# Patient Record
Sex: MALE | Race: WHITE | Employment: OTHER | URBAN - METROPOLITAN AREA
[De-identification: names, ages, dates, MRNs, and addresses within clinical notes are randomized per-mention and may not be internally consistent; named-entity substitution may affect disease eponyms.]

---

## 2016-11-08 LAB
CREATININE, EXTERNAL: 1.23
HBA1C MFR BLD HPLC: 6.8 %
LDL-C, EXTERNAL: 58
MICROALBUMIN UR TEST STR-MCNC: <3 MG/DL

## 2017-03-16 ENCOUNTER — OFFICE VISIT (OUTPATIENT)
Dept: INTERNAL MEDICINE CLINIC | Age: 67
End: 2017-03-16

## 2017-03-16 VITALS
SYSTOLIC BLOOD PRESSURE: 110 MMHG | RESPIRATION RATE: 16 BRPM | BODY MASS INDEX: 27.2 KG/M2 | DIASTOLIC BLOOD PRESSURE: 62 MMHG | HEART RATE: 75 BPM | OXYGEN SATURATION: 98 % | WEIGHT: 190 LBS | HEIGHT: 70 IN | TEMPERATURE: 97 F

## 2017-03-16 DIAGNOSIS — N52.9 ERECTILE DYSFUNCTION, UNSPECIFIED ERECTILE DYSFUNCTION TYPE: ICD-10-CM

## 2017-03-16 DIAGNOSIS — R10.31 RIGHT GROIN PAIN: ICD-10-CM

## 2017-03-16 DIAGNOSIS — E78.5 HYPERLIPIDEMIA WITH TARGET LDL LESS THAN 100: ICD-10-CM

## 2017-03-16 DIAGNOSIS — K21.9 GERD WITHOUT ESOPHAGITIS: ICD-10-CM

## 2017-03-16 DIAGNOSIS — I10 BENIGN ESSENTIAL HYPERTENSION: Primary | ICD-10-CM

## 2017-03-16 DIAGNOSIS — G47.33 SEVERE OBSTRUCTIVE SLEEP APNEA: ICD-10-CM

## 2017-03-16 RX ORDER — LOSARTAN POTASSIUM 25 MG/1
25 TABLET ORAL DAILY
Qty: 30 TAB | Refills: 2 | Status: SHIPPED | OUTPATIENT
Start: 2017-03-16 | End: 2018-01-19 | Stop reason: ALTCHOICE

## 2017-03-16 NOTE — PATIENT INSTRUCTIONS
Sleep Apnea: Care Instructions  Your Care Instructions  Sleep apnea means that you frequently stop breathing for 10 seconds or longer during sleep. It can be mild to severe, based on the number of times an hour that you stop breathing or have slowed breathing. Blocked or narrowed airways in your nose, mouth, or throat can cause sleep apnea. Your airway can become blocked when your throat muscles and tongue relax during sleep. You can treat sleep apnea at home by making lifestyle changes. You also can use a CPAP breathing machine that keeps tissues in the throat from blocking your airway. Or your doctor may suggest that you use a breathing device while you sleep. It helps keep your airway open. This could be a device that you put in your mouth. Other examples include strips or disks that you use on your nose. In some cases, surgery may be needed to remove enlarged tissues in the throat. Follow-up care is a key part of your treatment and safety. Be sure to make and go to all appointments, and call your doctor if you are having problems. It's also a good idea to know your test results and keep a list of the medicines you take. How can you care for yourself at home? · Lose weight, if needed. It may reduce the number of times you stop breathing or have slowed breathing. · Sleep on your side. It may stop mild apnea. If you tend to roll onto your back, sew a pocket in the back of your pajama top. Put a tennis ball into the pocket, and stitch the pocket shut. This will help keep you from sleeping on your back. · Avoid alcohol and medicines such as sleeping pills and sedatives before bed. · Do not smoke. Smoking can make sleep apnea worse. If you need help quitting, talk to your doctor about stop-smoking programs and medicines. These can increase your chances of quitting for good. · Prop up the head of your bed 4 to 6 inches by putting bricks under the legs of the bed.   · Treat breathing problems, such as a stuffy nose, caused by a cold or allergies. · Try a continuous positive airway pressure (CPAP) breathing machine if your doctor recommends it. The machine keeps your airway open when you sleep. · If CPAP does not work for you, ask your doctor if you can try other breathing machines. A bilevel positive airway pressure machine uses one type of air pressure for breathing in and another type for breathing out. Another device raises or lowers air pressure as needed while you breathe. · Talk to your doctor if:  ¨ Your nose feels dry or bleeds when you use one of these machines. You may need to increase moisture in the air. A humidifier may help. ¨ Your nose is runny or stuffy from using a breathing machine. Decongestants or a corticosteroid nasal spray may help. ¨ You are sleepy during the day and it gets in the way of the normal things you do. Do not drive when you are drowsy. When should you call for help? Watch closely for changes in your health, and be sure to contact your doctor if:  · You still have sleep apnea even though you have made lifestyle changes. · You are thinking of trying a device such as CPAP. · You are having problems using a CPAP or similar machine. Where can you learn more? Go to http://garo-kelle.info/. Enter O947 in the search box to learn more about \"Sleep Apnea: Care Instructions. \"  Current as of: May 23, 2016  Content Version: 11.1  © 5930-6786 [employer]. Care instructions adapted under license by [employer] (which disclaims liability or warranty for this information). If you have questions about a medical condition or this instruction, always ask your healthcare professional. [place] [address] any warranty or liability for your use of this information.

## 2017-03-16 NOTE — MR AVS SNAPSHOT
Visit Information Date & Time Provider Department Dept. Phone Encounter #  
 3/16/2017 12:40 PM Oumar Dejesus MD [place] [id number] [facility] [phone] Follow-up Instructions Return in about 6 months (around 9/16/2017) for establish care with new PCP for full physical.  
  
Upcoming Health Maintenance Date Due Hepatitis C Screening 1950 FOOT EXAM Q1 1/19/2016 MEDICARE YEARLY EXAM 2/5/2017 HEMOGLOBIN A1C Q6M 5/8/2017 MICROALBUMIN Q1 11/8/2017 DTaP/Tdap/Td series (2 - Td) 12/1/2017 EYE EXAM RETINAL OR DILATED Q1 1/19/2018 LIPID PANEL Q1 2/21/2018 GLAUCOMA SCREENING Q2Y 1/19/2019 COLONOSCOPY 6/9/2021 Allergies as of 3/16/2017  Review Complete On: 3/16/2017 By: Oumar Dejesus MD  
 No Active Allergies Current Immunizations  Reviewed on 7/24/2015 Name Date Influenza Vaccine 10/1/2016, 10/3/2015, 10/1/2014 Pneumococcal Conjugate (PCV-13) 7/24/2015  2:52 PM  
 Pneumococcal Polysaccharide (PPSV-23) 8/15/2014 Pneumococcal Vaccine (Unspecified Type) 12/1/2007, 12/1/2007 TDAP Vaccine 12/1/2007 Zoster Vaccine, Live 4/1/2011 Not reviewed this visit You Were Diagnosed With   
  
 Codes Comments Benign essential hypertension    -  Primary ICD-10-CM: I10 
ICD-9-CM: 401.1 Hyperlipidemia with target LDL less than 100     ICD-10-CM: E78.5 ICD-9-CM: 272.4 GERD without esophagitis     ICD-10-CM: K21.9 ICD-9-CM: 530.81 Severe obstructive sleep apnea     ICD-10-CM: G47.33 
ICD-9-CM: 327.23 Vitals BP Pulse Temp Resp Height(growth percentile) Weight(growth percentile) 110/62 (BP 1 Location: Left arm, BP Patient Position: Sitting) 75 97 °F (36.1 °C) (Oral) 16 5' 9.5\" (1.765 m) 190 lb (86.2 kg) SpO2 BMI Smoking Status 98% 27.66 kg/m2 Former Smoker BMI and BSA Data Body Mass Index Body Surface Area  
 27.66 kg/m 2 2.06 m 2 Preferred Pharmacy Pharmacy Name Phone [facility] [address], [facility] [address] [place] [address] [phone] Your Updated Medication List  
  
   
This list is accurate as of: 3/16/17  1:31 PM.  Always use your most recent med list.  
  
  
  
  
 aspirin 81 mg tablet Take 81 mg by mouth daily. atenolol 50 mg tablet Commonly known as:  TENORMIN Take 1 Tab by mouth daily. AXIRON 30 mg/actuation (1.5 mL) Slpm  
Generic drug:  testosterone 1 Pump(s) by TransDERmal route daily. CALCIUM 600 + D 600-125 mg-unit Tab Generic drug:  calcium-cholecalciferol (d3) Take 2 Tabs by mouth daily. canagliflozin 100 mg tablet Commonly known as:  Wyvonne Beech Take 300 mg by mouth Daily (before breakfast). CIALIS 5 mg tablet Generic drug:  tadalafil Take 5 mg by mouth daily. clobetasol 0.05 % external solution Commonly known as:  Ulysses Castaneda Apply  to affected area two (2) times a day. dexlansoprazole 30 mg capsule Commonly known as:  DEXILANT Take 1 capsule by mouth daily. * gabapentin 100 mg capsule Commonly known as:  NEURONTIN Take 300 mg by mouth three (3) times daily. * gabapentin 300 mg capsule Commonly known as:  NEURONTIN Take 1 Cap by mouth three (3) times daily. losartan 25 mg tablet Commonly known as:  COZAAR Take 1 Tab by mouth daily. metFORMIN ER 1,000 mg Tr24 Take 1,000 mg by mouth two (2) times a day. NOVOFINE 32 32 gauge x 1/4\" Ndle Generic drug:  Insulin Needles (Disposable) omeprazole 20 mg capsule Commonly known as:  PRILOSEC Take 20 mg by mouth daily as needed. ONETOUCH ULTRA TEST strip Generic drug:  glucose blood VI test strips TEST THREE TIMES DAILY  
  
 simvastatin 20 mg tablet Commonly known as:  ZOCOR Take 1 tablet by mouth daily. tamsulosin 0.4 mg capsule Commonly known as:  FLOMAX Take 0.4 mg by mouth daily. VICTOZA 3-DAGMAR 0.6 mg/0.1 mL (18 mg/3 mL) sub-q pen Generic drug:  Liraglutide 1.8 mg by SubCUTAneous route daily. VITAMIN B-12 1,000 mcg tablet Generic drug:  cyanocobalamin Take 1,000 mcg by mouth daily. VITAMIN D2 50,000 unit capsule Generic drug:  ergocalciferol Take 1 Cap by mouth every seven (7) days. * Notice: This list has 2 medication(s) that are the same as other medications prescribed for you. Read the directions carefully, and ask your doctor or other care provider to review them with you. Prescriptions Printed Refills  
 losartan (COZAAR) 25 mg tablet 2 Sig: Take 1 Tab by mouth daily. Class: Print Route: Oral  
  
Follow-up Instructions Return in about 6 months (around 9/16/2017) for establish care with new PCP for full physical.  
  
  
Patient Instructions Sleep Apnea: Care Instructions Your Care Instructions Sleep apnea means that you frequently stop breathing for 10 seconds or longer during sleep. It can be mild to severe, based on the number of times an hour that you stop breathing or have slowed breathing. Blocked or narrowed airways in your nose, mouth, or throat can cause sleep apnea. Your airway can become blocked when your throat muscles and tongue relax during sleep. You can treat sleep apnea at home by making lifestyle changes. You also can use a CPAP breathing machine that keeps tissues in the throat from blocking your airway. Or your doctor may suggest that you use a breathing device while you sleep. It helps keep your airway open. This could be a device that you put in your mouth. Other examples include strips or disks that you use on your nose. In some cases, surgery may be needed to remove enlarged tissues in the throat. Follow-up care is a key part of your treatment and safety.  Be sure to make and go to all appointments, and call your doctor if you are having problems. It's also a good idea to know your test results and keep a list of the medicines you take. How can you care for yourself at home? · Lose weight, if needed. It may reduce the number of times you stop breathing or have slowed breathing. · Sleep on your side. It may stop mild apnea. If you tend to roll onto your back, sew a pocket in the back of your pajama top. Put a tennis ball into the pocket, and stitch the pocket shut. This will help keep you from sleeping on your back. · Avoid alcohol and medicines such as sleeping pills and sedatives before bed. · Do not smoke. Smoking can make sleep apnea worse. If you need help quitting, talk to your doctor about stop-smoking programs and medicines. These can increase your chances of quitting for good. · Prop up the head of your bed 4 to 6 inches by putting bricks under the legs of the bed. · Treat breathing problems, such as a stuffy nose, caused by a cold or allergies. · Try a continuous positive airway pressure (CPAP) breathing machine if your doctor recommends it. The machine keeps your airway open when you sleep. · If CPAP does not work for you, ask your doctor if you can try other breathing machines. A bilevel positive airway pressure machine uses one type of air pressure for breathing in and another type for breathing out. Another device raises or lowers air pressure as needed while you breathe. · Talk to your doctor if: 
¨ Your nose feels dry or bleeds when you use one of these machines. You may need to increase moisture in the air. A humidifier may help. ¨ Your nose is runny or stuffy from using a breathing machine. Decongestants or a corticosteroid nasal spray may help. ¨ You are sleepy during the day and it gets in the way of the normal things you do. Do not drive when you are drowsy. When should you call for help? Watch closely for changes in your health, and be sure to contact your doctor if: · You still have sleep apnea even though you have made lifestyle changes. · You are thinking of trying a device such as CPAP. · You are having problems using a CPAP or similar machine. Where can you learn more? Go to http://garo-kelle.info/. Enter P637 in the search box to learn more about \"Sleep Apnea: Care Instructions. \" Current as of: May 23, 2016 Content Version: 11.1 © 0685-2744 [employer]. Care instructions adapted under license by [employer] (which disclaims liability or warranty for this information). If you have questions about a medical condition or this instruction, always ask your healthcare professional. [address] any warranty or liability for your use of this information. Introducing [facility]! Dear Reji Goldberg: Thank you for requesting a Zafgen account. Our records indicate that you already have an active Zafgen account. You can access your account anytime at [email or web address] Did you know that you can access your hospital and ER discharge instructions at any time in Zafgen? You can also review all of your test results from your hospital stay or ER visit. Additional Information If you have questions, please visit the Frequently Asked Questions section of the Zafgen website at [email or web address] Remember, Zafgen is NOT to be used for urgent needs. For medical emergencies, dial 911. Now available from your iPhone and Android! Please provide this summary of care documentation to your next provider. Your primary care clinician is listed as Lizy Spencer. If you have any questions after today's visit, please call [phone].

## 2017-03-16 NOTE — PROGRESS NOTES
HPI:  Josafat Gonzalez is a 74y.o. year old male  who returns to clinic today for routine follow up appointment to discuss the issues below:    Here for f/u htn and hld. Adherent to current regimen. Also has diabetes since 2006 -followed by Dr. Suki Castañeda. Labs reviewed from 2/20/17   Peripheral neuropathy diagnosed at same time as diabetes on gabapentin. No retinopathy or microalbuminuria. LDL at goal (< 100), A1c 6.8 %, Cr 0.9  He walks a couple miles daily for exercise and feels well doing so. Dr. Juan Verde monitors his prostate - has BPH, hypogonadism and erectile dysfunction. Colonoscopy UTD, 2011 w/ repeat in 10 yrs. GERD - on Dexilant for years. EGD w Dr. Laquita Mejia several years ago. Sx controlled. H/o colitis no episode in a long while. Sleep apnea, severe- did take home study through Dr. Suki Castañeda in [facility] to sleep physician but was never contacted to get the machine. At end of visit he mentions a pain in his right groin, no injury, occurs when lifting leg up. Prior to Admission medications    Medication Sig Start Date End Date Taking? Authorizing Provider   VITAMIN D2 50,000 unit capsule Take 1 Cap by mouth every seven (7) days. 4/28/16  Yes Historical Provider   gabapentin (NEURONTIN) 300 mg capsule Take 1 Cap by mouth three (3) times daily. 4/26/16  Yes Historical Provider   metFORMIN ER 1,000 mg tr24 Take 1,000 mg by mouth two (2) times a day. 1/18/16  Yes Historical Provider   NOVOFINE 32 32 gauge x 1/4\" ndle  1/25/16  Yes Historical Provider   canagliflozin (INVOKANA) 100 mg tablet Take 300 mg by mouth Daily (before breakfast). Yes Historical Provider   cyanocobalamin (VITAMIN B-12) 1,000 mcg tablet Take 1,000 mcg by mouth daily. Yes Historical Provider   tamsulosin (FLOMAX) 0.4 mg capsule Take 0.4 mg by mouth daily. 11/8/15  Yes Historical Provider   clobetasol (TEMOVATE) 0.05 % external solution Apply  to affected area two (2) times a day. 6/19/15  Yes Historical Provider   ONETOUCH ULTRA TEST strip TEST THREE TIMES DAILY 6/19/15  Yes Suhail Cruz MD   omeprazole (PRILOSEC) 20 mg capsule Take 20 mg by mouth daily as needed. Yes Historical Provider   tadalafil (CIALIS) 5 mg tablet Take 5 mg by mouth daily. Yes Historical Provider   dexlansoprazole (DEXILANT) 30 mg capsule Take 1 capsule by mouth daily. 10/8/14  Yes Buddy Nayak NP   simvastatin (ZOCOR) 20 mg tablet Take 1 tablet by mouth daily. 10/8/14  Yes Buddy Nayak NP   testosterone (AXIRON) 30 mg/1.5 mL /actuation slpm 1 Pump(s) by TransDERmal route daily. Yes Historical Provider   calcium-cholecalciferol, d3, (CALCIUM 600 + D) 600-125 mg-unit tab Take 2 Tabs by mouth daily. Yes Historical Provider   gabapentin (NEURONTIN) 100 mg capsule Take 300 mg by mouth three (3) times daily. Yes Historical Provider   Liraglutide (VICTOZA 3-DAGMAR) 0.6 mg/0.1 mL (18 mg/3 mL) sub-q pen 1.8 mg by SubCUTAneous route daily. Yes Historical Provider   atenolol (TENORMIN) 50 mg tablet Take 1 Tab by mouth daily. 10/8/13  Yes Rachel Brown MD   aspirin 81 mg tablet Take 81 mg by mouth daily. 9/15/11  Yes Historical Provider          No Active Allergies        Review of Systems   Constitutional: Negative for chills, fever and malaise/fatigue. HENT: Negative for congestion. Respiratory: Negative for cough, shortness of breath and wheezing. Cardiovascular: Negative for chest pain, palpitations and leg swelling. Gastrointestinal: Negative for abdominal pain, blood in stool and heartburn. Genitourinary: Positive for frequency. Musculoskeletal: Negative for falls, joint pain and myalgias. Neurological: Negative for dizziness and headaches. Physical Exam   Constitutional: He appears well-nourished. Neck: Carotid bruit is not present. Cardiovascular: Normal rate, regular rhythm and normal heart sounds. No murmur heard.   Pulses:       Carotid pulses are 2+ on the right side, and 2+ on the left side. Pulmonary/Chest: Effort normal and breath sounds normal.   Abdominal: Soft. Bowel sounds are normal. There is no hepatosplenomegaly. There is no tenderness. Musculoskeletal: He exhibits no edema. Right hip: He exhibits tenderness (with internal rotation of hip). Lymphadenopathy:        Right: No inguinal adenopathy present. Visit Vitals    /62 (BP 1 Location: Left arm, BP Patient Position: Sitting)    Pulse 75    Temp 97 °F (36.1 °C) (Oral)    Resp 16    Ht 5' 9.5\" (1.765 m)    Wt 190 lb (86.2 kg)    SpO2 98%    BMI 27.66 kg/m2         Assessment & Plan:  Jammie Hayward was seen today for hypertension. Diagnoses and all orders for this visit:    Benign essential hypertension  bp low normal today. Has been on atenolol for years - not favorable for diabetics or males with ED (and the ED is one of his major complaints). I recommend a change to losartan. His wife had problems with either an Ace or an Arb and he prefers to avoid the one she was on, he will call if it was losartan. -     losartan (COZAAR) 25 mg tablet; Take 1 Tab by mouth daily. Hyperlipidemia with target LDL less than 100  I evaluated and recommended to continue current doses of medications.      GERD without esophagitis  I evaluated and recommended to continue current doses of medications.      Severe obstructive sleep apnea  Counseled today at length about the implications of untreated sleep apnea. Strongly encouraged him to call his sleep physician to enquire about mask and give a trial.     Erectile dysfunction, unspecified erectile dysfunction type  Continue medication management per Dr. Michel Neely    Right groin pain  Suspect hip arthritis. Plain film today.     -     XR HIP RT W OR WO PELV 2-3 VWS; Future      Follow-up Disposition:  Return in about 6 months (around 9/16/2017) for establish care with new PCP for full physical.   Advised him to call back or return to office if symptoms worsen/change/persist.  Discussed expected course/resolution/complications of diagnosis in detail with patient. Medication risks/benefits/costs/interactions/alternatives discussed with patient. He was given an after visit summary which includes diagnoses, current medications, & vitals. He expressed understanding with the diagnosis and plan.

## 2017-03-16 NOTE — PROGRESS NOTES
Chief Complaint   Patient presents with    Hypertension     f/u     Pt here for f/u hypertension. Pt states he received message concerning colon screening.

## 2017-09-07 ENCOUNTER — APPOINTMENT (OUTPATIENT)
Dept: PHYSICAL THERAPY | Age: 67
End: 2017-09-07

## 2017-12-19 ENCOUNTER — OFFICE VISIT (OUTPATIENT)
Dept: DERMATOLOGY | Facility: AMBULATORY SURGERY CENTER | Age: 67
End: 2017-12-19

## 2017-12-19 VITALS
HEIGHT: 70 IN | DIASTOLIC BLOOD PRESSURE: 84 MMHG | WEIGHT: 195 LBS | HEART RATE: 78 BPM | BODY MASS INDEX: 27.92 KG/M2 | TEMPERATURE: 97.8 F | RESPIRATION RATE: 18 BRPM | SYSTOLIC BLOOD PRESSURE: 130 MMHG | OXYGEN SATURATION: 96 %

## 2017-12-19 DIAGNOSIS — C44.319 BASAL CELL CARCINOMA OF LEFT FOREHEAD: Primary | ICD-10-CM

## 2017-12-19 NOTE — PATIENT INSTRUCTIONS
WOUND CARE INSTRUCTIONS    1. Keep the dressing clean and dry and do not remove for 48 hours. 2. Then change the dressing once a day as follows:  a. Wash hands before and after each dressing change. b. Remove dressing and wash site gently with mild soap and water, rinse, and pat dry.  c. Apply an ointment (Bacitracin, Polysporin, Neosporin, Petroleum jelly or Aquaphor). d. Apply a non-stick (Telfa) dressing or Band-Aid to cover the wound. Remove pressure bandage on Thursday, then wash gently and apply a thin layer Vaseline and a band-aid to site daily for 1 week. 3. Watch for:  BLEEDING: A small amount of drainage may occur. If bleeding occurs, elevate and rest the surgery site. Apply gauze and steady pressure for 15 minutes. If bleeding continues, call this office. INFECTION: Signs of infection include increased redness, pain, warmth, drainage of pus, and fever. If this occurs, call this office. 4. Special Instructions (follow any that are checked):  · [x] You have stitches that DO NOT need to be removed. · [x] Avoid bending at the waist and heavy lifting for two days. · [x] Sleep with your head elevated for the next two nights. · [x] Rest the surgery site and keep it elevated as much as possible for two days. · [x] You may apply an ice-pack for 10-15 minutes every waking hour for the rest of the day. · [] Eat a soft diet and avoid hot food and hot drinks for the rest of the day. · [] Other instructions: Follow up as directed. Take Tylenol or Ibuprofen for pain as needed. Once the site is healed with no remaining bandages or open areas, protect your surgical site and scar from the sun, as this area will be more sensitive. Use a broad spectrum sunscreen SPF 30 or higher daily, and a chemical free product (one containing zinc oxide or titanium dioxide) is a good choice if the area is sensitive.     You may begin to gently massage the surgical site in 2-3 weeks, rubbing in a circular motion along the scar. This can help reduce swelling and thickness of a scar. A scar cream may be used beginnning 1 month after the surgery. If you have any questions or concerns, please call our office Monday through Friday at [phone].

## 2017-12-19 NOTE — MR AVS SNAPSHOT
Visit Information Date & Time Provider Department Dept. Phone Encounter #  
 12/19/2017  1:00 PM Tim Henry MD [facility] [id number] Your Appointments 1/19/2018 10:40 AM  
New Patient with Cynthia Lucio MD  
Critical access hospital Internal Medicine [facility]) Appt Note: Np est pcp CP: PD: 9/7/17 Shi Wetzel from [address] [place], [place] [address]e [address] [address] [address] [address] Upcoming Health Maintenance Date Due Hepatitis C Screening 1950 FOOT EXAM Q1 1/19/2016 MEDICARE YEARLY EXAM 2/5/2017 Influenza Age 5 to Adult 8/1/2017 MICROALBUMIN Q1 11/8/2017 DTaP/Tdap/Td series (2 - Td) 12/1/2017 EYE EXAM RETINAL OR DILATED Q1 1/19/2018 HEMOGLOBIN A1C Q6M 5/4/2018 LIPID PANEL Q1 11/4/2018 GLAUCOMA SCREENING Q2Y 1/19/2019 COLONOSCOPY 6/9/2021 Allergies as of 12/19/2017  Review Complete On: 12/19/2017 By: Rivas Murdock RN No Known Allergies Current Immunizations  Reviewed on 7/24/2015 Name Date Influenza Vaccine 10/1/2016, 10/3/2015, 10/1/2014 Pneumococcal Conjugate (PCV-13) 7/24/2015  2:52 PM  
 Pneumococcal Polysaccharide (PPSV-23) 8/15/2014 Pneumococcal Vaccine (Unspecified Type) 12/1/2007 TDAP Vaccine 12/1/2007 ZZZ-RETIRED (DO NOT USE) Pneumococcal Vaccine (Unspecified Type) 12/1/2007 Zoster Vaccine, Live 4/1/2011 Not reviewed this visit Vitals BP Pulse Temp Resp Height(growth percentile) Weight(growth percentile) 130/84 (BP 1 Location: Left arm, BP Patient Position: Sitting) 78 97.8 °F (36.6 °C) (Oral) 18 5' 9.5\" (1.765 m) 195 lb (88.5 kg) SpO2 BMI Smoking Status 96% 28.38 kg/m2 Former Smoker BMI and BSA Data Body Mass Index Body Surface Area  
 28.38 kg/m 2 2.08 m 2 Preferred Pharmacy Pharmacy Name Phone [address] [address] [facility] [place] [address] [address] [phone] Your Updated Medication List  
  
   
This list is accurate as of: 12/19/17  1:45 PM.  Always use your most recent med list.  
  
  
  
  
 aspirin 81 mg tablet Take 81 mg by mouth daily. atenolol 50 mg tablet Commonly known as:  TENORMIN Take 1 Tab by mouth daily. AXIRON 30 mg/actuation (1.5 mL) Slpm  
Generic drug:  testosterone 1 Pump(s) by TransDERmal route daily. CALCIUM 600 + D 600-125 mg-unit Tab Generic drug:  calcium-cholecalciferol (d3) Take 2 Tabs by mouth daily. canagliflozin 100 mg tablet Commonly known as:  Lannette Deutscher Take 300 mg by mouth Daily (before breakfast). CIALIS 5 mg tablet Generic drug:  tadalafil Take 5 mg by mouth daily. clobetasol 0.05 % external solution Commonly known as:  Marcha Sous Apply  to affected area two (2) times a day. dexlansoprazole 30 mg capsule Commonly known as:  DEXILANT Take 1 capsule by mouth daily. * gabapentin 100 mg capsule Commonly known as:  NEURONTIN Take 300 mg by mouth three (3) times daily. * gabapentin 300 mg capsule Commonly known as:  NEURONTIN Take 1 Cap by mouth three (3) times daily. losartan 25 mg tablet Commonly known as:  COZAAR Take 1 Tab by mouth daily. metFORMIN ER 1,000 mg Tr24 Take 1,000 mg by mouth two (2) times a day. NOVOFINE 32 32 gauge x 1/4\" Ndle Generic drug:  Insulin Needles (Disposable) omeprazole 20 mg capsule Commonly known as:  PRILOSEC Take 20 mg by mouth daily as needed. ONETOUCH ULTRA TEST strip Generic drug:  glucose blood VI test strips TEST THREE TIMES DAILY  
  
 simvastatin 20 mg tablet Commonly known as:  ZOCOR Take 1 tablet by mouth daily. tamsulosin 0.4 mg capsule Commonly known as:  FLOMAX Take 0.4 mg by mouth daily. VICTOZA 3-DAGMAR 0.6 mg/0.1 mL (18 mg/3 mL) Pnij Generic drug:  Liraglutide 1.8 mg by SubCUTAneous route daily. VITAMIN B-12 1,000 mcg tablet Generic drug:  cyanocobalamin Take 1,000 mcg by mouth daily. VITAMIN D2 50,000 unit capsule Generic drug:  ergocalciferol Take 1 Cap by mouth every seven (7) days. * Notice: This list has 2 medication(s) that are the same as other medications prescribed for you. Read the directions carefully, and ask your doctor or other care provider to review them with you. Patient Instructions WOUND CARE INSTRUCTIONS 1. Keep the dressing clean and dry and do not remove for 48 hours. 2. Then change the dressing once a day as follows: 
a. Wash hands before and after each dressing change. b. Remove dressing and wash site gently with mild soap and water, rinse, and pat dry. 
c. Apply an ointment (Bacitracin, Polysporin, Neosporin, Petroleum jelly or Aquaphor). d. Apply a non-stick (Telfa) dressing or Band-Aid to cover the wound. Remove pressure bandage on Thursday, then wash gently and apply a thin layer Vaseline and a band-aid to site daily for 1 week. 3. Watch for: BLEEDING: A small amount of drainage may occur. If bleeding occurs, elevate and rest the surgery site. Apply gauze and steady pressure for 15 minutes. If bleeding continues, call this office. INFECTION: Signs of infection include increased redness, pain, warmth, drainage of pus, and fever. If this occurs, call this office. 4. Special Instructions (follow any that are checked): ·  You have stitches that DO NOT need to be removed. ·  Avoid bending at the waist and heavy lifting for two days. ·  Sleep with your head elevated for the next two nights. ·  Rest the surgery site and keep it elevated as much as possible for two days. ·  You may apply an ice-pack for 10-15 minutes every waking hour for the rest of the day. ·  Eat a soft diet and avoid hot food and hot drinks for the rest of the day. ·  Other instructions: Follow up as directed. Take Tylenol or Ibuprofen for pain as needed. Once the site is healed with no remaining bandages or open areas, protect your surgical site and scar from the sun, as this area will be more sensitive. Use a broad spectrum sunscreen SPF 30 or higher daily, and a chemical free product (one containing zinc oxide or titanium dioxide) is a good choice if the area is sensitive. You may begin to gently massage the surgical site in 2-3 weeks, rubbing in a circular motion along the scar. This can help reduce swelling and thickness of a scar. A scar cream may be used beginnning 1 month after the surgery. If you have any questions or concerns, please call our office Monday through Friday at [phone]. Introducing [facility] & [facility] SERVICES! Dear Asa Spears: Thank you for requesting a [employer] account. Our records indicate that you already have an active [employer] account. You can access your account anytime at [email or web address] Did you know that you can access your hospital and ER discharge instructions at any time in [employer]? You can also review all of your test results from your hospital stay or ER visit. Additional Information If you have questions, please visit the Frequently Asked Questions section of the [employer] website at [email or web address] Remember, [employer] is NOT to be used for urgent needs. For medical emergencies, dial 911. Now available from your iPhone and Android! Please provide this summary of care documentation to your next provider. Your primary care clinician is listed as [address]. If you have any questions after today's visit, please call [phone].

## 2017-12-19 NOTE — PROGRESS NOTES
Pre-op: Patient presents today for the evaluation of BCC to the left forehead. Procedure explained with full understanding. Vitals:    12/19/17 1327   BP: 130/84   Pulse: 78   Resp: 18   Temp: 97.8 °F (36.6 °C)   TempSrc: Oral   SpO2: 96%   Weight: 88.5 kg (195 lb)   Height: 5' 9.5\" (1.765 m)     preoperatively, will continue to monitor. Post-op: Written and verbal post-op wound care instructions given to patient with full understanding of care. Surgical wound bandaged with Vaseline, Telfa, 2x2 gauze, and coverall tape. All questions and concerns addressed. Vitals stable postoperatively.

## 2017-12-19 NOTE — PROGRESS NOTES
Chief complaint: Basal cell carcinoma on the left forehead    History of present illness: Mr. Zee Velasco is a 20-year-old man referred by Dr. Marilynn Rodgers. He has a new biopsy proven basal cell carcinoma on his left forehead. This was an asymptomatic lesion detected by Dr. Marilynn Rodgers during his recent skin examination. He has a prior history of squamous cell carcinoma, I treated 1 on his right temple 2 years ago. That site has healed well with a scar. He is feeling well and in his usual state of health today. He has no pain, no current illnesses, no other skin concerns. His allergies medications medical and social history are reviewed by me today. Exam: He is awake alert well appearing man in no distress. There is no preauricular, submandibular, or cervical lymphadenopathy. I examined his face. He has a well-healed scar at the right temple without evidence of recurrent skin cancer. His left forehead has a subtle scar which corresponds to the recent biopsy, location is confirmed using a photograph that he took after the biopsy. Assessment/plan:  1. History of skin cancers. He will see Dr. Marilynn Rodgers routinely for surveillance. 2. Basal cell carcinoma, left fore head. We discussed the diagnosis. Mohs surgery is indicated by site and poor definition. The procedure was discussed, verbal and written consent were obtained. I performed the procedure. 1 stage was required to reach a tumor free plane. The surgical defect was managed with intermediate layered closure. There were no complications. He will followup as needed as the site heals.     [facility]  OFFICE PROCEDURE PROGRESS NOTE        Chart reviewed for the following:   Deanna Karimi MD, have reviewed the History, Physical and updated the Allergic reactions for Moncho Lopez Hafsa performed immediately prior to start of procedure:   Deanna Karimi MD, have performed the following reviews on Mauricio Angela prior to the start of the procedure:            * Patient was identified by name and date of birth   * Agreement on procedure being performed was verified  * Risks and Benefits explained to the patient  * Procedure site verified and marked as necessary  * Patient was positioned for comfort  * Consent was signed and verified     Time:       Date of procedure: 12/19/2017    Procedure performed by:  Olga Ayala MD    Provider assisted by:  LPN    Patient assisted by: self    How tolerated by patient: tolerated the procedure well with no complications    Post Procedural Pain Scale: 0 - No Hurt    Comments: none

## 2018-01-19 ENCOUNTER — OFFICE VISIT (OUTPATIENT)
Dept: INTERNAL MEDICINE CLINIC | Age: 68
End: 2018-01-19

## 2018-01-19 VITALS
RESPIRATION RATE: 19 BRPM | OXYGEN SATURATION: 97 % | HEART RATE: 76 BPM | TEMPERATURE: 97.7 F | DIASTOLIC BLOOD PRESSURE: 72 MMHG | SYSTOLIC BLOOD PRESSURE: 120 MMHG | BODY MASS INDEX: 29.38 KG/M2 | HEIGHT: 70 IN | WEIGHT: 205.2 LBS

## 2018-01-19 DIAGNOSIS — K21.9 GERD WITHOUT ESOPHAGITIS: ICD-10-CM

## 2018-01-19 DIAGNOSIS — E78.5 HYPERLIPIDEMIA WITH TARGET LDL LESS THAN 100: ICD-10-CM

## 2018-01-19 DIAGNOSIS — E11.42 CONTROLLED TYPE 2 DIABETES MELLITUS WITH DIABETIC POLYNEUROPATHY, WITHOUT LONG-TERM CURRENT USE OF INSULIN (HCC): Primary | ICD-10-CM

## 2018-01-19 DIAGNOSIS — I10 BENIGN ESSENTIAL HYPERTENSION: ICD-10-CM

## 2018-01-19 DIAGNOSIS — S46.012A ROTATOR CUFF STRAIN, LEFT, INITIAL ENCOUNTER: ICD-10-CM

## 2018-01-19 RX ORDER — DAPAGLIFLOZIN 10 MG/1
10 TABLET, FILM COATED ORAL DAILY
COMMUNITY
Start: 2017-11-20 | End: 2019-04-03

## 2018-01-19 RX ORDER — METOPROLOL SUCCINATE 25 MG/1
25 TABLET, EXTENDED RELEASE ORAL DAILY
COMMUNITY
Start: 2017-11-11

## 2018-01-19 RX ORDER — DULAGLUTIDE 1.5 MG/.5ML
1.5 INJECTION, SOLUTION SUBCUTANEOUS
COMMUNITY
Start: 2018-01-18 | End: 2019-04-03

## 2018-01-19 NOTE — PROGRESS NOTES
Chief Complaint   Patient presents with   [address] [place] [address]     Left arm stiffness- few months      1. Have you been to the ER, urgent care clinic since your last visit? Hospitalized since your last visit? No    2. Have you seen or consulted any other health care providers outside of the [address] [place] [address] since your last visit? Include any pap smears or colon screening.  No

## 2018-01-19 NOTE — PROGRESS NOTES
HISTORY OF PRESENT ILLNESS  Dasia Resendiz is a 69 y.o. male. HPI  New patient to me. Previously saw Dr. Alfredo Saha and Dr. Melissa Finley in the past.     Hx of DM. Followed by Dr. Selena Kennedy. Officially diagnosed in [address] [place] [address]. Last A1C in November was 6.7. Slowly creeping upwards. Making some medication changes recently secondary to formulary. Neuropathy bilat feet controlled with gabapentin. Trying to follow DM diet. Does not exercise regularly, had been walking in the past but fell out of habit. Sees Dr. Melody Brantley for BPH. Symptoms farily well controlled with current medications. Has been Dr. Philipp Solano for right hip pain. Told may need hip replacement in the future. Also with gerd. Hx of collagenous collitis - saw Dr. Kian Casanova in past.  NO recent flares. Left shoulder pain. Stiff, worse lifting overhead. Can radiate to back. Similar issues a couple of years ago, PT helped. Did not require steroid injection. Pain does not radiate down arm. Denies injury to area. Chronic pain LLQ which radiates to penis. Intermittent since 1996 but unchanged since that time. Dr. Melody Brantley sent him to PT, had dry needling. Sitting the wrong way tends to trigger the pain. Occurs every day but \"has learned to ignore it\". Got somewhat better post hernia surgery in 1998. Had repeat hernia surgery without resolution of pain. Looked at back, had injections through Dr. Gosia Barron without improvement. Current Outpatient Prescriptions:     TRULICITY 1.5 QG/8.3 mL sub-q pen, , Disp: , Rfl:     VITAMIN D2 50,000 unit capsule, Take 1 Cap by mouth every seven (7) days. , Disp: , Rfl:     gabapentin (NEURONTIN) 300 mg capsule, Take 1 Cap by mouth three (3) times daily. , Disp: , Rfl:     metFORMIN ER 1,000 mg tr24, Take 1,000 mg by mouth two (2) times a day., Disp: , Rfl:     NOVOFINE 32 32 gauge x 1/4\" ndle, , Disp: , Rfl:     canagliflozin (INVOKANA) 100 mg tablet, Take 300 mg by mouth Daily (before breakfast). , Disp: , Rfl:     cyanocobalamin (VITAMIN B-12) 1,000 mcg tablet, Take 1,000 mcg by mouth daily. , Disp: , Rfl:     tamsulosin (FLOMAX) 0.4 mg capsule, Take 0.4 mg by mouth daily. , Disp: , Rfl: 4    ONETOUCH ULTRA TEST strip, TEST THREE TIMES DAILY, Disp: 300 Each, Rfl: 1    omeprazole (PRILOSEC) 20 mg capsule, Take 20 mg by mouth daily as needed. , Disp: , Rfl:     tadalafil (CIALIS) 5 mg tablet, Take 5 mg by mouth daily. , Disp: , Rfl:     dexlansoprazole (DEXILANT) 30 mg capsule, Take 1 capsule by mouth daily. , Disp: 90 capsule, Rfl: 1    simvastatin (ZOCOR) 20 mg tablet, Take 1 tablet by mouth daily. , Disp: 90 tablet, Rfl: 1    testosterone (AXIRON) 30 mg/1.5 mL /actuation slpm, 1 Pump(s) by TransDERmal route daily. , Disp: , Rfl:     calcium-cholecalciferol, d3, (CALCIUM 600 + D) 600-125 mg-unit tab, Take 2 Tabs by mouth daily. , Disp: , Rfl:     atenolol (TENORMIN) 50 mg tablet, Take 1 Tab by mouth daily. , Disp: 90 Tab, Rfl: 3    aspirin 81 mg tablet, Take 81 mg by mouth daily. , Disp: , Rfl:     FARXIGA 10 mg tab tablet, , Disp: , Rfl:     metoprolol succinate (TOPROL-XL) 25 mg XL tablet, , Disp: , Rfl:     clobetasol (TEMOVATE) 0.05 % external solution, Apply  to affected area two (2) times a day., Disp: , Rfl:     Visit Vitals    /72    Pulse 76    Temp 97.7 °F (36.5 °C) (Oral)    Resp 19    Ht 5' 9.5\" (1.765 m)    Wt 205 lb 3.2 oz (93.1 kg)    SpO2 97%    BMI 29.87 kg/m2       ROS  See above  Physical Exam   Constitutional: He appears well-developed and well-nourished. HENT:   Head: Normocephalic and atraumatic. Neck: Neck supple. No thyromegaly present. Cardiovascular: Normal rate, regular rhythm and normal heart sounds. Exam reveals no gallop and no friction rub. No murmur heard. Pulmonary/Chest: Effort normal and breath sounds normal.   Abdominal: Soft. Bowel sounds are normal. He exhibits no distension and no mass.  There is tenderness (mild llq but no rebound or guarding. ). Musculoskeletal: He exhibits no edema. Lymphadenopathy:     He has no cervical adenopathy. Vitals reviewed. ASSESSMENT and PLAN  DM type II with neuropathy- med changes as above, f/u with Dr. Chad Harkins  Hyperlipidemia - controlled in past, continue same  htn - controlled, cont same  BPH - controlled, f/u with urology  Chronic LLQ pain - unknown etiology but full w/u in past without cause and has not changed. Will continue to follow. GERD 0 controlled, cont same  Right hip OA - f/u with Dr. Pastor Vyas, may need THR in future. Left shoulder strain - recurrent issue. Will refer back to PT.     Orders Placed This Encounter    REFERRAL TO PHYSICAL THERAPY    TRULICITY 1.5 ND/9.0 mL sub-q pen    FARXIGA 10 mg tab tablet    metoprolol succinate (TOPROL-XL) 25 mg XL tablet     Follow-up Disposition: Not on File

## 2018-01-19 NOTE — MR AVS SNAPSHOT
[address] [place] [address] [address] [place] [address] 
[phone] Patient: Ofe Perry MRN: [id number] Visit Information Date & Time Provider Department Dept. Phone Encounter #  
 1/19/2018 10:40 AM Ara Loera MD [facility] [phone] [id number] Upcoming Health Maintenance Date Due Hepatitis C Screening 1950 FOOT EXAM Q1 1/19/2016 MEDICARE YEARLY EXAM 2/5/2017 MICROALBUMIN Q1 11/8/2017 DTaP/Tdap/Td series (2 - Td) 12/1/2017 EYE EXAM RETINAL OR DILATED Q1 1/19/2018 HEMOGLOBIN A1C Q6M 5/4/2018 LIPID PANEL Q1 11/4/2018 GLAUCOMA SCREENING Q2Y 1/19/2019 COLONOSCOPY 6/9/2021 Allergies as of 1/19/2018  Review Complete On: 1/19/2018 By: Ara Loera MD  
  
 Severity Noted Reaction Type Reactions Pcn [Penicillins]  09/15/2011    Other (comments) Infancy was told he had a rash Current Immunizations  Reviewed on 7/24/2015 Name Date Influenza Vaccine 10/17/2017, 10/1/2016, 10/3/2015, 10/1/2014 Pneumococcal Conjugate (PCV-13) 7/24/2015  2:52 PM  
 Pneumococcal Polysaccharide (PPSV-23) 8/15/2014 Pneumococcal Vaccine (Unspecified Type) 12/1/2007 TDAP Vaccine 12/1/2007 ZZZ-RETIRED (DO NOT USE) Pneumococcal Vaccine (Unspecified Type) 12/1/2007 Zoster Vaccine, Live 4/1/2011 Not reviewed this visit You Were Diagnosed With   
  
 Codes Comments Rotator cuff strain, left, initial encounter    -  Primary ICD-10-CM: I25.997O ICD-9-CM: 840.4 Controlled type 2 diabetes mellitus with diabetic polyneuropathy, without long-term current use of insulin (HCC)     ICD-10-CM: E11.42 
ICD-9-CM: 250.60, 357.2 Severe obstructive sleep apnea     ICD-10-CM: G47.33 
ICD-9-CM: 327.23 Hyperlipidemia with target LDL less than 100     ICD-10-CM: E78.5 ICD-9-CM: 272.4  Benign essential hypertension     ICD-10-CM: I10 
 ICD-9-CM: 401.1 Vitals BP Pulse Temp Resp Height(growth percentile) Weight(growth percentile) 120/72 76 97.7 °F (36.5 °C) (Oral) 19 5' 9.5\" (1.765 m) 205 lb 3.2 oz (93.1 kg) SpO2 BMI Smoking Status 97% 29.87 kg/m2 Former Smoker Vitals History BMI and BSA Data Body Mass Index Body Surface Area  
 29.87 kg/m 2 2.14 m 2 Preferred Pharmacy Pharmacy Name Phone [address], [facility] [address] [phone] Your Updated Medication List  
  
   
This list is accurate as of: 1/19/18 11:54 AM.  Always use your most recent med list.  
  
  
  
  
 aspirin 81 mg tablet Take 81 mg by mouth daily. atenolol 50 mg tablet Commonly known as:  TENORMIN Take 1 Tab by mouth daily. AXIRON 30 mg/actuation (1.5 mL) Slpm  
Generic drug:  testosterone 1 Pump(s) by TransDERmal route daily. CALCIUM 600 + D 600-125 mg-unit Tab Generic drug:  calcium-cholecalciferol (d3) Take 2 Tabs by mouth daily. canagliflozin 100 mg tablet Commonly known as:  Azul Levers Take 300 mg by mouth Daily (before breakfast). CIALIS 5 mg tablet Generic drug:  tadalafil Take 5 mg by mouth daily. clobetasol 0.05 % external solution Commonly known as:  Jose Hipps Apply  to affected area two (2) times a day. dexlansoprazole 30 mg capsule Commonly known as:  DEXILANT Take 1 capsule by mouth daily. FARXIGA 10 mg Tab tablet Generic drug:  dapagliflozin  
  
 gabapentin 300 mg capsule Commonly known as:  NEURONTIN Take 1 Cap by mouth three (3) times daily. metFORMIN ER 1,000 mg Tr24 Take 1,000 mg by mouth two (2) times a day. metoprolol succinate 25 mg XL tablet Commonly known as:  TOPROL-XL  
  
 NOVOFINE 32 32 gauge x 1/4\" Ndle Generic drug:  Insulin Needles (Disposable) omeprazole 20 mg capsule Commonly known as:  PRILOSEC  
 Take 20 mg by mouth daily as needed. ONETOUCH ULTRA TEST strip Generic drug:  glucose blood VI test strips TEST THREE TIMES DAILY  
  
 simvastatin 20 mg tablet Commonly known as:  ZOCOR Take 1 tablet by mouth daily. tamsulosin 0.4 mg capsule Commonly known as:  FLOMAX Take 0.4 mg by mouth daily. TRULICITY 1.5 JU/9.1 mL sub-q pen Generic drug:  dulaglutide VITAMIN B-12 1,000 mcg tablet Generic drug:  cyanocobalamin Take 1,000 mcg by mouth daily. VITAMIN D2 50,000 unit capsule Generic drug:  ergocalciferol Take 1 Cap by mouth every seven (7) days. We Performed the Following REFERRAL TO PHYSICAL THERAPY [XLB94 Custom] Comments:  
 Left rotator cuff strain with anterior pain and decreased mobility Referral Information Referral ID Referred By Referred To  
  
 [id number] SHERITA Garcia [address] [place] [address] Medicine and Physical Therapy [address] Phone: [phone] Fax: [phone] Visits Status Start Date End Date 1 New Request 1/19/18 1/19/19 If your referral has a status of pending review or denied, additional information will be sent to support the outcome of this decision. Introducing [facility] & HEALTH SERVICES! Dear Angel Yen: Thank you for requesting a [employer] account. Our records indicate that you already have an active [employer] account. You can access your account anytime at [email or web address] Did you know that you can access your hospital and ER discharge instructions at any time in [employer]? You can also review all of your test results from your hospital stay or ER visit. Additional Information If you have questions, please visit the Frequently Asked Questions section of the [employer] website at [email or web address] Remember, [employer] is NOT to be used for urgent needs.  For medical emergencies, dial 911. Now available from your iPhone and Android! Please provide this summary of care documentation to your next provider. Your primary care clinician is listed as SHERITA PAVON. If you have any questions after today's visit, please call [phone].

## 2018-04-09 ENCOUNTER — TELEPHONE (OUTPATIENT)
Dept: INTERNAL MEDICINE CLINIC | Age: 68
End: 2018-04-09

## 2018-06-13 ENCOUNTER — OFFICE VISIT (OUTPATIENT)
Dept: INTERNAL MEDICINE CLINIC | Age: 68
End: 2018-06-13

## 2018-06-13 VITALS
OXYGEN SATURATION: 98 % | SYSTOLIC BLOOD PRESSURE: 133 MMHG | BODY MASS INDEX: 29.29 KG/M2 | HEART RATE: 75 BPM | DIASTOLIC BLOOD PRESSURE: 88 MMHG | RESPIRATION RATE: 20 BRPM | TEMPERATURE: 98 F | HEIGHT: 70 IN | WEIGHT: 204.6 LBS

## 2018-06-13 DIAGNOSIS — R17 YELLOW SKIN: Primary | ICD-10-CM

## 2018-06-13 DIAGNOSIS — S46.019A STRAIN OF ROTATOR CUFF CAPSULE, UNSPECIFIED LATERALITY, INITIAL ENCOUNTER: ICD-10-CM

## 2018-06-13 NOTE — PROGRESS NOTES
1. Have you been to the ER, urgent care clinic since your last visit? Hospitalized since your last [facility] for congestion and possible pneumonia. 2. Have you seen or consulted any other health care providers outside of the [address] [place] [address] since your last visit? Include any pap smears or colon screening. Endocrinologist Dr. Hussein Ovalles 5/2018 for follow up. Chief Complaint   Patient presents with    Hand Problem     hands turning yellow- noticed a couple of weeks ago; states ankles were also yellowing.      Shoulder Pain     bilateral shoulder pain for the last couple of months     Not fasting

## 2018-06-13 NOTE — MR AVS SNAPSHOT
[address] [place] [address] [address] [place] [address] 
[phone] Patient: Ignacio Fisher MRN: [id number] EBM:02/85/1114 Visit Information Date & Time Provider Department Dept. Phone Encounter #  
 6/13/2018  9:00 AM Jazmin Joshi MD [facility] [phone] [id number] Upcoming Health Maintenance Date Due Hepatitis C Screening 1950 FOOT EXAM Q1 1/19/2016 Influenza Age 5 to Adult 8/1/2018 HEMOGLOBIN A1C Q6M 8/9/2018 EYE EXAM RETINAL OR DILATED Q1 1/25/2019 MICROALBUMIN Q1 2/9/2019 LIPID PANEL Q1 2/9/2019 DTaP/Tdap/Td series (3 - Td) 1/1/2020 GLAUCOMA SCREENING Q2Y 1/25/2020 COLONOSCOPY 6/9/2021 Allergies as of 6/13/2018  Review Complete On: 6/13/2018 By: Jazmin Joshi MD  
  
 Severity Noted Reaction Type Reactions Pcn [Penicillins]  09/15/2011    Other (comments) Infancy was told he had a rash Current Immunizations  Reviewed on 7/24/2015 Name Date Influenza Vaccine 10/17/2017, 10/1/2016, 10/3/2015, 10/1/2014 Pneumococcal Conjugate (PCV-13) 7/24/2015  2:52 PM  
 Pneumococcal Polysaccharide (PPSV-23) 8/15/2014 Pneumococcal Vaccine (Unspecified Type) 12/1/2007 TDAP Vaccine 12/1/2007 Tdap 1/1/2010 ZZZ-RETIRED (DO NOT USE) Pneumococcal Vaccine (Unspecified Type) 12/1/2007 Zoster Vaccine, Live 4/1/2011 Not reviewed this visit You Were Diagnosed With   
  
 Codes Comments Yellow skin    -  Primary ICD-10-CM: R17 
ICD-9-CM: 782.4 Strain of rotator cuff capsule, unspecified laterality, initial encounter     ICD-10-CM: S46.019A 
ICD-9-CM: 840.4 Vitals BP Pulse Temp Resp Height(growth percentile) Weight(growth percentile) 133/88 (BP 1 Location: Left arm, BP Patient Position: Sitting) 75 98 °F (36.7 °C) (Oral) 20 5' 9.5\" (1.765 m) 204 lb 9.6 oz (92.8 kg) SpO2 BMI Smoking Status 98% 29.78 kg/m2 Former Smoker Vitals History BMI and BSA Data Body Mass Index Body Surface Area  
 29.78 kg/m 2 2.13 m 2 Preferred Pharmacy Pharmacy Name Phone [facility] [address], [facility] [address] [phone] Your Updated Medication List  
  
   
This list is accurate as of 6/13/18  9:33 AM.  Always use your most recent med list.  
  
  
  
  
 aspirin 81 mg tablet Take 81 mg by mouth daily. atenolol 50 mg tablet Commonly known as:  TENORMIN Take 1 Tab by mouth daily. AXIRON 30 mg/actuation (1.5 mL) Slpm  
Generic drug:  testosterone 1 Pump(s) by TransDERmal route daily. CALCIUM 600 + D 600-125 mg-unit Tab Generic drug:  calcium-cholecalciferol (d3) Take 2 Tabs by mouth daily. canagliflozin 100 mg tablet Commonly known as:  Britton Cordia Take 300 mg by mouth Daily (before breakfast). CIALIS 5 mg tablet Generic drug:  tadalafil Take 5 mg by mouth daily. clobetasol 0.05 % external solution Commonly known as:  Warnell January Apply  to affected area two (2) times a day. dexlansoprazole 30 mg capsule Commonly known as:  DEXILANT Take 1 capsule by mouth daily. FARXIGA 10 mg Tab tablet Generic drug:  dapagliflozin  
  
 gabapentin 300 mg capsule Commonly known as:  NEURONTIN Take 1 Cap by mouth three (3) times daily. metFORMIN ER 1,000 mg Tr24 Take 1,000 mg by mouth two (2) times a day. metoprolol succinate 25 mg XL tablet Commonly known as:  TOPROL-XL  
  
 NOVOFINE 32 32 gauge x 1/4\" Ndle Generic drug:  Insulin Needles (Disposable) omeprazole 20 mg capsule Commonly known as:  PRILOSEC Take 20 mg by mouth daily as needed. ONETOUCH ULTRA TEST strip Generic drug:  glucose blood VI test strips TEST THREE TIMES DAILY  
  
 simvastatin 20 mg tablet Commonly known as:  ZOCOR Take 1 tablet by mouth daily. tamsulosin 0.4 mg capsule Commonly known as:  FLOMAX Take 0.4 mg by mouth daily. TRULICITY 1.5 QE/3.7 mL sub-q pen Generic drug:  dulaglutide VITAMIN B-12 1,000 mcg tablet Generic drug:  cyanocobalamin Take 1,000 mcg by mouth daily. VITAMIN D2 50,000 unit capsule Generic drug:  ergocalciferol Take 1 Cap by mouth every seven (7) days. We Performed the Following HEPATIC FUNCTION PANEL [44191 CPT(R)] REFERRAL TO PHYSICAL THERAPY [PEM03 Custom] Referral Information Referral ID Referred By Referred To  
  
 [id number] SHERITA Garcia [address] [place] [address] Medicine and Physical Therapy [address] Phone: [phone] Fax: [phone] Visits Status Start Date End Date 1 New Request 6/13/18 6/13/19 If your referral has a status of pending review or denied, additional information will be sent to support the outcome of this decision. Introducing [address]! Dear Sugey Willoughby: Thank you for requesting a [employer] account. Our records indicate that you already have an active [employer] account. You can access your account anytime at [email or web address] Did you know that you can access your hospital and ER discharge instructions at any time in [employer]? You can also review all of your test results from your hospital stay or ER visit. Additional Information If you have questions, please visit the Frequently Asked Questions section of the [employer] website at [email or web address] Remember, [employer] is NOT to be used for urgent needs. For medical emergencies, dial 911. Now available from your iPhone and Android! Please provide this summary of care documentation to your next provider. Your primary care clinician is listed as SHERITA PAVON.  If you have any questions after today's visit, please call [phone].

## 2018-06-13 NOTE — PROGRESS NOTES
HISTORY OF PRESENT ILLNESS  Ruslan Bianchi is a 69 y.o. male. HPI  Was on cruise a couple of weeks ago and hands looked yellow - dorsal distal fingers. Also noticed yellow tint to ankles as well. Rest of skin was not yellow. No icterus of eyes. Yellow tint has improved. Increased stiffness in bilat shoulders with pain anterior and posterior shoulder. Previous given rx for PT but didn't quite get there - multiple cancellations. Pain has slowly increased since that time. Now with limitations in overhead reach. Current Outpatient Prescriptions:     TRULICITY 1.5 SN/0.8 mL sub-q pen, , Disp: , Rfl:     FARXIGA 10 mg tab tablet, , Disp: , Rfl:     metoprolol succinate (TOPROL-XL) 25 mg XL tablet, , Disp: , Rfl:     VITAMIN D2 50,000 unit capsule, Take 1 Cap by mouth every seven (7) days. , Disp: , Rfl:     gabapentin (NEURONTIN) 300 mg capsule, Take 1 Cap by mouth three (3) times daily. , Disp: , Rfl:     metFORMIN ER 1,000 mg tr24, Take 1,000 mg by mouth two (2) times a day., Disp: , Rfl:     NOVOFINE 32 32 gauge x 1/4\" ndle, , Disp: , Rfl:     cyanocobalamin (VITAMIN B-12) 1,000 mcg tablet, Take 1,000 mcg by mouth daily. , Disp: , Rfl:     tamsulosin (FLOMAX) 0.4 mg capsule, Take 0.4 mg by mouth daily. , Disp: , Rfl: 4    clobetasol (TEMOVATE) 0.05 % external solution, Apply  to affected area two (2) times a day., Disp: , Rfl:     ONETOUCH ULTRA TEST strip, TEST THREE TIMES DAILY, Disp: 300 Each, Rfl: 1    omeprazole (PRILOSEC) 20 mg capsule, Take 20 mg by mouth daily as needed. , Disp: , Rfl:     tadalafil (CIALIS) 5 mg tablet, Take 5 mg by mouth daily. , Disp: , Rfl:     dexlansoprazole (DEXILANT) 30 mg capsule, Take 1 capsule by mouth daily. , Disp: 90 capsule, Rfl: 1    simvastatin (ZOCOR) 20 mg tablet, Take 1 tablet by mouth daily. , Disp: 90 tablet, Rfl: 1    testosterone (AXIRON) 30 mg/1.5 mL /actuation slpm, 1 Pump(s) by TransDERmal route daily. , Disp: , Rfl:    calcium-cholecalciferol, d3, (CALCIUM 600 + D) 600-125 mg-unit tab, Take 2 Tabs by mouth daily. , Disp: , Rfl:     aspirin 81 mg tablet, Take 81 mg by mouth daily. , Disp: , Rfl:     canagliflozin (INVOKANA) 100 mg tablet, Take 300 mg by mouth Daily (before breakfast). , Disp: , Rfl:     atenolol (TENORMIN) 50 mg tablet, Take 1 Tab by mouth daily. (Patient not taking: Reported on 6/13/2018), Disp: 90 Tab, Rfl: 3    Visit Vitals    /88 (BP 1 Location: Left arm, BP Patient Position: Sitting)    Pulse 75    Temp 98 °F (36.7 °C) (Oral)    Resp 20    Ht 5' 9.5\" (1.765 m)    Wt 204 lb 9.6 oz (92.8 kg)    SpO2 98%    BMI 29.78 kg/m2       ROS  See above  Physical Exam   Constitutional: He appears well-developed and well-nourished. HENT:   Head: Normocephalic and atraumatic. Eyes: No scleral icterus. Neck: Neck supple. Lymphadenopathy:     He has no cervical adenopathy. Skin:   Skin is normal in coloration. Vitals reviewed. ASSESSMENT and PLAN  Yellowing of skin - ? Natural pigment, lighting on plane.   Will check LFT's to confirm no jaundice  bilat rot cuff strain - referred back to PT  Orders Placed This Encounter    HEPATIC FUNCTION PANEL    REFERRAL TO PHYSICAL THERAPY     Follow-up Disposition: Not on File fever (to 102) on and off with cough x 5 days. Not eating well. Vomiting with cough (x 1 today). Pt is awake, alert, active, no distress. Pink mucous membranes; slightly dry.

## 2018-06-14 LAB
ALBUMIN SERPL-MCNC: 4.9 G/DL (ref 3.6–4.8)
ALP SERPL-CCNC: 72 IU/L (ref 39–117)
ALT SERPL-CCNC: 21 IU/L (ref 0–44)
AST SERPL-CCNC: 21 IU/L (ref 0–40)
BILIRUB DIRECT SERPL-MCNC: 0.09 MG/DL (ref 0–0.4)
BILIRUB SERPL-MCNC: 0.3 MG/DL (ref 0–1.2)
PROT SERPL-MCNC: 7.7 G/DL (ref 6–8.5)

## 2018-06-26 ENCOUNTER — HOSPITAL ENCOUNTER (OUTPATIENT)
Dept: PHYSICAL THERAPY | Age: 68
Discharge: HOME | End: 2018-06-26
Payer: COMMERCIAL

## 2018-06-26 PROCEDURE — 97110 THERAPEUTIC EXERCISES: CPT | Performed by: PHYSICAL THERAPIST

## 2018-06-26 PROCEDURE — 97161 PT EVAL LOW COMPLEX 20 MIN: CPT | Performed by: PHYSICAL THERAPIST

## 2018-06-26 NOTE — PROGRESS NOTES
[facility]  [address]  [place], [address]  Phone: [phone]  Fax: [phone]    Plan of Care/Statement of Necessity for Physical Therapy Services  2-15    Patient name: Todd Capps  : 1950  Provider#: [id number]  Referral source: Fraser Gosselin, MD      Medical/Treatment Diagnosis: Lower back pain [M54.5]     Prior Hospitalization: see medical history     Comorbidities: see eval.  Prior Level of Function: see eval.   Medications: Verified on Patient Summary List  Start of Care: see eval.      Onset Date: see eval.     The Plan of Care and following information is based on the information from the initial evaluation. Assessment/ key information: Pt is a 70 yo male with acute onset of left sided back pain 2018 with no specific injury. Pt has severe pain with sitting > standing and with lumbar flexion. Pt also with TTP and decreased tolerance to evaluation (needed to lie down on table versus sitting during subjective portion of evaluation). Pt noted pain was 10/10 beginning of evaluation today but does decrease with supine lying and decreased to 3/10 end of session.        Evaluation Complexity History MEDIUM  Complexity : 1-2 comorbidities / personal factors will impact the outcome/ POC ; Examination LOW Complexity : 1-2 Standardized tests and measures addressing body structure, function, activity limitation and / or participation in recreation  ;Presentation LOW Complexity : Stable, uncomplicated  ;Clinical Decision Making MEDIUM Complexity : FOTO score of 26-74  Overall Complexity Rating: LOW      Treatment Plan may include any combination of the following: Therapeutic exercise, Therapeutic activities, Neuromuscular re-education, Physical agent/modality, Manual therapy and Patient education   Patient / Family readiness to learn indicated by: asking questions, trying to perform skills and interest  Persons(s) to be included in education: patient (P)  Barriers to Learning/Limitations: None  Patient Goal (s): \"see eval  Patient Self Reported Health Status: good  Rehabilitation Potential: good    Short Term Goals: To be accomplished in 2-3 weeks:   1) Pt independent in HEP from day 1   2) Pt will be able to demonstrate log roll technique for supine to sit to decrease strain to low back   3) Pt will report he is using a lumbar support while sitting at work and avoiding sitting > 30 ' to decrease strain to his low back    Long Term Goals: To be accomplished in 4-6 weeks:   1) Pt independent in final HEP. 2) Pt will be able to sit for 20 ' or more without pain > 1/10   3) Pt will be able to stand for 5 ' or more without pain > 1/10             Frequency / Duration: Patient to be seen 1-2 times per week for 4-6 weeks. Patient/ Caregiver education and instruction: self care and exercises    [x]  Plan of care has been reviewed with PTA      Alyce Borjas, PT PT,DPT,OCS 6/26/2018 11:10 AM    ________________________________________________________________________    I certify that the above Therapy Services are being furnished while the patient is under my care. I agree with the treatment plan and certify that this therapy is necessary.     [de-identified] Signature:____________________  Date:____________Time: _________    ___

## 2018-06-26 NOTE — PROGRESS NOTES
[facility]  [address] [facility], [address]  Phone: [phone]  Fax: [phone]    PT INITIAL EVALUATION NOTE - [facility] 2-15    Patient Name: Eriberto Rae  Date:2018  : 1950  [x]  Patient  Verified   Payor: Seda Louisa / Plan: [address] Se HMO / Product Type: HMO /    In time:1110  Out time:1215  Total Treatment Time (min): 65  Total Timed Codes (min): 10  1:1 Treatment Time ( only): 54  Visit #: 1     Treatment Area: Lower back pain [M54.5]    SUBJECTIVE    Any medication changes, allergies to medications, adverse drug reactions, diagnosis change, or new procedure performed?: [] No    [x] Yes (see summary sheet for update)    Current symptoms/chief complaint:   ** Pt unable to sit for subjective portion, needed to lie down on table. \"     \"Woke up Friday morning with pain on the lower left side. \"  \"It's just gotten worse and worse and worse. \"  \"[facility] \"  \"They x-rayed it. .. He suggested trying PT - saw PA - was given pain medication. \"  \"Next day saw Dr. Kan Felix. \"  \"He looked at x-ray, we talked about it, he suggested starting with PT.\"  \"There was nothing remarkable on the x-ray. .. I had some narrowing between some discs but nothing unusual for a 66 yo.\"  \"He said he thought it was muscular. \"     Pt notes he did have a fall 18, fell off the side of a chair, thinks it was the left side but didn't have any pain after that - it felt fine. Next day he went on a cruise in [facility]. He came back 5-6 weeks ago. Date of onset/injury: , worse the next day (Saturday) and . Aggravated by:  Standing and sitting    Eased by: Laying down. Medicine helps minimally     Pain Level (0-10 scale): Current:  10/10  Least: 3 Worst: 10.       Laying down decreased to 2-3/10    Location of symptoms: L Lower back. PMH: Significant for Scoliosis. HTN, DM, arthritis.        Social/Recreation/Work: Sandy Liriano works 40-50 hr/day. He did go to work on Friday. Sits most of the day at work, uses a computer chair with back support    Prior level of function: Before flare up didn't have any significant pain, could sit and stand without 10/10 pain. Patient goal(s): \"eliminate pain. \"      Objective:      Posture:   Kyphosis: [x] Increased [] Decreased   []  WNL       Gait:   Description: slow lisa, guarded. Lumbar Active Movements:  ROM  AROM Comments:pain, area   Forward flexion  Fingers moved approximately 4 inches from starting position Increased pain, no reversal of lumbar spine   Extension  Decreased by 50% No increase in pain   Seated Rotation right NT NT   Seated Rotation left NT NT   SB right Fingers 2 inches superior to PF joint line No change   SB left As above No change. Left hip PROM: flexion at least 110 deg, ER at least 50 deg, IR at least 30 deg without change of pain. Strength:     Deferred due to severe pain levels. Neurosensory:  Sensory examination reveals pt denies numbness/tingling besides neuropathy from DM. Palpation:  Pt TTP left iliolumbar ligament. Special Tests:      Stabilization Tests  ASLR Test:   [] Pos  [x] Neg With PT stabilization:       Sacroillic:         Gapping:  [] +    [x] -         Sacral Thrust: [] R    [] L    [x] +    [] -   Pain. Hip: Nain Bulla:   [] R    [] L    [] +    [x] -       Joint mobility:  Increased pain with central joint mobilization L5 level and with sacral thrust.           Outcome Measure: Patient presents with a FOTO score of see in chart. 10 min Therapeutic Exercise:  [x] See flow sheet : standing lumbar extension, pt education : using lumbar roll while sitting - trialed in clinic, pt education to sleep with pillow between knees, pt education on log roll, pt education on avoiding lumbar flexion. Rationale: increase strength and improve coordination to improve the patients ability to sit, stand.      Modalities:  MHP to low back in supine with wedge end of session. With   [] TE   [] TA   [] neuro   [] other: Patient Education: [x] Review HEP    [] Progressed/Changed HEP based on:   [] positioning   [] body mechanics   [] transfers   [] heat/ice application    [] other:      Pain Level (0-10 scale) post treatment: 3/10 after lying down.      Assessment:   [x] See POC  [] Other:  Plan:   [x] See POC  [] Other  [] Discharge due to:     Vick Crenshaw PT, DPT, OCS     6/26/2018     17:69 AM   PT License #[id number]

## 2018-06-28 ENCOUNTER — HOSPITAL ENCOUNTER (OUTPATIENT)
Dept: PHYSICAL THERAPY | Age: 68
Discharge: HOME | End: 2018-06-28
Payer: COMMERCIAL

## 2018-06-28 PROCEDURE — 97110 THERAPEUTIC EXERCISES: CPT | Performed by: PHYSICAL THERAPIST

## 2018-06-28 PROCEDURE — 97140 MANUAL THERAPY 1/> REGIONS: CPT | Performed by: PHYSICAL THERAPIST

## 2018-06-28 NOTE — PROGRESS NOTES
PT DAILY TREATMENT NOTE 2-15    Patient Name: Crystal Jameson  Date:2018  : 1950  [x]  Patient  Verified  Payor: Mercedes Rather / Plan: Brandee Palomo Se HMO / Product Type: HMO /    In time:  Out time:12:00  Total Treatment Time (min): 60  Total Timed Codes (min): 50  1:1 Treatment Time ( [address] only): 50   Visit #: 2     Treatment Area: Lower back pain [M54.5]    SUBJECTIVE  Pain Level (0-10 scale), subjective functional status/changes: Pt reports pain is 4-5/10. The pain is higher if he makes the wrong movement, like twisting. He is not going to work because of the pain. He is wondering if we see people like this in our clinic. Re: his ther. Ex today \"it feels like I am not doing anything, like I am doing 2 push ups. \"  \"I could endure more pain if it would make it better faster\"      Pt wife also present today, asking several questions \"do you think his posture is causing his pain. \"  \"Do you think that is OK for his back\"  \"He has a lot of pain when he twists getting in and out of the car. \"     Any medication changes, allergies to medications, adverse drug reactions, diagnosis change, or new procedure performed?: [x] No    [] Yes (see summary sheet for update)      OBJECTIVE    Modality rationale: decrease inflammation and decrease pain to improve the patients ability to do functional activities   Min Type Additional Details   10 [x]  Ice  Hooklying with wedige to LB    []  Heat    Position:  Location:   [x] Skin assessment post-treatment:  [x]intact []redness- no adverse reaction    []redness  adverse reaction:     40 min Therapeutic Exercise:  [x] See flow sheet : taught level one spinal stabilization exercises. Increased time with pt ed. Re: posture while sitting or standing, avoiding sustained sitting positions. Review of supine to sit with log roll technique and also pivoting for car transfer to decrease lumbar rotation.     Rationale: increase strength, improve coordination and increase proprioception to improve the patients ability to sit, walk. 10 min Manual Therapy:  STM to left iliolumbar ligament, left lower lumbar paraspinals. Rationale: decrease pain, increase tissue extensibility and decrease trigger points  to improve the patients ability to sit, walk. With   [] TE   [] TA   [] neuro   [] other: Patient Education: [x] Review HEP    [] Progressed/Changed HEP based on:   [] positioning   [] body mechanics   [] transfers   [] heat/ice application    [] other:      Other Objective/Functional Measures: 2-3/10 following manual therapy before ice. Pain Level (0-10 scale) post treatment: see above. ASSESSMENT/Changes in Function:   Increased time today discussing same pt education concepts as we did on day 1, pt seems to be asking same questions as day 1 so unsure of how compliant sitting posture is at home. Pt reports he is not going to work due to back pain but he is spending 3 hr or so per his report on the computer at his house. Pt wife reports this is so he can go lie down to relieve pain. Encouraged pt to limit sustained sitting overall. Patient will continue to benefit from skilled PT services to modify and progress therapeutic interventions, address ROM deficits, address strength deficits, analyze and address soft tissue restrictions, analyze and modify body mechanics/ergonomics, assess and modify postural abnormalities and instruct in home and community integration to attain remaining goals. Progress towards goals / Updated goals:  NT today.      PLAN  [x]  Upgrade activities as tolerated     [x]  Continue plan of care  []  Update interventions per flow sheet         []  Other:_      Milton Brandon, PT DPT, [facility] 6/28/2018  43:59 AM       PT License #[id number]

## 2018-07-02 ENCOUNTER — HOSPITAL ENCOUNTER (OUTPATIENT)
Dept: PHYSICAL THERAPY | Age: 68
Discharge: HOME | End: 2018-07-02
Payer: COMMERCIAL

## 2018-07-02 ENCOUNTER — APPOINTMENT (OUTPATIENT)
Dept: PHYSICAL THERAPY | Age: 68
End: 2018-07-02

## 2018-07-02 PROCEDURE — 97110 THERAPEUTIC EXERCISES: CPT | Performed by: PHYSICAL THERAPIST

## 2018-07-02 PROCEDURE — 97140 MANUAL THERAPY 1/> REGIONS: CPT | Performed by: PHYSICAL THERAPIST

## 2018-07-02 NOTE — PROGRESS NOTES
PT DAILY TREATMENT NOTE 2-15    Patient Name: Zane Stiles  Date:2018  : 1950  [x]  Patient  Verified  Payor: Ursula Dejesus / Plan: [address] HMO / Product Type: HMO /    In time:840  Out time:935  Total Treatment Time (min):55   Total Timed Codes (min): 45  1:1 Treatment Time [facility] only):45   Visit #: 3     Treatment Area: Lower back pain [M54.5]    SUBJECTIVE    Pt arrives 10 ' late to appointment    Pain Level (0-10 scale), subjective functional status/changes: Pt reports pain is 8/10. Pt reports he was feeling better on Saturday and went for a walk, stumbled over the sidewalk and fell. Pt daughter Susan Mcneal present, reported that someone saw him fall, called EMS. When EMS found him he had empty water bottles around him. He did not f/u with MD.  He reports he felt OK besides pain that brought him in here increased again. Pt reports he went for a walk around 5 pm on Saturday. Any medication changes, allergies to medications, adverse drug reactions, diagnosis change, or new procedure performed?: [x] No    [] Yes (see summary sheet for update)      OBJECTIVE    Observation:  Pt with black eye on the RIGHT, abrasion RIGHT forearm, no bandage/band-aid covering. Modality rationale: decrease inflammation and decrease pain to improve the patients ability to do functional activities   Min Type Additional Details   10 [x]  Ice  Hooklying with wedige to LB    []  Heat    Position:  Location:   [x] Skin assessment post-treatment:  [x]intact []redness- no adverse reaction    []redness  adverse reaction:     30 min Therapeutic Exercise:  [x] See flow sheet : review of level one spinal stabilization exercises. Increased time with pt ed. Re: posture while sitting or standing, avoiding sustained sitting positions. Review of supine to sit with log roll technique and also pivoting for car transfer to decrease lumbar rotation. Added TrA with march.     Large Band-aid over RIGHT forearm abrasion that appears to be open wound - pt wife reports he probably rubbed scab off in shower. Encouraged to keep band-aid over to protect from infection but also to protect from bumping abrasion during the day. Rationale: increase strength, improve coordination and increase proprioception to improve the patients ability to sit, walk. 15 min Manual Therapy:  STM to left iliolumbar ligament, left lower lumbar paraspinals, long axis distraction through L LE in prone   Rationale: decrease pain, increase tissue extensibility and decrease trigger points  to improve the patients ability to sit, walk. With   [] TE   [] TA   [] neuro   [] other: Patient Education: [x] Review HEP    [] Progressed/Changed HEP based on:   [] positioning   [] body mechanics   [] transfers   [] heat/ice application    [] other:      Other Objective/Functional Measures: 2-3/10 following MT and ther ex     Pain Level (0-10 scale) post treatment: see above. ASSESSMENT/Changes in Function:   Pt to clinic today 10 ' late with his daughter Myrtle Andrews" and his wife. Pt and pt family reports he had a fall on Saturday, reports he stumbled over side walk but EMS reported to family he could have been partially dehydrated. Encouraged pt if he does walk to walk with family member early morning due to high temperature. Also, suggested following up with MD re: July 11th appt to see if we can move that appointment up. Patient will continue to benefit from skilled PT services to modify and progress therapeutic interventions, address ROM deficits, address strength deficits, analyze and address soft tissue restrictions, analyze and modify body mechanics/ergonomics, assess and modify postural abnormalities and instruct in home and community integration to attain remaining goals. Progress towards goals / Updated goals:  NT today.      PLAN  [x]  Upgrade activities as tolerated     [x]  Continue plan of care  []  Update interventions per flow sheet         []  Other:_      Mayra Lopez, PT DPT, OCS 7/2/2018  51:32 AM       PT License #[id number]

## 2018-07-06 ENCOUNTER — HOSPITAL ENCOUNTER (OUTPATIENT)
Dept: PHYSICAL THERAPY | Age: 68
Discharge: HOME | End: 2018-07-06
Payer: COMMERCIAL

## 2018-07-06 PROCEDURE — 97140 MANUAL THERAPY 1/> REGIONS: CPT | Performed by: PHYSICAL THERAPIST

## 2018-07-06 PROCEDURE — 97110 THERAPEUTIC EXERCISES: CPT | Performed by: PHYSICAL THERAPIST

## 2018-07-06 NOTE — PROGRESS NOTES
PT DAILY TREATMENT NOTE - [facility] 2-15    Patient Name: Sajan Martin  Date:2018  : 1950  [x]  Patient  Verified  Payor: Courtney Ryan / Plan: [address] HMO / Product Type: HMO /    In time: 8:35a  Out time: 8:40a  Total Treatment Time (min): 65  Total Timed Codes (min): 30  1:1 Treatment Time ( only): 30  Visit #: 4     Treatment Area: Lower back pain [M54.5]    SUBJECTIVE  Pain Level (0-10 scale): 3/10 laying on plinth  Any medication changes, allergies to medications, adverse drug reactions, diagnosis change, or new procedure performed?: [x] No    [] Yes (see summary sheet for update)  Subjective functional status/changes:   [] No changes reported  Pt reported he was doing better this morning, Wife reported yesterday was a really bad day. Had severe pain all day. OBJECTIVE     Observation:  Pt still has black eye on the RIGHT, abrasion RIGHT forearm,  bandage/band-aid covering.            Modality rationale: decrease inflammation and decrease pain to improve the patients ability to do functional activities   Min Type Additional Details   10 []  Ice  Hooklying with wedige to LB    [x]  Heat  Position:  Location:   [x] Skin assessment post-treatment:  [x]intact []redness- no adverse reaction    []redness  adverse reaction:      30 min Therapeutic Exercise:  [x] See flow sheet : review of level one spinal stabilization exercises. Increased time with pt ed. Re: posture while sitting or standing, avoiding sustained sitting positions. Review of supine to sit with log roll technique and also pivoting for car transfer to decrease lumbar rotation. Added TrA with march.     Large Band-aid over RIGHT forearm abrasion that appears to be open wound - pt wife reports he probably rubbed scab off in shower. Encouraged to keep band-aid over to protect from infection but also to protect from bumping abrasion during the day.     Rationale: increase strength, improve coordination and increase proprioception to improve the patients ability to sit, walk.      20 min Manual Therapy:  STM to left iliolumbar ligament, left lower lumbar paraspinals in prone, hooklying lumbar distraction with belt. Rationale: decrease pain, increase tissue extensibility and decrease trigger points  to improve the patients ability to sit, walk.         With   [] TE   [] TA   [] neuro   [] other: Patient Education: [x] Review HEP    [] Progressed/Changed HEP based on:   [] positioning   [] body mechanics   [] transfers   [] heat/ice application    [] other:       Other Objective/Functional Measures: 3/10                  Pain Level (0-10 scale) post treatment: see above.     ASSESSMENT/Changes in Function:   Pt tolerated session well and had pain relief with manual techniques, however when he stood up pain started to return as before in left lumbar region. Performed Hooklying traction to determine response. No difference reported during or immediately after, but he wasn't in much pain in supine. Pt has slowed transfers, and felt like he was unsteady on his feet walking out (not necessarily in response to pain but needed cues to negotiate environment safely.        Patient will continue to benefit from skilled PT services to modify and progress therapeutic interventions, address ROM deficits, address strength deficits, analyze and address soft tissue restrictions, analyze and modify body mechanics/ergonomics, assess and modify postural abnormalities and instruct in home and community integration to attain remaining goals.           Progress towards goals / Updated goals:  NT today.      PLAN  [x]  Upgrade activities as tolerated     [x]  Continue plan of care  []  Update interventions per flow sheet          []  Other:Gui Mackey 7/6/2018  8:44 AM

## 2018-07-10 ENCOUNTER — HOSPITAL ENCOUNTER (OUTPATIENT)
Dept: PHYSICAL THERAPY | Age: 68
Discharge: HOME | End: 2018-07-10
Payer: COMMERCIAL

## 2018-07-10 PROCEDURE — 97140 MANUAL THERAPY 1/> REGIONS: CPT | Performed by: PHYSICAL THERAPIST

## 2018-07-10 PROCEDURE — 97110 THERAPEUTIC EXERCISES: CPT | Performed by: PHYSICAL THERAPIST

## 2018-07-10 NOTE — PROGRESS NOTES
PT DAILY TREATMENT NOTE - [facility] 2-15    Patient Name: King Mcmillan  Date:7/10/2018  : 1950  [x]  Patient  Verified  Payor: Kole  / Plan: [address] HMO / Product Type: HMO /    In time: 3485  Out time: 105  Total Treatment Time (min):  50  Total Timed Codes (min): 40  1:1 Treatment Time ( [address] only): n/a   Visit #: 5    Treatment Area: Lower back pain [M54.5]    SUBJECTIVE  Pain Level (0-10 scale): 3/10 sitting, better today. Pt reports he did go back to [facility] , they did more xrays, told to continue medication. They aren't sure quite when visit with Dr. Cj Hope is. Any medication changes, allergies to medications, adverse drug reactions, diagnosis change, or new procedure performed?: [x] No    [] Yes (see summary sheet for update)  Subjective functional status/changes:   [] No changes reported  See above. OBJECTIVE             Modality rationale: decrease inflammation and decrease pain to improve the patients ability to do functional activities   Min Type Additional Details   10 []  Ice  Hooklying with wedige to LB    [x]  Heat  Position:  Location:   [x] Skin assessment post-treatment:  [x]intact []redness- no adverse reaction    []redness  adverse reaction:      20 min Therapeutic Exercise:  [x] See flow sheet : review of level one spinal stabilization exercises. Increased time discussing importance of punctuality of appointments, also to please make appointment with PCP to f/u after fall as well as check when appointment with specialist is. Rationale: increase strength, improve coordination and increase proprioception to improve the patients ability to sit, walk.      20 min Manual Therapy:  STM to left iliolumbar ligament, left lower lumbar paraspinals in prone, manual distraction long axis through L LE in prone.     Rationale: decrease pain, increase tissue extensibility and decrease trigger points  to improve the patients ability to sit, walk.         With [] TE   [] TA   [] neuro   [] other: Patient Education: [x] Review HEP    [] Progressed/Changed HEP based on:   [] positioning   [] body mechanics   [] transfers   [] heat/ice application    [] other:       Other Objective/Functional Measures: 1-2 /10                  Pain Level (0-10 scale) post treatment: see above.     ASSESSMENT/Changes in Function:   Pt reports decreased pain overall today, decreased pain end of session. However, pt apparently went to [facility] last week as well.        Patient will continue to benefit from skilled PT services to modify and progress therapeutic interventions, address ROM deficits, address strength deficits, analyze and address soft tissue restrictions, analyze and modify body mechanics/ergonomics, assess and modify postural abnormalities and instruct in home and community integration to attain remaining goals.           Progress towards goals / Updated goals:  NT today.      PLAN  [x]  Upgrade activities as tolerated     [x]  Continue plan of care  []  Update interventions per flow sheet          []  Other:_          Tresa Virk, PT 7/10/2018  8:44 AM

## 2018-07-17 ENCOUNTER — APPOINTMENT (OUTPATIENT)
Dept: PHYSICAL THERAPY | Age: 68
End: 2018-07-17
Payer: COMMERCIAL

## 2018-07-19 ENCOUNTER — APPOINTMENT (OUTPATIENT)
Dept: PHYSICAL THERAPY | Age: 68
End: 2018-07-19
Payer: COMMERCIAL

## 2018-07-20 ENCOUNTER — HOSPITAL ENCOUNTER (OUTPATIENT)
Dept: MAMMOGRAPHY | Age: 68
Discharge: HOME | End: 2018-07-20
Attending: INTERNAL MEDICINE
Payer: COMMERCIAL

## 2018-07-20 DIAGNOSIS — M85.80 OSTEOPENIA: ICD-10-CM

## 2018-07-20 PROCEDURE — 77080 DXA BONE DENSITY AXIAL: CPT

## 2018-07-30 ENCOUNTER — HOSPITAL ENCOUNTER (OUTPATIENT)
Dept: PREADMISSION TESTING | Age: 68
Discharge: HOME | End: 2018-07-30
Payer: COMMERCIAL

## 2018-07-30 VITALS
WEIGHT: 180.38 LBS | BODY MASS INDEX: 25.82 KG/M2 | TEMPERATURE: 97.6 F | HEIGHT: 70 IN | DIASTOLIC BLOOD PRESSURE: 78 MMHG | HEART RATE: 73 BPM | SYSTOLIC BLOOD PRESSURE: 123 MMHG

## 2018-07-30 LAB
ABO + RH BLD: NORMAL
ANION GAP SERPL CALC-SCNC: 10 MMOL/L (ref 5–15)
APPEARANCE UR: ABNORMAL
ATRIAL RATE: 72 BPM
BACTERIA URNS QL MICRO: NEGATIVE /HPF
BILIRUB UR QL: NEGATIVE
BLOOD GROUP ANTIBODIES SERPL: NORMAL
BUN SERPL-MCNC: 20 MG/DL (ref 6–20)
BUN/CREAT SERPL: 19 (ref 12–20)
CALCIUM SERPL-MCNC: 8.9 MG/DL (ref 8.5–10.1)
CALCULATED P AXIS, ECG09: 19 DEGREES
CALCULATED R AXIS, ECG10: -19 DEGREES
CALCULATED T AXIS, ECG11: 13 DEGREES
CAOX CRY URNS QL MICRO: ABNORMAL
CHLORIDE SERPL-SCNC: 106 MMOL/L (ref 97–108)
CO2 SERPL-SCNC: 23 MMOL/L (ref 21–32)
COLOR UR: ABNORMAL
CREAT SERPL-MCNC: 1.07 MG/DL (ref 0.7–1.3)
DIAGNOSIS, 93000: NORMAL
EPITH CASTS URNS QL MICRO: ABNORMAL /LPF
ERYTHROCYTE [DISTWIDTH] IN BLOOD BY AUTOMATED COUNT: 13.1 % (ref 11.5–14.5)
EST. AVERAGE GLUCOSE BLD GHB EST-MCNC: 163 MG/DL
GLUCOSE SERPL-MCNC: 157 MG/DL (ref 65–100)
GLUCOSE UR STRIP.AUTO-MCNC: >1000 MG/DL
HBA1C MFR BLD: 7.3 % (ref 4.2–6.3)
HCT VFR BLD AUTO: 45.3 % (ref 36.6–50.3)
HGB BLD-MCNC: 15.4 G/DL (ref 12.1–17)
HGB UR QL STRIP: NEGATIVE
INR PPP: 0.9 (ref 0.9–1.1)
KETONES UR QL STRIP.AUTO: ABNORMAL MG/DL
LEUKOCYTE ESTERASE UR QL STRIP.AUTO: NEGATIVE
MCH RBC QN AUTO: 31.4 PG (ref 26–34)
MCHC RBC AUTO-ENTMCNC: 34 G/DL (ref 30–36.5)
MCV RBC AUTO: 92.4 FL (ref 80–99)
MUCOUS THREADS URNS QL MICRO: ABNORMAL /LPF
NITRITE UR QL STRIP.AUTO: NEGATIVE
NRBC # BLD: 0 K/UL (ref 0–0.01)
NRBC BLD-RTO: 0 PER 100 WBC
P-R INTERVAL, ECG05: 164 MS
PH UR STRIP: 5.5 [PH] (ref 5–8)
PLATELET # BLD AUTO: 240 K/UL (ref 150–400)
PMV BLD AUTO: 9.8 FL (ref 8.9–12.9)
POTASSIUM SERPL-SCNC: 3.9 MMOL/L (ref 3.5–5.1)
PROT UR STRIP-MCNC: NEGATIVE MG/DL
PROTHROMBIN TIME: 9.6 SEC (ref 9–11.1)
Q-T INTERVAL, ECG07: 382 MS
QRS DURATION, ECG06: 108 MS
QTC CALCULATION (BEZET), ECG08: 418 MS
RBC # BLD AUTO: 4.9 M/UL (ref 4.1–5.7)
RBC #/AREA URNS HPF: ABNORMAL /HPF (ref 0–5)
SODIUM SERPL-SCNC: 139 MMOL/L (ref 136–145)
SP GR UR REFRACTOMETRY: 1.02 (ref 1–1.03)
SPECIMEN EXP DATE BLD: NORMAL
UA: UC IF INDICATED,UAUC: ABNORMAL
UROBILINOGEN UR QL STRIP.AUTO: 0.2 EU/DL (ref 0.2–1)
VENTRICULAR RATE, ECG03: 72 BPM
WBC # BLD AUTO: 8.5 K/UL (ref 4.1–11.1)
WBC URNS QL MICRO: ABNORMAL /HPF (ref 0–4)

## 2018-07-30 PROCEDURE — 93005 ELECTROCARDIOGRAM TRACING: CPT

## 2018-07-30 PROCEDURE — 36415 COLL VENOUS BLD VENIPUNCTURE: CPT | Performed by: ORTHOPAEDIC SURGERY

## 2018-07-30 PROCEDURE — 83036 HEMOGLOBIN GLYCOSYLATED A1C: CPT | Performed by: ORTHOPAEDIC SURGERY

## 2018-07-30 PROCEDURE — 85027 COMPLETE CBC AUTOMATED: CPT | Performed by: ORTHOPAEDIC SURGERY

## 2018-07-30 PROCEDURE — 80048 BASIC METABOLIC PNL TOTAL CA: CPT | Performed by: ORTHOPAEDIC SURGERY

## 2018-07-30 PROCEDURE — 81001 URINALYSIS AUTO W/SCOPE: CPT | Performed by: ORTHOPAEDIC SURGERY

## 2018-07-30 PROCEDURE — 85610 PROTHROMBIN TIME: CPT | Performed by: ORTHOPAEDIC SURGERY

## 2018-07-30 PROCEDURE — 86900 BLOOD TYPING SEROLOGIC ABO: CPT | Performed by: ORTHOPAEDIC SURGERY

## 2018-07-30 RX ORDER — TESTOSTERONE 20.25 MG/1.25G
GEL TOPICAL DAILY
COMMUNITY
End: 2019-03-21

## 2018-07-30 NOTE — PERIOP NOTES
ORTHOPEDIC PRE-OP ASSESSMENT AND PLANNING FORM SENT FOR SCANNING. Preoperative instructions reviewed with patient. Patient given six packs of CHG wipes. Instructions(reviewed/to be reviewed in class) on use of CHG wipes. Patient given SSI infection FAQS sheet, as well as a  MRSA/MSSA treatment instruction sheet  With an explanation to patient that they will be notified if treatment instructions need to be initiated. Patient was given the opportunity to ask questions on the information provided.

## 2018-07-31 ENCOUNTER — OFFICE VISIT (OUTPATIENT)
Dept: INTERNAL MEDICINE CLINIC | Age: 68
End: 2018-07-31

## 2018-07-31 VITALS
HEART RATE: 89 BPM | WEIGHT: 185 LBS | BODY MASS INDEX: 26.48 KG/M2 | DIASTOLIC BLOOD PRESSURE: 71 MMHG | RESPIRATION RATE: 18 BRPM | TEMPERATURE: 97.5 F | HEIGHT: 70 IN | SYSTOLIC BLOOD PRESSURE: 115 MMHG | OXYGEN SATURATION: 97 %

## 2018-07-31 DIAGNOSIS — Z01.818 PRE-OP EVALUATION: ICD-10-CM

## 2018-07-31 DIAGNOSIS — M48.062 SPINAL STENOSIS OF LUMBAR REGION WITH NEUROGENIC CLAUDICATION: Primary | ICD-10-CM

## 2018-07-31 LAB
BACTERIA SPEC CULT: NORMAL
BACTERIA SPEC CULT: NORMAL
SERVICE CMNT-IMP: NORMAL

## 2018-07-31 NOTE — PROGRESS NOTES
Chief Complaint Patient presents with  Pre-op Exam  
  sx date 08/22 1. Have you been to the ER, urgent care clinic since your last visit? Hospitalized since your last visit? No 
 
2. Have you seen or consulted any other health care providers outside of the [address] [place] [address] since your last visit? Include any pap smears or colon screening. No 
 
 
 
Current Outpatient Prescriptions:  
  testosterone (ANDROGEL) 20.25 mg/1.25 gram (1.62 %) gel, by TransDERmal route daily. , Disp: , Rfl:  
  TRULICITY 1.5 LP/6.0 mL sub-q pen, , Disp: , Rfl:  
  FARXIGA 10 mg tab tablet, , Disp: , Rfl:  
  metoprolol succinate (TOPROL-XL) 25 mg XL tablet, , Disp: , Rfl:  
  VITAMIN D2 50,000 unit capsule, Take 1 Cap by mouth every seven (7) days. , Disp: , Rfl:  
  gabapentin (NEURONTIN) 300 mg capsule, Take 1 Cap by mouth three (3) times daily. , Disp: , Rfl:  
  metFORMIN ER 1,000 mg tr24, Take 1,000 mg by mouth two (2) times a day., Disp: , Rfl:  
  NOVOFINE 32 32 gauge x 1/4\" ndle, , Disp: , Rfl:  
  cyanocobalamin (VITAMIN B-12) 1,000 mcg tablet, Take 1,000 mcg by mouth daily. , Disp: , Rfl:  
  tamsulosin (FLOMAX) 0.4 mg capsule, Take 0.4 mg by mouth daily. , Disp: , Rfl: 4   clobetasol (TEMOVATE) 0.05 % external solution, Apply  to affected area two (2) times a day., Disp: , Rfl:  
  ONETOUCH ULTRA TEST strip, TEST THREE TIMES DAILY, Disp: 300 Each, Rfl: 1 
  tadalafil (CIALIS) 5 mg tablet, Take 5 mg by mouth daily. , Disp: , Rfl:  
  dexlansoprazole (DEXILANT) 30 mg capsule, Take 1 capsule by mouth daily. , Disp: 90 capsule, Rfl: 1   simvastatin (ZOCOR) 20 mg tablet, Take 1 tablet by mouth daily. , Disp: 90 tablet, Rfl: 1   calcium-cholecalciferol, d3, (CALCIUM 600 + D) 600-125 mg-unit tab, Take 2 Tabs by mouth daily. , Disp: , Rfl:  
  aspirin 81 mg tablet, Take 81 mg by mouth daily. , Disp: , Rfl:

## 2018-07-31 NOTE — MR AVS SNAPSHOT
[address] [place] [address] [address] [place] [address] 
[phone] Patient: Marisol Mcgovern MRN: [id number] PVA:[id number]/06/[id number] Visit Information Date & Time Provider Department Dept. Phone Encounter #  
 7/31/2018  2:00 PM Vivek Mendez MD [facility] [phone] [id number] Upcoming Health Maintenance Date Due Hepatitis C Screening 1950 FOOT EXAM Q1 1/19/2016 Influenza Age 5 to Adult 8/1/2018 EYE EXAM RETINAL OR DILATED Q1 1/25/2019 HEMOGLOBIN A1C Q6M 1/30/2019 MICROALBUMIN Q1 2/9/2019 LIPID PANEL Q1 5/20/2019 DTaP/Tdap/Td series (3 - Td) 1/1/2020 GLAUCOMA SCREENING Q2Y 1/25/2020 COLONOSCOPY 6/9/2021 Allergies as of 7/31/2018  Review Complete On: 7/31/2018 By: Vivek Mendez MD  
  
 Severity Noted Reaction Type Reactions Pcn [Penicillins]  09/15/2011    Other (comments) Infancy was told he had a rash Current Immunizations  Reviewed on 7/24/2015 Name Date Influenza Vaccine 10/17/2017, 10/1/2016, 10/3/2015, 10/1/2014 Pneumococcal Conjugate (PCV-13) 7/24/2015  2:52 PM  
 Pneumococcal Polysaccharide (PPSV-23) 8/15/2014 Pneumococcal Vaccine (Unspecified Type) 12/1/2007 TDAP Vaccine 12/1/2007 Tdap 1/1/2010 ZZZ-RETIRED (DO NOT USE) Pneumococcal Vaccine (Unspecified Type) 12/1/2007 Zoster Vaccine, Live 4/1/2011 Not reviewed this visit Vitals BP Pulse Temp Resp Height(growth percentile) Weight(growth percentile) 115/71 89 97.5 °F (36.4 °C) (Oral) 18 5' 10\" (1.778 m) 185 lb (83.9 kg) SpO2 BMI Smoking Status 97% 26.54 kg/m2 Former Smoker BMI and BSA Data Body Mass Index Body Surface Area  
 26.54 kg/m 2 2.04 m 2 Preferred Pharmacy Pharmacy Name Phone [address], [facility] [address] [place] [address] [phone] Your Updated Medication List  
 This list is accurate as of 7/31/18  3:05 PM.  Always use your most recent med list.  
  
  
  
  
 aspirin 81 mg tablet Take 81 mg by mouth daily. CALCIUM 600 + D 600-125 mg-unit Tab Generic drug:  calcium-cholecalciferol (d3) Take 2 Tabs by mouth daily. CIALIS 5 mg tablet Generic drug:  tadalafil Take 5 mg by mouth daily. clobetasol 0.05 % external solution Commonly known as:  Addy Pipes Apply  to affected area two (2) times a day. dexlansoprazole 30 mg capsule Commonly known as:  DEXILANT Take 1 capsule by mouth daily. FARXIGA 10 mg Tab tablet Generic drug:  dapagliflozin  
  
 gabapentin 300 mg capsule Commonly known as:  NEURONTIN Take 1 Cap by mouth three (3) times daily. metFORMIN ER 1,000 mg Tr24 Take 1,000 mg by mouth two (2) times a day. metoprolol succinate 25 mg XL tablet Commonly known as:  TOPROL-XL  
  
 NOVOFINE 32 32 gauge x 1/4\" Ndle Generic drug:  Insulin Needles (Disposable) ONETOUCH ULTRA TEST strip Generic drug:  glucose blood VI test strips TEST THREE TIMES DAILY  
  
 simvastatin 20 mg tablet Commonly known as:  ZOCOR Take 1 tablet by mouth daily. tamsulosin 0.4 mg capsule Commonly known as:  FLOMAX Take 0.4 mg by mouth daily. testosterone 20.25 mg/1.25 gram (1.62 %) gel Commonly known as:  ANDROGEL  
by TransDERmal route daily. TRULICITY 1.5 TA/3.0 mL sub-q pen Generic drug:  dulaglutide VITAMIN B-12 1,000 mcg tablet Generic drug:  cyanocobalamin Take 1,000 mcg by mouth daily. VITAMIN D2 50,000 unit capsule Generic drug:  ergocalciferol Take 1 Cap by mouth every seven (7) days. Introducing [facility]! Dear Nirali Garcia: Thank you for requesting a [employer] account. Our records indicate that you already have an active [employer] account. You can access your account anytime at [email or web address] Did you know that you can access your hospital and ER discharge instructions at any time in Pathflow? You can also review all of your test results from your hospital stay or ER visit. Additional Information If you have questions, please visit the Frequently Asked Questions section of the Pathflow website at [email or web address] Remember, Pathflow is NOT to be used for urgent needs. For medical emergencies, dial 911. Now available from your iPhone and Android! Please provide this summary of care documentation to your next provider. Your primary care clinician is listed as SHERITA PAVON. If you have any questions after today's visit, please call [phone].

## 2018-08-07 NOTE — ANCILLARY DISCHARGE INSTRUCTIONS
[employer] [facility]  [address], [place], [address]  Phone: [phone]  Fax: [phone]    Discharge Summary    Name: Camryn Saini   : 1950   MD: Ivy Ryder MD       Treatment Diagnosis: Lower back pain [M54.5]  Start of Care: 18    Visits from Start of Care: 5  Missed Visits: none    Summary of Care:Pt attended 5 PT visits and then called to D/C remaining visits after f/u with MD, will be having surgery.       D/C to [address], PT 2018 3:15 PM

## 2018-08-16 ENCOUNTER — HOSPITAL ENCOUNTER (OUTPATIENT)
Age: 68
Setting detail: OBSERVATION
Discharge: HOME | End: 2018-08-17
Attending: ORTHOPAEDIC SURGERY | Admitting: ORTHOPAEDIC SURGERY
Payer: COMMERCIAL

## 2018-08-16 ENCOUNTER — ANESTHESIA EVENT (OUTPATIENT)
Dept: SURGERY | Age: 68
End: 2018-08-16
Payer: COMMERCIAL

## 2018-08-16 ENCOUNTER — ANESTHESIA (OUTPATIENT)
Dept: SURGERY | Age: 68
End: 2018-08-16
Payer: COMMERCIAL

## 2018-08-16 ENCOUNTER — APPOINTMENT (OUTPATIENT)
Dept: GENERAL RADIOLOGY | Age: 68
End: 2018-08-16
Attending: ORTHOPAEDIC SURGERY
Payer: COMMERCIAL

## 2018-08-16 LAB
ABO + RH BLD: NORMAL
BLOOD GROUP ANTIBODIES SERPL: NORMAL
GLUCOSE BLD STRIP.AUTO-MCNC: 144 MG/DL (ref 65–100)
GLUCOSE BLD STRIP.AUTO-MCNC: 152 MG/DL (ref 65–100)
GLUCOSE BLD STRIP.AUTO-MCNC: 154 MG/DL (ref 65–100)
SERVICE CMNT-IMP: ABNORMAL
SPECIMEN EXP DATE BLD: NORMAL

## 2018-08-16 PROCEDURE — 77030011943

## 2018-08-16 PROCEDURE — 77030039266 HC ADH SKN EXOFIN S2SG -A: Performed by: ORTHOPAEDIC SURGERY

## 2018-08-16 PROCEDURE — 77030032490 HC SLV COMPR SCD KNE COVD -B: Performed by: ORTHOPAEDIC SURGERY

## 2018-08-16 PROCEDURE — 76010000162 HC OR TIME 1.5 TO 2 HR INTENSV-TIER 1: Performed by: ORTHOPAEDIC SURGERY

## 2018-08-16 PROCEDURE — 74011636637 HC RX REV CODE- 636/637: Performed by: ORTHOPAEDIC SURGERY

## 2018-08-16 PROCEDURE — 77030020782 HC GWN BAIR PAWS FLX 3M -B

## 2018-08-16 PROCEDURE — 77030012893

## 2018-08-16 PROCEDURE — 51798 US URINE CAPACITY MEASURE: CPT

## 2018-08-16 PROCEDURE — 82962 GLUCOSE BLOOD TEST: CPT

## 2018-08-16 PROCEDURE — 77030018836 HC SOL IRR NACL ICUM -A: Performed by: ORTHOPAEDIC SURGERY

## 2018-08-16 PROCEDURE — 74011000250 HC RX REV CODE- 250: Performed by: ORTHOPAEDIC SURGERY

## 2018-08-16 PROCEDURE — 77030012406 HC DRN WND PENRS BARD -A: Performed by: ORTHOPAEDIC SURGERY

## 2018-08-16 PROCEDURE — 77030004391 HC BUR FLUT MEDT -C: Performed by: ORTHOPAEDIC SURGERY

## 2018-08-16 PROCEDURE — 74011000250 HC RX REV CODE- 250

## 2018-08-16 PROCEDURE — 76060000035 HC ANESTHESIA 2 TO 2.5 HR: Performed by: ORTHOPAEDIC SURGERY

## 2018-08-16 PROCEDURE — 36415 COLL VENOUS BLD VENIPUNCTURE: CPT | Performed by: ORTHOPAEDIC SURGERY

## 2018-08-16 PROCEDURE — 76000 FLUOROSCOPY <1 HR PHYS/QHP: CPT

## 2018-08-16 PROCEDURE — 74011250637 HC RX REV CODE- 250/637: Performed by: ORTHOPAEDIC SURGERY

## 2018-08-16 PROCEDURE — 77030014647 HC SEAL FBRN TISSL BAXT -D: Performed by: ORTHOPAEDIC SURGERY

## 2018-08-16 PROCEDURE — 77030008684 HC TU ET CUF COVD -B: Performed by: ANESTHESIOLOGY

## 2018-08-16 PROCEDURE — 77030038020 HC MANFLD NEPTUNE STRY -B: Performed by: ORTHOPAEDIC SURGERY

## 2018-08-16 PROCEDURE — 77030026438 HC STYL ET INTUB CARD -A: Performed by: ANESTHESIOLOGY

## 2018-08-16 PROCEDURE — 74011250636 HC RX REV CODE- 250/636

## 2018-08-16 PROCEDURE — 77030031139 HC SUT VCRL2 J&J -A: Performed by: ORTHOPAEDIC SURGERY

## 2018-08-16 PROCEDURE — 74011250636 HC RX REV CODE- 250/636: Performed by: ORTHOPAEDIC SURGERY

## 2018-08-16 PROCEDURE — 99218 HC RM OBSERVATION: CPT

## 2018-08-16 PROCEDURE — 74011000272 HC RX REV CODE- 272: Performed by: ORTHOPAEDIC SURGERY

## 2018-08-16 PROCEDURE — 77030003666 HC NDL SPINAL BD -A: Performed by: ORTHOPAEDIC SURGERY

## 2018-08-16 PROCEDURE — 77030013079 HC BLNKT BAIR HGGR 3M -A: Performed by: ANESTHESIOLOGY

## 2018-08-16 PROCEDURE — 76210000016 HC OR PH I REC 1 TO 1.5 HR: Performed by: ORTHOPAEDIC SURGERY

## 2018-08-16 PROCEDURE — 86900 BLOOD TYPING SEROLOGIC ABO: CPT | Performed by: ORTHOPAEDIC SURGERY

## 2018-08-16 PROCEDURE — 77030029099 HC BN WAX SSPC -A: Performed by: ORTHOPAEDIC SURGERY

## 2018-08-16 RX ORDER — SODIUM CHLORIDE 9 MG/ML
125 INJECTION, SOLUTION INTRAVENOUS CONTINUOUS
Status: DISPENSED | OUTPATIENT
Start: 2018-08-16 | End: 2018-08-17

## 2018-08-16 RX ORDER — SODIUM CHLORIDE, SODIUM LACTATE, POTASSIUM CHLORIDE, CALCIUM CHLORIDE 600; 310; 30; 20 MG/100ML; MG/100ML; MG/100ML; MG/100ML
INJECTION, SOLUTION INTRAVENOUS
Status: DISCONTINUED | OUTPATIENT
Start: 2018-08-16 | End: 2018-08-16 | Stop reason: HOSPADM

## 2018-08-16 RX ORDER — PANTOPRAZOLE SODIUM 40 MG/1
40 TABLET, DELAYED RELEASE ORAL
Status: DISCONTINUED | OUTPATIENT
Start: 2018-08-17 | End: 2018-08-17 | Stop reason: HOSPADM

## 2018-08-16 RX ORDER — PROPOFOL 10 MG/ML
INJECTION, EMULSION INTRAVENOUS AS NEEDED
Status: DISCONTINUED | OUTPATIENT
Start: 2018-08-16 | End: 2018-08-16 | Stop reason: HOSPADM

## 2018-08-16 RX ORDER — KETAMINE HYDROCHLORIDE 10 MG/ML
INJECTION, SOLUTION INTRAMUSCULAR; INTRAVENOUS AS NEEDED
Status: DISCONTINUED | OUTPATIENT
Start: 2018-08-16 | End: 2018-08-16 | Stop reason: HOSPADM

## 2018-08-16 RX ORDER — LANOLIN ALCOHOL/MO/W.PET/CERES
1000 CREAM (GRAM) TOPICAL DAILY
Status: DISCONTINUED | OUTPATIENT
Start: 2018-08-17 | End: 2018-08-17 | Stop reason: HOSPADM

## 2018-08-16 RX ORDER — SODIUM CHLORIDE 0.9 % (FLUSH) 0.9 %
5-10 SYRINGE (ML) INJECTION AS NEEDED
Status: DISCONTINUED | OUTPATIENT
Start: 2018-08-16 | End: 2018-08-16 | Stop reason: HOSPADM

## 2018-08-16 RX ORDER — POLYETHYLENE GLYCOL 3350 17 G/17G
17 POWDER, FOR SOLUTION ORAL DAILY
Status: DISCONTINUED | OUTPATIENT
Start: 2018-08-17 | End: 2018-08-17 | Stop reason: HOSPADM

## 2018-08-16 RX ORDER — MORPHINE SULFATE 10 MG/ML
2 INJECTION, SOLUTION INTRAMUSCULAR; INTRAVENOUS
Status: DISCONTINUED | OUTPATIENT
Start: 2018-08-16 | End: 2018-08-16 | Stop reason: HOSPADM

## 2018-08-16 RX ORDER — DEXLANSOPRAZOLE 30 MG/1
30 CAPSULE, DELAYED RELEASE ORAL DAILY
Status: DISCONTINUED | OUTPATIENT
Start: 2018-08-17 | End: 2018-08-16 | Stop reason: CLARIF

## 2018-08-16 RX ORDER — SODIUM CHLORIDE 0.9 % (FLUSH) 0.9 %
5-10 SYRINGE (ML) INJECTION EVERY 8 HOURS
Status: DISCONTINUED | OUTPATIENT
Start: 2018-08-17 | End: 2018-08-17 | Stop reason: HOSPADM

## 2018-08-16 RX ORDER — SUCCINYLCHOLINE CHLORIDE 20 MG/ML
INJECTION INTRAMUSCULAR; INTRAVENOUS AS NEEDED
Status: DISCONTINUED | OUTPATIENT
Start: 2018-08-16 | End: 2018-08-16 | Stop reason: HOSPADM

## 2018-08-16 RX ORDER — DEXMEDETOMIDINE HYDROCHLORIDE 4 UG/ML
INJECTION, SOLUTION INTRAVENOUS AS NEEDED
Status: DISCONTINUED | OUTPATIENT
Start: 2018-08-16 | End: 2018-08-16 | Stop reason: HOSPADM

## 2018-08-16 RX ORDER — ONDANSETRON 2 MG/ML
INJECTION INTRAMUSCULAR; INTRAVENOUS AS NEEDED
Status: DISCONTINUED | OUTPATIENT
Start: 2018-08-16 | End: 2018-08-16 | Stop reason: HOSPADM

## 2018-08-16 RX ORDER — OXYCODONE AND ACETAMINOPHEN 5; 325 MG/1; MG/1
1 TABLET ORAL AS NEEDED
Status: DISCONTINUED | OUTPATIENT
Start: 2018-08-16 | End: 2018-08-16 | Stop reason: HOSPADM

## 2018-08-16 RX ORDER — ACETAMINOPHEN 10 MG/ML
INJECTION, SOLUTION INTRAVENOUS AS NEEDED
Status: DISCONTINUED | OUTPATIENT
Start: 2018-08-16 | End: 2018-08-16 | Stop reason: HOSPADM

## 2018-08-16 RX ORDER — SODIUM CHLORIDE 9 MG/ML
25 INJECTION, SOLUTION INTRAVENOUS CONTINUOUS
Status: DISCONTINUED | OUTPATIENT
Start: 2018-08-16 | End: 2018-08-16 | Stop reason: HOSPADM

## 2018-08-16 RX ORDER — METOPROLOL SUCCINATE 25 MG/1
25 TABLET, EXTENDED RELEASE ORAL DAILY
Status: DISCONTINUED | OUTPATIENT
Start: 2018-08-17 | End: 2018-08-17 | Stop reason: HOSPADM

## 2018-08-16 RX ORDER — SODIUM CHLORIDE, SODIUM LACTATE, POTASSIUM CHLORIDE, CALCIUM CHLORIDE 600; 310; 30; 20 MG/100ML; MG/100ML; MG/100ML; MG/100ML
125 INJECTION, SOLUTION INTRAVENOUS CONTINUOUS
Status: DISCONTINUED | OUTPATIENT
Start: 2018-08-16 | End: 2018-08-16 | Stop reason: HOSPADM

## 2018-08-16 RX ORDER — FERROUS SULFATE, DRIED 160(50) MG
2 TABLET, EXTENDED RELEASE ORAL DAILY
Status: DISCONTINUED | OUTPATIENT
Start: 2018-08-17 | End: 2018-08-17 | Stop reason: HOSPADM

## 2018-08-16 RX ORDER — PHENYLEPHRINE HCL IN 0.9% NACL 0.4MG/10ML
SYRINGE (ML) INTRAVENOUS AS NEEDED
Status: DISCONTINUED | OUTPATIENT
Start: 2018-08-16 | End: 2018-08-16 | Stop reason: HOSPADM

## 2018-08-16 RX ORDER — GLYCOPYRROLATE 0.2 MG/ML
INJECTION INTRAMUSCULAR; INTRAVENOUS AS NEEDED
Status: DISCONTINUED | OUTPATIENT
Start: 2018-08-16 | End: 2018-08-16 | Stop reason: HOSPADM

## 2018-08-16 RX ORDER — SODIUM CHLORIDE 0.9 % (FLUSH) 0.9 %
5-10 SYRINGE (ML) INJECTION EVERY 8 HOURS
Status: DISCONTINUED | OUTPATIENT
Start: 2018-08-16 | End: 2018-08-16 | Stop reason: HOSPADM

## 2018-08-16 RX ORDER — HYDROMORPHONE HYDROCHLORIDE 1 MG/ML
INJECTION, SOLUTION INTRAMUSCULAR; INTRAVENOUS; SUBCUTANEOUS AS NEEDED
Status: DISCONTINUED | OUTPATIENT
Start: 2018-08-16 | End: 2018-08-16 | Stop reason: HOSPADM

## 2018-08-16 RX ORDER — GABAPENTIN 300 MG/1
300 CAPSULE ORAL 3 TIMES DAILY
Status: DISCONTINUED | OUTPATIENT
Start: 2018-08-16 | End: 2018-08-17 | Stop reason: HOSPADM

## 2018-08-16 RX ORDER — ACETAMINOPHEN 325 MG/1
650 TABLET ORAL EVERY 6 HOURS
Status: DISCONTINUED | OUTPATIENT
Start: 2018-08-16 | End: 2018-08-17 | Stop reason: HOSPADM

## 2018-08-16 RX ORDER — BUPIVACAINE HYDROCHLORIDE AND EPINEPHRINE 5; 5 MG/ML; UG/ML
INJECTION, SOLUTION EPIDURAL; INTRACAUDAL; PERINEURAL AS NEEDED
Status: DISCONTINUED | OUTPATIENT
Start: 2018-08-16 | End: 2018-08-16 | Stop reason: HOSPADM

## 2018-08-16 RX ORDER — DEXAMETHASONE SODIUM PHOSPHATE 4 MG/ML
INJECTION, SOLUTION INTRA-ARTICULAR; INTRALESIONAL; INTRAMUSCULAR; INTRAVENOUS; SOFT TISSUE AS NEEDED
Status: DISCONTINUED | OUTPATIENT
Start: 2018-08-16 | End: 2018-08-16 | Stop reason: HOSPADM

## 2018-08-16 RX ORDER — OXYCODONE HYDROCHLORIDE 5 MG/1
10 TABLET ORAL
Status: DISCONTINUED | OUTPATIENT
Start: 2018-08-16 | End: 2018-08-17 | Stop reason: HOSPADM

## 2018-08-16 RX ORDER — ROCURONIUM BROMIDE 10 MG/ML
INJECTION, SOLUTION INTRAVENOUS AS NEEDED
Status: DISCONTINUED | OUTPATIENT
Start: 2018-08-16 | End: 2018-08-16 | Stop reason: HOSPADM

## 2018-08-16 RX ORDER — DIPHENHYDRAMINE HCL 12.5MG/5ML
12.5 ELIXIR ORAL
Status: DISCONTINUED | OUTPATIENT
Start: 2018-08-16 | End: 2018-08-17 | Stop reason: HOSPADM

## 2018-08-16 RX ORDER — CEFAZOLIN SODIUM/WATER 2 G/20 ML
2 SYRINGE (ML) INTRAVENOUS EVERY 8 HOURS
Status: COMPLETED | OUTPATIENT
Start: 2018-08-16 | End: 2018-08-17

## 2018-08-16 RX ORDER — NALOXONE HYDROCHLORIDE 0.4 MG/ML
0.4 INJECTION, SOLUTION INTRAMUSCULAR; INTRAVENOUS; SUBCUTANEOUS AS NEEDED
Status: DISCONTINUED | OUTPATIENT
Start: 2018-08-16 | End: 2018-08-17 | Stop reason: HOSPADM

## 2018-08-16 RX ORDER — FACIAL-BODY WIPES
10 EACH TOPICAL DAILY PRN
Status: DISCONTINUED | OUTPATIENT
Start: 2018-08-18 | End: 2018-08-17 | Stop reason: HOSPADM

## 2018-08-16 RX ORDER — ONDANSETRON 2 MG/ML
4 INJECTION INTRAMUSCULAR; INTRAVENOUS AS NEEDED
Status: DISCONTINUED | OUTPATIENT
Start: 2018-08-16 | End: 2018-08-16 | Stop reason: HOSPADM

## 2018-08-16 RX ORDER — INSULIN LISPRO 100 [IU]/ML
INJECTION, SOLUTION INTRAVENOUS; SUBCUTANEOUS
Status: DISCONTINUED | OUTPATIENT
Start: 2018-08-16 | End: 2018-08-17 | Stop reason: HOSPADM

## 2018-08-16 RX ORDER — OXYCODONE HYDROCHLORIDE 5 MG/1
5 TABLET ORAL
Status: DISCONTINUED | OUTPATIENT
Start: 2018-08-16 | End: 2018-08-17 | Stop reason: HOSPADM

## 2018-08-16 RX ORDER — MIDAZOLAM HYDROCHLORIDE 1 MG/ML
INJECTION, SOLUTION INTRAMUSCULAR; INTRAVENOUS AS NEEDED
Status: DISCONTINUED | OUTPATIENT
Start: 2018-08-16 | End: 2018-08-16 | Stop reason: HOSPADM

## 2018-08-16 RX ORDER — VANCOMYCIN HYDROCHLORIDE 1 G/20ML
INJECTION, POWDER, LYOPHILIZED, FOR SOLUTION INTRAVENOUS AS NEEDED
Status: DISCONTINUED | OUTPATIENT
Start: 2018-08-16 | End: 2018-08-16 | Stop reason: HOSPADM

## 2018-08-16 RX ORDER — TAMSULOSIN HYDROCHLORIDE 0.4 MG/1
0.4 CAPSULE ORAL DAILY
Status: DISCONTINUED | OUTPATIENT
Start: 2018-08-17 | End: 2018-08-17

## 2018-08-16 RX ORDER — NEOSTIGMINE METHYLSULFATE 1 MG/ML
INJECTION INTRAVENOUS AS NEEDED
Status: DISCONTINUED | OUTPATIENT
Start: 2018-08-16 | End: 2018-08-16 | Stop reason: HOSPADM

## 2018-08-16 RX ORDER — SODIUM CHLORIDE 0.9 % (FLUSH) 0.9 %
5-10 SYRINGE (ML) INJECTION AS NEEDED
Status: DISCONTINUED | OUTPATIENT
Start: 2018-08-16 | End: 2018-08-17 | Stop reason: HOSPADM

## 2018-08-16 RX ORDER — MIDAZOLAM HYDROCHLORIDE 1 MG/ML
1 INJECTION, SOLUTION INTRAMUSCULAR; INTRAVENOUS AS NEEDED
Status: DISCONTINUED | OUTPATIENT
Start: 2018-08-16 | End: 2018-08-16 | Stop reason: HOSPADM

## 2018-08-16 RX ORDER — LIDOCAINE HYDROCHLORIDE 20 MG/ML
INJECTION, SOLUTION EPIDURAL; INFILTRATION; INTRACAUDAL; PERINEURAL AS NEEDED
Status: DISCONTINUED | OUTPATIENT
Start: 2018-08-16 | End: 2018-08-16 | Stop reason: HOSPADM

## 2018-08-16 RX ORDER — HYDROMORPHONE HCL/0.9% NACL/PF 0.5 MG/ML
PLASTIC BAG, INJECTION (ML) INTRAVENOUS CONTINUOUS
Status: DISCONTINUED | OUTPATIENT
Start: 2018-08-16 | End: 2018-08-17 | Stop reason: HOSPADM

## 2018-08-16 RX ORDER — HYDROMORPHONE HCL/0.9% NACL/PF 0.5 MG/ML
PLASTIC BAG, INJECTION (ML) INTRAVENOUS
Status: DISPENSED
Start: 2018-08-16 | End: 2018-08-17

## 2018-08-16 RX ORDER — FENTANYL CITRATE 50 UG/ML
50 INJECTION, SOLUTION INTRAMUSCULAR; INTRAVENOUS AS NEEDED
Status: DISCONTINUED | OUTPATIENT
Start: 2018-08-16 | End: 2018-08-16 | Stop reason: HOSPADM

## 2018-08-16 RX ORDER — DIPHENHYDRAMINE HYDROCHLORIDE 50 MG/ML
12.5 INJECTION, SOLUTION INTRAMUSCULAR; INTRAVENOUS AS NEEDED
Status: DISCONTINUED | OUTPATIENT
Start: 2018-08-16 | End: 2018-08-16 | Stop reason: HOSPADM

## 2018-08-16 RX ORDER — DEXTROSE 50 % IN WATER (D50W) INTRAVENOUS SYRINGE
12.5-25 AS NEEDED
Status: DISCONTINUED | OUTPATIENT
Start: 2018-08-16 | End: 2018-08-17 | Stop reason: HOSPADM

## 2018-08-16 RX ORDER — SIMVASTATIN 20 MG/1
20 TABLET, FILM COATED ORAL DAILY
Status: DISCONTINUED | OUTPATIENT
Start: 2018-08-17 | End: 2018-08-17 | Stop reason: HOSPADM

## 2018-08-16 RX ORDER — CYCLOBENZAPRINE HCL 10 MG
10 TABLET ORAL
Status: DISCONTINUED | OUTPATIENT
Start: 2018-08-16 | End: 2018-08-17 | Stop reason: HOSPADM

## 2018-08-16 RX ORDER — AMOXICILLIN 250 MG
1 CAPSULE ORAL 2 TIMES DAILY
Status: DISCONTINUED | OUTPATIENT
Start: 2018-08-16 | End: 2018-08-17 | Stop reason: HOSPADM

## 2018-08-16 RX ORDER — CEFAZOLIN SODIUM/WATER 2 G/20 ML
2 SYRINGE (ML) INTRAVENOUS
Status: COMPLETED | OUTPATIENT
Start: 2018-08-16 | End: 2018-08-16

## 2018-08-16 RX ORDER — FENTANYL CITRATE 50 UG/ML
25 INJECTION, SOLUTION INTRAMUSCULAR; INTRAVENOUS
Status: DISCONTINUED | OUTPATIENT
Start: 2018-08-16 | End: 2018-08-16 | Stop reason: HOSPADM

## 2018-08-16 RX ORDER — MIDAZOLAM HYDROCHLORIDE 1 MG/ML
0.5 INJECTION, SOLUTION INTRAMUSCULAR; INTRAVENOUS
Status: DISCONTINUED | OUTPATIENT
Start: 2018-08-16 | End: 2018-08-16 | Stop reason: HOSPADM

## 2018-08-16 RX ORDER — FENTANYL CITRATE 50 UG/ML
INJECTION, SOLUTION INTRAMUSCULAR; INTRAVENOUS AS NEEDED
Status: DISCONTINUED | OUTPATIENT
Start: 2018-08-16 | End: 2018-08-16 | Stop reason: HOSPADM

## 2018-08-16 RX ORDER — MAGNESIUM SULFATE 100 %
4 CRYSTALS MISCELLANEOUS AS NEEDED
Status: DISCONTINUED | OUTPATIENT
Start: 2018-08-16 | End: 2018-08-17 | Stop reason: HOSPADM

## 2018-08-16 RX ORDER — ONDANSETRON 2 MG/ML
4 INJECTION INTRAMUSCULAR; INTRAVENOUS
Status: DISCONTINUED | OUTPATIENT
Start: 2018-08-16 | End: 2018-08-17 | Stop reason: HOSPADM

## 2018-08-16 RX ORDER — LIDOCAINE HYDROCHLORIDE 10 MG/ML
0.1 INJECTION, SOLUTION EPIDURAL; INFILTRATION; INTRACAUDAL; PERINEURAL AS NEEDED
Status: DISCONTINUED | OUTPATIENT
Start: 2018-08-16 | End: 2018-08-16 | Stop reason: HOSPADM

## 2018-08-16 RX ADMIN — LIDOCAINE HYDROCHLORIDE 80 MG: 20 INJECTION, SOLUTION EPIDURAL; INFILTRATION; INTRACAUDAL; PERINEURAL at 11:45

## 2018-08-16 RX ADMIN — GLYCOPYRROLATE 0.4 MG: 0.2 INJECTION INTRAMUSCULAR; INTRAVENOUS at 13:09

## 2018-08-16 RX ADMIN — FENTANYL CITRATE 50 MCG: 50 INJECTION, SOLUTION INTRAMUSCULAR; INTRAVENOUS at 11:45

## 2018-08-16 RX ADMIN — SUCCINYLCHOLINE CHLORIDE 180 MG: 20 INJECTION INTRAMUSCULAR; INTRAVENOUS at 11:45

## 2018-08-16 RX ADMIN — SODIUM CHLORIDE 125 ML/HR: 900 INJECTION, SOLUTION INTRAVENOUS at 14:59

## 2018-08-16 RX ADMIN — ACETAMINOPHEN 650 MG: 325 TABLET ORAL at 17:59

## 2018-08-16 RX ADMIN — NEOSTIGMINE METHYLSULFATE 2 MG: 1 INJECTION INTRAVENOUS at 13:09

## 2018-08-16 RX ADMIN — PROPOFOL 150 MG: 10 INJECTION, EMULSION INTRAVENOUS at 11:45

## 2018-08-16 RX ADMIN — FENTANYL CITRATE 50 MCG: 50 INJECTION, SOLUTION INTRAMUSCULAR; INTRAVENOUS at 12:07

## 2018-08-16 RX ADMIN — ACETAMINOPHEN 650 MG: 325 TABLET ORAL at 22:45

## 2018-08-16 RX ADMIN — Medication 2 G: at 20:02

## 2018-08-16 RX ADMIN — DEXMEDETOMIDINE HYDROCHLORIDE 4 MCG: 4 INJECTION, SOLUTION INTRAVENOUS at 13:05

## 2018-08-16 RX ADMIN — Medication 2 G: at 11:59

## 2018-08-16 RX ADMIN — MIDAZOLAM HYDROCHLORIDE 1.5 MG: 1 INJECTION, SOLUTION INTRAMUSCULAR; INTRAVENOUS at 11:40

## 2018-08-16 RX ADMIN — SODIUM CHLORIDE, SODIUM LACTATE, POTASSIUM CHLORIDE, CALCIUM CHLORIDE: 600; 310; 30; 20 INJECTION, SOLUTION INTRAVENOUS at 11:43

## 2018-08-16 RX ADMIN — GABAPENTIN 300 MG: 300 CAPSULE ORAL at 17:59

## 2018-08-16 RX ADMIN — DOCUSATE SODIUM AND SENNOSIDES 1 TABLET: 8.6; 5 TABLET, FILM COATED ORAL at 17:59

## 2018-08-16 RX ADMIN — DEXMEDETOMIDINE HYDROCHLORIDE 4 MCG: 4 INJECTION, SOLUTION INTRAVENOUS at 13:09

## 2018-08-16 RX ADMIN — ROCURONIUM BROMIDE 5 MG: 10 INJECTION, SOLUTION INTRAVENOUS at 11:45

## 2018-08-16 RX ADMIN — Medication 80 MCG: at 11:52

## 2018-08-16 RX ADMIN — ROCURONIUM BROMIDE 35 MG: 10 INJECTION, SOLUTION INTRAVENOUS at 11:52

## 2018-08-16 RX ADMIN — Medication 80 MCG: at 12:29

## 2018-08-16 RX ADMIN — SODIUM CHLORIDE 125 ML/HR: 900 INJECTION, SOLUTION INTRAVENOUS at 22:38

## 2018-08-16 RX ADMIN — ONDANSETRON 4 MG: 2 INJECTION INTRAMUSCULAR; INTRAVENOUS at 13:07

## 2018-08-16 RX ADMIN — HYDROMORPHONE HYDROCHLORIDE 0.5 MG: 1 INJECTION, SOLUTION INTRAMUSCULAR; INTRAVENOUS; SUBCUTANEOUS at 13:34

## 2018-08-16 RX ADMIN — Medication: at 14:30

## 2018-08-16 RX ADMIN — KETAMINE HYDROCHLORIDE 25 MG: 10 INJECTION, SOLUTION INTRAMUSCULAR; INTRAVENOUS at 12:10

## 2018-08-16 RX ADMIN — DEXAMETHASONE SODIUM PHOSPHATE 4 MG: 4 INJECTION, SOLUTION INTRA-ARTICULAR; INTRALESIONAL; INTRAMUSCULAR; INTRAVENOUS; SOFT TISSUE at 12:19

## 2018-08-16 RX ADMIN — INSULIN LISPRO 2 UNITS: 100 INJECTION, SOLUTION INTRAVENOUS; SUBCUTANEOUS at 16:42

## 2018-08-16 RX ADMIN — GABAPENTIN 300 MG: 300 CAPSULE ORAL at 22:25

## 2018-08-16 RX ADMIN — ACETAMINOPHEN 1000 MG: 10 INJECTION, SOLUTION INTRAVENOUS at 12:19

## 2018-08-16 RX ADMIN — DEXMEDETOMIDINE HYDROCHLORIDE 4 MCG: 4 INJECTION, SOLUTION INTRAVENOUS at 13:13

## 2018-08-16 RX ADMIN — SODIUM CHLORIDE, SODIUM LACTATE, POTASSIUM CHLORIDE, CALCIUM CHLORIDE: 600; 310; 30; 20 INJECTION, SOLUTION INTRAVENOUS at 13:09

## 2018-08-16 NOTE — ANESTHESIA POSTPROCEDURE EVALUATION
Post-Anesthesia Evaluation and Assessment    Patient: Sharmila Willett MRN: [id number]  SSN: [id number]    YOB: 1950  Age: 69 y.o. Sex: male       Cardiovascular Function/Vital Signs  Visit Vitals    /67    Pulse 72    Temp 36.3 °C (97.4 °F)    Resp 15    Wt 82.6 kg (182 lb)    SpO2 95%    BMI 26.11 kg/m2       Patient is status post general anesthesia for Procedure(s):  L4-S1 LEFT DECOMPRESSION, L4-L5 DISCECTOMY . Nausea/Vomiting: None    Postoperative hydration reviewed and adequate. Pain:  Pain Scale 1: Visual (08/16/18 1341)  Pain Intensity 1: 0 (08/16/18 1341)   Managed    Neurological Status:   Neuro (WDL): Exceptions to WDL (08/16/18 1341)  Neuro  Neurologic State: Anesthetized (08/16/18 1341)  LLE Motor Response: Numbness (feet) (08/16/18 0929)  RLE Motor Response: Numbness (feet) (08/16/18 0929)   At baseline    Mental Status and Level of Consciousness: Arousable    Pulmonary Status:   O2 Device: CO2 nasal cannula (08/16/18 1341)   Adequate oxygenation and airway patent    Complications related to anesthesia: None    Post-anesthesia assessment completed.  No concerns    Signed By: Lamar Noguera DO     August 16, 2018

## 2018-08-16 NOTE — ROUTINE PROCESS
Patient: Reny Antunez MRN: [id number]  SSN: [id number]   YOB: 1950  Age: 69 y.o. Sex: male     Patient is status post Procedure(s):  L4-S1 LEFT DECOMPRESSION, L4-L5 DISCECTOMY . Surgeon(s) and Role:     Stacy Rueda MD - Primary    Local/Dose/Irrigation:  SEE MAR                  Peripheral IV 08/16/18 Right Hand (Active)   Site Assessment Clean, dry, & intact 8/16/2018  9:32 AM   Phlebitis Assessment 0 8/16/2018  9:32 AM   Infiltration Assessment 0 8/16/2018  9:32 AM   Dressing Status Clean, dry, & intact; New 8/16/2018  9:32 AM   Dressing Type Tape;Transparent 8/16/2018  9:32 AM   Hub Color/Line Status Green; Infusing 8/16/2018  9:32 AM   Alcohol Cap Used No 8/16/2018  9:32 AM          Hemovac Left Back (Active)      Airway - Endotracheal Tube Oral (Active)                   Dressing/Packing:  Wound Back Posterior-DRESSING TYPE: 4 x 4;Topical skin adhesive/glue (08/16/18 1300)  Splint/Cast:  ]    Other:

## 2018-08-16 NOTE — ANESTHESIA PREPROCEDURE EVALUATION
Anesthetic History   No history of anesthetic complications            Review of Systems / Medical History  Patient summary reviewed, nursing notes reviewed and pertinent labs reviewed    Pulmonary        Sleep apnea           Neuro/Psych   Within defined limits           Cardiovascular    Hypertension        Dysrhythmias            GI/Hepatic/Renal     GERD           Endo/Other    Diabetes    Arthritis     Other Findings            Physical Exam    Airway  Mallampati: II  TM Distance: 4 - 6 cm  Neck ROM: normal range of motion   Mouth opening: Normal     Cardiovascular  Regular rate and rhythm,  S1 and S2 normal,  no murmur, click, rub, or gallop             Dental  No notable dental hx       Pulmonary  Breath sounds clear to auscultation               Abdominal  GI exam deferred       Other Findings            Anesthetic Plan    ASA: 3  Anesthesia type: general          Induction: Intravenous  Anesthetic plan and risks discussed with: Patient

## 2018-08-16 NOTE — PERIOP NOTES
TRANSFER - OUT REPORT    Verbal report given to Aretha Galvin RN on Aric Anguiano  being transferred to  for routine post - op       Report consisted of patients Situation, Background, Assessment and   Recommendations(SBAR). Time Pre op antibiotic given:1159  Anesthesia Stop time: 2832  Steve Present on Transfer to floor:no  Order for Steve on Chart:no  Discharge Prescriptions with Chart:no    Information from the following report(s) SBAR, OR Summary, Intake/Output and MAR was reviewed with the receiving nurse. Opportunity for questions and clarification was provided. Is the patient on 02? YES       L/Min 1       Other 0    Is the patient on a monitor? NO    Is the nurse transporting with the patient? NO    Surgical Waiting Area notified of patient's transfer from PACU?  YES      The following personal items collected during your admission accompanied patient upon transfer:   Dental Appliance: Dental Appliances: None  Vision: Visual Aid: Glasses (with wife)  Hearing Aid:    Jewelry:    Clothing: Clothing:  (bag of clothes & cane returned to pt in pacu)  Other Valuables:    Valuables sent to safe:

## 2018-08-16 NOTE — ROUTINE PROCESS
Primary Nurse Michelle Olivo and Margarita Wheeler, RN performed a dual skin assessment on this patient No impairment noted  Benton score is 21

## 2018-08-16 NOTE — IP AVS SNAPSHOT
[address] 
[phone] Patient: Jose Vines MRN: [id number] About your hospitalization You were admitted on:  August 16, 2018 You last received care in the:  [address] [place] [address] You were discharged on:  August 17, 2018 Why you were hospitalized Your primary diagnosis was:  Not on File Your diagnoses also included:  Hnp (Herniated Nucleus Pulposus) Follow-up Information Follow up With Details Comments Contact Info Lynda Jessica MD   [address] [address] 
[phone] Discharge Orders None A check micaela indicates which time of day the medication should be taken. My Medications START taking these medications Instructions Each Dose to Equal  
 Morning Noon Evening Bedtime  
 oxyCODONE IR 5 mg immediate release tablet Commonly known as:  Ever [place]ans Your last dose was: Your next dose is: Take 1 Tab by mouth every three (3) hours as needed. Max Daily Amount: 40 mg.  
 5 mg CONTINUE taking these medications Instructions Each Dose to Equal  
 Morning Noon Evening Bedtime CALCIUM 600 + D 600-125 mg-unit Tab Generic drug:  calcium-cholecalciferol (d3) Your last dose was: Your next dose is: Take 2 Tabs by mouth daily. 2 Tab CIALIS 5 mg tablet Generic drug:  tadalafil Your last dose was: Your next dose is: Take 5 mg by mouth daily. 5 mg  
    
   
   
   
  
 clobetasol 0.05 % external solution Commonly known as:  [place] Your last dose was: Your next dose is:    
   
   
 Apply  to affected area two (2) times a day. dexlansoprazole 30 mg capsule Commonly known as:  DEXILANT Your last dose was: Your next dose is: Take 1 capsule by mouth daily. 30 mg FARXIGA 10 mg Tab tablet Generic drug:  dapagliflozin Your last dose was: Your next dose is:    
   
   
      
   
   
   
  
 gabapentin 300 mg capsule Commonly known as:  NEURONTIN Your last dose was: Your next dose is: Take 1 Cap by mouth three (3) times daily. 1 Cap  
    
   
   
   
  
 metFORMIN ER 1,000 mg Tr24 Your last dose was: Your next dose is: Take 1,000 mg by mouth two (2) times a day. 1000 mg  
    
   
   
   
  
 metoprolol succinate 25 mg XL tablet Commonly known as:  TOPROL-XL Your last dose was: Your next dose is:    
   
   
      
   
   
   
  
 NOVOFINE 32 32 gauge x 1/4\" Ndle Generic drug:  Insulin Needles (Disposable) Your last dose was: Your next dose is:    
   
   
      
   
   
   
  
 ONETOUCH ULTRA TEST strip Generic drug:  glucose blood VI test strips Your last dose was: Your next dose is:    
   
   
 TEST THREE TIMES DAILY  
     
   
   
   
  
 simvastatin 20 mg tablet Commonly known as:  ZOCOR Your last dose was: Your next dose is: Take 1 tablet by mouth daily. 20 mg  
    
   
   
   
  
 tamsulosin 0.4 mg capsule Commonly known as:  FLOMAX Your last dose was: Your next dose is: Take 0.4 mg by mouth daily. 0.4 mg  
    
   
   
   
  
 testosterone 20.25 mg/1.25 gram (1.62 %) gel Commonly known as:  ANDROGEL Your last dose was: Your next dose is:    
   
   
 by TransDERmal route daily. TRULICITY 1.5 CD/9.0 mL sub-q pen Generic drug:  dulaglutide Your last dose was: Your next dose is: VITAMIN B-12 1,000 mcg tablet Generic drug:  cyanocobalamin Your last dose was: Your next dose is: Take 1,000 mcg by mouth daily. 1000 mcg VITAMIN D2 50,000 unit capsule Generic drug:  ergocalciferol Your last dose was: Your next dose is: Take 1 Cap by mouth every seven (7) days. 1 Cap ASK your doctor about these medications Instructions Each Dose to Equal  
 Morning Noon Evening Bedtime  
 aspirin 81 mg tablet Your last dose was: Your next dose is: Take 81 mg by mouth daily. 81 mg Where to Get Your Medications Information on where to get these meds will be given to you by the nurse or doctor. ! Ask your nurse or doctor about these medications  
  oxyCODONE IR 5 mg immediate release tablet Opioid Education Prescription Opioids: What You Need to Know: 
 
 
Follow up appointments 
-Follow up with Dr. Alfredito Fisher in 2 weeks. Call [phone] to make an appointment as soon as you get home from the hospital. 
 
[address] [place], [place] [address]y: _________________________   phone: ___________________ The agency will contact you to arrange dates/times for visits. Please call them if you do not hear from them within 24 hours after you are discharged Physical therapy 3 times a week for 3 weeks Nursing-initial assessment and as needed When to call your Orthopaedic Surgeon: 
-Signs of infection-if your incision is red; continues to have drainage; drainage has a foul odor or if you have a persistent fever over 101 degrees for 24 hours 
-Nausea or vomiting, severe headache 
-Loss of bowel or bladder function, inability to urinate 
-Changes in sensation in your arms or legs (numbness, tingling, loss of color) -Increased weakness-greater than before your surgery 
-Severe pain or pain not relieved by medications 
-Signs of a blood clot in your leg-calf pain, tenderness, redness, swelling of lower leg When to call your Primary Care Physician: 
-Concerns about medical conditions such as diabetes, high blood pressure, asthma, congestive heart failure 
-Call if blood sugars are elevated, persistent headache or dizziness, coughing or congestion, constipation or diarrhea, burning with urination, abnormal heart rate When to call 911 and go to the nearest emergency room: 
-Acute onset of chest pain, shortness of breath, difficulty breathing Activity - You are going home a well person, be as active as possible. Your only exercise should be walking. Start with short frequent walks and increase your walking distance each day. 
-Limit the amount of time you sit to 20-30 minute intervals. Sitting for prolonged periods of time will be uncomfortable for you following surgery. 
-Do NOT lift anything over 5 pounds 
-Do NOT do any straining, twisting or bending 
-When you are in bed, you may lay on your back or on either side. Do NOT lie on your stomach Brace 
-If you have a back brace, you should wear your brace at all times when you are out of bed. Do not wear the brace while in bed or showering. 
-Remember to always wear a cotton t-shirt underneath your brace. 
-Do not bend or twist when your brace is off Diet 
-Resume usual diet; drink plenty of fluids; eat foods high in fiber 
-It is important to have regular bowel movements. Pain medications may cause constipation.   You may want to take a stool softener (such as Senokot-S or Colace) to prevent constipation. 
-If constipation occurs, take a laxative (such as Dulcolax tablets, Milk of Magnesia, or a suppository). Laxatives should only be used if the above preventable measures have failed and you still have not had a bowel movement after three days Driving 
-You may not drive or return to work until instructed by your physician. However, you may ride in the car for short periods of time. Incision Care 
-You may take brief showers but do not run the water run directly onto the wound. After showering or bathing, remove the wet dressing and gently blot the wound dry with a soft towel. 
-Do not rub or apply any lotions or ointments to your incision site.  
-Do not soak or scrub your wound 
-Keep a dry dressing (ABD and paper tape) on your incision and have it changed daily for 14 days after surgery; more often if your incision is draining. Have your caregiver wash their hands thoroughly before changing your dressing. 
-You will have absorbable sutures and steristrips (white tape) on your incision. Leave the steristrips on until they fall off. Showering 
-You may shower in approximately 2 days after your surgery.   
-Leave the dressing on during your shower. Do NOT allow the water to run directly onto your dressing. Once you get out of the shower, put on a dry dressing. 
-Reminder- your brace can be removed while showering. Remember to not bend or twist while your brace is off.   
-Do not take a tub bath. Preventing blood clots 
-You have been given T.E.D. stockings to wear. Continue to wear these for 7 days after your discharge. Put them on in the morning and take them off at night.   
-They are used to increase your circulation and prevent blood clots from forming in your legs 
-T. E.D. stockings can be machine washed, temperature not to exceed 160° F (71°C) and machine dried for 15 to 20 minutes, temperature not to exceed 250° F (121°C). Pain management -Take pain medication as prescribed; decrease the amount you use as your pain lessens 
-Do not wait until you are in extreme pain to take your medication. 
-Avoid alcoholic beverages while taking pain medication Pain Medication Safety DO: 
-Read the Medication Guide  
-Take your medicine exactly as prescribed  
-Store your medicine away from children and in a safe place  
-Flush unused medicine down the toilet  
-Call your healthcare provider for medical advice about side effects. You may report side effects to FDA at 5-211-FDA-7778.  
-Please be aware that many medications contain Tylenol. We do not want you to over medicate so please read the information below as a guide. Do not take more than 4 Grams of Tylenol in a 24 hour period. (There are 1000 milligrams in one Gram) Percocet contains 325 mg of Tylenol per tablet (do not take more than 12 tablets in 24 hours) Lortab contains 500 mg of Tylenol per tablet (do not take more than 8 tablets in 24 hours) Norco contains 325 mg of Tylenol per tablet (do not take more than 12 tablets in 24 hours). DO NOT: 
-Do not give your medicine to others  
-Do not take medicine unless it was prescribed for you  
-Do not stop taking your medicine without talking to your healthcare provider  
-Do not break, chew, crush, dissolve, or inject your medicine. If you cannot  swallow your medicine whole, talk to your healthcare provider. 
-Do not drink alcohol while taking this medicine 
-Do not take anti-inflammatory medications or aspirin unless instructed by your   physician. Introducing [facility]! Dear Gibson Silva: Thank you for requesting a [employer] account. Our records indicate that you already have an active [employer] account. You can access your account anytime at [email or web address] Did you know that you can access your hospital and ER discharge instructions at any time in [employer]? You can also review all of your test results from your hospital stay or ER visit. Additional Information If you have questions, please visit the Frequently Asked Questions section of the [employer] website at [email or web address] Remember, [employer] is NOT to be used for urgent needs. For medical emergencies, dial 911. Now available from your iPhone and Android! Introducing Juno Cespedes As a [facility] patient, I wanted to make you aware of our electronic visit tool called Juno Cespedes. [employer] allows you to connect within minutes with a medical provider 24 hours a day, seven days a week via a mobile device or tablet or logging into a secure website from your computer. You can access Juno Cespedes from anywhere in the [place]. A virtual visit might be right for you when you have a simple condition and feel like you just dont want to get out of bed, or cant get away from work for an appointment, when your regular [facility] provider is not available (evenings, weekends or holidays), or when youre out of town and need minor care. Electronic visits cost only $49 and if the [employer] provider determines a prescription is needed to treat your condition, one can be electronically transmitted to a nearby pharmacy*. Please take a moment to enroll today if you have not already done so. The enrollment process is free and takes just a few minutes. To enroll, please download the Delta ID ute to your tablet or phone, or visit [email or web address] to enroll on your computer. And, as an [address] [place] [address] patient with a [employer] account, the results of your visits will be scanned into your electronic medical record and your primary care provider will be able to view the scanned results. We urge you to continue to see your regular [facility] provider for your ongoing medical care. And while your primary care provider may not be the one available when you seek a Juno Mullenfin virtual visit, the peace of mind you get from getting a real diagnosis real time can be priceless. For more information on Juno Mullenfin, view our Frequently Asked Questions (FAQs) at [email or web address]. Sincerely, 
 
Usama Reid MD 
Chief Medical Officer [address] *:  certain medications cannot be prescribed via [employer] Unresulted Labs-Please follow up with your PCP about these lab tests Order Current Status XR FLUOROSCOPY UNDER 60 MINUTES In process Providers Seen During Your Hospitalization Provider Specialty Primary office phone Alexis Serrano MD Orthopedic Surgery [phone] Your Primary Care Physician (PCP) Primary Care Physician Office Phone Office Fax [phone] [facility] [place] [address] [phone] You are allergic to the following Allergen Reactions Pcn (Penicillins) Other (comments) Infancy was told he had a rash Recent Documentation Weight BMI Smoking Status 82.6 kg 26.11 kg/m2 Former Smoker Emergency Contacts Name Discharge Info Relation Home Work Mobile [facility] DISCHARGE CAREGIVER [3] Spouse [3] [phone] Patient Belongings The following personal items are in your possession at time of discharge: 
  Dental Appliances: None  Visual Aid: Glasses             Clothing:  (bag of clothes & cane returned to pt in pacu) Please provide this summary of care documentation to your next provider. Signatures-by signing, you are acknowledging that this After Visit Summary has been reviewed with you and you have received a copy. Patient Signature:  ____________________________________________________________ Date:  ____________________________________________________________  
  
Marilee Bigelow Provider Signature:  ____________________________________________________________ Date:  ____________________________________________________________

## 2018-08-16 NOTE — IP AVS SNAPSHOT
[address] 
[phone] Patient: Sid Lucas MRN: [id number] [facility]:75/96/6443 A check micaela indicates which time of day the medication should be taken. My Medications START taking these medications Instructions Each Dose to Equal  
 Morning Noon Evening Bedtime  
 oxyCODONE IR 5 mg immediate release tablet Commonly known as:  [facility] [place] Your last dose was: Your next dose is: Take 1 Tab by mouth every three (3) hours as needed. Max Daily Amount: 40 mg.  
 5 mg CONTINUE taking these medications Instructions Each Dose to Equal  
 Morning Noon Evening Bedtime CALCIUM 600 + D 600-125 mg-unit Tab Generic drug:  calcium-cholecalciferol (d3) Your last dose was: Your next dose is: Take 2 Tabs by mouth daily. 2 Tab CIALIS 5 mg tablet Generic drug:  tadalafil Your last dose was: Your next dose is: Take 5 mg by mouth daily. 5 mg  
    
   
   
   
  
 clobetasol 0.05 % external solution Commonly known as:  Sofy [place] Your last dose was: Your next dose is:    
   
   
 Apply  to affected area two (2) times a day. dexlansoprazole 30 mg capsule Commonly known as:  DEXILANT Your last dose was: Your next dose is: Take 1 capsule by mouth daily. 30 mg FARXIGA 10 mg Tab tablet Generic drug:  dapagliflozin Your last dose was: Your next dose is:    
   
   
      
   
   
   
  
 gabapentin 300 mg capsule Commonly known as:  NEURONTIN Your last dose was: Your next dose is: Take 1 Cap by mouth three (3) times daily. 1 Cap  
    
   
   
   
  
 metFORMIN ER 1,000 mg Tr24 Your last dose was: Your next dose is: Take 1,000 mg by mouth two (2) times a day. 1000 mg  
    
   
   
   
  
 metoprolol succinate 25 mg XL tablet Commonly known as:  TOPROL-XL Your last dose was: Your next dose is:    
   
   
      
   
   
   
  
 NOVOFINE 32 32 gauge x 1/4\" Ndle Generic drug:  Insulin Needles (Disposable) Your last dose was: Your next dose is:    
   
   
      
   
   
   
  
 ONETOUCH ULTRA TEST strip Generic drug:  glucose blood VI test strips Your last dose was: Your next dose is:    
   
   
 TEST THREE TIMES DAILY  
     
   
   
   
  
 simvastatin 20 mg tablet Commonly known as:  ZOCOR Your last dose was: Your next dose is: Take 1 tablet by mouth daily. 20 mg  
    
   
   
   
  
 tamsulosin 0.4 mg capsule Commonly known as:  FLOMAX Your last dose was: Your next dose is: Take 0.4 mg by mouth daily. 0.4 mg  
    
   
   
   
  
 testosterone 20.25 mg/1.25 gram (1.62 %) gel Commonly known as:  ANDROGEL Your last dose was: Your next dose is:    
   
   
 by TransDERmal route daily. TRULICITY 1.5 TL/6.0 mL sub-q pen Generic drug:  dulaglutide Your last dose was: Your next dose is: VITAMIN B-12 1,000 mcg tablet Generic drug:  cyanocobalamin Your last dose was: Your next dose is: Take 1,000 mcg by mouth daily. 1000 mcg VITAMIN D2 50,000 unit capsule Generic drug:  ergocalciferol Your last dose was: Your next dose is: Take 1 Cap by mouth every seven (7) days. 1 Cap ASK your doctor about these medications Instructions Each Dose to Equal  
 Morning Noon Evening Bedtime  
 aspirin 81 mg tablet Your last dose was: Your next dose is: Take 81 mg by mouth daily.   
 81 mg  
    
   
   
 Where to Get Your Medications Information on where to get these meds will be given to you by the nurse or doctor. ! Ask your nurse or doctor about these medications  
  oxyCODONE IR 5 mg immediate release tablet

## 2018-08-16 NOTE — ROUTINE PROCESS
TRANSFER - IN REPORT:    Verbal report received from Christie(name) on Olegario Pagan  being received from PACU(unit) for routine progression of care      Report consisted of patients Situation, Background, Assessment and   Recommendations(SBAR). Information from the following report(s) SBAR, Kardex, OR Summary, Procedure Summary, Intake/Output and MAR was reviewed with the receiving nurse. Opportunity for questions and clarification was provided. Assessment completed upon patients arrival to unit and care assumed.

## 2018-08-16 NOTE — BRIEF OP NOTE
BRIEF OPERATIVE NOTE    Date of Procedure: 8/16/2018   Preoperative Diagnosis: LUMBAR STENOSIS  LUMBAR DISC HERNIATION   Postoperative Diagnosis: LUMBAR STENOSIS  LUMBAR DISC HERNIATION     Procedure(s):  L4-S1 LEFT DECOMPRESSION, L4-L5 DISCECTOMY   Surgeon(s) and Role:      Drew Ramos MD - Primary         Surgical Assistant: Venessa linares    Surgical Staff:  Circ-1: Starr Escobedo  Circ-Relief: Mely Gusman  Scrub Tech-1: Ugo Kothari  Scrsanjana RN-Relief: Melvin Whiteside RN  Nurse Practitioner: Yung Franklin NP  Float Staff: Jackie Brown  Event Time In   Incision Start 1207   Incision Close 1326     Anesthesia: General   Estimated Blood Loss: min  Specimens: * No specimens in log *   Findings: stenosis   Complications: none  Implants: * No implants in log *

## 2018-08-17 VITALS
SYSTOLIC BLOOD PRESSURE: 127 MMHG | DIASTOLIC BLOOD PRESSURE: 73 MMHG | RESPIRATION RATE: 16 BRPM | HEART RATE: 70 BPM | TEMPERATURE: 98.7 F | BODY MASS INDEX: 26.11 KG/M2 | OXYGEN SATURATION: 97 % | WEIGHT: 182 LBS

## 2018-08-17 LAB
ANION GAP SERPL CALC-SCNC: 12 MMOL/L (ref 5–15)
BUN SERPL-MCNC: 12 MG/DL (ref 6–20)
BUN/CREAT SERPL: 13 (ref 12–20)
CALCIUM SERPL-MCNC: 8.6 MG/DL (ref 8.5–10.1)
CHLORIDE SERPL-SCNC: 107 MMOL/L (ref 97–108)
CO2 SERPL-SCNC: 24 MMOL/L (ref 21–32)
CREAT SERPL-MCNC: 0.9 MG/DL (ref 0.7–1.3)
GLUCOSE BLD STRIP.AUTO-MCNC: 107 MG/DL (ref 65–100)
GLUCOSE BLD STRIP.AUTO-MCNC: 201 MG/DL (ref 65–100)
GLUCOSE SERPL-MCNC: 111 MG/DL (ref 65–100)
HGB BLD-MCNC: 14.1 G/DL (ref 12.1–17)
POTASSIUM SERPL-SCNC: 3.8 MMOL/L (ref 3.5–5.1)
SERVICE CMNT-IMP: ABNORMAL
SERVICE CMNT-IMP: ABNORMAL
SODIUM SERPL-SCNC: 143 MMOL/L (ref 136–145)

## 2018-08-17 PROCEDURE — 82962 GLUCOSE BLOOD TEST: CPT

## 2018-08-17 PROCEDURE — 74011636637 HC RX REV CODE- 636/637: Performed by: ORTHOPAEDIC SURGERY

## 2018-08-17 PROCEDURE — 74011250636 HC RX REV CODE- 250/636: Performed by: ORTHOPAEDIC SURGERY

## 2018-08-17 PROCEDURE — 97535 SELF CARE MNGMENT TRAINING: CPT

## 2018-08-17 PROCEDURE — 97530 THERAPEUTIC ACTIVITIES: CPT

## 2018-08-17 PROCEDURE — 94760 N-INVAS EAR/PLS OXIMETRY 1: CPT

## 2018-08-17 PROCEDURE — 97116 GAIT TRAINING THERAPY: CPT

## 2018-08-17 PROCEDURE — 99218 HC RM OBSERVATION: CPT

## 2018-08-17 PROCEDURE — 97161 PT EVAL LOW COMPLEX 20 MIN: CPT

## 2018-08-17 PROCEDURE — 74011000250 HC RX REV CODE- 250: Performed by: ORTHOPAEDIC SURGERY

## 2018-08-17 PROCEDURE — G8989 SELF CARE D/C STATUS: HCPCS

## 2018-08-17 PROCEDURE — 74011250637 HC RX REV CODE- 250/637: Performed by: ORTHOPAEDIC SURGERY

## 2018-08-17 PROCEDURE — 74011250637 HC RX REV CODE- 250/637: Performed by: NURSE PRACTITIONER

## 2018-08-17 PROCEDURE — G8979 MOBILITY GOAL STATUS: HCPCS

## 2018-08-17 PROCEDURE — 85018 HEMOGLOBIN: CPT | Performed by: ORTHOPAEDIC SURGERY

## 2018-08-17 PROCEDURE — 36415 COLL VENOUS BLD VENIPUNCTURE: CPT | Performed by: ORTHOPAEDIC SURGERY

## 2018-08-17 PROCEDURE — 51798 US URINE CAPACITY MEASURE: CPT

## 2018-08-17 PROCEDURE — G8987 SELF CARE CURRENT STATUS: HCPCS

## 2018-08-17 PROCEDURE — G8988 SELF CARE GOAL STATUS: HCPCS

## 2018-08-17 PROCEDURE — 80048 BASIC METABOLIC PNL TOTAL CA: CPT | Performed by: ORTHOPAEDIC SURGERY

## 2018-08-17 PROCEDURE — 97165 OT EVAL LOW COMPLEX 30 MIN: CPT

## 2018-08-17 PROCEDURE — G8978 MOBILITY CURRENT STATUS: HCPCS

## 2018-08-17 RX ORDER — TAMSULOSIN HYDROCHLORIDE 0.4 MG/1
0.4 CAPSULE ORAL DAILY
Status: DISCONTINUED | OUTPATIENT
Start: 2018-08-17 | End: 2018-08-17 | Stop reason: HOSPADM

## 2018-08-17 RX ORDER — OXYCODONE HYDROCHLORIDE 5 MG/1
5 TABLET ORAL
Qty: 40 TAB | Refills: 0 | Status: SHIPPED | OUTPATIENT
Start: 2018-08-17 | End: 2019-03-21

## 2018-08-17 RX ORDER — TAMSULOSIN HYDROCHLORIDE 0.4 MG/1
0.4 CAPSULE ORAL ONCE
Status: COMPLETED | OUTPATIENT
Start: 2018-08-17 | End: 2018-08-17

## 2018-08-17 RX ADMIN — POLYETHYLENE GLYCOL 3350 17 G: 17 POWDER, FOR SOLUTION ORAL at 09:06

## 2018-08-17 RX ADMIN — Medication 10 ML: at 04:34

## 2018-08-17 RX ADMIN — TAMSULOSIN HYDROCHLORIDE 0.4 MG: 0.4 CAPSULE ORAL at 10:37

## 2018-08-17 RX ADMIN — INSULIN LISPRO 3 UNITS: 100 INJECTION, SOLUTION INTRAVENOUS; SUBCUTANEOUS at 11:47

## 2018-08-17 RX ADMIN — ACETAMINOPHEN 650 MG: 325 TABLET ORAL at 13:05

## 2018-08-17 RX ADMIN — GABAPENTIN 300 MG: 300 CAPSULE ORAL at 09:06

## 2018-08-17 RX ADMIN — Medication 1000 MCG: at 09:06

## 2018-08-17 RX ADMIN — ACETAMINOPHEN 650 MG: 325 TABLET ORAL at 04:30

## 2018-08-17 RX ADMIN — METOPROLOL SUCCINATE 25 MG: 25 TABLET, EXTENDED RELEASE ORAL at 09:06

## 2018-08-17 RX ADMIN — SIMVASTATIN 20 MG: 20 TABLET, FILM COATED ORAL at 09:06

## 2018-08-17 RX ADMIN — DOCUSATE SODIUM AND SENNOSIDES 1 TABLET: 8.6; 5 TABLET, FILM COATED ORAL at 09:06

## 2018-08-17 RX ADMIN — PANTOPRAZOLE SODIUM 40 MG: 40 TABLET, DELAYED RELEASE ORAL at 07:53

## 2018-08-17 RX ADMIN — CALCIUM CARBONATE-VITAMIN D TAB 500 MG-200 UNIT 2 TABLET: 500-200 TAB at 09:06

## 2018-08-17 RX ADMIN — Medication 2 G: at 04:29

## 2018-08-17 RX ADMIN — TAMSULOSIN HYDROCHLORIDE 0.4 MG: 0.4 CAPSULE ORAL at 13:05

## 2018-08-17 NOTE — PROGRESS NOTES
Care Management Interventions  PCP Verified by CM: Yes  Palliative Care Criteria Met (RRAT>21 & CHF Dx)?: No  Transition of Care Consult (CM Consult): Discharge Planning  Physical Therapy Consult: Yes  Occupational Therapy Consult: Yes  Discharge Location  Discharge Placement: Home with family assistance    Reason for Admission:  L4-S1 LEFT DECOMPRESSION, L4-L5 DISCECTOMY                     RRAT Score:   8                  Plan for utilizing home health:   Not indicated                       Likelihood of Readmission:  low                         Transition of Care Plan:   Patient admitted for the above surgery, has been cleared by therapy to return home with no home health needs. Consult completed.   [employer] , [place]

## 2018-08-17 NOTE — PROGRESS NOTES
Problem: Mobility Impaired (Adult and Pediatric)  Goal: *Acute Goals and Plan of Care (Insert Text)  Physical Therapy Goals  Initiated 8/17/2018    1. Patient will move from supine to sit and sit to supine  in bed with modified independence within 4 days. 2. Patient will perform sit to stand with modified independence within 4 days. 3. Patient will ambulate with modified independence for 500 feet with the least restrictive device within 4 days. 4. Patient will ascend/descend 12 stairs with single handrail(s) with supervision/set-up within 4 days. 5. Patient will verbalize and demonstrate understanding of spinal precautions (No bending, lifting greater than 5 lbs, or twisting; log-roll technique; frequent repositioning as instructed) within 4 days. physical Therapy EVALUATION  Patient: Kristina Raya (62 y.o. male)  Date: 8/17/2018  Primary Diagnosis: LUMBAR STENOSIS  LUMBAR DISC HERNIATION   HNP (herniated nucleus pulposus)  Procedure(s) (LRB):  L4-S1 LEFT DECOMPRESSION, L4-L5 DISCECTOMY  (Left) 1 Day Post-Op   Precautions:   Fall, Back    ASSESSMENT :  Based on the objective data described below, the patient presents s/p L4-S1 L decompression and discectomy. Patient received supine in bed with spouse present in room. Prior to admission he was ambulatory with RW at times, lives with spouse in a 2 story home with first floor master bedroom. Patient employed as an . Pt able to verbalize spinal precautions. Pt completed supine to sit via log roll technique with standby assist. Patient required mod assist to don brace seated EOB. Patient performed sit<>stand with RW with standby assist. Pt ambulated around the unit with RW with standby assist demonstrating decreased lisa, wide RAKEL, L foot externally rotated, no LOB. Patient required verbal and tactile cues to avoid twisting to talk to people as he walked by or in a standing position.  Pt completed stair training with standby assist and verbal cueing for proper sequencing. Patient and family educated extensively on activity restrictions and recommendations post-operatively. Patient is cleared from a PT standpoint to discharge home with family assistance. Will see tomorrow if discharge is delayed. Recommend OOB and ambulation with RN staff. Patient will benefit from skilled intervention to address the above impairments. Patients rehabilitation potential is considered to be Good  Factors which may influence rehabilitation potential include:   [x]         None noted  []         Mental ability/status  []         Medical condition  []         Home/family situation and support systems  []         Safety awareness  []         Pain tolerance/management  []         Other:      PLAN :  Recommendations and Planned Interventions:  [x]           Bed Mobility Training             [x]    Neuromuscular Re-Education  [x]           Transfer Training                   []    Orthotic/Prosthetic Training  [x]           Gait Training                         []    Modalities  [x]           Therapeutic Exercises           []    Edema Management/Control  [x]           Therapeutic Activities            [x]    Patient and Family Training/Education  []           Other (comment):    Frequency/Duration: Patient will be followed by physical therapy  twice daily to address goals. Discharge Recommendations: None  Further Equipment Recommendations for Discharge: none     SUBJECTIVE:   Patient stated I am feeling pretty good about this.     OBJECTIVE DATA SUMMARY:   HISTORY:    Past Medical History:   Diagnosis Date    Arthritis     BACK    BPH (benign prostatic hyperplasia) 2013    Cancer [facility])     SKIN    Chronic pain     BACK    Chronic pelvic pain in male     sensitive to touch on the left side down to the left side of the penis    Collagenous colitis 2011    Diabetes mellitus type 2, controlled, with complications ([facility] 4099    Diabetic neuropathy ([facility] 2006    Diverticulosis     ED (erectile dysfunction)     Dr. Stanislaw Gallagher GERD (gastroesophageal reflux disease) 1990    Gout     1 episode    Hypertension 1970    Low serum testosterone level     Dr. Tyrese Turner Osteopenia     Dr. Tyrese Turner Psoriasis     Sleep apnea     USES CPAP    Sun-damaged skin      Past Surgical History:   Procedure Laterality Date    EMG TWO EXTREMITIES LOWER  8/24/2013         ENDOSCOPY, COLON, DIAGNOSTIC      HX HERNIA REPAIR      x2 bilateral inguinal    HX OTHER SURGICAL  2015,2017    MOHS    NCV SENSORY OR MIXED  8/24/2013          Prior Level of Function/Home Situation: see above  Personal factors and/or comorbidities impacting plan of care:     Home Situation  Home Environment: Private residence  # Steps to Enter: 5  Rails to Enter: Yes  One/Two Story Residence: Two story, live on 1st floor  Living Alone: No  Support Systems: Spouse/Significant Other/Partner, Child(maycol)  Patient Expects to be Discharged to[de-identified] Private residence  Current DME Used/Available at Home: Johnette Severe, rolling    EXAMINATION/PRESENTATION/DECISION MAKING:   Critical Behavior:  Neurologic State: Alert           Hearing:     Skin:    Edema:   Range Of Motion:  AROM: Within functional limits                       Strength:    Strength: Generally decreased, functional                    Tone & Sensation:                  Sensation: Impaired (LLE)               Coordination:     Vision:      Functional Mobility:  Bed Mobility:  Rolling: Stand-by assistance  Supine to Sit: Stand-by assistance        Transfers:  Sit to Stand: Stand-by assistance  Stand to Sit: Stand-by assistance                       Balance:   Sitting: Intact  Standing: Intact  Ambulation/Gait Training:  Distance (ft): 500 Feet (ft)  Assistive Device: Gait belt;Brace/Splint; Walker, rolling  Ambulation - Level of Assistance: Stand-by assistance;Supervision        Gait Abnormalities: Decreased step clearance (L foot ER)        Base of Support: Widened Speed/Joana: Pace decreased (<100 feet/min)  Step Length: Left shortened;Right shortened                     Stairs:  Number of Stairs Trained: 8  Stairs - Level of Assistance: Stand-by assistance   Rail Use: Right     Therapeutic Exercises:       Functional Measure:  Tinetti test:    Sitting Balance: 1  Arises: 1  Attempts to Rise: 1  Immediate Standing Balance: 2  Standing Balance: 1  Nudged: 2  Eyes Closed: 1  Turn 360 Degrees - Continuous/Discontinuous: 1  Turn 360 Degrees - Steady/Unsteady: 1  Sitting Down: 1  Balance Score: 12  Indication of Gait: 1  R Step Length/Height: 1  L Step Length/Height: 1  R Foot Clearance: 1  L Foot Clearance: 1  Step Symmetry: 1  Step Continuity: 1  Path: 1  Trunk: 2  Walking Time: 1  Gait Score: 11  Total Score: 23       Tinetti Test and G-code impairment scale:  Percentage of Impairment CH    0%   CI    1-19% CJ    20-39% CK    40-59% CL    60-79% CM    80-99% CN     100%   Tinetti  Score 0-28 28 23-27 17-22 12-16 6-11 1-5 0       Tinetti Tool Score Risk of Falls  <19 = High Fall Risk  19-24 = Moderate Fall Risk  25-28 = Low Fall Risk  Tinetti ME. Performance-Oriented Assessment of Mobility Problems in Elderly Patients. [address] [address]; [id number]. (Scoring Description: PT Bulletin Feb. 10, 1993)    Older adults: Melyssa Arnold et al, 2009; n = 1000 [facility] elderly evaluated with ABC, MONTANA, ADL, and IADL)  · Mean MON[place] score for males aged 69-68 years = 26.21(3.40)  · Mean MONTANA score for females age 69-68 years = 25.16(4.30)  · Mean MON[place] score for males over 80 years = 23.29(6.02)  · Mean MON[place] score for females over 80 years = 17.20(8.32)         G codes: In compliance with CMSs Claims Based Outcome Reporting, the following G-code set was chosen for this patient based on their primary functional limitation being treated:     The outcome measure chosen to determine the severity of the functional limitation was the Tinetti with a score of 23/28 which was correlated with the impairment scale.    ? Mobility - Walking and Moving Around:     - CURRENT STATUS: CI - 1%-19% impaired, limited or restricted    - GOAL STATUS: CH - 0% impaired, limited or restricted    - D/C STATUS:  ---------------To be determined---------------       Based on the above components, the patient evaluation is determined to be of the following complexity level: LOW     Pain:  Pain Scale 1: Numeric (0 - 10)  Pain Intensity 1: 2  Pain Location 1: Foot  Pain Orientation 1: Left  Pain Description 1: Sharp  Pain Intervention(s) 1: Rest  Activity Tolerance:   Good. VSS  Please refer to the flowsheet for vital signs taken during this treatment. After treatment:   [x]         Patient left in no apparent distress sitting up in chair  []         Patient left in no apparent distress in bed  [x]         Call bell left within reach  [x]         Nursing notified  [x]         Caregiver present  []         Bed alarm activated    COMMUNICATION/EDUCATION:   The patients plan of care was discussed with: Occupational Therapist and Registered Nurse. [x]         Fall prevention education was provided and the patient/caregiver indicated understanding. [x]         Patient/family have participated as able in goal setting and plan of care. [x]         Patient/family agree to work toward stated goals and plan of care. []         Patient understands intent and goals of therapy, but is neutral about his/her participation. []         Patient is unable to participate in goal setting and plan of care.     Thank you for this referral.  Nancy Guevara, PT , DPT   Time Calculation: 32 mins

## 2018-08-17 NOTE — PROGRESS NOTES
Problem: Falls - Risk of  Goal: *Absence of Falls  Document Maggie Fall Risk and appropriate interventions in the flowsheet. Outcome: Progressing Towards Goal  Fall Risk Interventions:            Medication Interventions: Teach patient to arise slowly, Patient to call before getting OOB    Elimination Interventions: Call light in reach    History of Falls Interventions:  Investigate reason for fall, Room close to nurse's station, Door open when patient unattended

## 2018-08-17 NOTE — PROGRESS NOTES
Orthopedic Spine Progress Note  Elana Del Rio, AGACNP-BC  Work Cell: [phone]    Post Op Day: 1 Day Post-Op    2018 11:05 AM     Mendy Hooker    HPI:      Mendy Hooker is a 69 y.o. male with history of BPH, ED, GERD, DMII, and chronic back pain. He presented to Dr. Trista Salazar clinic with complaints of back and left leg pain. He had shooting pain traveling down his left leg and in to his foot intermittently. He exhausted conservative treatment options. His MRI revealed two level lumbar stenosis with a herniated disk at L4-5 . After a discussion of the risks, benefits, and alternatives, Mendy Hooker consented to undergo a  Procedure(s):  L4-S1 LEFT DECOMPRESSION, L4-L5 DISCECTOMY     Subjective    Patient sitting up in chair eating breakfast. He is voiding small amounts. Difficulty straight catheterizing overnight. Will check a PVR this am prior to discharge. Otherwise doing quite well and left leg pain essentially resolved. HVAC can come out. He denies h/a,dizziness, cp, cough, dyspnea, fever, chills, abd pain, nausea, or vomiting. Objective:     Physical Exam:  General:  Alert and oriented. No acute distress. Respiratory:  Breathing unlabored. Abdomen:   Extremities: Soft, non-tender, non-distended  No evidence of cyanosis. Pulses palpable in both upper and lower extremities. Neurologic:    Musculoskeletal:  No new motor deficits. Neurovascular exam within normal limits. Sensation stable. Motor: unchanged L2-S1. Calves soft, nontender upon palpation and with passive stretch  Moves both upper and lower extremities. Incision: clean, dry, and intact. No significant erythema or swelling. No active drainage noted.              Vital Signs:    Patient Vitals for the past 8 hrs:   BP Temp Pulse Resp SpO2   18 0809 (!) 161/91 97.6 °F (36.4 °C) 70 16 98 %   18 0318 149/88 97.6 °F (36.4 °C) 73 16 98 %     Temp (24hrs), Av.6 °F (36.4 °C), Min:97.1 °F (36.2 °C), Max:97.9 °F (36.6 °C)      Intake/Output:     08/15 1901 - 08/17 0700  In: 2040 [P.O.:240; I.V.:1800]  Out: 3620 [Urine:3525; Drains:70]    Pain Control:   Pain Assessment  Pain Scale 1: Numeric (0 - 10)  Pain Intensity 1: 2  Pain Onset 1: chronic  Pain Location 1: Foot  Pain Orientation 1: Left  Pain Description 1: Sharp  Pain Intervention(s) 1: Rest    LAB:    Recent Labs      08/17/18   0254   HGB  14.1     Lab Results   Component Value Date/Time    Sodium 143 08/17/2018 02:54 AM    Potassium 3.8 08/17/2018 02:54 AM    Chloride 107 08/17/2018 02:54 AM    CO2 24 08/17/2018 02:54 AM    Glucose 111 (H) 08/17/2018 02:54 AM    BUN 12 08/17/2018 02:54 AM    Creatinine 0.90 08/17/2018 02:54 AM    Calcium 8.6 08/17/2018 02:54 AM       PT/OT:   Gait:  Gait  Base of Support: Widened  Speed/Joana: Pace decreased (<100 feet/min)  Step Length: Left shortened, Right shortened  Gait Abnormalities: Decreased step clearance (L foot ER)  Ambulation - Level of Assistance: Stand-by assistance, Supervision  Distance (ft): 500 Feet (ft)  Assistive Device: Gait belt, Brace/Splint, Walker, rolling  Rail Use: Right   Stairs - Level of Assistance: Stand-by assistance  Number of Stairs Trained: 8                   Assessment/Plan      1. 1 Day Post-Op Procedure(s):  L4-S1 LEFT DECOMPRESSION, L4-L5 DISCECTOMY    -Continue PT/OT   -Pain control- PRN oxycodone   -Voiding   -Incentive Spirometer   -Tolerating diet   -VTE Prophylaxes - TEDS &SCDs    2. BPH   -Flomax restarted this am   -mobilize, Check PVR after next void. 3.  DMII   -Hgba1c 7.3%   -blood sugars well controlled postop   -acuchecks, ssi, humalog SSI   -can continue Trulicity, Farxiga, and metformin on discharge    4. FELICITY   -CPAP      Plan above discussed with Dr. Tremaine Kent    Discharge To:   Home today if no acute urinary retention    Signed By: Cristine Rebolledo NP

## 2018-08-17 NOTE — DISCHARGE SUMMARY
[address] [place] [address]   [address]  [phone]     Discharge Summary       PATIENT ID: Beatriz Swanson  MRN: [id number]   YOB: 1950    DATE OF ADMISSION: 8/16/2018  8:20 AM    DATE OF DISCHARGE: 8/17/2018   PRIMARY CARE PROVIDER: Sujata Damon MD     CONSULTATIONS: None    PROCEDURES/SURGERIES: Procedure(s):  L4-S1 LEFT DECOMPRESSION, L4-L5 DISCECTOMY     History of Present Illness:  Beatriz Swanson is a 69 y.o. male with history of BPH, ED, GERD, DMII, and chronic back pain. He presented to Dr. Kacey Latham clinic with complaints of back and left leg pain. He had shooting pain traveling down his left leg and in to his foot intermittently. He exhausted conservative treatment options. His MRI revealed two level lumbar stenosis with a herniated disk at L4-5 After failing conservative therapy and a discussion of the risks, benefits, alternatives, perioperative course, and potential complications of surgery, he consented to undergo a Procedure(s):  L4-S1 LEFT DECOMPRESSION, L4-L5 DISCECTOMY . Hospital Course:  Beatriz Swanson tolerated the procedure well. He was transferred  to the recovery room in stable condition. After a brief stay the patient was then transferred to the Spinal Surgery Unit at [address] [place] [address].  On postoperative day #1, the dressing was clean and dry, he was neurovascularly intact. The patient was afebrile and vital signs were stable. Calves were soft and non-tender bilaterally. The patient was tolerating a regular diet and making great progress with physical therapy. Hemoglobin prior to discharge were   Lab Results   Component Value Date/Time    HGB 14.1 08/17/2018 02:54 AM        Beatriz Swanson made satisfactory progress with physical therapy and was discharged to Home in stable condition on postoperative day 1.   He was provided with routine postoperative instructions and advised to follow up with Alexis Serrano MD  in 2 weeks following discharge from the hospital.        FOLLOW UP APPOINTMENTS:    Follow-up Information     Follow up With Details Comments Contact Info    Amber Charlton MD   [address]  [phone]            DISCHARGE MEDICATIONS:  Current Discharge Medication List      START taking these medications    Details   oxyCODONE IR (ROXICODONE) 5 mg immediate release tablet Take 1 Tab by mouth every three (3) hours as needed. Max Daily Amount: 40 mg.  Qty: 40 Tab, Refills: 0    Associated Diagnoses: HNP (herniated nucleus pulposus)         CONTINUE these medications which have NOT CHANGED    Details   testosterone (ANDROGEL) 20.25 mg/1.25 gram (1.62 %) gel by TransDERmal route daily. FARXIGA 10 mg tab tablet       metoprolol succinate (TOPROL-XL) 25 mg XL tablet       gabapentin (NEURONTIN) 300 mg capsule Take 1 Cap by mouth three (3) times daily. metFORMIN ER 1,000 mg tr24 Take 1,000 mg by mouth two (2) times a day. cyanocobalamin (VITAMIN B-12) 1,000 mcg tablet Take 1,000 mcg by mouth daily. tamsulosin (FLOMAX) 0.4 mg capsule Take 0.4 mg by mouth daily. Refills: 4      clobetasol (TEMOVATE) 0.05 % external solution Apply  to affected area two (2) times a day. tadalafil (CIALIS) 5 mg tablet Take 5 mg by mouth daily. dexlansoprazole (DEXILANT) 30 mg capsule Take 1 capsule by mouth daily. Qty: 90 capsule, Refills: 1      simvastatin (ZOCOR) 20 mg tablet Take 1 tablet by mouth daily. Qty: 90 tablet, Refills: 1      calcium-cholecalciferol, d3, (CALCIUM 600 + D) 600-125 mg-unit tab Take 2 Tabs by mouth daily. TRULICITY 1.5 QI/1.1 mL sub-q pen       VITAMIN D2 50,000 unit capsule Take 1 Cap by mouth every seven (7) days.       NOVOFINE 32 32 gauge x 1/4\" ndle       ONETOUCH ULTRA TEST strip TEST THREE TIMES DAILY  Qty: 300 Each, Refills: 1             PHYSICAL EXAMINATION AT DISCHARGE:  General: Pleasant, alert, cooperative, no distress. Resp: Equal chest expansion. No accessory muscle use. CV:  No edema appreciated in the extremities. Gastrointestinal:  Soft, non-tender, non-distended. Neurological: Follows commands. REAL. Speech clear. Sensation intact to light touch. Motor: unchangedL2-S1. Musculoskeletal:  Calves soft, non-tender upon palpation or with passive stretch. Skin: No obvious rashes or lesions. Incision - clean, dry and intact. No significant erythema or swelling.       CHRONIC MEDICAL DIAGNOSES:  Problem List as of 8/17/2018  Date Reviewed: 8/16/2018          Codes Class Noted - Resolved    HNP (herniated nucleus pulposus) ICD-10-CM: M51.9  ICD-9-CM: 722.2  8/16/2018 - Present        Severe obstructive sleep apnea ICD-10-CM: G47.33  ICD-9-CM: 327.23  10/27/2016 - Present    Overview Signed 10/27/2016  3:43 PM by Azul Worrell MD     Dx 10/20/2016             History of colonoscopy ICD-10-CM: G23.891  ICD-9-CM: V45.89  2/11/2016 - Present    Overview Signed 2/11/2016  8:10 AM by Azul Worrell MD     6/9/2011 - normal -  Dr. Tomasz Olivia - f/u 10 years             Incomplete RBBB ICD-10-CM: I45.10  ICD-9-CM: 426.4  1/19/2015 - Present        Erectile dysfunction ICD-10-CM: N52.9  ICD-9-CM: 607.84  8/15/2014 - Present        BPH (benign prostatic hyperplasia) ICD-10-CM: N40.0  ICD-9-CM: 600.00  8/15/2014 - Present        Osteopenia ICD-10-CM: M85.80  ICD-9-CM: 733.90  3/14/2014 - Present        Gout attack ICD-10-CM: M10.9  ICD-9-CM: 274.01  3/14/2014 - Present    Overview Signed 3/14/2014  4:26 PM by Azul Worrell MD     1 past attack             Functional abdominal pain syndrome ICD-10-CM: R10.9  ICD-9-CM: 789.00  3/14/2014 - Present    Overview Addendum 1/19/2015 12:17 PM by Azul Worrell MD     Chronic LLQ             Collagenous colitis ICD-10-CM: V38.748  ICD-9-CM: 558.9  3/14/2014 - Present    Overview Addendum 3/14/2014  4:44 PM by MD Dr. Tomasz Pugh, around 2012. Colitis at the time. F/u 10 years. Peripheral neuropathy (HCC) ICD-10-CM: G62.9  ICD-9-CM: 356.9  11/19/2012 - Present    Overview Addendum 3/16/2017  1:18 PM by Chana Bai MD     Unclear etiology - likely not diabetic related  Didn't tolerate Cymbalta, Lyrica. Metanx did not help.                Hyperlipidemia with target LDL less than 100 ICD-10-CM: E78.5  ICD-9-CM: 272.4  Unknown - Present        Benign essential hypertension ICD-10-CM: I10  ICD-9-CM: 401.1  9/15/2011 - Present        Diabetes mellitus type 2, controlled ([facility] ICD-10-CM: E11.9  ICD-9-CM: 250.00  9/15/2011 - Present    Overview Signed 3/14/2014  7:12 PM by Chana Bai MD     Endo - Dr. Jelly Chandler             GERD without esophagitis ICD-10-CM: K21.9  ICD-9-CM: 530.81  9/15/2011 - Present        RESOLVED: Collagenous colitis ICD-10-CM: K74.117  ICD-9-CM: 558.9  1/19/2015 - 1/19/2015    Overview Signed 1/19/2015 12:09 PM by MD Dr. Cristy Collins: Viral syndrome ICD-10-CM: B34.9  ICD-9-CM: 079.99  3/6/2013 - 3/14/2014        RESOLVED: Fever, unspecified ICD-10-CM: R50.9  ICD-9-CM: 780.60  3/6/2013 - 3/14/2014        RESOLVED: Tachycardia ICD-10-CM: R00.0  ICD-9-CM: 785.0  3/6/2013 - 3/14/2014              Signed:   Chato Coles NP  8/17/2018  2:26 PM

## 2018-08-17 NOTE — PROGRESS NOTES
D/C instructions reviewed with pt and family at bedside by Camille Griffin. Pt given copy of d/c instructions. Hard script provided for pain medicine. All lines discontinued. Dsg change teaching completed by Renetta Cavazos RN. Pt getting changed at moment. Pt to be escorted off floor in wheelchair by volunteer services.

## 2018-08-17 NOTE — ROUTINE PROCESS
I have reviewed discharge instructions with the patient, daughter, and spouse. The patient, daughter, and spouse verbalized understanding. All questions were answered. IV discontinued. Prescriptions in hand. Pt was discharged via wheelchair with family member and volunteer.

## 2018-08-17 NOTE — OP NOTES
[address] [place] [address] REPORT    Aba Scott  MR#: [id number]  : 1950  ACCOUNT #: [de-identified]   DATE OF SERVICE: 2018    PREOPERATIVE DIAGNOSIS:  Spinal stenosis, herniated disc. POSTOPERATIVE DIAGNOSIS:  Spinal stenosis, herniated disc. PROCEDURES PERFORMED:    1.  Left-sided laminectomy decompression L5-S1 with medial facetectomy and foraminotomy. 1.  2.  Left-sided microdiskectomy L4-L5.  3.  Use of operating microscope. COMPLICATIONS:  None. SURGEON:  Mellissa Hodge MD    ASSISTANT:  Roland Jacobson NP     FINDINGS:  Significant large disk herniation at L4-L5 which also advanced around the pedicle and under the L5 nerve root in the L5-S1 area. ESTIMATED BLOOD LOSS:  Minimal.    IMPLANTS:  None. COMPLICATIONS:  None. ANESTHESIA:  General.    INDICATIONS FOR PROCEDURE:  The patient is a pleasant male with a history of significant left leg pain. After discussing the risks and benefits of surgery, he elected to proceed with surgery. He understood the risks including being no better, being worse, continued pain, reherniation, surgical site infection, need for fusion and adjacent segment disease, continued pain, nerve damage, foot drop and all the risks inherent to the procedure and he elected to go forward. DESCRIPTION OF PROCEDURE:  The patient was laid prone on the operative table, prepped and draped in normal fashion using alcohol and DuraPrep. Skin incision was made. Soft tissue dissected out the spinous processes and then out over the pars interarticularis at L4-L5 and L5-S1. A laminectomy was done at L5-S1 with medial facetectomy and foraminotomies using the high speed bur and the Kerrison punch. Then, at L4-L5 a laminotomy was done with medial facetectomy and then a foraminotomy. The nerve root was then gently retracted and there was a large disk herniation under the L5 nerve root, which was extruded inferiorly. This was removed. The disk space was also decompressed. The wounds were irrigated and then all free fragments were removed and the disk was fully decompressed. Meticulous hemostasis maintained. At this time 500 mL of vancomycin was placed in the wound for infection prophylaxis. A deep drain was placed. Fascia was closed with #1 Vicryl, skin was closed with 2-0 Vicryl, 3-0 Vicryl and Dermabond. There were no complications to the procedure.   I, Dr. Anushka Atwood, did the procedure myself with the help of El Goff NP.      MD DERRICK Basilio / [place]  D: 08/16/2018 13:36     T: 08/16/2018 19:59  JOB #: [id number]

## 2018-08-17 NOTE — DISCHARGE INSTRUCTIONS
After Hospital Care Plan:  Discharge Instructions Lumbar Laminectomy Surgery  Dr. Dick Hutchins   Patient Name: Nicolas Wayne    Date of procedure: 8/16/2018  Date of discharge: 8/17/2018    Procedure: Procedure(s):  L4-S1 LEFT DECOMPRESSION, L4-L5 DISCECTOMY   PCP: Elli Phoenix MD    Follow up appointments  -Follow up with Dr. Dick Hutchins in 2 weeks. Call [phone] to make an appointment as soon as you get home from the hospital.    [address] [place], [place] [address]y: _________________________   phone: ___________________  The agency will contact you to arrange dates/times for visits. Please call them if you do not hear from them within 24 hours after you are discharged  Physical therapy 3 times a week for 3 weeks  Nursing-initial assessment and as needed     When to call your Orthopaedic Surgeon:  -Signs of infection-if your incision is red; continues to have drainage; drainage has a foul odor or if you have a persistent fever over 101 degrees for 24 hours  -Nausea or vomiting, severe headache  -Loss of bowel or bladder function, inability to urinate  -Changes in sensation in your arms or legs (numbness, tingling, loss of color)  -Increased weakness-greater than before your surgery  -Severe pain or pain not relieved by medications  -Signs of a blood clot in your leg-calf pain, tenderness, redness, swelling of lower leg    When to call your Primary Care Physician:  -Concerns about medical conditions such as diabetes, high blood pressure, asthma, congestive heart failure  -Call if blood sugars are elevated, persistent headache or dizziness, coughing or congestion, constipation or diarrhea, burning with urination, abnormal heart rate    When to call 911 and go to the nearest emergency room:  -Acute onset of chest pain, shortness of breath, difficulty breathing    Activity  - You are going home a well person, be as active as possible. Your only exercise should be walking.   Start with short frequent walks and increase your walking distance each day.  -Limit the amount of time you sit to 20-30 minute intervals. Sitting for prolonged periods of time will be uncomfortable for you following surgery.  -Do NOT lift anything over 5 pounds  -Do NOT do any straining, twisting or bending  -When you are in bed, you may lay on your back or on either side. Do NOT lie on your stomach    Brace  -If you have a back brace, you should wear your brace at all times when you are out of bed. Do not wear the brace while in bed or showering.  -Remember to always wear a cotton t-shirt underneath your brace.  -Do not bend or twist when your brace is off    Diet  -Resume usual diet; drink plenty of fluids; eat foods high in fiber  -It is important to have regular bowel movements. Pain medications may cause constipation. You may want to take a stool softener (such as Senokot-S or Colace) to prevent constipation.  -If constipation occurs, take a laxative (such as Dulcolax tablets, Milk of Magnesia, or a suppository). Laxatives should only be used if the above preventable measures have failed and you still have not had a bowel movement after three days    Driving  -You may not drive or return to work until instructed by your physician. However, you may ride in the car for short periods of time. Incision Care  -You may take brief showers but do not run the water run directly onto the wound. After showering or bathing, remove the wet dressing and gently blot the wound dry with a soft towel.  -Do not rub or apply any lotions or ointments to your incision site.   -Do not soak or scrub your wound  -Keep a dry dressing (ABD and paper tape) on your incision and have it changed daily for 14 days after surgery; more often if your incision is draining. Have your caregiver wash their hands thoroughly before changing your dressing.  -You will have absorbable sutures and steristrips (white tape) on your incision.   Leave the steristrips on until they fall off.    Showering  -You may shower in approximately 2 days after your surgery.    -Leave the dressing on during your shower. Do NOT allow the water to run directly onto your dressing. Once you get out of the shower, put on a dry dressing.  -Reminder- your brace can be removed while showering. Remember to not bend or twist while your brace is off.    -Do not take a tub bath. Preventing blood clots  -You have been given T.E.D. stockings to wear. Continue to wear these for 7 days after your discharge. Put them on in the morning and take them off at night.    -They are used to increase your circulation and prevent blood clots from forming in your legs  -T. E.D. stockings can be machine washed, temperature not to exceed 160° F (71°C) and machine dried for 15 to 20 minutes, temperature not to exceed 250° F (121°C). Pain management  -Take pain medication as prescribed; decrease the amount you use as your pain lessens  -Do not wait until you are in extreme pain to take your medication.  -Avoid alcoholic beverages while taking pain medication    Pain Medication Safety  DO:  -Read the Medication Guide   -Take your medicine exactly as prescribed   -Store your medicine away from children and in a safe place   -Flush unused medicine down the toilet   -Call your healthcare provider for medical advice about side effects. You may report side effects to FDA at 6-534-FDA-2779.   -Please be aware that many medications contain Tylenol. We do not want you to over medicate so please read the information below as a guide. Do not take more than 4 Grams of Tylenol in a 24 hour period.   (There are 1000 milligrams in one Gram) Percocet contains 325 mg of Tylenol per tablet (do not take more than 12 tablets in 24 hours)  Lortab contains 500 mg of Tylenol per tablet (do not take more than 8 tablets in 24 hours)  Norco contains 325 mg of Tylenol per tablet (do not take more than 12 tablets in 24 hours). DO NOT:  -Do not give your medicine to others   -Do not take medicine unless it was prescribed for you   -Do not stop taking your medicine without talking to your healthcare provider   -Do not break, chew, crush, dissolve, or inject your medicine. If you cannot  swallow your medicine whole, talk to your healthcare provider.  -Do not drink alcohol while taking this medicine  -Do not take anti-inflammatory medications or aspirin unless instructed by your   physician.     **You can resume your aspirin on 8/20

## 2018-08-17 NOTE — PROGRESS NOTES
Occupational Therapy EVALUATION/discharge  Patient: Mendy Hooker (54 y.o. male)  Date: 8/17/2018  Primary Diagnosis: LUMBAR STENOSIS  LUMBAR DISC HERNIATION   HNP (herniated nucleus pulposus)  Procedure(s) (LRB):  L4-S1 LEFT DECOMPRESSION, L4-L5 DISCECTOMY  (Left) 1 Day Post-Op   Precautions:   Fall, Back    ASSESSMENT:   Based on the objective data described below, the patient presents with independence to max A ADLs. ADLs limited by typical limitations s/p back surgery. Patient with L LE resolve of numbness and pain s/p sx but R LE still in pain to have planned hip replacement. Education completed and patient now min A ADLs with long handled AE and wife to A PRN. Patient to discharge home. Further skilled acute occupational therapy is not indicated at this time. Discharge Recommendations: None  Further Equipment Recommendations for Discharge: none noted      SUBJECTIVE:   Patient stated It will be good to wait for my wife.     OBJECTIVE DATA SUMMARY:   HISTORY:   Past Medical History:   Diagnosis Date    Arthritis     BACK    BPH (benign prostatic hyperplasia) 2013    Cancer [facility])     SKIN    Chronic pain     BACK    Chronic pelvic pain in male     sensitive to touch on the left side down to the left side of the penis    Collagenous colitis 2011    Diabetes mellitus type 2, controlled, with complications ([address] 6304    Diabetic neuropathy ([facility] [address] 2006    Diverticulosis     ED (erectile dysfunction)     Dr. Gurjit Limon GERD (gastroesophageal reflux disease) 1990    Gout     1 episode    Hypertension 1970    Low serum testosterone level     Dr. Yomaira Serra Osteopenia     Dr. Cheyenne Ardon Sleep apnea     USES CPAP    Sun-damaged skin      Past Surgical History:   Procedure Laterality Date    EMG TWO EXTREMITIES LOWER  8/24/2013         ENDOSCOPY, COLON, DIAGNOSTIC      HX HERNIA REPAIR      x2 bilateral inguinal    HX OTHER SURGICAL  2015,2017    [facility]    NCV SENSORY OR MIXED 8/24/2013            Prior Level of Function/Environment/Context: independent to max A ADLs. On most painful days supine for wife to don all lower body cloths. Sitting to shower. 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 Wife independent. Just moved to [facility] community. Occupations in which the patient is/was successful, what are the barriers preventing that success:   Performance Patterns (routines, roles, habits, and rituals):   Personal Interests and/or values:   Expanded or extensive additional review of patient history:     Home Situation  Home Environment: Private residence  # Steps to Enter: 5  Rails to Enter: Yes  One/Two Story Residence: Two story, live on 1st floor  Living Alone: No  Support Systems: Spouse/Significant Other/Partner, Child(maycol)  Patient Expects to be Discharged to[de-identified] Private residence  Current DME Used/Available at Home: Walker, rolling, Grab bars, Shower chair  Tub or Shower Type: Shower    Hand dominance: Right    EXAMINATION OF PERFORMANCE DEFICITS:  Cognitive/Behavioral Status:  Neurologic State: Alert  Orientation Level: Oriented X4  Cognition: Appropriate decision making; Appropriate for age attention/concentration; Appropriate safety awareness  Perception: Appears intact  Perseveration: No perseveration noted  Safety/Judgement: Awareness of environment;Good awareness of safety precautions    Skin: intact bandage and Hemovac    Edema: intact    Hearing:       Vision/Perceptual:                           Acuity: Impaired near vision (baseline)    Corrective Lenses: Reading glasses    Range of Motion:    AROM: Within functional limits                         Strength:    Strength: Generally decreased, functional                Coordination:     Fine Motor Skills-Upper: Left Intact; Right Intact    Gross Motor Skills-Upper: Left Intact; Right Intact    Tone & Sensation:       Sensation: Impaired (LLE)                      Balance:  Sitting: Intact  Standing: Intact    Functional Mobility and Transfers for ADLs:  Bed Mobility:  Sit-supine min A instruction hand placement and to log roll  Transfers:  Sit to Stand: Stand-by assistance-chair  Stand to Sit: Stand-by assistance-EOB    ADL Assessment:  Feeding: Independent    Oral Facial Hygiene/Grooming: Supervision states completed with setup on tray    Bathing: Moderate assistance A R LE, knees-feet - instruction on benefits long handled sponge, continued sitting at home until increased standing tolerance    Upper Body Dressing: Moderate assistance    Lower Body Dressing: Maximum assistance patient has a long handled shoe horn and sock funnel at home. L LE tailor sitting can doff sock, unable to don, good standing balance. R LE unable to complete due to ROM, pain and back precautions. Toileting: supervision. Cues not to twist functional reach, good sitting and standing balance. ADL Intervention and task modifications: handout provided       Patient instructed and indicated understanding the benefits of maintaining activity tolerance, functional mobility, and independence with self care tasks during acute stay  to ensure safe return home and to baseline. Encouraged patient to increase frequency and duration OOB, not sitting longer than 30 mins without marching/walking with staff, be out of bed for all meals, perform daily ADLs (as approved by RN/MD regarding bathing etc), and performing functional mobility to/from bathroom. Patient instruction and indicated understanding on body mechanics, ergonomics and gravitational force on the spine during different body positions to plan activities in prep for return home to complete basic ADLs, instrumental ADLs and back to work safely.    Bathing: Patient instructed and indicated understanding when bathing to not submerge wound in water, stand to shower or sponge bathe, cover wound with plastic and tape to ensure no water reaches bandage/wound without cues. Dressing brace: Patient instructed and demonstrated to don/doff velcro on brace using dominant side, keeping non-dominant side intact. Patient instructed and demonstrated in meantime of being able to stand with back against wall to don/doff brace, to don/doff seated using lap and bed/chair surface to support brace while manipulating. Dressing lower body: Patient instructed to don brace first and on the benefits to remain seated to don all clothing to increase independence with precautions and pain management. Patient instructed and demonstrated L LE tailor sitting for lower body dressing and reacher for R LE, but cue can use reacher for L LE as well during painful moments with min A technique reinforcement. Instruction dress R LE first and undress last 2* at this time R LE decreased ROM and increased pain compared to L. .   Toileting: Patient instructed on the benefits of using flushable wet wipes if decreased reach or pain for barron care. Also, the benefits of a reacher to aid in clothing management. Home safety: Patient instructed and indicated understanding on home modifications and safety [raise height of ADL objects (i.e. clothing, sink items, fridge items, items to mouth when grooming), appropriate height of chair surfaces, recliner safety, change of floor surfaces, clear pathways] to increase independence and fall prevention. Standing: Patient instructed and indicated understanding to walk up to sink/counter top/surfaces, step into walker, square off while using objects, slide objects along surfaces, to increase adherence to back precautions and fall prevention. Patient instructed to increase amount of time standing in order to increase independence and tolerance with ADLs. During prolonged standing, can open cabinet door or place foot on stool to decrease spinal pressure/increase pain.                     Lower Body Dressing Assistance  Pants With Elastic Waist: Minimum assistance  Leg Crossed Method Used: Yes  Position Performed: Seated in chair  Adaptive Equipment Used: Reacher         Cognitive Retraining  Safety/Judgement: Awareness of environment;Good awareness of safety precautions    Therapeutic Exercise:  Patient instructed on the benefits shoulder flexion exercises to increase independence with ADLs, active ROM, and strength of spine. Patient demonstrated 10 reps and 2 set(s) while standing with UEs v. gravity with Supervision. Patient instructed and demonstrated techniques of activating abdomen and pelvic floor muscles. Patient instructed and indicated understanding how to progress reps, sets, against gravity to then working up to 5 lbs until surgeon clears for increased weight, supine to sitting and then standing. Can use household items as weights. Functional Measure:  Barthel Index:    Bathin  Bladder: 10  Bowels: 10  Groomin  Dressin  Feeding: 10  Mobility: 10  Stairs: 5  Toilet Use: 5  Transfer (Bed to Chair and Back): 10  Total: 70       Barthel and G-code impairment scale:  Percentage of impairment CH  0% CI  1-19% CJ  20-39% CK  40-59% CL  60-79% CM  80-99% CN  100%   Barthel Score 0-100 100 99-80 79-60 59-40 20-39 1-19   0   Barthel Score 0-20 20 17-19 13-16 9-12 5-8 1-4 0      The Barthel ADL Index: Guidelines  1. The index should be used as a record of what a patient does, not as a record of what a patient could do. 2. The main aim is to establish degree of independence from any help, physical or verbal, however minor and for whatever reason. 3. The need for supervision renders the patient not independent. 4. A patient's performance should be established using the best available evidence. Asking the patient, friends/relatives and nurses are the usual sources, but direct observation and common sense are also important. However direct testing is not needed.   5. Usually the patient's performance over the preceding 24-48 hours is important, but occasionally longer periods will be relevant. 6. Middle categories imply that the patient supplies over 50 per cent of the effort. 7. Use of aids to be independent is allowed. Kailey Montero., Barthel, D.W. (3680). Functional evaluation: the Barthel Index. [address] [facility] (14)2. [facility] HospitalROSA ISELA Hilario, Cheyenne IbrahimKansas City VA Medical Centeralayna., Karol Robledo., [place], [address] [place] [address] (1999). Measuring the change indisability after inpatient rehabilitation; comparison of the responsiveness of the Barthel Index and Functional [place] Measure. Journal of Neurology, Neurosurgery, and Psychiatry, 66(4), 985-988. Josias Merrill, N.J.A, ANDIE Mccall, & Jazmine Hernandez MDOMINIQUE. (2004.) Assessment of post-stroke quality of life in cost-effectiveness studies: The usefulness of the Barthel Index and the EuroQoL-5D. Quality of Life Research, 13, 427-43         In compliance with CMSs Claims Based Outcome Reporting, the following G-code set was chosen for this patient based on their primary functional limitation being treated: The outcome measure chosen to determine the severity of the functional limitation was the Barthel index with a score of 70/100 which was correlated with the impairment scale. ? Self Care:     - CURRENT STATUS: CJ - 20%-39% impaired, limited or restricted    - GOAL STATUS: CJ - 20%-39% impaired, limited or restricted    - D/C STATUS:  CJ - 20%-39% impaired, limited or restricted       Pain:  Pain Scale 1: Numeric (0 - 10)  Pain Intensity 1: 2  Pain Location 1: Foot  Pain Orientation 1: Left  Pain Description 1: Sharp  Pain Intervention(s) 1: Rest  Activity Tolerance:   VSS  Please refer to the flowsheet for vital signs taken during this treatment.   After treatment:   []  Patient left in no apparent distress sitting up in chair  [x]  Patient left in no apparent distress in bed  [x]  Call bell left within reach  [x]  Nursing notified  [x]  Caregiver present  []  Bed alarm activated    COMMUNICATION/EDUCATION: Communication/Collaboration:  [x]      Home safety education was provided and the patient/caregiver indicated understanding. [x]      Patient/family have participated as able and agree with findings and recommendations. []      Patient is unable to participate in plan of care at this time.   Findings and recommendations were discussed with: Physical Therapist and Registered Nurse    Corky Silverio  Time Calculation: 33 mins

## 2018-08-17 NOTE — PROGRESS NOTES
Orthopedic Spine Progress Note  Post Op day: 1 Day Post-Op    2018 7:28 AM     Navarro Ly    Vital Signs:    Patient Vitals for the past 8 hrs:   BP Temp Pulse Resp SpO2   18 0318 149/88 97.6 °F (36.4 °C) 73 16 98 %     Temp (24hrs), Av.7 °F (36.5 °C), Min:97.1 °F (36.2 °C), Max:98.7 °F (37.1 °C)      Intake/Output:     08/15 1901 -  0700  In: 2040 [P.O.:240; I.V.:1800]  Out: 3620 [Urine:3525; Drains:70]    Pain Control:   Pain Assessment  Pain Scale 1: Numeric (0 - 10)  Pain Intensity 1: 0    LAB:    Recent Labs      18   0254   HGB  14.1     Lab Results   Component Value Date/Time    Sodium 143 2018 02:54 AM    Potassium 3.8 2018 02:54 AM    Chloride 107 2018 02:54 AM    CO2 24 2018 02:54 AM    Glucose 111 (H) 2018 02:54 AM    BUN 12 2018 02:54 AM    Creatinine 0.90 2018 02:54 AM    Calcium 8.6 2018 02:54 AM       Subjective:  Navarro Ly is a 69 y.o. male s/p a  Procedure(s):  L4-S1 LEFT DECOMPRESSION, L4-L5 DISCECTOMY    Procedure(s):  L4-S1 LEFT DECOMPRESSION, L4-L5 DISCECTOMY . Tolerating diet. Objective: General: alert, cooperative, no distress. Gastrointestinal:  Soft, non-tender. Neurological: Neurovascular exam within normal limits. Sensation stable. Motor: unchanged C5-T1 and L2-S1. Leg pain resolved  Musculoskeletal:  Sheng's sign negative in bilateral lower extremities. Calves soft, supple, non-tender upon palpation or with passive stretch. Skin: Incision - clean, dry and intact. No significant erythema or swelling. Dressing: clean, dry, and intact     PT/OT:   Gait:                      Assessment:    s/p Procedure(s):  L4-S1 LEFT DECOMPRESSION, L4-L5 DISCECTOMY     Active Problems:    HNP (herniated nucleus pulposus) (2018)         Plan:     1. Continue PT/OT  2. Continue established methods of pain control  3. VTE Prophylaxes - TEDS &/or SCDs              Discharge To:   Home if urinating  Signed By: Deborah Valenzuela MD

## 2019-01-29 ENCOUNTER — HOSPITAL ENCOUNTER (OUTPATIENT)
Dept: NUCLEAR MEDICINE | Age: 69
Discharge: HOME | End: 2019-01-29
Attending: UROLOGY
Payer: MEDICARE

## 2019-01-29 ENCOUNTER — HOSPITAL ENCOUNTER (OUTPATIENT)
Dept: CT IMAGING | Age: 69
Discharge: HOME | End: 2019-01-29
Attending: UROLOGY
Payer: MEDICARE

## 2019-01-29 DIAGNOSIS — C61 PROSTATE CANCER (HCC): ICD-10-CM

## 2019-01-29 LAB — CREAT BLD-MCNC: 0.8 MG/DL (ref 0.6–1.3)

## 2019-01-29 PROCEDURE — 82565 ASSAY OF CREATININE: CPT

## 2019-01-29 PROCEDURE — 74011000258 HC RX REV CODE- 258: Performed by: RADIOLOGY

## 2019-01-29 PROCEDURE — 74011636320 HC RX REV CODE- 636/320: Performed by: RADIOLOGY

## 2019-01-29 PROCEDURE — 78306 BONE IMAGING WHOLE BODY: CPT

## 2019-01-29 PROCEDURE — 72193 CT PELVIS W/DYE: CPT

## 2019-01-29 RX ORDER — SODIUM CHLORIDE 0.9 % (FLUSH) 0.9 %
10 SYRINGE (ML) INJECTION
Status: COMPLETED | OUTPATIENT
Start: 2019-01-29 | End: 2019-01-29

## 2019-01-29 RX ADMIN — Medication 10 ML: at 12:56

## 2019-01-29 RX ADMIN — IOPAMIDOL 50 ML: 612 INJECTION, SOLUTION INTRAVENOUS at 10:00

## 2019-01-29 RX ADMIN — IOHEXOL 50 ML: 240 INJECTION, SOLUTION INTRATHECAL; INTRAVASCULAR; INTRAVENOUS; ORAL at 12:59

## 2019-01-29 RX ADMIN — SODIUM CHLORIDE 100 ML: 900 INJECTION, SOLUTION INTRAVENOUS at 12:56

## 2019-03-15 LAB
CREATININE, EXTERNAL: 1.03
LDL-C, EXTERNAL: 38
MICROALBUMIN UR TEST STR-MCNC: <3 MG/DL
PSA, EXTERNAL: 4.4

## 2019-03-21 ENCOUNTER — HOSPITAL ENCOUNTER (OUTPATIENT)
Dept: PREADMISSION TESTING | Age: 69
Discharge: HOME | End: 2019-03-21
Payer: MEDICARE

## 2019-03-21 ENCOUNTER — HOSPITAL ENCOUNTER (OUTPATIENT)
Dept: GENERAL RADIOLOGY | Age: 69
Discharge: HOME | End: 2019-03-21
Payer: MEDICARE

## 2019-03-21 VITALS
WEIGHT: 206.25 LBS | SYSTOLIC BLOOD PRESSURE: 126 MMHG | HEIGHT: 67 IN | HEART RATE: 80 BPM | DIASTOLIC BLOOD PRESSURE: 79 MMHG | BODY MASS INDEX: 32.37 KG/M2 | TEMPERATURE: 98.2 F | RESPIRATION RATE: 14 BRPM

## 2019-03-21 DIAGNOSIS — Z01.818 PRE-OP TESTING: ICD-10-CM

## 2019-03-21 LAB
ALBUMIN SERPL-MCNC: 4.2 G/DL (ref 3.5–5)
ALBUMIN/GLOB SERPL: 1.3 {RATIO} (ref 1.1–2.2)
ALP SERPL-CCNC: 87 U/L (ref 45–117)
ALT SERPL-CCNC: 41 U/L (ref 12–78)
ANION GAP SERPL CALC-SCNC: 8 MMOL/L (ref 5–15)
APPEARANCE UR: CLEAR
AST SERPL-CCNC: 21 U/L (ref 15–37)
ATRIAL RATE: 79 BPM
BACTERIA URNS QL MICRO: NEGATIVE /HPF
BASOPHILS # BLD: 0 K/UL (ref 0–0.1)
BASOPHILS NFR BLD: 1 % (ref 0–1)
BILIRUB SERPL-MCNC: 0.3 MG/DL (ref 0.2–1)
BILIRUB UR QL: NEGATIVE
BUN SERPL-MCNC: 17 MG/DL (ref 6–20)
BUN/CREAT SERPL: 16 (ref 12–20)
CALCIUM SERPL-MCNC: 9.6 MG/DL (ref 8.5–10.1)
CALCULATED P AXIS, ECG09: 29 DEGREES
CALCULATED R AXIS, ECG10: -15 DEGREES
CALCULATED T AXIS, ECG11: 9 DEGREES
CHLORIDE SERPL-SCNC: 104 MMOL/L (ref 97–108)
CO2 SERPL-SCNC: 25 MMOL/L (ref 21–32)
COLOR UR: ABNORMAL
CREAT SERPL-MCNC: 1.07 MG/DL (ref 0.7–1.3)
DIAGNOSIS, 93000: NORMAL
DIFFERENTIAL METHOD BLD: NORMAL
EOSINOPHIL # BLD: 0.2 K/UL (ref 0–0.4)
EOSINOPHIL NFR BLD: 2 % (ref 0–7)
EPITH CASTS URNS QL MICRO: ABNORMAL /LPF
ERYTHROCYTE [DISTWIDTH] IN BLOOD BY AUTOMATED COUNT: 13.7 % (ref 11.5–14.5)
EST. AVERAGE GLUCOSE BLD GHB EST-MCNC: 174 MG/DL
GLOBULIN SER CALC-MCNC: 3.3 G/DL (ref 2–4)
GLUCOSE SERPL-MCNC: 153 MG/DL (ref 65–100)
GLUCOSE UR STRIP.AUTO-MCNC: >1000 MG/DL
HBA1C MFR BLD: 7.7 % (ref 4.2–6.3)
HCT VFR BLD AUTO: 45.3 % (ref 36.6–50.3)
HGB BLD-MCNC: 15.1 G/DL (ref 12.1–17)
HGB UR QL STRIP: NEGATIVE
HYALINE CASTS URNS QL MICRO: ABNORMAL /LPF (ref 0–5)
IMM GRANULOCYTES # BLD AUTO: 0 K/UL (ref 0–0.04)
IMM GRANULOCYTES NFR BLD AUTO: 0 % (ref 0–0.5)
KETONES UR QL STRIP.AUTO: ABNORMAL MG/DL
LEUKOCYTE ESTERASE UR QL STRIP.AUTO: NEGATIVE
LYMPHOCYTES # BLD: 1 K/UL (ref 0.8–3.5)
LYMPHOCYTES NFR BLD: 15 % (ref 12–49)
MCH RBC QN AUTO: 31 PG (ref 26–34)
MCHC RBC AUTO-ENTMCNC: 33.3 G/DL (ref 30–36.5)
MCV RBC AUTO: 93 FL (ref 80–99)
MONOCYTES # BLD: 0.6 K/UL (ref 0–1)
MONOCYTES NFR BLD: 9 % (ref 5–13)
NEUTS SEG # BLD: 5 K/UL (ref 1.8–8)
NEUTS SEG NFR BLD: 73 % (ref 32–75)
NITRITE UR QL STRIP.AUTO: NEGATIVE
NRBC # BLD: 0 K/UL (ref 0–0.01)
NRBC BLD-RTO: 0 PER 100 WBC
P-R INTERVAL, ECG05: 156 MS
PH UR STRIP: 5 [PH] (ref 5–8)
PLATELET # BLD AUTO: 261 K/UL (ref 150–400)
PMV BLD AUTO: 10.1 FL (ref 8.9–12.9)
POTASSIUM SERPL-SCNC: 4.2 MMOL/L (ref 3.5–5.1)
PROT SERPL-MCNC: 7.5 G/DL (ref 6.4–8.2)
PROT UR STRIP-MCNC: NEGATIVE MG/DL
Q-T INTERVAL, ECG07: 368 MS
QRS DURATION, ECG06: 106 MS
QTC CALCULATION (BEZET), ECG08: 421 MS
RBC # BLD AUTO: 4.87 M/UL (ref 4.1–5.7)
RBC #/AREA URNS HPF: ABNORMAL /HPF (ref 0–5)
SODIUM SERPL-SCNC: 137 MMOL/L (ref 136–145)
SP GR UR REFRACTOMETRY: 1.02 (ref 1–1.03)
UA: UC IF INDICATED,UAUC: ABNORMAL
UROBILINOGEN UR QL STRIP.AUTO: 0.2 EU/DL (ref 0.2–1)
VENTRICULAR RATE, ECG03: 79 BPM
WBC # BLD AUTO: 6.8 K/UL (ref 4.1–11.1)
WBC URNS QL MICRO: ABNORMAL /HPF (ref 0–4)

## 2019-03-21 PROCEDURE — 85025 COMPLETE CBC W/AUTO DIFF WBC: CPT

## 2019-03-21 PROCEDURE — 36415 COLL VENOUS BLD VENIPUNCTURE: CPT

## 2019-03-21 PROCEDURE — 71046 X-RAY EXAM CHEST 2 VIEWS: CPT

## 2019-03-21 PROCEDURE — 93005 ELECTROCARDIOGRAM TRACING: CPT

## 2019-03-21 PROCEDURE — 83036 HEMOGLOBIN GLYCOSYLATED A1C: CPT

## 2019-03-21 PROCEDURE — 80053 COMPREHEN METABOLIC PANEL: CPT

## 2019-03-21 PROCEDURE — 81001 URINALYSIS AUTO W/SCOPE: CPT

## 2019-03-21 NOTE — PERIOP NOTES
Preoperative instructions reviewed with patient. Patient given SSI infection FAQS sheet. Given skin prep chlorhexidine wipes; given written and verbal instructions on use. Patient was given the opportunity to ask questions on the information provided. DR. Janet Beck OFFICE CALLED TO OBTAIN ORDER FOR [id number].  SPOKE WITH ANU AND SHE CONFIRMED WITH NURSE, FLORINDA, THAT HGBA1C TO BE ORDERED IN PAT. SURGICAL CONSENT OBTAINED AND SIGNED BY PATIENT.

## 2019-03-22 NOTE — PERIOP NOTES
NOTIFIED DR. Holloway Speaker OFFICE OF [facility] A1C 7.7 SPOKE WITH MAX NURSE , INFORMED WAS FAXED TO OFFICE THIS MORNING ALSO.

## 2019-03-26 ENCOUNTER — ANESTHESIA EVENT (OUTPATIENT)
Dept: SURGERY | Age: 69
DRG: 708 | End: 2019-03-26
Payer: MEDICARE

## 2019-04-02 ENCOUNTER — HOSPITAL ENCOUNTER (INPATIENT)
Age: 69
LOS: 1 days | Discharge: HOME | DRG: 708 | End: 2019-04-03
Attending: UROLOGY | Admitting: UROLOGY
Payer: MEDICARE

## 2019-04-02 ENCOUNTER — ANESTHESIA (OUTPATIENT)
Dept: SURGERY | Age: 69
DRG: 708 | End: 2019-04-02
Payer: MEDICARE

## 2019-04-02 DIAGNOSIS — C61 PROSTATE CANCER (HCC): Primary | ICD-10-CM

## 2019-04-02 LAB
GLUCOSE BLD STRIP.AUTO-MCNC: 133 MG/DL (ref 65–100)
GLUCOSE BLD STRIP.AUTO-MCNC: 140 MG/DL (ref 65–100)
GLUCOSE BLD STRIP.AUTO-MCNC: 169 MG/DL (ref 65–100)
GLUCOSE BLD STRIP.AUTO-MCNC: 172 MG/DL (ref 65–100)
GLUCOSE BLD STRIP.AUTO-MCNC: 205 MG/DL (ref 65–100)
SERVICE CMNT-IMP: ABNORMAL

## 2019-04-02 PROCEDURE — 76210000006 HC OR PH I REC 0.5 TO 1 HR: Performed by: UROLOGY

## 2019-04-02 PROCEDURE — 77030022704 HC SUT VLOC COVD -B: Performed by: UROLOGY

## 2019-04-02 PROCEDURE — 77030040361 HC SLV COMPR DVT MDII -B: Performed by: UROLOGY

## 2019-04-02 PROCEDURE — 77030018832 HC SOL IRR H20 ICUM -A: Performed by: UROLOGY

## 2019-04-02 PROCEDURE — 74011250636 HC RX REV CODE- 250/636: Performed by: ANESTHESIOLOGY

## 2019-04-02 PROCEDURE — 74011250636 HC RX REV CODE- 250/636

## 2019-04-02 PROCEDURE — 76060000038 HC ANESTHESIA 3.5 TO 4 HR: Performed by: UROLOGY

## 2019-04-02 PROCEDURE — 77030033730 HC CLP LAPRO ABS LPW COVD -C: Performed by: UROLOGY

## 2019-04-02 PROCEDURE — 77030018846 HC SOL IRR STRL H20 ICUM -A

## 2019-04-02 PROCEDURE — 82962 GLUCOSE BLOOD TEST: CPT

## 2019-04-02 PROCEDURE — 77030002966 HC SUT PDS J&J -A: Performed by: UROLOGY

## 2019-04-02 PROCEDURE — 36415 COLL VENOUS BLD VENIPUNCTURE: CPT

## 2019-04-02 PROCEDURE — 77030035277 HC OBTRTR BLDELSS DISP INTU -B: Performed by: UROLOGY

## 2019-04-02 PROCEDURE — 77030039266 HC ADH SKN EXOFIN S2SG -A: Performed by: UROLOGY

## 2019-04-02 PROCEDURE — 77030011640 HC PAD GRND REM COVD -A: Performed by: UROLOGY

## 2019-04-02 PROCEDURE — 88309 TISSUE EXAM BY PATHOLOGIST: CPT

## 2019-04-02 PROCEDURE — 77030008756 HC TU IRR SUC STRY -B: Performed by: UROLOGY

## 2019-04-02 PROCEDURE — 77030007955 HC PCH ENDOSC SPEC J&J -B: Performed by: UROLOGY

## 2019-04-02 PROCEDURE — 77030016151 HC PROTCTR LNS DFOG COVD -B: Performed by: UROLOGY

## 2019-04-02 PROCEDURE — 74011250637 HC RX REV CODE- 250/637: Performed by: UROLOGY

## 2019-04-02 PROCEDURE — 74011000250 HC RX REV CODE- 250: Performed by: UROLOGY

## 2019-04-02 PROCEDURE — 74011636637 HC RX REV CODE- 636/637: Performed by: UROLOGY

## 2019-04-02 PROCEDURE — 76010000878 HC OR TIME 3 TO 3.5HR INTENSV - TIER 2: Performed by: UROLOGY

## 2019-04-02 PROCEDURE — 65270000029 HC RM PRIVATE

## 2019-04-02 PROCEDURE — 77030020782 HC GWN BAIR PAWS FLX 3M -B

## 2019-04-02 PROCEDURE — 77030031139 HC SUT VCRL2 J&J -A: Performed by: UROLOGY

## 2019-04-02 PROCEDURE — 07BC4ZZ EXCISION OF PELVIS LYMPHATIC, PERCUTANEOUS ENDOSCOPIC APPROACH: ICD-10-PCS | Performed by: UROLOGY

## 2019-04-02 PROCEDURE — 77030012893

## 2019-04-02 PROCEDURE — 0VT04ZZ RESECTION OF PROSTATE, PERCUTANEOUS ENDOSCOPIC APPROACH: ICD-10-PCS | Performed by: UROLOGY

## 2019-04-02 PROCEDURE — 88307 TISSUE EXAM BY PATHOLOGIST: CPT

## 2019-04-02 PROCEDURE — 77030037051 HC TRCR ENDOSC VRSPRT BLD COVD -B: Performed by: UROLOGY

## 2019-04-02 PROCEDURE — 74011250636 HC RX REV CODE- 250/636: Performed by: UROLOGY

## 2019-04-02 PROCEDURE — 77030002933 HC SUT MCRYL J&J -A: Performed by: UROLOGY

## 2019-04-02 PROCEDURE — 77030031492 HC PRT ACC BLNT AIRSEAL CNMD -B: Performed by: UROLOGY

## 2019-04-02 PROCEDURE — 8E0W4CZ ROBOTIC ASSISTED PROCEDURE OF TRUNK REGION, PERCUTANEOUS ENDOSCOPIC APPROACH: ICD-10-PCS | Performed by: UROLOGY

## 2019-04-02 PROCEDURE — 77030003580 HC NDL INSUF VERES J&J -B: Performed by: UROLOGY

## 2019-04-02 PROCEDURE — 74011000250 HC RX REV CODE- 250

## 2019-04-02 RX ORDER — SODIUM CHLORIDE, SODIUM LACTATE, POTASSIUM CHLORIDE, CALCIUM CHLORIDE 600; 310; 30; 20 MG/100ML; MG/100ML; MG/100ML; MG/100ML
INJECTION, SOLUTION INTRAVENOUS
Status: DISCONTINUED | OUTPATIENT
Start: 2019-04-02 | End: 2019-04-02 | Stop reason: HOSPADM

## 2019-04-02 RX ORDER — SODIUM CHLORIDE 0.9 % (FLUSH) 0.9 %
5-40 SYRINGE (ML) INJECTION EVERY 8 HOURS
Status: DISCONTINUED | OUTPATIENT
Start: 2019-04-02 | End: 2019-04-03 | Stop reason: HOSPADM

## 2019-04-02 RX ORDER — PROPOFOL 10 MG/ML
INJECTION, EMULSION INTRAVENOUS AS NEEDED
Status: DISCONTINUED | OUTPATIENT
Start: 2019-04-02 | End: 2019-04-02 | Stop reason: HOSPADM

## 2019-04-02 RX ORDER — LIDOCAINE HYDROCHLORIDE 10 MG/ML
0.1 INJECTION, SOLUTION EPIDURAL; INFILTRATION; INTRACAUDAL; PERINEURAL AS NEEDED
Status: DISCONTINUED | OUTPATIENT
Start: 2019-04-02 | End: 2019-04-02 | Stop reason: HOSPADM

## 2019-04-02 RX ORDER — ONDANSETRON 2 MG/ML
4 INJECTION INTRAMUSCULAR; INTRAVENOUS AS NEEDED
Status: DISCONTINUED | OUTPATIENT
Start: 2019-04-02 | End: 2019-04-02 | Stop reason: HOSPADM

## 2019-04-02 RX ORDER — MIDAZOLAM HYDROCHLORIDE 1 MG/ML
0.5 INJECTION, SOLUTION INTRAMUSCULAR; INTRAVENOUS
Status: DISCONTINUED | OUTPATIENT
Start: 2019-04-02 | End: 2019-04-02 | Stop reason: HOSPADM

## 2019-04-02 RX ORDER — OXYCODONE HYDROCHLORIDE 5 MG/1
5 TABLET ORAL AS NEEDED
Status: DISCONTINUED | OUTPATIENT
Start: 2019-04-02 | End: 2019-04-02 | Stop reason: HOSPADM

## 2019-04-02 RX ORDER — HYDROCODONE BITARTRATE AND ACETAMINOPHEN 5; 325 MG/1; MG/1
1 TABLET ORAL
Qty: 20 TAB | Refills: 0 | Status: SHIPPED | OUTPATIENT
Start: 2019-04-02 | End: 2019-04-05

## 2019-04-02 RX ORDER — ATROPA BELLADONNA AND OPIUM 16.2; 6 MG/1; MG/1
1 SUPPOSITORY RECTAL ONCE
Status: COMPLETED | OUTPATIENT
Start: 2019-04-02 | End: 2019-04-02

## 2019-04-02 RX ORDER — SODIUM CHLORIDE 9 MG/ML
25 INJECTION, SOLUTION INTRAVENOUS CONTINUOUS
Status: DISCONTINUED | OUTPATIENT
Start: 2019-04-02 | End: 2019-04-02 | Stop reason: HOSPADM

## 2019-04-02 RX ORDER — MAGNESIUM SULFATE 100 %
4 CRYSTALS MISCELLANEOUS AS NEEDED
Status: DISCONTINUED | OUTPATIENT
Start: 2019-04-02 | End: 2019-04-03 | Stop reason: HOSPADM

## 2019-04-02 RX ORDER — SODIUM CHLORIDE AND POTASSIUM CHLORIDE .9; .15 G/100ML; G/100ML
SOLUTION INTRAVENOUS
Status: COMPLETED
Start: 2019-04-02 | End: 2019-04-02

## 2019-04-02 RX ORDER — LIDOCAINE HYDROCHLORIDE 20 MG/ML
INJECTION, SOLUTION EPIDURAL; INFILTRATION; INTRACAUDAL; PERINEURAL AS NEEDED
Status: DISCONTINUED | OUTPATIENT
Start: 2019-04-02 | End: 2019-04-02 | Stop reason: HOSPADM

## 2019-04-02 RX ORDER — KETOROLAC TROMETHAMINE 30 MG/ML
15 INJECTION, SOLUTION INTRAMUSCULAR; INTRAVENOUS EVERY 6 HOURS
Status: COMPLETED | OUTPATIENT
Start: 2019-04-02 | End: 2019-04-03

## 2019-04-02 RX ORDER — PANTOPRAZOLE SODIUM 40 MG/1
40 TABLET, DELAYED RELEASE ORAL
Status: DISCONTINUED | OUTPATIENT
Start: 2019-04-03 | End: 2019-04-03 | Stop reason: HOSPADM

## 2019-04-02 RX ORDER — KETOROLAC TROMETHAMINE 30 MG/ML
INJECTION, SOLUTION INTRAMUSCULAR; INTRAVENOUS AS NEEDED
Status: DISCONTINUED | OUTPATIENT
Start: 2019-04-02 | End: 2019-04-02 | Stop reason: HOSPADM

## 2019-04-02 RX ORDER — SODIUM CHLORIDE AND POTASSIUM CHLORIDE .9; .15 G/100ML; G/100ML
SOLUTION INTRAVENOUS CONTINUOUS
Status: DISCONTINUED | OUTPATIENT
Start: 2019-04-02 | End: 2019-04-03 | Stop reason: HOSPADM

## 2019-04-02 RX ORDER — NEOSTIGMINE METHYLSULFATE 1 MG/ML
INJECTION INTRAVENOUS AS NEEDED
Status: DISCONTINUED | OUTPATIENT
Start: 2019-04-02 | End: 2019-04-02 | Stop reason: HOSPADM

## 2019-04-02 RX ORDER — BUPIVACAINE HYDROCHLORIDE 5 MG/ML
30 INJECTION, SOLUTION EPIDURAL; INTRACAUDAL ONCE
Status: COMPLETED | OUTPATIENT
Start: 2019-04-02 | End: 2019-04-02

## 2019-04-02 RX ORDER — MIDAZOLAM HYDROCHLORIDE 1 MG/ML
1 INJECTION, SOLUTION INTRAMUSCULAR; INTRAVENOUS AS NEEDED
Status: DISCONTINUED | OUTPATIENT
Start: 2019-04-02 | End: 2019-04-02 | Stop reason: HOSPADM

## 2019-04-02 RX ORDER — DEXLANSOPRAZOLE 30 MG/1
30 CAPSULE, DELAYED RELEASE ORAL DAILY
Status: DISCONTINUED | OUTPATIENT
Start: 2019-04-03 | End: 2019-04-02 | Stop reason: CLARIF

## 2019-04-02 RX ORDER — ROCURONIUM BROMIDE 10 MG/ML
INJECTION, SOLUTION INTRAVENOUS AS NEEDED
Status: DISCONTINUED | OUTPATIENT
Start: 2019-04-02 | End: 2019-04-02 | Stop reason: HOSPADM

## 2019-04-02 RX ORDER — HYDROMORPHONE HYDROCHLORIDE 1 MG/ML
0.2 INJECTION, SOLUTION INTRAMUSCULAR; INTRAVENOUS; SUBCUTANEOUS
Status: ACTIVE | OUTPATIENT
Start: 2019-04-02 | End: 2019-04-02

## 2019-04-02 RX ORDER — MORPHINE SULFATE 10 MG/ML
2 INJECTION, SOLUTION INTRAMUSCULAR; INTRAVENOUS
Status: DISCONTINUED | OUTPATIENT
Start: 2019-04-02 | End: 2019-04-02 | Stop reason: HOSPADM

## 2019-04-02 RX ORDER — ACETAMINOPHEN 325 MG/1
650 TABLET ORAL ONCE
Status: DISCONTINUED | OUTPATIENT
Start: 2019-04-02 | End: 2019-04-02 | Stop reason: HOSPADM

## 2019-04-02 RX ORDER — ROPIVACAINE HYDROCHLORIDE 5 MG/ML
150 INJECTION, SOLUTION EPIDURAL; INFILTRATION; PERINEURAL AS NEEDED
Status: DISCONTINUED | OUTPATIENT
Start: 2019-04-02 | End: 2019-04-02 | Stop reason: HOSPADM

## 2019-04-02 RX ORDER — FENTANYL CITRATE 50 UG/ML
25 INJECTION, SOLUTION INTRAMUSCULAR; INTRAVENOUS
Status: DISCONTINUED | OUTPATIENT
Start: 2019-04-02 | End: 2019-04-02 | Stop reason: HOSPADM

## 2019-04-02 RX ORDER — HYDROMORPHONE HYDROCHLORIDE 1 MG/ML
1 INJECTION, SOLUTION INTRAMUSCULAR; INTRAVENOUS; SUBCUTANEOUS
Status: DISCONTINUED | OUTPATIENT
Start: 2019-04-02 | End: 2019-04-03 | Stop reason: HOSPADM

## 2019-04-02 RX ORDER — DEXTROSE 50 % IN WATER (D50W) INTRAVENOUS SYRINGE
12.5-25 AS NEEDED
Status: DISCONTINUED | OUTPATIENT
Start: 2019-04-02 | End: 2019-04-03 | Stop reason: HOSPADM

## 2019-04-02 RX ORDER — FENTANYL CITRATE 50 UG/ML
50 INJECTION, SOLUTION INTRAMUSCULAR; INTRAVENOUS AS NEEDED
Status: DISCONTINUED | OUTPATIENT
Start: 2019-04-02 | End: 2019-04-02 | Stop reason: HOSPADM

## 2019-04-02 RX ORDER — EPHEDRINE SULFATE/0.9% NACL/PF 50 MG/5 ML
5 SYRINGE (ML) INTRAVENOUS AS NEEDED
Status: DISCONTINUED | OUTPATIENT
Start: 2019-04-02 | End: 2019-04-02 | Stop reason: HOSPADM

## 2019-04-02 RX ORDER — LEVOFLOXACIN 5 MG/ML
500 INJECTION, SOLUTION INTRAVENOUS
Status: DISCONTINUED | OUTPATIENT
Start: 2019-04-02 | End: 2019-04-02

## 2019-04-02 RX ORDER — FENTANYL CITRATE 50 UG/ML
INJECTION, SOLUTION INTRAMUSCULAR; INTRAVENOUS AS NEEDED
Status: DISCONTINUED | OUTPATIENT
Start: 2019-04-02 | End: 2019-04-02 | Stop reason: HOSPADM

## 2019-04-02 RX ORDER — ZOLPIDEM TARTRATE 5 MG/1
5 TABLET ORAL
Status: DISCONTINUED | OUTPATIENT
Start: 2019-04-02 | End: 2019-04-03 | Stop reason: HOSPADM

## 2019-04-02 RX ORDER — GLYCOPYRROLATE 0.2 MG/ML
INJECTION INTRAMUSCULAR; INTRAVENOUS AS NEEDED
Status: DISCONTINUED | OUTPATIENT
Start: 2019-04-02 | End: 2019-04-02 | Stop reason: HOSPADM

## 2019-04-02 RX ORDER — SODIUM CHLORIDE, SODIUM LACTATE, POTASSIUM CHLORIDE, CALCIUM CHLORIDE 600; 310; 30; 20 MG/100ML; MG/100ML; MG/100ML; MG/100ML
1000 INJECTION, SOLUTION INTRAVENOUS CONTINUOUS
Status: DISCONTINUED | OUTPATIENT
Start: 2019-04-02 | End: 2019-04-02 | Stop reason: HOSPADM

## 2019-04-02 RX ORDER — CEFAZOLIN SODIUM/WATER 2 G/20 ML
2 SYRINGE (ML) INTRAVENOUS EVERY 8 HOURS
Status: DISCONTINUED | OUTPATIENT
Start: 2019-04-02 | End: 2019-04-02

## 2019-04-02 RX ORDER — HYDROCODONE BITARTRATE AND ACETAMINOPHEN 5; 325 MG/1; MG/1
1 TABLET ORAL
Status: DISCONTINUED | OUTPATIENT
Start: 2019-04-02 | End: 2019-04-03 | Stop reason: HOSPADM

## 2019-04-02 RX ORDER — NALOXONE HYDROCHLORIDE 0.4 MG/ML
0.4 INJECTION, SOLUTION INTRAMUSCULAR; INTRAVENOUS; SUBCUTANEOUS AS NEEDED
Status: DISCONTINUED | OUTPATIENT
Start: 2019-04-02 | End: 2019-04-03 | Stop reason: HOSPADM

## 2019-04-02 RX ORDER — ACETAMINOPHEN 10 MG/ML
INJECTION, SOLUTION INTRAVENOUS AS NEEDED
Status: DISCONTINUED | OUTPATIENT
Start: 2019-04-02 | End: 2019-04-02 | Stop reason: HOSPADM

## 2019-04-02 RX ORDER — SODIUM CHLORIDE, SODIUM LACTATE, POTASSIUM CHLORIDE, CALCIUM CHLORIDE 600; 310; 30; 20 MG/100ML; MG/100ML; MG/100ML; MG/100ML
100 INJECTION, SOLUTION INTRAVENOUS CONTINUOUS
Status: DISCONTINUED | OUTPATIENT
Start: 2019-04-02 | End: 2019-04-02 | Stop reason: HOSPADM

## 2019-04-02 RX ORDER — ONDANSETRON 2 MG/ML
INJECTION INTRAMUSCULAR; INTRAVENOUS AS NEEDED
Status: DISCONTINUED | OUTPATIENT
Start: 2019-04-02 | End: 2019-04-02 | Stop reason: HOSPADM

## 2019-04-02 RX ORDER — METOPROLOL SUCCINATE 25 MG/1
25 TABLET, EXTENDED RELEASE ORAL DAILY
Status: DISCONTINUED | OUTPATIENT
Start: 2019-04-03 | End: 2019-04-03 | Stop reason: HOSPADM

## 2019-04-02 RX ORDER — SODIUM CHLORIDE 0.9 % (FLUSH) 0.9 %
5-40 SYRINGE (ML) INJECTION AS NEEDED
Status: DISCONTINUED | OUTPATIENT
Start: 2019-04-02 | End: 2019-04-03 | Stop reason: HOSPADM

## 2019-04-02 RX ORDER — SUCCINYLCHOLINE CHLORIDE 20 MG/ML
INJECTION INTRAMUSCULAR; INTRAVENOUS AS NEEDED
Status: DISCONTINUED | OUTPATIENT
Start: 2019-04-02 | End: 2019-04-02 | Stop reason: HOSPADM

## 2019-04-02 RX ORDER — CEFAZOLIN SODIUM 1 G/3ML
INJECTION, POWDER, FOR SOLUTION INTRAMUSCULAR; INTRAVENOUS AS NEEDED
Status: DISCONTINUED | OUTPATIENT
Start: 2019-04-02 | End: 2019-04-02 | Stop reason: HOSPADM

## 2019-04-02 RX ORDER — SIMVASTATIN 20 MG/1
20 TABLET, FILM COATED ORAL DAILY
Status: DISCONTINUED | OUTPATIENT
Start: 2019-04-03 | End: 2019-04-03 | Stop reason: HOSPADM

## 2019-04-02 RX ORDER — MIDAZOLAM HYDROCHLORIDE 1 MG/ML
INJECTION, SOLUTION INTRAMUSCULAR; INTRAVENOUS AS NEEDED
Status: DISCONTINUED | OUTPATIENT
Start: 2019-04-02 | End: 2019-04-02 | Stop reason: HOSPADM

## 2019-04-02 RX ADMIN — GLYCOPYRROLATE 0.6 MG: 0.2 INJECTION INTRAMUSCULAR; INTRAVENOUS at 11:18

## 2019-04-02 RX ADMIN — ROCURONIUM BROMIDE 10 MG: 10 INJECTION, SOLUTION INTRAVENOUS at 09:36

## 2019-04-02 RX ADMIN — HYDROCODONE BITARTRATE AND ACETAMINOPHEN 1 TABLET: 5; 325 TABLET ORAL at 20:11

## 2019-04-02 RX ADMIN — LIDOCAINE HYDROCHLORIDE 100 MG: 20 INJECTION, SOLUTION EPIDURAL; INFILTRATION; INTRACAUDAL; PERINEURAL at 08:42

## 2019-04-02 RX ADMIN — HUMAN INSULIN 2 UNITS: 100 INJECTION, SOLUTION SUBCUTANEOUS at 19:04

## 2019-04-02 RX ADMIN — ONDANSETRON 4 MG: 2 INJECTION INTRAMUSCULAR; INTRAVENOUS at 11:10

## 2019-04-02 RX ADMIN — SODIUM CHLORIDE, SODIUM LACTATE, POTASSIUM CHLORIDE, AND CALCIUM CHLORIDE 1000 ML: 600; 310; 30; 20 INJECTION, SOLUTION INTRAVENOUS at 08:01

## 2019-04-02 RX ADMIN — PROPOFOL 200 MG: 10 INJECTION, EMULSION INTRAVENOUS at 08:42

## 2019-04-02 RX ADMIN — FENTANYL CITRATE 50 MCG: 50 INJECTION, SOLUTION INTRAMUSCULAR; INTRAVENOUS at 11:23

## 2019-04-02 RX ADMIN — SODIUM CHLORIDE, SODIUM LACTATE, POTASSIUM CHLORIDE, AND CALCIUM CHLORIDE: 600; 310; 30; 20 INJECTION, SOLUTION INTRAVENOUS at 10:45

## 2019-04-02 RX ADMIN — HYDROCODONE BITARTRATE AND ACETAMINOPHEN 1 TABLET: 5; 325 TABLET ORAL at 16:20

## 2019-04-02 RX ADMIN — Medication 10 ML: at 14:24

## 2019-04-02 RX ADMIN — ROCURONIUM BROMIDE 10 MG: 10 INJECTION, SOLUTION INTRAVENOUS at 10:30

## 2019-04-02 RX ADMIN — KETOROLAC TROMETHAMINE 30 MG: 30 INJECTION, SOLUTION INTRAMUSCULAR; INTRAVENOUS at 11:30

## 2019-04-02 RX ADMIN — ROCURONIUM BROMIDE 10 MG: 10 INJECTION, SOLUTION INTRAVENOUS at 08:42

## 2019-04-02 RX ADMIN — SUCCINYLCHOLINE CHLORIDE 160 MG: 20 INJECTION INTRAMUSCULAR; INTRAVENOUS at 08:42

## 2019-04-02 RX ADMIN — POTASSIUM CHLORIDE AND SODIUM CHLORIDE: 900; 150 INJECTION, SOLUTION INTRAVENOUS at 12:32

## 2019-04-02 RX ADMIN — POTASSIUM CHLORIDE AND SODIUM CHLORIDE: 900; 150 INJECTION, SOLUTION INTRAVENOUS at 19:11

## 2019-04-02 RX ADMIN — CEFAZOLIN SODIUM 2 G: 1 INJECTION, POWDER, FOR SOLUTION INTRAMUSCULAR; INTRAVENOUS at 08:51

## 2019-04-02 RX ADMIN — ACETAMINOPHEN 1000 MG: 10 INJECTION, SOLUTION INTRAVENOUS at 09:00

## 2019-04-02 RX ADMIN — SODIUM CHLORIDE 500 ML: 900 INJECTION, SOLUTION INTRAVENOUS at 12:22

## 2019-04-02 RX ADMIN — SODIUM CHLORIDE, SODIUM LACTATE, POTASSIUM CHLORIDE, AND CALCIUM CHLORIDE 1000 ML: 600; 310; 30; 20 INJECTION, SOLUTION INTRAVENOUS at 07:48

## 2019-04-02 RX ADMIN — KETOROLAC TROMETHAMINE 15 MG: 30 INJECTION, SOLUTION INTRAMUSCULAR; INTRAVENOUS at 20:11

## 2019-04-02 RX ADMIN — KETOROLAC TROMETHAMINE 15 MG: 30 INJECTION, SOLUTION INTRAMUSCULAR; INTRAVENOUS at 14:24

## 2019-04-02 RX ADMIN — MIDAZOLAM HYDROCHLORIDE 2 MG: 1 INJECTION, SOLUTION INTRAMUSCULAR; INTRAVENOUS at 08:28

## 2019-04-02 RX ADMIN — FENTANYL CITRATE 100 MCG: 50 INJECTION, SOLUTION INTRAMUSCULAR; INTRAVENOUS at 08:42

## 2019-04-02 RX ADMIN — FENTANYL CITRATE 50 MCG: 50 INJECTION, SOLUTION INTRAMUSCULAR; INTRAVENOUS at 09:30

## 2019-04-02 RX ADMIN — ROCURONIUM BROMIDE 40 MG: 10 INJECTION, SOLUTION INTRAVENOUS at 08:49

## 2019-04-02 RX ADMIN — NEOSTIGMINE METHYLSULFATE 5 MG: 1 INJECTION INTRAVENOUS at 11:18

## 2019-04-02 RX ADMIN — ROCURONIUM BROMIDE 10 MG: 10 INJECTION, SOLUTION INTRAVENOUS at 10:10

## 2019-04-02 RX ADMIN — SODIUM CHLORIDE, SODIUM LACTATE, POTASSIUM CHLORIDE, CALCIUM CHLORIDE: 600; 310; 30; 20 INJECTION, SOLUTION INTRAVENOUS at 08:28

## 2019-04-02 NOTE — ANESTHESIA POSTPROCEDURE EVALUATION
Procedure(s): 
DAVINCI RADICAL ROBOTIC ASSISTED LAPAROSCOPIC PROSTATECTOMY, BILATERAL  PELVIC LYMPH NODE DISECTION. general 
 
<BSHSIANPOST> Vitals Value Taken Time /86 4/2/2019  1:19 PM  
Temp 36.4 °C (97.6 °F) 4/2/2019  1:19 PM  
Pulse 60 4/2/2019  1:19 PM  
Resp 18 4/2/2019  1:19 PM  
SpO2 96 % 4/2/2019  1:19 PM

## 2019-04-02 NOTE — H&P
Urology Consult Patient: Dolly Zee MRN: [id number]  SSN: [id number] YOB: 1950  Age: 77 y.o. Sex: male Date of Encounter:  April 2, 2019 Pre-existing [facility] Patient:    
  
 
History of Present Illness:  Patient is a 77 y.o. male. He presents with an elevated PSA found on routine screening Past Medical History: Allergies Allergen Reactions  Pcn [Penicillins] Other (comments) Infancy was told he had a rash Prior to Admission medications Medication Sig Start Date End Date Taking? Authorizing Provider TRULICITY 1.5 AF/1.5 mL sub-q pen 1.5 mg by SubCUTAneous route every seven (7) days. 1/18/18   Provider, Historical  
FARXIGA 10 mg tab tablet Take 10 mg by mouth daily. 11/20/17   Provider, Historical  
metoprolol succinate (TOPROL-XL) 25 mg XL tablet Take 25 mg by mouth daily. 11/11/17   Provider, Historical  
VITAMIN D2 50,000 unit capsule Take 1 Cap by mouth every seven (7) days. 4/28/16   Provider, Historical  
gabapentin (NEURONTIN) 300 mg capsule Take 1 Cap by mouth three (3) times daily. 4/26/16   Provider, Historical  
metFORMIN ER 1,000 mg tr24 Take 1,000 mg by mouth two (2) times a day. 1/18/16   Provider, Historical  
NOVOFINE 32 32 gauge x 1/4\" ndle  1/25/16   Provider, Historical  
cyanocobalamin (VITAMIN B-12) 1,000 mcg tablet Take 1,000 mcg by mouth daily. Provider, Historical  
tamsulosin (FLOMAX) 0.4 mg capsule Take 0.4 mg by mouth daily. 11/8/15   Provider, Historical  
clobetasol (TEMOVATE) 0.05 % external solution Apply  to affected area two (2) times a day. 6/19/15   Provider, Historical  
ONETOUCH ULTRA TEST strip TEST THREE TIMES DAILY 6/19/15   Luis Mann MD  
tadalafil (CIALIS) 5 mg tablet Take 5 mg by mouth daily. Provider, Historical  
dexlansoprazole (DEXILANT) 30 mg capsule Take 1 capsule by mouth daily. Patient taking differently: Take 60 mg by mouth daily.  10/8/14   Cha Casas NP  
 simvastatin (ZOCOR) 20 mg tablet Take 1 tablet by mouth daily. Patient taking differently: Take 20 mg by mouth nightly. 10/8/14   Kory Isidro NP  
calcium-cholecalciferol, d3, (CALCIUM 600 + D) 600-125 mg-unit tab Take 1 Tab by mouth daily. Provider, Historical  
aspirin 81 mg tablet Take 81 mg by mouth daily. 9/15/11   Provider, Historical  
 
PMHx:  has a past medical history of Arthritis, BPH (benign prostatic hyperplasia) (2013), Cancer ([facility] (2015), Cancer ([facility] (01/2019), Chronic pain, Chronic pelvic pain in male, Collagenous colitis (2011), Diabetes mellitus type 2, controlled, with complications ([facility] (8033), Diabetic neuropathy ([facility] (2006), Diverticulosis, ED (erectile dysfunction), GERD (gastroesophageal reflux disease) (1990), Gout, Hypertension (1970), Ill-defined condition, Low serum testosterone level, Osteopenia, Psoriasis, Sleep apnea, and Sun-damaged skin. He also has no past medical history of Arsenic suspected exposure, Family history of skin cancer, Pacemaker, Radiation exposure, Skin cancer, Sunburn, blistering, or Tanning bed exposure. PSurgHx:  has a past surgical history that includes endoscopy, colon, diagnostic; emg two extremities lower (8/24/2013); ncv sensory or mixed (8/24/2013); hx hernia repair; hx other surgical (0291,2719); hx orthopaedic (08/2018); hx heent; and hx colonoscopy. PSocHx:  reports that he quit smoking about 38 years ago. He has a 12.00 pack-year smoking history. He has never used smokeless tobacco. He reports that he drinks alcohol. He reports that he does not use drugs. ROS:  negative other than above. Physical Exam: 
          General:    appears nontoxic Skin:  no clubbing, cyanosis, edema HEENT:  NCAT, O/P Clear Throat/Neck:  supple, no LAD Chest[de-identified]  CTA Heart[de-identified]  Regular rate and rhythm Abdomen/Flank[de-identified]  No CVAT, non-tender soft abdomen, no masses Bladder[de-identified]  Bladder not palpable Lymph nodes:  no Cervical, supraclavicular, and axillary LAD Lab Results Component Value Date/Time WBC 6.8 03/21/2019 10:38 AM  
 HCT 45.3 03/21/2019 10:38 AM  
 PLATELET 496 47/84/8007 10:38 AM  
 Sodium 137 03/21/2019 10:38 AM  
 Potassium 4.2 03/21/2019 10:38 AM  
 Chloride 104 03/21/2019 10:38 AM  
 CO2 25 03/21/2019 10:38 AM  
 BUN 17 03/21/2019 10:38 AM  
 Creatinine 1.07 03/21/2019 10:38 AM  
 Glucose 153 (H) 03/21/2019 10:38 AM  
 Calcium 9.6 03/21/2019 10:38 AM  
 INR 0.9 07/30/2018 10:04 AM  
 
 
UA:  
Lab Results Component Value Date/Time Color YELLOW/STRAW 03/21/2019 10:38 AM  
 Appearance CLEAR 03/21/2019 10:38 AM  
 Specific gravity 1.025 03/21/2019 10:38 AM  
 Specific gravity 1.025 03/06/2013 04:15 PM  
 pH (UA) 5.0 03/21/2019 10:38 AM  
 Protein NEGATIVE  03/21/2019 10:38 AM  
 Glucose >1,000 (A) 03/21/2019 10:38 AM  
 Ketone TRACE (A) 03/21/2019 10:38 AM  
 Bilirubin NEGATIVE  03/21/2019 10:38 AM  
 Urobilinogen 0.2 03/21/2019 10:38 AM  
 Nitrites NEGATIVE  03/21/2019 10:38 AM  
 Leukocyte Esterase NEGATIVE  03/21/2019 10:38 AM  
 Epithelial cells FEW 03/21/2019 10:38 AM  
 Bacteria NEGATIVE  03/21/2019 10:38 AM  
 WBC 0-4 03/21/2019 10:38 AM  
 RBC 0-5 03/21/2019 10:38 AM  
 
 
Cultures:  
Xrays:  
 
Assessment/Plan: 1. Treatment options were discussed with the patient. Risks and benefits of surgery were reviewed. The patient would like to proceed with surgical intervention. DAVINCI RADICAL ROBOTIC ASSISTED LAPAROSCOPIC PROSTATECTOMY POSSIBLE OPEN POSSIBLE PELVIC LYMPH NODE DISECTION Signed By: Preeti Parham PA-C  - April 2, 2019

## 2019-04-02 NOTE — PERIOP NOTES
TRANSFER - OUT REPORT: 
 
Verbal report given to Tamiko Hu (name) on Olegario Pagan  being transferred to [facility] (unit) for routine post - op Report consisted of patients Situation, Background, Assessment and  
Recommendations(SBAR). Information from the following report(s) SBAR, OR Summary, Procedure Summary, Intake/Output, MAR and Accordion was reviewed with the receiving nurse. Lines:  
Peripheral IV 04/02/19 Left Hand (Active) Site Assessment Clean, dry, & intact 4/2/2019 12:05 PM  
Phlebitis Assessment 0 4/2/2019 12:05 PM  
Infiltration Assessment 0 4/2/2019 12:05 PM  
Dressing Status Clean, dry, & intact 4/2/2019 12:05 PM  
Dressing Type Transparent 4/2/2019 12:05 PM  
Hub Color/Line Status Green; Infusing 4/2/2019 12:05 PM  
Alcohol Cap Used Yes 4/2/2019 12:05 PM  
   
Peripheral IV 04/02/19 Right Hand (Active) Site Assessment Clean, dry, & intact 4/2/2019 12:05 PM  
Phlebitis Assessment 0 4/2/2019 12:05 PM  
Infiltration Assessment 0 4/2/2019 12:05 PM  
Dressing Status Clean, dry, & intact 4/2/2019 12:05 PM  
Dressing Type Transparent 4/2/2019 12:05 PM  
Hub Color/Line Status Green; Infusing 4/2/2019 12:05 PM  
Alcohol Cap Used Yes 4/2/2019 12:05 PM  
  
 
Opportunity for questions and clarification was provided. Patient transported with: 
 Anghami

## 2019-04-02 NOTE — BRIEF OP NOTE
BRIEF OPERATIVE NOTE Date of Procedure: 4/2/2019 Preoperative Diagnosis: PROSTATE CANCER Postoperative Diagnosis: PROSTATE CANCER Procedure(s): 
DAVINCI RADICAL ROBOTIC ASSISTED LAPAROSCOPIC PROSTATECTOMY, BILATERAL  PELVIC LYMPH NODE DISECTION Surgeon(s) and Role: Lulu Manzo MD - Primary Surgical Staff: 
Circ-1: Osmany Zavala RN 
Circ-Relief: Marcella Badillo Physician Assistant: Valentine López PA-C Scrub Tech-1: DJZ Event Time In Time Out Incision Start 6435 Incision Close Anesthesia: General  
Estimated Blood Loss: 75cc Specimens:  
ID Type Source Tests Collected by Time Destination 1 : left pelvic lymph node Fresh Lymph Node  Mayi Snyder MD 4/2/2019 1115 Pathology 2 : right pelvic lymph node Fresh Lymph Node  Mayi Snyder MD 4/2/2019 1116 Pathology 3 : prostate Fresh Lymph Node  Mayi Snyder MD 4/2/2019 1117 Pathology Findings: PCA Complications: None Implants: * No implants in log *

## 2019-04-02 NOTE — OP NOTES
OPERATIVE REPORT      PREOPERATIVE DIAGNOSIS: Adenocarcinoma of the prostate. POSTOPERATIVE DIAGNOSIS: Adenocarcinoma of the prostate. OPERATIVE PROCEDURE  DaVinci robotic-assisted laparoscopic radical prostatectomy. Right sided Nerve sparing    Bilateral Lymph node dissection    SURGEON: Johann Ralph MD    ASSISTANT: Nursing staff    ANESTHESIA: General endotracheal.    COMPLICATIONS: None. EBL: 75cc    INDICATIONS: T1c G8 PCA    DESCRIPTION OF PROCEDURE: After informed consent was obtained, the patient  was given appropriate IV antibiotic prophylaxis in the holding area. The  patient was then brought to the operative theatre, where he received  general anesthesia. The patient was carefully placed in a low lithotomy,  Trendelenburg position. All pressure points were padded appropriately. The patient's abdomen and genitalia were shaved, prepared and draped in the  usual sterile fashion. A Steve catheter was introduced into the bladder  transurethrally. A supraumbilical incision was made with a #15 scalpel blade. Veress needle placed into the peritoneal cavity and position confirmed with saline irrigation. Veress needle connected to C02 gas to generate a pneumoperitoneum of 15mm H20 pressure,  followed by introduction of the camera port and camera through this incision site. The  remaining ports were placed under camera vision. Two robotic arm ports were  placed, flanking the umbilicus at the lateral border of the rectus  abdominus muscle. The 4th arm robotic port was placed in the right lower  abdominal quadrant and the accessory port was placed in the left lower  abdominal quadrant. The robot was then docked and the procedure ensued at  the surgical console. The bladder was released from the anterior pelvis by transection of the  medial umbilical ligaments and urachal remnant. The space of Retzius was  defined and anterior prostatic fat was dissected off and removed.  The  endopelvic fascia was sharply incised from the prostatic base to the apex. Pubo-prostatic ligaments were taken down sharply as well. The dorsal venous  complex was doubly ligated with interrupted 1-0 Vicryl suture. The bladder  neck was identified and subsequently transected anteriorly to expose the  Steve catheter. The Steve catheter was retracted to complete the bladder  neck transaction posteriorly. This dissection continued posteriorly to the  level of the vas ampullae and seminal vesicles. These were dissected out  and lifted anteriorly. Posterior Denonvilliers fascia was incised  transversely to begin the dissection of the posterior aspect of the  prostate off the rectum and pre-rectal fat. The vascular pedicles of the  prostate were transected with selective arterial pinpoint cautery hemostasis. The lateral prostatic fascia was incised from the prostatic base to the apex bilaterally to begin  the delicate nerve-sparing portion of the procedure. Athermic and  atraumatic dissection was carefully performed to release both cavernous  neurovascular bundles from the posterior lateral aspect of the prostate. The dorsal venous complex was transected just beyond the apex of the  prostate. The urethra was then transected and the prostate specimen was  placed into a retrieval bag and positioned in the left paracolic gutter for  later extraction. The pelvis was then copiously irrigated with saline and  inspected for significant bleeding or injury. None was seen. The  vesico-urethral anastomosis was performed with double-armed 3-0 v-lock suture in  a running fashion. A new Steve catheter was anchored transurethrally. The robot was de-docked and all trocars were removed  under vision to confirm hemostasis. The supraumbilical incision was  lengthened to remove the specimen. The abdominal fascia in this area was  approximated with 1-0 PDS in a figure-of-eight fashion. All incision sites  were injected with 0.25% Marcaine. Subcuticular closure was performed with  3-0 Monocryl and skin approximation with Dermabond. The patient was  repositioned supine and awakened, extubated and transferred to the recovery  room in good condition. Addendum:    1. Anterior approach used. 2. Endopelvic fascia incised. 3. No median lobe identified on bladder neck transection. 4. Watertight vesicourethral anastomosis confirmed. 5. Wide resection on left side (non-nerve sparing)  6. Bilateral inguinal hernia repair mesh and reactive change in pre-vesicle space noted  7. Attention was then taken to expose the obturator lymph node packet. A bilateral pelvic lymph node dissection was performed along the sameera-medial aspect of the external iliac vein towards the pelvic side wall. Posterior limits defined by the obturator neurovascular structures. Surgical clips applied at the pubic bone distally and the bifurcation of the iliac vessels proximally. The specimen was placed in the retrieval bag with the prostate.

## 2019-04-02 NOTE — ANESTHESIA PREPROCEDURE EVALUATION
Relevant Problems   No relevant active problems       Anesthetic History   No history of anesthetic complications            Review of Systems / Medical History  Patient summary reviewed, nursing notes reviewed and pertinent labs reviewed    Pulmonary        Sleep apnea: CPAP           Neuro/Psych   Within defined limits           Cardiovascular    Hypertension                   GI/Hepatic/Renal     GERD           Endo/Other    Diabetes    Arthritis and cancer     Other Findings              Physical Exam    Airway  Mallampati: II  TM Distance: > 6 cm  Neck ROM: normal range of motion   Mouth opening: Normal     Cardiovascular  Regular rate and rhythm,  S1 and S2 normal,  no murmur, click, rub, or gallop             Dental  No notable dental hx       Pulmonary  Breath sounds clear to auscultation               Abdominal  GI exam deferred       Other Findings            Anesthetic Plan    ASA: 3  Anesthesia type: general          Induction: Intravenous  Anesthetic plan and risks discussed with: Patient

## 2019-04-02 NOTE — PERIOP NOTES
Patient: Pamela Resendez MRN: [id number]  SSN: [id number] YOB: 1950  Age: 77 y.o. Sex: male Patient is status post Procedure(s): 
DAVINCI RADICAL ROBOTIC ASSISTED LAPAROSCOPIC PROSTATECTOMY, BILATERAL  PELVIC LYMPH NODE DISECTION. Surgeon(s) and Role: Umair Camara MD - Primary Local/Dose/Irrigation:  30 mL 0.5% marcaine plain Peripheral IV 04/02/19 Left Hand (Active) Peripheral IV 04/02/19 Right Hand (Active) Airway - Endotracheal Tube 04/02/19 Oral (Active) Dressing/Packing:    
Splint/Cast:  ] Other:  Steve

## 2019-04-02 NOTE — DISCHARGE INSTRUCTIONS
Dr. Mulugeta Marx. Jose Tasha Batreswayne PROSTATECTOMY  POST OPERATIVE INSTRUCTIONS    FOLLOW-UP VISIT    ? Dr. Samuel Owens or his associate will see you back in the office in 1 week. ? At that time your final pathology report will be reviewed with you.  ? Your Steve catheter will be evaluated for removal at that time. ? You will be re-educated on male kegel exercises to strengthen your pelvic muscles. This exercise is felt to hasten your recovery of urinary continence. Virtually everyone has leakage of urine upon removal of the catheter and recovery time is variable and everyone is different. Please be patient. ? Please bring an adult urinary pad (such as Depend Guards) with you on this appointment. DISCHARGE MEDICATIONS    ? Upon discharge you will be given prescriptions for pain control (Hydrocodone)   ? Most patients experience only minimal discomfort after this minimally invasive surgery. We recommend using Tylenol (acetaminophen) for pain first and reserve the narcotic pain medication as an alternative as it tends to cause constipation. ? You may resume your normal medications upon discharge from the hospital with the exception of any blood thinning products (such as coumadin or aspirin). ACTIVITY    ? Please refrain from driving for the 1st week after your surgery. ? You may shower upon discharge from the hospital. Avoid bathtubs, hot tubs or swimming pools during 1st 2 weeks. ? It is important to be mobile during your recovery period. Avoid sitting in one position for longer than 45 minutes to 1 hour. Walking and climbing stairs are OK. Be careful not to overdo it. ? Avoid any heavy lifting or strenuous activity for the first 2 weeks. ? Most patients ease into normal activities (including employment) after 2 weeks of recuperation. ? Most patients resume driving by the 2nd week. Please use your best judgment. CATHETER CARE    ?  Keep your [facility] urinary catheter below the level of your bladder at all times otherwise it may not drain properly. ? The leg bag can be fitted under your trousers for daytime use. The larger overnight bag will hold more urine and is best reserved for bedtime use.  ? Minimal care of this unit is required. Make sure there is slack on the catheter between your penis and the drainage bag. This should not be on any tension. Empty the bag when full. You may disconnect the urinary drainage bag when showering and let the catheter drain freely. ? A topical antibiotic ointment applied to the catheter as inserts into the penis will reduce discomfort from the catheter. This can be obtained over the counter at your local pharmacy. ? Do not perform the male kegel exercises while the urinary catheter is in place. CARE OF YOUR INCISION SITES    ? Application of ointments or creams to the incision sites is not recommended. ? The adhesive clear wound dressing will slough off naturally in 5 - 10 days  ? Do not pick at or apply ointments/medications to the adhesive dressing  ? Do not scrub, soak or expose incisions to prolonged moisture. MALE KEGEL EXERCISES    ? Once your [facility] urinary catheter is removed it will be safe to start these exercises. ? Your surgery removed your prostate and affected your secondary urinary control mechanisms. Your external sphincter must now take all the responsibility for keeping you dry and continent. It will take time and effort to strengthen this mechanism. How do you do Kegel exercises? The first step is to find the right muscles. Imagine that you are trying to stop yourself from passing gas. Squeeze the muscles you would use. If you sense a \"pulling\" feeling, those are the right muscles for pelvic exercises. It is important not to squeeze other muscles at the same time and not to hold your breath. Also, be careful not to tighten your stomach, leg, or buttock muscles.  Squeezing the wrong muscles can put more pressure on your bladder control muscles. Squeeze just the pelvic muscles. Repeat, but do not overdo it. Pull in the pelvic muscles and hold for a count of 3. Then relax for a count of 3. Work up to [employer] of 10 repeats. Be patient. Do not give up. It takes just 5 minutes, three to five times a day. Your bladder control may not improve for 3 to 6 weeks, although most people notice an improvement after a few weeks. DIET     Please avoid carbonated beverages and any other gas producing foods (such as flour, beans, or broccoli) for the 1st week or so. Small meals are best until bowel habits return to normal.    THINGS YOU MAY ENCOUNTER AFTER SURGERY    ? Bruising around incision sites. Not at all uncommon and should not alarm you. This will resolve with time. ? Abdominal distention, constipation or bloating. Make sure you are following the dietary instructions. If you dont have a bowel movement or pass gas or are feeling uncomfortable 24 hours after surgery, try taking Milk of Magnesia as directed on the bottle. If after two doses of Milk of Magnesia, you still have not had a bowel movement, it is safe to use a Dulcolax suppository (available over the counter at your local pharmacy). ? Scrotal/Penile swelling and bruising. This is quite common and should not alarm you. It may appear immediately after surgery or may start 4 -5 days after surgery. It should resolve in about 7 - 14 days. You may try elevating your scrotum with a small towel or washcloth when lying down. ? Bloody drainage around neri catheter or in the urine. This is especially common after increased activity or following a bowel movement. Do not be alarmed. Resting for a short period and drinking plenty of fluids usually improves the situation. ? Leaking urine around Neri catheter. This is not unusual.  The catheter is not perfect, but most of the urine should flow through the catheter into the drainage bag.  ? Bladder spasms. It is not uncommon with the catheter in and even after the catheter comes out to have bladder spasms. You may feel mild to severe bladder pain or cramping. You may also experience the sudden urge to urinate or a burning sensation when you urinate. Call your MD if this persists without relief. ? Perineal pain (pain between your rectum and scrotum)/Testicular discomfort. This may last for several weeks after surgery, but it will resolve. Call your MD if the pain medication does not alleviate this. ? Lower legs/ankle swelling. This is not abnormal with it occurs in both legs and should not alarm you. It should resolve in about 7 - 14 days. Elevation of your legs while sitting will help. CONTACT MD IMMEDIATELY IF YOU ARE EXPERIENCING ANY OF THE FOLLOWING SYMPTOMS:    ? Oral Temperature over 101° F.  ? Urine stops draining from Steve into drainage bag.  ? Any pain not relieved by pain medication prescribed. ? Nausea/Vomiting. ? Large amount of blood clots in urine that are blocking the catheter from draining. ? Bladder spasms that are not relieved with pain medication. ? Uneven swelling of legs or ankles. ? Redness and/or large amount of swelling around your incision sites. ? Excessive bleeding or drainage from your incision sites.         [address]  [phone]

## 2019-04-03 ENCOUNTER — APPOINTMENT (OUTPATIENT)
Dept: GENERAL RADIOLOGY | Age: 69
End: 2019-04-03
Attending: EMERGENCY MEDICINE
Payer: MEDICARE

## 2019-04-03 ENCOUNTER — HOSPITAL ENCOUNTER (EMERGENCY)
Age: 69
Discharge: HOME | End: 2019-04-04
Attending: EMERGENCY MEDICINE
Payer: MEDICARE

## 2019-04-03 ENCOUNTER — APPOINTMENT (OUTPATIENT)
Dept: CT IMAGING | Age: 69
End: 2019-04-03
Attending: EMERGENCY MEDICINE
Payer: MEDICARE

## 2019-04-03 VITALS
HEIGHT: 67 IN | OXYGEN SATURATION: 95 % | TEMPERATURE: 98.6 F | BODY MASS INDEX: 32.37 KG/M2 | SYSTOLIC BLOOD PRESSURE: 135 MMHG | RESPIRATION RATE: 16 BRPM | DIASTOLIC BLOOD PRESSURE: 82 MMHG | WEIGHT: 206.25 LBS | HEART RATE: 90 BPM

## 2019-04-03 DIAGNOSIS — Z90.79 STATUS POST PROSTATECTOMY: ICD-10-CM

## 2019-04-03 DIAGNOSIS — R50.9 FEBRILE ILLNESS, ACUTE: Primary | ICD-10-CM

## 2019-04-03 DIAGNOSIS — N50.1 PELVIC HEMATOMA, MALE: ICD-10-CM

## 2019-04-03 LAB
ALBUMIN SERPL-MCNC: 3.3 G/DL (ref 3.5–5)
ALBUMIN/GLOB SERPL: 1 {RATIO} (ref 1.1–2.2)
ALP SERPL-CCNC: 62 U/L (ref 45–117)
ALT SERPL-CCNC: 24 U/L (ref 12–78)
ANION GAP SERPL CALC-SCNC: 8 MMOL/L (ref 5–15)
AST SERPL-CCNC: 16 U/L (ref 15–37)
BASOPHILS # BLD: 0 K/UL (ref 0–0.1)
BASOPHILS NFR BLD: 0 % (ref 0–1)
BILIRUB SERPL-MCNC: 0.4 MG/DL (ref 0.2–1)
BUN SERPL-MCNC: 11 MG/DL (ref 6–20)
BUN/CREAT SERPL: 11 (ref 12–20)
CALCIUM SERPL-MCNC: 8.4 MG/DL (ref 8.5–10.1)
CHLORIDE SERPL-SCNC: 108 MMOL/L (ref 97–108)
CO2 SERPL-SCNC: 24 MMOL/L (ref 21–32)
COMMENT, HOLDF: NORMAL
CREAT SERPL-MCNC: 1.01 MG/DL (ref 0.7–1.3)
CRP SERPL-MCNC: 8.95 MG/DL (ref 0–0.6)
DIFFERENTIAL METHOD BLD: ABNORMAL
EOSINOPHIL # BLD: 0.1 K/UL (ref 0–0.4)
EOSINOPHIL NFR BLD: 2 % (ref 0–7)
ERYTHROCYTE [DISTWIDTH] IN BLOOD BY AUTOMATED COUNT: 13.7 % (ref 11.5–14.5)
FLUAV AG NPH QL IA: NEGATIVE
FLUBV AG NOSE QL IA: NEGATIVE
GLOBULIN SER CALC-MCNC: 3.4 G/DL (ref 2–4)
GLUCOSE BLD STRIP.AUTO-MCNC: 179 MG/DL (ref 65–100)
GLUCOSE SERPL-MCNC: 137 MG/DL (ref 65–100)
HCT VFR BLD AUTO: 35.3 % (ref 36.6–50.3)
HGB BLD-MCNC: 11.7 G/DL (ref 12.1–17)
IMM GRANULOCYTES # BLD AUTO: 0 K/UL (ref 0–0.04)
IMM GRANULOCYTES NFR BLD AUTO: 0 % (ref 0–0.5)
LACTATE BLD-SCNC: 0.99 MMOL/L (ref 0.4–2)
LYMPHOCYTES # BLD: 1 K/UL (ref 0.8–3.5)
LYMPHOCYTES NFR BLD: 15 % (ref 12–49)
MCH RBC QN AUTO: 30.9 PG (ref 26–34)
MCHC RBC AUTO-ENTMCNC: 33.1 G/DL (ref 30–36.5)
MCV RBC AUTO: 93.1 FL (ref 80–99)
MONOCYTES # BLD: 0.6 K/UL (ref 0–1)
MONOCYTES NFR BLD: 10 % (ref 5–13)
NEUTS SEG # BLD: 4.9 K/UL (ref 1.8–8)
NEUTS SEG NFR BLD: 73 % (ref 32–75)
NRBC # BLD: 0 K/UL (ref 0–0.01)
NRBC BLD-RTO: 0 PER 100 WBC
PLATELET # BLD AUTO: 227 K/UL (ref 150–400)
PMV BLD AUTO: 9.5 FL (ref 8.9–12.9)
POTASSIUM SERPL-SCNC: 3.6 MMOL/L (ref 3.5–5.1)
PROT SERPL-MCNC: 6.7 G/DL (ref 6.4–8.2)
RBC # BLD AUTO: 3.79 M/UL (ref 4.1–5.7)
SAMPLES BEING HELD,HOLD: NORMAL
SERVICE CMNT-IMP: ABNORMAL
SODIUM SERPL-SCNC: 140 MMOL/L (ref 136–145)
WBC # BLD AUTO: 6.7 K/UL (ref 4.1–11.1)

## 2019-04-03 PROCEDURE — 85025 COMPLETE CBC W/AUTO DIFF WBC: CPT

## 2019-04-03 PROCEDURE — 83605 ASSAY OF LACTIC ACID: CPT

## 2019-04-03 PROCEDURE — 87086 URINE CULTURE/COLONY COUNT: CPT

## 2019-04-03 PROCEDURE — 81001 URINALYSIS AUTO W/SCOPE: CPT

## 2019-04-03 PROCEDURE — 74011636320 HC RX REV CODE- 636/320: Performed by: RADIOLOGY

## 2019-04-03 PROCEDURE — 82962 GLUCOSE BLOOD TEST: CPT

## 2019-04-03 PROCEDURE — 99285 EMERGENCY DEPT VISIT HI MDM: CPT

## 2019-04-03 PROCEDURE — 74011250636 HC RX REV CODE- 250/636: Performed by: UROLOGY

## 2019-04-03 PROCEDURE — 87804 INFLUENZA ASSAY W/OPTIC: CPT

## 2019-04-03 PROCEDURE — 74177 CT ABD & PELVIS W/CONTRAST: CPT

## 2019-04-03 PROCEDURE — 87040 BLOOD CULTURE FOR BACTERIA: CPT

## 2019-04-03 PROCEDURE — 86140 C-REACTIVE PROTEIN: CPT

## 2019-04-03 PROCEDURE — 74011000258 HC RX REV CODE- 258: Performed by: RADIOLOGY

## 2019-04-03 PROCEDURE — 36415 COLL VENOUS BLD VENIPUNCTURE: CPT

## 2019-04-03 PROCEDURE — 74011250637 HC RX REV CODE- 250/637: Performed by: UROLOGY

## 2019-04-03 PROCEDURE — 71045 X-RAY EXAM CHEST 1 VIEW: CPT

## 2019-04-03 PROCEDURE — 74011000258 HC RX REV CODE- 258: Performed by: EMERGENCY MEDICINE

## 2019-04-03 PROCEDURE — 74011250636 HC RX REV CODE- 250/636: Performed by: EMERGENCY MEDICINE

## 2019-04-03 PROCEDURE — 93005 ELECTROCARDIOGRAM TRACING: CPT

## 2019-04-03 PROCEDURE — 96365 THER/PROPH/DIAG IV INF INIT: CPT

## 2019-04-03 PROCEDURE — 80053 COMPREHEN METABOLIC PANEL: CPT

## 2019-04-03 RX ORDER — LEVOFLOXACIN 5 MG/ML
750 INJECTION, SOLUTION INTRAVENOUS ONCE
Status: COMPLETED | OUTPATIENT
Start: 2019-04-03 | End: 2019-04-04

## 2019-04-03 RX ORDER — SODIUM CHLORIDE 0.9 % (FLUSH) 0.9 %
10 SYRINGE (ML) INJECTION
Status: COMPLETED | OUTPATIENT
Start: 2019-04-03 | End: 2019-04-03

## 2019-04-03 RX ORDER — SODIUM CHLORIDE 0.9 % (FLUSH) 0.9 %
5-10 SYRINGE (ML) INJECTION AS NEEDED
Status: DISCONTINUED | OUTPATIENT
Start: 2019-04-03 | End: 2019-04-04 | Stop reason: HOSPADM

## 2019-04-03 RX ADMIN — SODIUM CHLORIDE 100 ML: 900 INJECTION, SOLUTION INTRAVENOUS at 23:49

## 2019-04-03 RX ADMIN — Medication 10 ML: at 23:50

## 2019-04-03 RX ADMIN — PANTOPRAZOLE SODIUM 40 MG: 40 TABLET, DELAYED RELEASE ORAL at 07:06

## 2019-04-03 RX ADMIN — METOPROLOL SUCCINATE 25 MG: 25 TABLET, EXTENDED RELEASE ORAL at 08:47

## 2019-04-03 RX ADMIN — POTASSIUM CHLORIDE AND SODIUM CHLORIDE: 900; 150 INJECTION, SOLUTION INTRAVENOUS at 07:06

## 2019-04-03 RX ADMIN — CEFEPIME 2 G: 2 INJECTION, POWDER, FOR SOLUTION INTRAVENOUS at 23:35

## 2019-04-03 RX ADMIN — KETOROLAC TROMETHAMINE 15 MG: 30 INJECTION, SOLUTION INTRAMUSCULAR; INTRAVENOUS at 07:06

## 2019-04-03 RX ADMIN — HYDROCODONE BITARTRATE AND ACETAMINOPHEN 1 TABLET: 5; 325 TABLET ORAL at 01:13

## 2019-04-03 RX ADMIN — KETOROLAC TROMETHAMINE 15 MG: 30 INJECTION, SOLUTION INTRAMUSCULAR; INTRAVENOUS at 01:13

## 2019-04-03 RX ADMIN — SIMVASTATIN 20 MG: 20 TABLET, FILM COATED ORAL at 08:47

## 2019-04-03 RX ADMIN — IOPAMIDOL 100 ML: 755 INJECTION, SOLUTION INTRAVENOUS at 23:50

## 2019-04-03 RX ADMIN — POTASSIUM CHLORIDE AND SODIUM CHLORIDE: 900; 150 INJECTION, SOLUTION INTRAVENOUS at 01:13

## 2019-04-03 RX ADMIN — HYDROCODONE BITARTRATE AND ACETAMINOPHEN 1 TABLET: 5; 325 TABLET ORAL at 10:15

## 2019-04-03 RX ADMIN — SODIUM CHLORIDE 2802 ML: 900 INJECTION, SOLUTION INTRAVENOUS at 23:14

## 2019-04-03 RX ADMIN — HYDROCODONE BITARTRATE AND ACETAMINOPHEN 1 TABLET: 5; 325 TABLET ORAL at 06:04

## 2019-04-03 NOTE — PROGRESS NOTES
Urology Progress Note Admit Date:  4/2/2019 Admit Dx: Prostate cancer ([address] Kendrick Mancia SUBJECTIVE:  
 
Ashley Cotto is doing well this morning. Some discomfort in the left groin region. No nausea Tolerating diet Has been up walking. Passing gas. OBJECTIVE:  
 
Vitals: 
Patient Vitals for the past 8 hrs: 
 BP Temp Pulse Resp SpO2  
04/03/19 0847 135/82 98.6 °F (37 °C) 90 16 95 % 04/03/19 0600 129/79 99 °F (37.2 °C) 85 16 93 % Intake and Output: 
  
Last shift 04/01 1901 - 04/03 0700 In: 1349 [P.O.:930; I.V.:2600] Out: 2900 [Urine:2825] Last 8 hours No intake/output data recorded. Drain Output (last 8hr): 
 
 
Physical Exam:  
Alert and oriented. No disterss. Abdomen soft. nontender Incisions clean and dry Significant bruising along flanks, right more than left. nontender Labs: CBC:  
Lab Results Component Value Date/Time HGB 15.1 03/21/2019 10:38 AM  
 HCT 45.3 03/21/2019 10:38 AM  
 
BMP:  
Lab Results Component Value Date/Time BUN 17 03/21/2019 10:38 AM  
 Creatinine 1.07 03/21/2019 10:38 AM  
 
   
   
Assessment:  
 
Procedure(s): 
DAVINCI RADICAL ROBOTIC ASSISTED LAPAROSCOPIC PROSTATECTOMY, BILATERAL  PELVIC LYMPH NODE DISECTION Plan:  
 
Continue OOB Steve teaching this morning Home later today Signed By: Yvrose Hope PA-C - April 3, 2019

## 2019-04-03 NOTE — PHYSICIAN ADVISORY
Short Stay Review Pt Name:  Dale Chou MR#  [id number] CSN#   [id number] [facility]  520/01  @ [facility]  
Hospitalization date  4/2/2019  6:37 AM 
No discharge date for patient encounter. Current Attending Physician  Mayi Snyder MD  
 
A discharge order has been placed for this episode of hospital care for Mr. Dale Chou; since this hospital stay is less than two midnights, I reviewed Mr. Jante He's chart. Mr. Janet He's healthcare insurance/benefit include: 
Payor: [place] MEDICARE / Plan: [place] MEDICARE PART A & B / Product Type: Medicare /  
 
Utilization Review related case summary:  
Age  77 y.o.  
BMI  Body mass index is 32.3 kg/m². Functional status ASA Class  3 PMHx includes  DM2, HTN, OA, obesity (Body mass index is 32.3 kg/m².) , CA prostate EKG  [facility] course  Uncomplicated prostatectomy PT OT evaluation Other Social/logistical issues Risk of deterioration at the time this patient was hospitalized     Moderate On the basis of chart review, this patient's hospitalization status     
is appropriate for INPATIENT Jackson Gusman MD MPH FACP Cell: [phone] Physician Advisor Keren  [address]  
Utilization Review, Care Management CSN:  [id number] FAY:   [id number] Admitted on :  4/2/2019 Discharge order

## 2019-04-03 NOTE — PROGRESS NOTES
1200- Pt educated on discharge instructions and medications. Pt verbalized understanding. Allowed time for questions. Discharge paperwork signed and copy left on hard chart.

## 2019-04-03 NOTE — DISCHARGE SUMMARY
Urology Discharge Summary    Patient: Dolly Zee MRN: [id number]  SSN: [id number]    YOB: 1950  Age: 77 y.o. Sex: male               ADMISSION:  to No att. providers found by Acacia Sheriff MD  4/2/2019 ADMISSION DIAGNOSIS: Prostate cancer ([address] Yobani Jackson  4/3/2019 DISCHARGE DIAGNOSIS: [x]    Same  CONSULTS: None  PROCEDURES: POD# 1 Day Post-Op Procedure(s):  DAVINCI RADICAL ROBOTIC ASSISTED LAPAROSCOPIC PROSTATECTOMY, BILATERAL  PELVIC LYMPH NODE DISECTION    RECENT LABS: [unfilled]     [facility]RSE: [x]    Uncomplicated. CONDITION AT DISCHARGE: stable  COMPLICATIONS: [x]    None identified  DISCHARGE TO:     [x]    Home  []    Rehab []    SNF  FOLLOWUP: one week  DISCHARGE MEDS:     [x]    IT IS INTENDED THAT YOU CONTINUE TO TAKE YOUR PRIOR MEDICATIONS WITHOUT CHANGES WITH THE EXCEPTION OF:  []    NONE    Discharge Medication List as of 4/3/2019 11:00 AM      START taking these medications    Details   HYDROcodone-acetaminophen (NORCO) 5-325 mg per tablet Take 1 Tab by mouth every four (4) hours as needed for Pain for up to 3 days. Max Daily Amount: 6 Tabs., Print, Disp-20 Tab, R-0         CONTINUE these medications which have NOT CHANGED    Details   metoprolol succinate (TOPROL-XL) 25 mg XL tablet Take 25 mg by mouth daily. , Historical Med      gabapentin (NEURONTIN) 300 mg capsule Take 1 Cap by mouth three (3) times daily. , Historical Med      simvastatin (ZOCOR) 20 mg tablet Take 1 tablet by mouth daily. , Normal, Disp-90 tablet, R-1      dexlansoprazole (DEXILANT) 30 mg capsule Take 1 capsule by mouth daily. , Normal, Disp-90 capsule, R-1         STOP taking these medications       TRULICITY 1.5 MX/6.6 mL sub-q pen Comments:   Reason for Stopping:         FARXIGA 10 mg tab tablet Comments:   Reason for Stopping:         VITAMIN D2 50,000 unit capsule Comments:   Reason for Stopping:         metFORMIN ER 1,000 mg tr24 Comments:   Reason for Stopping:         NOVOFINE 32 32 gauge x 1/4\" ndle Comments:   Reason for Stopping:         cyanocobalamin (VITAMIN B-12) 1,000 mcg tablet Comments:   Reason for Stopping:         tamsulosin (FLOMAX) 0.4 mg capsule Comments:   Reason for Stopping:         clobetasol (TEMOVATE) 0.05 % external solution Comments:   Reason for Stopping:         ONETOUCH ULTRA TEST strip Comments:   Reason for Stopping:         tadalafil (CIALIS) 5 mg tablet Comments:   Reason for Stopping:         calcium-cholecalciferol, d3, (CALCIUM 600 + D) 600-125 mg-unit tab Comments:   Reason for Stopping:         aspirin 81 mg tablet Comments:   Reason for Stopping:                 Fabricio Cespedes PA-C 4/3/2019 1:04 PM

## 2019-04-03 NOTE — PROGRESS NOTES
Reason for Admission:   Prostate cancer RRAT Score:     15 Do you (patient/family) have any concerns for transition/discharge? None expressed Plan for utilizing home health:   None Current Advanced Directive/Advance Care Plan:  NOT ON FILE Likelihood of readmission? Moderate Transition of Care Plan:      Patient has no hx of HH, IPR, or SNF. [facility] preference [facility] [address]. Patient's spouse is at bedside to provide discharge transport. Patient's spouse took off of work for the rest of the week to provide additional support, and patient's daughter will also be available. Patient and spouse have received neri care education, and patient is aware of follow up appointment. No additional CM needs noted. Patient is ready for discharge. Care Management Interventions PCP Verified by CM: Yes(last seen by Dr. Bisi Ambrose in August) Palliative Care Criteria Met (RRAT>21 & CHF Dx)?: No 
Mode of Transport at Discharge: Other (see comment)(spouse at bedside to transport at discharge) Transition of Care Consult (CM Consult): Discharge Planning MyChart Signup: Yes Discharge Durable Medical Equipment: No(Has DME of: CPAP) Health Maintenance Reviewed: Yes(CM met with patient, with spouse at bedside, with patient alert and oriented x 4) Physical Therapy Consult: No 
Occupational Therapy Consult: No 
Speech Therapy Consult: No 
Current Support Network: Lives with Spouse, Own Home, Family Lives [place] Confirm Follow Up Transport: Self(Independent in ADLs, to include driving) Plan discussed with Pt/Family/Caregiver: Yes(spouse at bedside) [employer] Information Provided?: No 
Discharge Location Discharge Placement: Home with family assistance(Lives with spouse in a 2 story home, first floor living, 3 exterior steps, 12 interior steps) CRM: Alok Gonsalves, MPH, [address] [place] [address] Eliz; Z: [phone]

## 2019-04-04 ENCOUNTER — PATIENT OUTREACH (OUTPATIENT)
Dept: INTERNAL MEDICINE CLINIC | Age: 69
End: 2019-04-04

## 2019-04-04 VITALS
SYSTOLIC BLOOD PRESSURE: 144 MMHG | TEMPERATURE: 98.6 F | HEART RATE: 92 BPM | WEIGHT: 206 LBS | RESPIRATION RATE: 16 BRPM | DIASTOLIC BLOOD PRESSURE: 58 MMHG | BODY MASS INDEX: 32.33 KG/M2 | OXYGEN SATURATION: 96 % | HEIGHT: 67 IN

## 2019-04-04 LAB
APPEARANCE UR: CLEAR
ATRIAL RATE: 88 BPM
BACTERIA URNS QL MICRO: NEGATIVE /HPF
BILIRUB UR QL: NEGATIVE
CALCULATED P AXIS, ECG09: 25 DEGREES
CALCULATED R AXIS, ECG10: -20 DEGREES
CALCULATED T AXIS, ECG11: 14 DEGREES
COLOR UR: ABNORMAL
DIAGNOSIS, 93000: NORMAL
EPITH CASTS URNS QL MICRO: ABNORMAL /LPF
GLUCOSE UR STRIP.AUTO-MCNC: >1000 MG/DL
HGB UR QL STRIP: ABNORMAL
HYALINE CASTS URNS QL MICRO: ABNORMAL /LPF (ref 0–5)
KETONES UR QL STRIP.AUTO: ABNORMAL MG/DL
LEUKOCYTE ESTERASE UR QL STRIP.AUTO: ABNORMAL
NITRITE UR QL STRIP.AUTO: NEGATIVE
P-R INTERVAL, ECG05: 150 MS
PH UR STRIP: 7 [PH] (ref 5–8)
PROT UR STRIP-MCNC: ABNORMAL MG/DL
Q-T INTERVAL, ECG07: 350 MS
QRS DURATION, ECG06: 110 MS
QTC CALCULATION (BEZET), ECG08: 423 MS
RBC #/AREA URNS HPF: >100 /HPF (ref 0–5)
SP GR UR REFRACTOMETRY: 1.02 (ref 1–1.03)
UA: UC IF INDICATED,UAUC: ABNORMAL
UROBILINOGEN UR QL STRIP.AUTO: 1 EU/DL (ref 0.2–1)
VENTRICULAR RATE, ECG03: 88 BPM
WBC URNS QL MICRO: ABNORMAL /HPF (ref 0–4)

## 2019-04-04 PROCEDURE — 96367 TX/PROPH/DG ADDL SEQ IV INF: CPT

## 2019-04-04 PROCEDURE — 74011250636 HC RX REV CODE- 250/636: Performed by: EMERGENCY MEDICINE

## 2019-04-04 RX ADMIN — LEVOFLOXACIN 750 MG: 5 INJECTION, SOLUTION INTRAVENOUS at 00:34

## 2019-04-04 NOTE — ED TRIAGE NOTES
Arrived from home reporting abdominal pain, accompanied by fever. . Recent prostitis surgery yesterday. Self medicated Hydrocodone around 1915. Denies CP, NVD.

## 2019-04-04 NOTE — ED NOTES
Patient received discharge instructions by MD and nurse. Patient verbalized understanding of medication use and other discharge instructions. Updated vitals, IV DC and patient wheeled out of ED, showing no signs of distress. Pt reports relief of most intense pain. All questions answered.

## 2019-04-04 NOTE — DISCHARGE INSTRUCTIONS
Patient Education        Learning About Fever  What is a fever? A fever is a high body temperature. It's one way your body fights being sick. A fever shows that the body is responding to infection or other illnesses, both minor and severe. A fever is a symptom, not an illness by itself. A fever can be a sign that you are ill, but most fevers are not caused by a serious problem. You may have a fever with a minor illness, such as a cold. But sometimes a very serious infection may cause little or no fever. It is important to look at other symptoms, other conditions you have, and how you feel in general. In children, notice how they act and see what symptoms they complain of. What is a normal body temperature? A normal body temperature is about 98. 6ºF. Some people have a normal temperature that is a little higher or a little lower than this. Your temperature may be a little lower in the morning than it is later in the day. It may go up during hot weather or when you exercise, wear heavy clothes, or take a hot bath. Your temperature may also be different depending on how you take it. A temperature taken in the mouth (oral) or under the arm may be a little lower than your core temperature (rectal). What is a fever temperature? A core temperature of 100.4°F or above is considered a fever. What can cause a fever? A fever may be caused by:  · Infections. This is the most common cause of a fever. Examples of infections that can cause a fever include the flu, a kidney infection, or pneumonia. · Some medicines. · Severe trauma or injury, such as a heart attack, stroke, heatstroke, or burns. · Other medical conditions, such as arthritis and some cancers. How can you treat a fever at home? · Ask your doctor if you can take an over-the-counter pain medicine, such as acetaminophen (Tylenol), ibuprofen (Advil, Motrin), or naproxen (Aleve). Be safe with medicines. Read and follow all instructions on the label.   · To prevent dehydration, drink plenty of fluids. Choose water and other caffeine-free clear liquids until you feel better. If you have kidney, heart, or liver disease and have to limit fluids, talk with your doctor before you increase the amount of fluids you drink. Follow-up care is a key part of your treatment and safety. Be sure to make and go to all appointments, and call your doctor if you are having problems. It's also a good idea to know your test results and keep a list of the medicines you take. Where can you learn more? Go to http://garo-kelle.info/. Enter V967 in the search box to learn more about \"Learning About Fever. \"  Current as of: September 23, 2018  Content Version: 11.9  © 3909-4378 [employer]. Care instructions adapted under license by [employer] (which disclaims liability or warranty for this information). If you have questions about a medical condition or this instruction, always ask your healthcare professional. [address] any warranty or liability for your use of this information.

## 2019-04-04 NOTE — ED PROVIDER NOTES
The patient is a 55-year-old male with a past medical history significant for arthritis of the lumbar spine, BPH, chronic pain, prostate cancer status post Robott assisted prostatectomy, post update. #3, who presents to the ED with his wife at the bedside with the complaint of general malaise and intermittent episodes of fever x2 days. The wife said the patient had a tactile temperature of approximately 102°F approximately 2 hours prior to arrival to the ED. The patient has been getting intermittent episodes of Percocet for pain. He also complains of left lower quadrant abdominal pain. He denies any headache, neck pain or stiffness, sore throat, cough, congestion, nausea, vomiting, diarrhea, or constipation, sick contact at home, skin rash. Past Medical History:  
Diagnosis Date  Arthritis BACK  BPH (benign prostatic hyperplasia) 2013  Cancer ([address] 2015 SKIN - HEAD AND FACE  Cancer ([address] 01/2019 PROSTATE  Chronic pain BACK  Chronic pelvic pain in male   
 sensitive to touch on the left side down to the left side of the penis  Collagenous colitis 2011  Diabetes mellitus type 2, controlled, with complications ([address] 5022  Diabetic neuropathy ([address] 2006  Diverticulosis  ED (erectile dysfunction) Dr. Rocio Velazquez  GERD (gastroesophageal reflux disease) 1990  Gout   
 1 episode  Hypertension 1970  Ill-defined condition BILATERAL PERIPHERAL NEUROPATHY BOTH LEGS  Low serum testosterone level Dr. Eloy Hall  Osteopenia Dr. Eloy Hall  Psoriasis  Sleep apnea USES CPAP  
 Sun-damaged skin Past Surgical History:  
Procedure Laterality Date  EMG TWO EXTREMITIES LOWER  8/24/2013  ENDOSCOPY, COLON, DIAGNOSTIC    
 HX COLONOSCOPY    
 HX HEENT    
 WISDOM TEETH X4  
 HX HERNIA REPAIR    
 x2 bilateral inguinal  
 HX ORTHOPAEDIC  08/2018 L4-5, L5-S1 DISKECTOMY  HX OTHER SURGICAL  [id number] MOHS  NCV SENSORY OR MIXED  2013 Family History:  
Problem Relation Age of Onset  Diabetes Father  Heart Disease Father 62  Stroke Father x2  Cancer Mother Bladder  Parkinson's Disease Mother  Hypertension Mother  No Known Problems Sister  Diabetes Paternal Grandmother  Diabetes Other   
     cousins  Anesth Problems Neg Hx Social History Socioeconomic History  Marital status:  Spouse name: Not on file  Number of children: Not on file  Years of education: Not on file  Highest education level: Not on file Occupational History  Occupation:   
  Employer: OTHER Social Needs  Financial resource strain: Not on file  Food insecurity:  
  Worry: Not on file Inability: Not on file  Transportation needs:  
  Medical: Not on file Non-medical: Not on file Tobacco Use  Smoking status: Former Smoker Packs/day: 1.00 Years: 12.00 Pack years: 12.00 Last attempt to quit: 1980 Years since quittin.9  Smokeless tobacco: Never Used Substance and Sexual Activity  Alcohol use: Yes Alcohol/week: 0.0 oz  
  Comment: rarely  Drug use: No  
 Sexual activity: Yes  
  Partners: Female Lifestyle  Physical activity:  
  Days per week: Not on file Minutes per session: Not on file  Stress: Not on file Relationships  Social connections:  
  Talks on phone: Not on file Gets together: Not on file Attends Quaker service: Not on file Active member of club or organization: Not on file Attends meetings of clubs or organizations: Not on file Relationship status: Not on file  Intimate partner violence:  
  Fear of current or ex partner: Not on file Emotionally abused: Not on file Physically abused: Not on file Forced sexual activity: Not on file Other Topics Concern  Not on file Social History Narrative Lives in [place] with wife of 39 years. Has 2 daughters. Works as a  for [employer]. His wife owns a dance school. ALLERGIES: Pcn [penicillins] Review of Systems All other systems reviewed and are negative. Vitals:  
 04/03/19 2230 04/03/19 2231 04/03/19 2345 04/04/19 0100 BP: 171/79 171/79  144/58 Pulse:  92 Resp:  16 16 Temp:  (!) 100.6 °F (38.1 °C) 99.5 °F (37.5 °C) (P) 98.6 °F (37 °C) SpO2:  98% 99% 96% Weight:  93.4 kg (206 lb) Height:  5' 7\" (1.702 m) Physical Exam  
Nursing note and vitals reviewed. CONSTITUTIONAL: Well-appearing; well-nourished; in mild distress HEAD: Normocephalic; atraumatic EYES: PERRL; EOM intact; conjunctiva and sclera are clear bilaterally. ENT: No rhinorrhea; normal pharynx with no tonsillar hypertrophy; mucous membranes pink/moist, no erythema, no exudate. NECK: Supple; non-tender; no cervical lymphadenopathy CARD: Normal S1, S2; no murmurs, rubs, or gallops. Regular rate and rhythm. RESP: Normal respiratory effort; breath sounds clear and equal bilaterally; no wheezes, rhonchi, or rales. ABD: surgical incision appears clean, dry, and slightly indurated; ecchymosis with purpuric lesion in the left lower quadrant abdominal wall; Normal bowel sounds; non-distended; left lower quadrant tenderness without any rebound or guarding; no palpable organomegaly, no masses, no bruits. Back Exam: Normal inspection; no vertebral point tenderness, no CVA tenderness. Normal range of motion. EXT: Normal ROM in all four extremities; non-tender to palpation; no swelling or deformity; distal pulses are normal, no edema. SKIN: Warm; dry; no rash. NEURO:Alert and oriented x 3, coherent, AMARIS-XII grossly intact, sensory and motor are non-focal. 
 exam: noncircumcised- indwelling Steve catheter in place and draining well; no scrotal erythema, swelling, felt MDM Number of Diagnoses or Management Options Febrile illness, acute:  
Pelvic hematoma, male:  
Status post prostatectomy:  
Diagnosis management comments: Assessment: 68-year-old, male, who presents with left lower quadrant abdominal pain, fever, chills, status post prostatectomy. The patient needed admission for sepsis with occult bacteremia. Plan: lab/ IV fluid/ antibiotic and analgesia/ EKG/ chest x-ray/ CT scan of the abdomen and pelvis/ broad spectrum antibiotics/ consult urology/ Monitor and Reevaluate. Amount and/or Complexity of Data Reviewed Clinical lab tests: ordered and reviewed Tests in the radiology section of CPT®: ordered and reviewed Tests in the medicine section of CPT®: ordered and reviewed Discussion of test results with the performing providers: yes Decide to obtain previous medical records or to obtain history from someone other than the patient: yes Obtain history from someone other than the patient: yes Review and summarize past medical records: yes Discuss the patient with other providers: yes Independent visualization of images, tracings, or specimens: yes Risk of Complications, Morbidity, and/or Mortality Presenting problems: moderate Diagnostic procedures: moderate Management options: moderate Critical Care Total time providing critical care: (Total critical care time spent exclusive of procedures: 60 minutes) Patient Progress Patient progress: stable Procedures ED EKG interpretation: 
Rhythm: normal sinus rhythm; and regular . Rate (approx.): 88; Axis: left axis deviation; P wave: normal; QRS interval: normal ; ST/T wave: non-specific changes; in  Lead: Diffusely; Other findings: abnormal ekg. This EKG was interpreted by Delia Cuevas MD,ED Provider. XRAY INTERPRETATION (ED MD) Chest Xray No acute process seen. Normal heart size. No bony abnormalities. No infiltrate.  
Roxane Christiansen MD 1:17 AM 
 
CONSULT NOTE: 
Roxane Christiansen MD spoke with Dr. Lenora Jean Discussed patient's presentation, history, physical assessment, and available diagnostic results. Wants patient discharged home and will follow up in the office for outpatient reevaluation and work-up as deemed appropriate. Progress Note:  
Pt has been reexamined by Vincent Helm MD. Pt is feeling much better. Symptoms have improved. All available results have been reviewed with pt and any available family. Pt understands sx, dx, and tx in ED. Care plan has been outlined and questions have been answered. Pt is ready to go home. Will send home on Febrile illness/ Pelvic wall hematoma instruction. Outpatient referral with PCP as needed. Written by Vincent Helm MD,1:18 AM 
 
. Aniya Garza

## 2019-04-04 NOTE — PROGRESS NOTES
Hospital Discharge Follow-Up Date/Time:  2019 2:22 PM 
 
Patient was admitted to [facility] on  and discharged on 4/3 for laparoscopic prostatectomy. He then presented to [facility] ED on  for c/o fever and LLQ abdominal pain & received IV abx. The physician discharge summary was available at the time of outreach. Patient was contacted within 1 business day of discharge. Top Challenges reviewed with the provider Pt was instructed to D/C Trulicity, Farxiga, & metformin. Method of communication with provider :face to face, Dr. Tolu Alcala advised that pt should contact Dr. Svetlana Morton to determine whether or not to resume diabetes medications. Inpatient RRAT score: 15 Was this a readmission? no  
Patient stated reason for the readmission: n/a Nurse Navigator (NN) contacted the patient by telephone to perform post hospital discharge assessment. Verified name and  with patient as identifiers. Provided introduction to self, and explanation of the Nurse Navigator role. Reviewed discharge instructions and red flags with patient who verbalized understanding. Patient given an opportunity to ask questions and does not have any further questions or concerns at this time. The patient agrees to contact the PCP office for questions related to their healthcare. NN provided contact information for future reference. Disease Specific:   N/A Summary of patient's top problems: 1. Prostatectomy w/ post-op fever/pain: pt feels ok now, denies any fevers since yesterday, LLQ tenderness present but much improved, Steve patent, draining yellow urine w/ no blood. Instructed pt to contact Dr. Svetlana Morton re: continuing his diabetes medications. Home Health orders at discharge: none [address]: n/a Date of initial visit: n/a Durable Medical Equipment ordered/company: n/a Durable Medical Equipment received: n/a Barriers to care? none identified Advance Care Planning:  
Does patient have an Advance Directive:  reviewed and current Medication(s):  
New Medications at Discharge: Norco 
Changed Medications at Discharge: none Discontinued Medications at Discharge: Trulicity, Farxiga, metformin, Flomax, Cialis, ASA, vitamins D & B12, Calcium Medication reconciliation was performed with patient/wife, who verbalizes understanding of administration of home medications. There were no barriers to obtaining medications identified at this time. Referral to Pharm D needed: no  
 
Current Outpatient Medications Medication Sig  
 HYDROcodone-acetaminophen (NORCO) 5-325 mg per tablet Take 1 Tab by mouth every four (4) hours as needed for Pain for up to 3 days. Max Daily Amount: 6 Tabs.  metoprolol succinate (TOPROL-XL) 25 mg XL tablet Take 25 mg by mouth daily.  gabapentin (NEURONTIN) 300 mg capsule Take 1 Cap by mouth three (3) times daily.  dexlansoprazole (DEXILANT) 30 mg capsule Take 1 capsule by mouth daily. (Patient taking differently: Take 60 mg by mouth daily.)  simvastatin (ZOCOR) 20 mg tablet Take 1 tablet by mouth daily. (Patient taking differently: Take 20 mg by mouth nightly.) No current facility-administered medications for this visit. There are no discontinued medications. [facility] follow up appointment(s): No future appointments. Per Dr. Jagdeep Levi, pt should return in a few months for annual f/u & pt is aware. Non-[facility] follow up appointment(s): Urologist Dr. Oswaldo Garner on 4/11 [facility]:  n/a  
 
 
Goals  Pt will attend [facility] appt with urologist (pt-stated) 4/4/19: Pt will attend [facility] appt with urologist Dr. Oswaldo Garner on 4/11. Plan to have Steve removed at that time. -SRW

## 2019-04-05 LAB
BACTERIA SPEC CULT: NORMAL
CC UR VC: NORMAL
SERVICE CMNT-IMP: NORMAL

## 2019-04-08 LAB
BACTERIA SPEC CULT: NORMAL
SERVICE CMNT-IMP: NORMAL

## 2019-05-20 ENCOUNTER — PATIENT OUTREACH (OUTPATIENT)
Dept: INTERNAL MEDICINE CLINIC | Age: 69
End: 2019-05-20

## 2019-05-20 NOTE — PROGRESS NOTES
Patient has graduated from the Transitions of Care Coordination  program on 5/2/19. Patient's symptoms are stable at this time. Patient/family has the ability to self-manage. Care management goals have been completed at this time. No further nurse navigator follow up scheduled. Goals Addressed This Visit's Progress  COMPLETED: Pt will attend [facility] appt with urologist (pt-stated) 4/4/19: Pt will attend [facility] appt with urologist Dr. Fernie Freeman on 4/11. Plan to have Steve removed at that time. -SRW Pt has nurse navigator's contact information for any further questions, concerns, or needs. Patients upcoming visits:  No future appointments.

## 2019-06-25 ENCOUNTER — OFFICE VISIT (OUTPATIENT)
Dept: INTERNAL MEDICINE CLINIC | Age: 69
End: 2019-06-25

## 2019-06-25 VITALS
WEIGHT: 204 LBS | TEMPERATURE: 98.9 F | HEIGHT: 67 IN | OXYGEN SATURATION: 97 % | DIASTOLIC BLOOD PRESSURE: 76 MMHG | SYSTOLIC BLOOD PRESSURE: 126 MMHG | BODY MASS INDEX: 32.02 KG/M2 | HEART RATE: 78 BPM

## 2019-06-25 DIAGNOSIS — M75.02 ADHESIVE CAPSULITIS OF LEFT SHOULDER: ICD-10-CM

## 2019-06-25 DIAGNOSIS — M77.12 LEFT LATERAL EPICONDYLITIS: ICD-10-CM

## 2019-06-25 DIAGNOSIS — H61.23 BILATERAL IMPACTED CERUMEN: ICD-10-CM

## 2019-06-25 DIAGNOSIS — M77.8 THUMB TENDONITIS: ICD-10-CM

## 2019-06-25 DIAGNOSIS — S46.012A ROTATOR CUFF STRAIN, LEFT, INITIAL ENCOUNTER: Primary | ICD-10-CM

## 2019-06-25 RX ORDER — INSULIN GLARGINE 100 [IU]/ML
40 INJECTION, SOLUTION SUBCUTANEOUS
COMMUNITY

## 2019-06-25 RX ORDER — OMEPRAZOLE 20 MG/1
CAPSULE, DELAYED RELEASE ORAL
Refills: 3 | COMMUNITY
Start: 2019-04-16 | End: 2020-01-02 | Stop reason: ALTCHOICE

## 2019-06-25 RX ORDER — ERGOCALCIFEROL 1.25 MG/1
CAPSULE ORAL
Refills: 2 | COMMUNITY
Start: 2019-04-29

## 2019-06-25 RX ORDER — TADALAFIL 20 MG/1
20 TABLET ORAL AS NEEDED
COMMUNITY

## 2019-06-25 RX ORDER — INSULIN LISPRO 100 [IU]/ML
INJECTION, SOLUTION INTRAVENOUS; SUBCUTANEOUS
COMMUNITY
End: 2020-02-06

## 2019-06-25 RX ORDER — DICLOFENAC SODIUM 75 MG/1
75 TABLET, DELAYED RELEASE ORAL 2 TIMES DAILY
Qty: 40 TAB | Refills: 0 | Status: SHIPPED | OUTPATIENT
Start: 2019-06-25 | End: 2020-01-02 | Stop reason: ALTCHOICE

## 2019-06-25 RX ORDER — DAPAGLIFLOZIN 10 MG/1
TABLET, FILM COATED ORAL
COMMUNITY
Start: 2019-04-13

## 2019-06-25 RX ORDER — METFORMIN HYDROCHLORIDE 500 MG/1
TABLET, EXTENDED RELEASE ORAL
COMMUNITY
Start: 2019-04-13 | End: 2020-02-06

## 2019-06-25 RX ORDER — LANOLIN ALCOHOL/MO/W.PET/CERES
1000 CREAM (GRAM) TOPICAL DAILY
COMMUNITY

## 2019-06-25 NOTE — PROGRESS NOTES
HISTORY OF PRESENT ILLNESS  Marisol Mcgovern is a 77 y.o. male. Here for left arm pain. Pain on with palptation in shoulder, forearm and hand/thumb. Started approx 6 months ago and getting worse. Also painful with buttoning buttons, decreased rom reaching overhead. He has not tried any otc analgesics. No weakness. Some pain but much less severe in his right shoulder. Denies neck pain. Had a sleep study last night. Eyes feel a bit gritty. Left ear feels a bit full. No URI symptoms but wife has had a cold. completed prostatectomy for prostate ca. Has fu with urology in September. Having some incontinence still. Current Outpatient Medications:     omeprazole (PRILOSEC) 20 mg capsule, TAKE 1 CAPSULE DAILY, Disp: , Rfl: 3    ergocalciferol (ERGOCALCIFEROL) 50,000 unit capsule, TAKE ONE CAPSULE BY MOUTH EVERY WEEK, Disp: , Rfl: 2    FARXIGA 10 mg tab tablet, , Disp: , Rfl:     cyanocobalamin 1,000 mcg tablet, Take 100 mcg by mouth., Disp: , Rfl:     metFORMIN ER (GLUCOPHAGE XR) 500 mg tablet, 2,000mg daily per patient, Disp: , Rfl:     tadalafil (CIALIS) 5 mg tablet, Take 5 mg by mouth daily. , Disp: , Rfl:     insulin glargine (BASAGLAR KWIKPEN U-100 INSULIN) 100 unit/mL (3 mL) inpn, 20 Units by SubCUTAneous route nightly. As directed , Disp: , Rfl:     insulin lispro (HUMALOG) 100 unit/mL kwikpen, by SubCUTAneous route. As directed, Disp: , Rfl:     metoprolol succinate (TOPROL-XL) 25 mg XL tablet, Take 25 mg by mouth daily. , Disp: , Rfl:     gabapentin (NEURONTIN) 300 mg capsule, Take 1 Cap by mouth three (3) times daily. , Disp: , Rfl:     simvastatin (ZOCOR) 20 mg tablet, Take 1 tablet by mouth daily.  (Patient taking differently: Take 20 mg by mouth nightly.), Disp: 90 tablet, Rfl: 1    Visit Vitals  /76   Pulse 78   Temp 98.9 °F (37.2 °C) (Oral)   Ht 5' 7\" (1.702 m)   Wt 204 lb (92.5 kg)   SpO2 97%   BMI 31.95 kg/m²       ROS  See above  Physical Exam   Constitutional: He appears well-developed and well-nourished. HENT:   bilat cerumen impaction. Neck: Normal range of motion. Neck supple. Cspine, paraspinal muscles and trapezius muscles nontender   Musculoskeletal:   left shoulder - tender anterior and lateral with limited rom.  + impingement sign. left elbow - tenderness over lateral epicondyle and pain with internal rotation of forearm. left thumb - tenderness anatomical snuffbox and base of thumb. Pain with full extension of thumb. No swelling ef extremties/joints. Vitals reviewed. 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 This has most likely led to his left lateral epicondylitis and thumb tendonitis. Diclofenac, stretching exercises and referred to ortho  bilat cerumen impaction - ears clean out in office today.    Orders Placed This Encounter    REMOVE IMPACTED EAR WAX    REFERRAL TO ORTHOPEDICS    omeprazole (PRILOSEC) 20 mg capsule    ergocalciferol (ERGOCALCIFEROL) 50,000 unit capsule    FARXIGA 10 mg tab tablet    cyanocobalamin 1,000 mcg tablet    metFORMIN ER (GLUCOPHAGE XR) 500 mg tablet    tadalafil (CIALIS) 5 mg tablet    insulin glargine (BASAGLAR KWIKPEN U-100 INSULIN) 100 unit/mL (3 mL) inpn    insulin lispro (HUMALOG) 100 unit/mL kwikpen    diclofenac EC (VOLTAREN) 75 mg EC tablet

## 2019-06-25 NOTE — PATIENT INSTRUCTIONS
Tennis Elbow: Exercises  Your Care Instructions  Here are some examples of typical rehabilitation exercises for your condition. Start each exercise slowly. Ease off the exercise if you start to have pain. Your doctor or physical therapist will tell you when you can start these exercises and which ones will work best for you. How to do the exercises  Wrist flexor stretch    1. Extend your arm in front of you with your palm up. 2. Bend your wrist, pointing your hand toward the floor. 3. With your other hand, gently bend your wrist farther until you feel a mild to moderate stretch in your forearm. 4. Hold for at least 15 to 30 seconds. Repeat 2 to 4 times. Wrist extensor stretch    1. Repeat steps 1 to 4 of the stretch above but begin with your extended hand palm down. Ball or sock squeeze    1. Hold a tennis ball (or a rolled-up sock) in your hand. 2. Make a fist around the ball (or sock) and squeeze. 3. Hold for about 6 seconds, and then relax for up to 10 seconds. 4. Repeat 8 to 12 times. 5. Switch the ball (or sock) to your other hand and do 8 to 12 times. Wrist deviation    1. Sit so that your arm is supported but your hand hangs off the edge of a flat surface, such as a table. 2. Hold your hand out like you are shaking hands with someone. 3. Move your hand up and down. 4. Repeat this motion 8 to 12 times. 5. Switch arms. 6. Try to do this exercise twice with each hand. Wrist curls    1. Place your forearm on a table with your hand hanging over the edge of the table, palm up. 2. Place a 1- to 2-pound weight in your hand. This may be a dumbbell, a can of food, or a filled water bottle. 3. Slowly raise and lower the weight while keeping your forearm on the table and your palm facing up. 4. Repeat this motion 8 to 12 times. 5. Switch arms, and do steps 1 through 4.  6. Repeat with your hand facing down toward the floor. Switch arms. Biceps curls    1.  Sit leaning forward with your legs slightly spread and your left hand on your left thigh. 2. Place your right elbow on your right thigh, and hold the weight with your forearm horizontal.  3. Slowly curl the weight up and toward your chest.  4. Repeat this motion 8 to 12 times. 5. Switch arms, and do steps 1 through 4. Follow-up care is a key part of your treatment and safety. Be sure to make and go to all appointments, and call your doctor if you are having problems. It's also a good idea to know your test results and keep a list of the medicines you take. Where can you learn more? Go to http://garo-kelle.info/. Enter F840 in the search box to learn more about \"Tennis Elbow: Exercises. \"  Current as of: September 20, 2018  Content Version: 11.9  © 1776-3705 [employer]. Care instructions adapted under license by [employer] (which disclaims liability or warranty for this information). If you have questions about a medical condition or this instruction, always ask your healthcare professional. [place] [address] any warranty or liability for your use of this information. Rotator Cuff: Exercises  Your Care Instructions  Here are some examples of typical rehabilitation exercises for your condition. Start each exercise slowly. Ease off the exercise if you start to have pain. Your doctor or physical therapist will tell you when you can start these exercises and which ones will work best for you. How to do the exercises  Pendulum swing    1. Hold on to a table or the back of a chair with your good arm. Then bend forward a little and let your sore arm hang straight down. This exercise does not use the arm muscles. Rather, use your legs and your hips to create movement that makes your arm swing freely. 2. Use the movement from your hips and legs to guide the slightly swinging arm back and forth like a pendulum (or elephant trunk).  Then guide it in circles that start small (about the size of a dinner plate). Make the circles a bit larger each day, as your pain allows. 3. Do this exercise for 5 minutes, 5 to 7 times each day. 4. As you have less pain, try bending over a little farther to do this exercise. This will increase the amount of movement at your shoulder. Posterior stretching exercise    1. Hold the elbow of your injured arm with your other hand. 2. Use your hand to pull your injured arm gently up and across your body. You will feel a gentle stretch across the back of your injured shoulder. 3. Hold for at least 15 to 30 seconds. Then slowly lower your arm. 4. Repeat 2 to 4 times. Up-the-back stretch    1. Put your hand in your back pocket. Let it rest there to stretch your shoulder. 2. With your other hand, hold your injured arm (palm outward) behind your back by the wrist. Pull your arm up gently to stretch your shoulder. 3. Next, put a towel over your other shoulder. Put the hand of your injured arm behind your back. Now hold the back end of the towel. With the other hand, hold the front end of the towel in front of your body. Pull gently on the front end of the towel. This will bring your hand farther up your back to stretch your shoulder. Overhead stretch    1. Standing about an arm's length away, grasp onto a solid surface. You could use a countertop, a doorknob, or the back of a sturdy chair. 2. With your knees slightly bent, bend forward with your arms straight. Lower your upper body, and let your shoulders stretch. 3. As your shoulders are able to stretch farther, you may need to take a step or two backward. 4. Hold for at least 15 to 30 seconds. Then stand up and relax. If you had stepped back during your stretch, step forward so you can keep your hands on the solid surface. 5. Repeat 2 to 4 times. Shoulder flexion (lying down)    1. Lie on your back, holding a wand with both hands.  Your palms should face down as you hold the wand.  2. Keeping your elbows straight, slowly raise your arms over your head. Raise them until you feel a stretch in your shoulders, upper back, and chest.  3. Hold for 15 to 30 seconds. 4. Repeat 2 to 4 times. Shoulder rotation (lying down)    1. Lie on your back. Hold a wand with both hands with your elbows bent and palms up. 2. Keep your elbows close to your body, and move the wand across your body toward the sore arm. 3. Hold for 8 to 12 seconds. 4. Repeat 2 to 4 times. Wall climbing (to the side)    1. Stand with your side to a wall so that your fingers can just touch it at an angle about 30 degrees toward the front of your body. 2. Walk the fingers of your injured arm up the wall as high as pain permits. Try not to shrug your shoulder up toward your ear as you move your arm up. 3. Hold that position for a count of at least 15 to 20.  4. Walk your fingers back down to the starting position. 5. Repeat at least 2 to 4 times. Try to reach higher each time. Wall climbing (to the front)    1. Face a wall, and stand so your fingers can just touch it. 2. Keeping your shoulder down, walk the fingers of your injured arm up the wall as high as pain permits. (Don't shrug your shoulder up toward your ear.)  3. Hold your arm in that position for at least 15 to 30 seconds. 4. Slowly walk your fingers back down to where you started. 5. Repeat at least 2 to 4 times. Try to reach higher each time. Shoulder blade squeeze    1. Stand with your arms at your sides, and squeeze your shoulder blades together. Do not raise your shoulders up as you squeeze. 2. Hold 6 seconds. 3. Repeat 8 to 12 times. Scapular exercise: Arm reach    1. Lie flat on your back. This exercise is a very slight motion that starts with your arms raised (elbows straight, arms straight). 2. From this position, reach higher toward the mely or ceiling. Keep your elbows straight.  All motion should be from your shoulder blade only.  3. Relax your arms back to where you started. 4. Repeat 8 to 12 times. Arm raise to the side    1. Slowly raise your injured arm to the side, with your thumb facing up. Raise your arm 60 degrees at the most (shoulder level is 90 degrees). 2. Hold the position for 3 to 5 seconds. Then lower your arm back to your side. If you need to, bring your \"good\" arm across your body and place it under the elbow as you lower your injured arm. Use your good arm to keep your injured arm from dropping down too fast.  3. Repeat 8 to 12 times. 4. When you first start out, don't hold any extra weight in your hand. As you get stronger, you may use a 1-pound to 2-pound dumbbell or a small can of food. Shoulder flexor and extensor exercise    1. Push forward (flex): Stand facing a wall or doorjamb, about 6 inches or less back. Hold your injured arm against your body. Make a closed fist with your thumb on top. Then gently push your hand forward into the wall with about 25% to 50% of your strength. Don't let your body move backward as you push. Hold for about 6 seconds. Relax for a few seconds. Repeat 8 to 12 times. 2. Push backward (extend): Stand with your back flat against a wall. Your upper arm should be against the wall, with your elbow bent 90 degrees (your hand straight ahead). Push your elbow gently back against the wall with about 25% to 50% of your strength. Don't let your body move forward as you push. Hold for about 6 seconds. Relax for a few seconds. Repeat 8 to 12 times. Scapular exercise: Wall push-ups    1. Stand facing a wall, about 12 inches to 18 inches away. 2. Place your hands on the wall at shoulder height. 3. Slowly bend your elbows and bring your face to the wall. Keep your back and hips straight. 4. Push back to where you started. 5. Repeat 8 to 12 times. 6. When you can do this exercise against a wall comfortably, you can try it against a counter.  You can then slowly progress to the end of a couch, then to a sturdy chair, and finally to the floor. Scapular exercise: Retraction    1. Put the band around a solid object at about waist level. (A bedpost will work well.) Each hand should hold an end of the band. 2. With your elbows at your sides and bent to 90 degrees, pull the band back. Your shoulder blades should move toward each other. Then move your arms back where you started. 3. Repeat 8 to 12 times. 4. If you have good range of motion in your shoulders, try this exercise with your arms lifted out to the sides. Keep your elbows at a 90-degree angle. Raise the elastic band up to about shoulder level. Pull the band back to move your shoulder blades toward each other. Then move your arms back where you started. Internal rotator strengthening exercise    1. Start by tying a piece of elastic exercise material to a doorknob. You can use surgical tubing or Thera-Band. 2. Stand or sit with your shoulder relaxed and your elbow bent 90 degrees. Your upper arm should rest comfortably against your side. Squeeze a rolled towel between your elbow and your body for comfort. This will help keep your arm at your side. 3. Hold one end of the elastic band in the hand of the painful arm. 4. Slowly rotate your forearm toward your body until it touches your belly. Slowly move it back to where you started. 5. Keep your elbow and upper arm firmly tucked against the towel roll or at your side. 6. Repeat 8 to 12 times. External rotator strengthening exercise    1. Start by tying a piece of elastic exercise material to a doorknob. You can use surgical tubing or Thera-Band. (You may also hold one end of the band in each hand.)  2. Stand or sit with your shoulder relaxed and your elbow bent 90 degrees. Your upper arm should rest comfortably against your side. Squeeze a rolled towel between your elbow and your body for comfort. This will help keep your arm at your side.   3. Hold one end of the elastic band with the hand of the painful arm. 4. Start with your forearm across your belly. Slowly rotate the forearm out away from your body. Keep your elbow and upper arm tucked against the towel roll or the side of your body until you begin to feel tightness in your shoulder. Slowly move your arm back to where you started. 5. Repeat 8 to 12 times. Follow-up care is a key part of your treatment and safety. Be sure to make and go to all appointments, and call your doctor if you are having problems. It's also a good idea to know your test results and keep a list of the medicines you take. Where can you learn more? Go to http://garo-kelle.info/. Enter Grace Gonzales in the search box to learn more about \"Rotator Cuff: Exercises. \"  Current as of: September 20, 2018  Content Version: 11.9  © 8384-5220 [employer]. Care instructions adapted under license by [employer] (which disclaims liability or warranty for this information). If you have questions about a medical condition or this instruction, always ask your healthcare professional. [address] any warranty or liability for your use of this information. Shahnaz Rocker Disease: Exercises  Your Care Instructions  Here are some examples of typical rehabilitation exercises for your condition. Start each exercise slowly. Ease off the exercise if you start to have pain. Your doctor or your physical or occupational therapist will tell you when you can start these exercises and which ones will work best for you. How to do the exercises  Thumb lifts    5. Place your hand on a flat surface, with your palm up. 6. Lift your thumb away from your palm to make a \"C\" shape. 7. Hold for about 6 seconds. 8. Repeat 8 to 12 times. Passive thumb MP flexion    5. Hold your hand in front of you, and turn your hand so your little finger faces down and your thumb faces up.  (Your hand should be in the position used for shaking someone's hand.) You may also rest your hand on a flat surface. 6. Use the fingers on your other hand to bend your thumb down at the point where your thumb connects to your palm. 7. Hold for at least 15 to 30 seconds. 8. Repeat 2 to 4 times. Finkelstein stretch    4. Hold your arms out in front of you. (Your hand should be in the position used for shaking someone's hand.)  5. Bend your thumb toward your palm. 6. Use your other hand to gently stretch your thumb and wrist downward until you feel the stretch on the thumb side of your wrist.  7. Hold for at least 15 to 30 seconds. 8. Repeat 2 to 4 times. Resisted ulnar deviation    6. Sit leaning forward with your legs slightly spread and your elbow on your thigh. 7. Grasp one end of the band with your palm down, and step on the other end with the foot opposite the hand holding the band. 8. Slowly bend your wrist sideways and away from your knee. 9. Repeat 8 to 12 times. Follow-up care is a key part of your treatment and safety. Be sure to make and go to all appointments, and call your doctor if you are having problems. It's also a good idea to know your test results and keep a list of the medicines you take. Where can you learn more? Go to http://garo-kelle.info/. Enter Q310 in the search box to learn more about \"De Quervain's Disease: Exercises. \"  Current as of: September 20, 2018  Content Version: 11.9  © 3715-1802 [employer]. Care instructions adapted under license by [employer] (which disclaims liability or warranty for this information). If you have questions about a medical condition or this instruction, always ask your healthcare professional. [address] any warranty or liability for your use of this information.

## 2019-10-23 ENCOUNTER — OFFICE VISIT (OUTPATIENT)
Dept: INTERNAL MEDICINE CLINIC | Age: 69
End: 2019-10-23

## 2019-10-23 VITALS
SYSTOLIC BLOOD PRESSURE: 112 MMHG | HEART RATE: 83 BPM | BODY MASS INDEX: 32.02 KG/M2 | TEMPERATURE: 98.7 F | DIASTOLIC BLOOD PRESSURE: 65 MMHG | WEIGHT: 204 LBS | HEIGHT: 67 IN | OXYGEN SATURATION: 95 %

## 2019-10-23 DIAGNOSIS — Z00.00 WELCOME TO MEDICARE PREVENTIVE VISIT: ICD-10-CM

## 2019-10-23 DIAGNOSIS — Z11.59 NEED FOR HEPATITIS C SCREENING TEST: ICD-10-CM

## 2019-10-23 DIAGNOSIS — Z13.6 SCREENING FOR CARDIOVASCULAR CONDITION: ICD-10-CM

## 2019-10-23 DIAGNOSIS — E11.42 CONTROLLED TYPE 2 DIABETES MELLITUS WITH DIABETIC POLYNEUROPATHY, WITHOUT LONG-TERM CURRENT USE OF INSULIN (HCC): ICD-10-CM

## 2019-10-23 DIAGNOSIS — E66.9 OBESITY (BMI 30.0-34.9): Primary | ICD-10-CM

## 2019-10-23 DIAGNOSIS — Z87.891 PERSONAL HISTORY OF TOBACCO USE, PRESENTING HAZARDS TO HEALTH: ICD-10-CM

## 2019-10-23 DIAGNOSIS — E11.40 TYPE 2 DIABETES MELLITUS WITH DIABETIC NEUROPATHY, WITHOUT LONG-TERM CURRENT USE OF INSULIN (HCC): ICD-10-CM

## 2019-10-23 RX ORDER — DULAGLUTIDE 1.5 MG/.5ML
1.5 INJECTION, SOLUTION SUBCUTANEOUS
COMMUNITY
Start: 2019-09-23

## 2019-10-23 NOTE — PROGRESS NOTES
This is a \"Welcome to [employer]"  Initial Preventive Physical Examination (IPPE) providing Personalized Prevention Plan Services (Performed in the first 12 months of enrollment)    I have reviewed the patient's medical history in detail and updated the computerized patient record. Prostate Ca - PSA post surgery have remained 0. Seeing urologist every 3 months, Dr. Felipe Browne. DM - followed by Dr. Carlene Langston. Last A1C was 8.2% in September. Medicatoins were not adjusted but told to do better with his sliding scale. Continued low back pain. Had RUBEN last week.       History     Past Medical History:   Diagnosis Date    Arthritis     BACK    BPH (benign prostatic hyperplasia) 2013    Cancer ([address] 2015    SKIN - HEAD AND FACE    Cancer ([address] 01/2019    PROSTATE    Chronic pain     BACK    Chronic pelvic pain in male     sensitive to touch on the left side down to the left side of the penis    Collagenous colitis 2011    Diabetes mellitus type 2, controlled, with complications ([address] 5815    Diabetic neuropathy ([address] 2006    Diverticulosis     ED (erectile dysfunction)     Dr. Krissy Daniels GERD (gastroesophageal reflux disease) 1990    Gout     1 episode    Hypertension 1970    Ill-defined condition     BILATERAL PERIPHERAL NEUROPATHY BOTH LEGS    Low serum testosterone level     Dr. Huber Mtz Osteopenia     Dr. Alejandra Given Sleep apnea     USES CPAP    Sun-damaged skin       Past Surgical History:   Procedure Laterality Date    EMG TWO EXTREMITIES LOWER  8/24/2013         ENDOSCOPY, COLON, DIAGNOSTIC      HX COLONOSCOPY      HX HEENT      WISDOM TEETH X4    HX HERNIA REPAIR      x2 bilateral inguinal    HX ORTHOPAEDIC  08/2018    L4-5, L5-S1 DISKECTOMY    HX OTHER SURGICAL  2015,2017    MOHS    NCV SENSORY OR MIXED  8/24/2013          Current Outpatient Medications   Medication Sig Dispense Refill    TRULICITY 1.5 RT/0.3 mL sub-q pen       diph,pertuss,acel,,tetanus vac,PF, (ADACEL) 2 Lf-(2.5-5-3-5 mcg)-5Lf/0.5 mL syrg vaccine 0.5 mL by IntraMUSCular route once for 1 dose. 0.5 mL 0    omeprazole (PRILOSEC) 20 mg capsule TAKE 1 CAPSULE DAILY  3    ergocalciferol (ERGOCALCIFEROL) 50,000 unit capsule TAKE ONE CAPSULE BY MOUTH EVERY WEEK  2    FARXIGA 10 mg tab tablet 1 tablet by mouth daily      cyanocobalamin 1,000 mcg tablet Take 100 mcg by mouth.  metFORMIN ER (GLUCOPHAGE XR) 500 mg tablet 2,000mg daily per patient      tadalafil (CIALIS) 5 mg tablet Take 5 mg by mouth daily.  insulin glargine (BASAGLAR KWIKPEN U-100 INSULIN) 100 unit/mL (3 mL) inpn 20 Units by SubCUTAneous route nightly. As directed       insulin lispro (HUMALOG) 100 unit/mL kwikpen by SubCUTAneous route. As directed      metoprolol succinate (TOPROL-XL) 25 mg XL tablet Take 25 mg by mouth daily.  gabapentin (NEURONTIN) 300 mg capsule Take 1 Cap by mouth three (3) times daily.  simvastatin (ZOCOR) 20 mg tablet Take 1 tablet by mouth daily. (Patient taking differently: Take 20 mg by mouth nightly.) 90 tablet 1    diclofenac EC (VOLTAREN) 75 mg EC tablet Take 1 Tab by mouth two (2) times a day. 36 Tab 0     Allergies   Allergen Reactions    Pcn [Penicillins] Other (comments)     Infancy was told he had a rash     Family History   Problem Relation Age of Onset    Diabetes Father     Heart Disease Father 62    Stroke Father         x2    Cancer Mother         Bladder    Parkinson's Disease Mother     Hypertension Mother     No Known Problems Sister     Diabetes Paternal Grandmother     Diabetes Other         cousins    Anesth Problems Neg Hx      Social History     Tobacco Use    Smoking status: Former Smoker     Packs/day: 1.00     Years: 12.00     Pack years: 12.00     Last attempt to quit: 1980     Years since quittin.5    Smokeless tobacco: Never Used   Substance Use Topics    Alcohol use:  Yes     Alcohol/week: 0.0 standard drinks     Comment: rarely     Diet, Lifestyle: trying to follow diabetic diet    Exercise level: sedentary    Depression Risk Screen     3 most recent PHQ Screens 7/31/2018   Little interest or pleasure in doing things Not at all   Feeling down, depressed, irritable, or hopeless Not at all   Total Score PHQ 2 0     Alcohol Risk Screen   You do not drink alcohol or very rarely. Functional Ability and Level of Safety   Hearing Loss  Hearing is good. Vision Screening  Vision is good. Cataract left worse than right eye  No exam data present      Activities of Daily Living  The home contains: grab bars  Patient does total self care    Fall Risk Screen  Fall Risk Assessment, last 12 mths 10/23/2019   Able to walk? Yes   Fall in past 12 months? No   Fall with injury? -   Number of falls in past 12 months -   Fall Risk Score -       Abuse Screen  Patient is not abused     ROS:   Negative except  10 lb weight gain over the last several months  GERD controlled with omeprazole  Mild urinary incontinence post prostatectomy  Neuropathy in feet.       Physical Examination:  Visit Vitals  /65   Pulse 83   Temp 98.7 °F (37.1 °C) (Oral)   Ht 5' 7\" (1.702 m)   Wt 204 lb (92.5 kg)   SpO2 95%   BMI 31.95 kg/m²    General appearance - alert, well appearing, and in no distress and oriented to person, place, and time  Mental status - alert, oriented to person, place, and time, normal mood, behavior, speech, dress, motor activity, and thought processes  Eyes - pupils equal and reactive, extraocular eye movements intact  Ears - bilateral TM's and external ear canals normal  Nose - normal and patent, no erythema, discharge or polyps  Mouth - mucous membranes moist, pharynx normal without lesions  Neck - supple, no significant adenopathy, carotids upstroke normal bilaterally, no bruits, thyroid exam: thyroid is normal in size without nodules or tenderness  Lymphatics - no palpable lymphadenopathy, no hepatosplenomegaly  Chest - clear to auscultation, no wheezes, rales or rhonchi, symmetric air entry  Heart - normal rate, regular rhythm, normal S1, S2, no murmurs, rubs, clicks or gallops  Abdomen - soft, nontender, nondistended, no masses or organomegaly  Back exam - full range of motion, no tenderness, palpable spasm or pain on motion  Neurological - alert, oriented, normal speech, no focal findings or movement disorder noted  Musculoskeletal - no joint tenderness, deformity or swelling  Extremities - peripheral pulses normal, no pedal edema, no clubbing or cyanosis  Skin - normal coloration and turgor, no rashes, no suspicious skin lesions noted  FEET - 2+ dp and pt pulses. No ulcerations, lesions or callusing. Decreased senstation to light touch and filament testing. Screening EKG   EKG order placed: Yes    Patient Care Team   Patient Care Team:  Claudio Alcala MD as PCP - General (Internal Medicine)  Karla Ayala MD (Endocrinology)  Kapil Jerez MD (Urology)  Miguel Barriga MD (Ophthalmology)     End of Life Planning   Advanced care planning directives were discussed with the patient and /or family/caregiver. Assessment/Plan   Education and counseling provided:  Are appropriate based on today's review and evaluation    Diagnoses and all orders for this visit:    1. Obesity (BMI 30.0-34.9) 0 discussed diet and exercise for weight loss    2. Controlled type 2 diabetes mellitus with diabetic polyneuropathy, with long-term current use of insulin ([address]    3. Type 2 diabetes mellitus with diabetic neuropathy, with long-term current use of insulin ([address]    AAA screening  Hep C screening. Foot exam completed.       Health Maintenance Due   Topic Date Due    Hepatitis C Screening  Ordered today    Shingrix Vaccine Age 49> (1of 2) 11/23/2019    AAA Screening 73-67 YO Male Smoking Patients  Ordered today    FOOT EXAM Q1  Completed today    MEDICARE YEARLY EXAM  10/23/2020    Influenza Age 5 to Adult  Up to date

## 2019-10-23 NOTE — PATIENT INSTRUCTIONS
Medicare Wellness Visit, Male The best way to live healthy is to have a lifestyle where you eat a well-balanced diet, exercise regularly, limit alcohol use, and quit all forms of tobacco/nicotine, if applicable. Regular preventive services are another way to keep healthy. Preventive services (vaccines, screening tests, monitoring & exams) can help personalize your care plan, which helps you manage your own care. Screening tests can find health problems at the earliest stages, when they are easiest to treat. [facility] follows the current, evidence-based guidelines published by the [facility] ([facility]) when recommending preventive services for our patients. Because we follow these guidelines, sometimes recommendations change over time as research supports it. (For example, a prostate screening blood test is no longer routinely recommended for men with no symptoms.) Of course, you and your doctor may decide to screen more often for some diseases, based on your risk and co-morbidities (chronic disease you are already diagnosed with). Preventive services for you include: - Medicare offers their members a free annual wellness visit, which is time for you and your primary care provider to discuss and plan for your preventive service needs. Take advantage of this benefit every year! 
-All adults over age 72 should receive the recommended pneumonia vaccines. Current USPSTF guidelines recommend a series of two vaccines for the best pneumonia protection.  
-All adults should have a flu vaccine yearly and an ECG.  All adults age 61 and older should receive a shingles vaccine once in their lifetime.   
-All adults age 38-68 who are overweight should have a diabetes screening test once every three years.  
-Other screening tests & preventive services for persons with diabetes include: an eye exam to screen for diabetic retinopathy, a kidney function test, a foot exam, and stricter control over your cholesterol.  
-Cardiovascular screening for adults with routine risk involves an electrocardiogram (ECG) at intervals determined by the provider.  
-Colorectal cancer screening should be done for adults age 54-65 with no increased risk factors for colorectal cancer. There are a number of acceptable methods of screening for this type of cancer. Each test has its own benefits and drawbacks. Discuss with your provider what is most appropriate for you during your annual wellness visit. The different tests include: colonoscopy (considered the best screening method), a fecal occult blood test, a fecal DNA test, and sigmoidoscopy. 
-All adults born between [facility] should be screened once for Hepatitis C. 
-An Abdominal Aortic Aneurysm (AAA) Screening is recommended for men age 73-68 who has ever smoked in their lifetime. Here is a list of your current Health Maintenance items (your personalized list of preventive services) with a due date: 
 
 
Health Maintenance Due Topic Date Due  
 Hepatitis C Screening  Ordered today  Shingrix Vaccine Age 50> (2of 2) 11/23/2019  AAA Screening 73-69 YO Male Smoking Patients  Ordered today  FOOT EXAM Q1  Completed today  MEDICARE YEARLY EXAM  10/23/2020  Influenza Age 5 to Adult  Up to date

## 2019-11-18 ENCOUNTER — HOSPITAL ENCOUNTER (OUTPATIENT)
Dept: ULTRASOUND IMAGING | Age: 69
Discharge: HOME | End: 2019-11-18
Attending: INTERNAL MEDICINE
Payer: MEDICARE

## 2019-11-18 DIAGNOSIS — Z13.6 SCREENING FOR CARDIOVASCULAR CONDITION: ICD-10-CM

## 2019-11-18 DIAGNOSIS — Z87.891 PERSONAL HISTORY OF TOBACCO USE, PRESENTING HAZARDS TO HEALTH: ICD-10-CM

## 2019-11-18 PROCEDURE — 76706 US ABDL AORTA SCREEN AAA: CPT

## 2019-12-10 LAB
CREATININE, EXTERNAL: 0.95
HBA1C MFR BLD HPLC: 7.8 %
LDL-C, EXTERNAL: 49

## 2020-01-01 NOTE — PROGRESS NOTES
HISTORY OF PRESENT ILLNESS  Maya Carpio is a 68 y.o. male. HPI  Admitted to hospital in [place] 12/23 secondary to chest pain when initially awoke in the am.   Troponin was not elevated, EKG was normal.  Had cardiac catheterization on 12/24 which showed 90% blockage on \"side artery\" with stent placement. Also with another artery with 50% blockage. 40% lesions x 2 on \"front artery\" and on back had 2-3 branches with mild 40% blckage as well. Discharged on 12/25. She has an appointment with Dr. Lizet Platt tomorrow. Discharged on Plavix, Amlodipine. Simvastatin was changed to atorvastatin. He is sedentary and does not exercise regularly  Saw ortho today for increased pain in left shoulder. Prescribed Mobic. Wonders if he should take the Mobic. Current Outpatient Medications:     NOVOLOG FLEXPEN U-100 INSULIN 100 unit/mL (3 mL) inpn, , Disp: , Rfl:     halobetasol (ULTRAVATE) 0.05 % topical cream, , Disp: , Rfl:     clopidogrel (PLAVIX) 75 mg tab, TAKE 1 TABLET BY MOUTH EVERY DAY, Disp: , Rfl:     atorvastatin (LIPITOR) 40 mg tablet, TAKE 1 TABLET BY MOUTH EVERYDAY AT BEDTIME, Disp: , Rfl:     amLODIPine (NORVASC) 2.5 mg tablet, TAKE 1 TABLET BY MOUTH EVERY DAY, Disp: , Rfl:     pantoprazole (PROTONIX) 40 mg granules for oral suspension, 40 mg daily. , Disp: , Rfl:     TRULICITY 1.5 TJ/8.9 mL sub-q pen, , Disp: , Rfl:     ergocalciferol (ERGOCALCIFEROL) 50,000 unit capsule, TAKE ONE CAPSULE BY MOUTH EVERY WEEK, Disp: , Rfl: 2    FARXIGA 10 mg tab tablet, 1 tablet by mouth daily, Disp: , Rfl:     cyanocobalamin 1,000 mcg tablet, Take 100 mcg by mouth., Disp: , Rfl:     metFORMIN ER (GLUCOPHAGE XR) 500 mg tablet, 2,000mg daily per patient, Disp: , Rfl:     tadalafil (CIALIS) 5 mg tablet, Take 5 mg by mouth daily. , Disp: , Rfl:     insulin glargine (BASAGLAR KWIKPEN U-100 INSULIN) 100 unit/mL (3 mL) inpn, 30 Units by SubCUTAneous route nightly.  As directed , Disp: , Rfl:     insulin lispro (HUMALOG) 100 unit/mL kwikpen, by SubCUTAneous route. 10 units as directed, Disp: , Rfl:     diclofenac EC (VOLTAREN) 75 mg EC tablet, Take 1 Tab by mouth two (2) times a day. (Patient taking differently: Take 75 mg by mouth two (2) times a day. As needed), Disp: 40 Tab, Rfl: 0    metoprolol succinate (TOPROL-XL) 25 mg XL tablet, Take 25 mg by mouth daily. , Disp: , Rfl:     gabapentin (NEURONTIN) 300 mg capsule, Take 1 Cap by mouth three (3) times daily. , Disp: , Rfl:     testosterone (ANDROGEL) 20.25 mg/1.25 gram (1.62 %) gel, , Disp: , Rfl:     tamsulosin (FLOMAX) 0.4 mg capsule, , Disp: , Rfl:     meloxicam (MOBIC) 7.5 mg tablet, Take 7.5 mg by mouth., Disp: , Rfl:     dexlansoprazole (DEXILANT) 60 mg CpDB capsule (delayed release), , Disp: , Rfl:     omeprazole (PRILOSEC) 20 mg capsule, TAKE 1 CAPSULE DAILY, Disp: , Rfl: 3    simvastatin (ZOCOR) 20 mg tablet, Take 1 tablet by mouth daily. (Patient taking differently: Take 20 mg by mouth nightly.), Disp: 90 tablet, Rfl: 1    Visit Vitals  /78   Pulse 81   Temp 98.7 °F (37.1 °C) (Oral)   Ht 5' 7\" (1.702 m)   Wt 204 lb (92.5 kg)   SpO2 95%   BMI 31.95 kg/m²       ROS  See above  Physical Exam  Vitals signs reviewed. Constitutional:       Appearance: He is normal weight. HENT:      Head: Normocephalic and atraumatic. Neck:      Musculoskeletal: Normal range of motion. Vascular: No carotid bruit. Cardiovascular:      Rate and Rhythm: Normal rate and regular rhythm. Pulses: Normal pulses. Heart sounds: Normal heart sounds. Pulmonary:      Effort: Pulmonary effort is normal.      Breath sounds: Normal breath sounds. Abdominal:      General: Abdomen is flat. Bowel sounds are normal. There is no distension. Palpations: There is no mass. Tenderness: There is no tenderness. Musculoskeletal:      Right lower leg: No edema. Left lower leg: No edema. Lymphadenopathy:      Cervical: No cervical adenopathy. Neurological:      Mental Status: He is alert. ASSESSMENT and PLAN  Stent placement for 90% lesion on 12/24 - doing well. Will f/u with Dr. James Lynch as a new patient tomorrow. Hyperlipidemia - changed from simvastatin to atorvastatin. Discussed NTG and cialis. htn - added amlodipine low dose in [place]. BP well controlled today. DM type II - A1C was 7.8 through Dr. Joyce Heredia in December. Discussed improved diet and exercise along with the medications  Left shoulder pain - stop Meloxicam and start diclofenac cream prn. Orders Placed This Encounter    DISCONTD: testosterone (ANDROGEL) 20.25 mg/1.25 gram (1.62 %) gel    DISCONTD: tamsulosin (FLOMAX) 0.4 mg capsule    DISCONTD: meloxicam (MOBIC) 7.5 mg tablet    NOVOLOG FLEXPEN U-100 INSULIN 100 unit/mL (3 mL) inpn    halobetasol (ULTRAVATE) 0.05 % topical cream    DISCONTD: dexlansoprazole (DEXILANT) 60 mg CpDB capsule (delayed release)    clopidogrel (PLAVIX) 75 mg tab    atorvastatin (LIPITOR) 40 mg tablet    amLODIPine (NORVASC) 2.5 mg tablet    pantoprazole (PROTONIX) 40 mg granules for oral suspension    nitroglycerin (NITROSTAT) 0.4 mg SL tablet    diclofenac (VOLTAREN) 1 % gel     Follow-up and Dispositions    · Return in about 3 months (around 4/2/2020).

## 2020-01-02 ENCOUNTER — OFFICE VISIT (OUTPATIENT)
Dept: INTERNAL MEDICINE CLINIC | Age: 70
End: 2020-01-02

## 2020-01-02 VITALS
DIASTOLIC BLOOD PRESSURE: 78 MMHG | SYSTOLIC BLOOD PRESSURE: 125 MMHG | HEART RATE: 81 BPM | BODY MASS INDEX: 32.02 KG/M2 | TEMPERATURE: 98.7 F | HEIGHT: 67 IN | OXYGEN SATURATION: 95 % | WEIGHT: 204 LBS

## 2020-01-02 DIAGNOSIS — E11.40 TYPE 2 DIABETES MELLITUS WITH DIABETIC NEUROPATHY, WITH LONG-TERM CURRENT USE OF INSULIN (HCC): ICD-10-CM

## 2020-01-02 DIAGNOSIS — I10 BENIGN ESSENTIAL HYPERTENSION: ICD-10-CM

## 2020-01-02 DIAGNOSIS — E78.5 HYPERLIPIDEMIA WITH TARGET LDL LESS THAN 100: ICD-10-CM

## 2020-01-02 DIAGNOSIS — Z79.4 TYPE 2 DIABETES MELLITUS WITH DIABETIC NEUROPATHY, WITH LONG-TERM CURRENT USE OF INSULIN (HCC): ICD-10-CM

## 2020-01-02 DIAGNOSIS — I25.83 CORONARY ARTERY DISEASE DUE TO LIPID RICH PLAQUE: Primary | ICD-10-CM

## 2020-01-02 DIAGNOSIS — I25.10 CORONARY ARTERY DISEASE DUE TO LIPID RICH PLAQUE: Primary | ICD-10-CM

## 2020-01-02 RX ORDER — TESTOSTERONE 20.25 MG/1.25G
GEL TOPICAL
COMMUNITY
Start: 2018-06-12 | End: 2020-01-02 | Stop reason: ALTCHOICE

## 2020-01-02 RX ORDER — HALOBETASOL PROPIONATE 0.5 MG/G
CREAM TOPICAL AS NEEDED
COMMUNITY
Start: 2018-06-21

## 2020-01-02 RX ORDER — DEXLANSOPRAZOLE 60 MG/1
CAPSULE, DELAYED RELEASE ORAL
COMMUNITY
Start: 2018-06-12 | End: 2020-01-02 | Stop reason: ALTCHOICE

## 2020-01-02 RX ORDER — INSULIN ASPART 100 [IU]/ML
INJECTION, SOLUTION INTRAVENOUS; SUBCUTANEOUS
COMMUNITY
Start: 2019-12-04 | End: 2021-01-18 | Stop reason: ALTCHOICE

## 2020-01-02 RX ORDER — AMLODIPINE BESYLATE 2.5 MG/1
TABLET ORAL
COMMUNITY
Start: 2019-12-26 | End: 2020-01-03 | Stop reason: SDUPTHER

## 2020-01-02 RX ORDER — MELOXICAM 7.5 MG/1
7.5 TABLET ORAL
COMMUNITY
Start: 2020-01-02 | End: 2020-01-02 | Stop reason: ALTCHOICE

## 2020-01-02 RX ORDER — CLOPIDOGREL BISULFATE 75 MG/1
TABLET ORAL
COMMUNITY
Start: 2019-12-26 | End: 2020-01-03 | Stop reason: SDUPTHER

## 2020-01-02 RX ORDER — DICLOFENAC SODIUM 10 MG/G
4 GEL TOPICAL 4 TIMES DAILY
Qty: 200 G | Refills: 1 | Status: SHIPPED | OUTPATIENT
Start: 2020-01-02 | End: 2020-02-06

## 2020-01-02 RX ORDER — TAMSULOSIN HYDROCHLORIDE 0.4 MG/1
CAPSULE ORAL
COMMUNITY
Start: 2018-06-13 | End: 2020-01-02 | Stop reason: ALTCHOICE

## 2020-01-02 RX ORDER — ATORVASTATIN CALCIUM 40 MG/1
TABLET, FILM COATED ORAL
COMMUNITY
Start: 2019-12-26 | End: 2020-01-03 | Stop reason: SDUPTHER

## 2020-01-02 RX ORDER — NITROGLYCERIN 0.4 MG/1
0.4 TABLET SUBLINGUAL
Qty: 50 TAB | Refills: 1 | Status: SHIPPED | OUTPATIENT
Start: 2020-01-02

## 2020-01-02 RX ORDER — PANTOPRAZOLE SODIUM 40 MG/1
40 GRANULE, DELAYED RELEASE ORAL DAILY
COMMUNITY
End: 2020-01-03

## 2020-01-03 ENCOUNTER — OFFICE VISIT (OUTPATIENT)
Dept: CARDIOLOGY CLINIC | Age: 70
End: 2020-01-03

## 2020-01-03 VITALS
BODY MASS INDEX: 32.08 KG/M2 | OXYGEN SATURATION: 97 % | DIASTOLIC BLOOD PRESSURE: 80 MMHG | SYSTOLIC BLOOD PRESSURE: 132 MMHG | RESPIRATION RATE: 16 BRPM | WEIGHT: 204.4 LBS | HEIGHT: 67 IN | HEART RATE: 118 BPM

## 2020-01-03 DIAGNOSIS — Z95.5 S/P CORONARY ARTERY STENT PLACEMENT: ICD-10-CM

## 2020-01-03 DIAGNOSIS — I25.10 CORONARY ARTERY DISEASE INVOLVING NATIVE CORONARY ARTERY OF NATIVE HEART WITHOUT ANGINA PECTORIS: Primary | ICD-10-CM

## 2020-01-03 DIAGNOSIS — Z85.46 HISTORY OF PROSTATE CANCER: ICD-10-CM

## 2020-01-03 DIAGNOSIS — Z87.891 HISTORY OF TOBACCO USE: ICD-10-CM

## 2020-01-03 RX ORDER — AMLODIPINE BESYLATE 2.5 MG/1
TABLET ORAL
Qty: 90 TAB | Refills: 1 | Status: SHIPPED | OUTPATIENT
Start: 2020-01-03 | End: 2020-06-24 | Stop reason: SDUPTHER

## 2020-01-03 RX ORDER — PANTOPRAZOLE SODIUM 40 MG/1
40 GRANULE, DELAYED RELEASE ORAL 2 TIMES DAILY
Qty: 180 EACH | Refills: 0 | Status: SHIPPED | OUTPATIENT
Start: 2020-01-03 | End: 2021-09-14

## 2020-01-03 RX ORDER — ATORVASTATIN CALCIUM 40 MG/1
TABLET, FILM COATED ORAL
Qty: 90 TAB | Refills: 3 | Status: SHIPPED | OUTPATIENT
Start: 2020-01-03 | End: 2020-12-21

## 2020-01-03 RX ORDER — CLOPIDOGREL BISULFATE 75 MG/1
TABLET ORAL
Qty: 90 TAB | Refills: 3 | Status: SHIPPED | OUTPATIENT
Start: 2020-01-03 | End: 2020-11-18

## 2020-01-03 NOTE — PROGRESS NOTES
[place]: [place]  [phone]    History of Present Illness: Mr. Karena Carl is a 67 yo M with recently diagnosed coronary artery disease when they were traveling in [place]. He had left sided upper abdominal pain and went to the hospital.  His cardiac enzymes were negative, but given his symptoms they proceeded with a cardiac catheterization on 12/24/2019, which noted 90% blockage that was treated with PCI. He had some moderate plaquing otherwise. Since his hospitalization, overall he denies any exertional chest pain. He still has episodes of pain on his left upper abdomen when he wakes up. His breathing has been stable. He has been compliant with his medications. He is compensated on exam with clear lungs and no lower extremity edema. His blood pressure is 132/80. Soc hx. Quit tobacco 1980. 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 Fam hx. Dad with CAD. Assessment and Plan:    1. Coronary artery disease. Stressed the importance of aspirin and Plavix for the first year. Agree with Lipitor, his beta blocker. Will review his records and ARB as a possibility to replace Norvasc, but not clear why Norvasc was picked instead. Also recommended initiating cardiac rehab.    2. Tobacco use. Quit many years ago. 3. Back surgery in 2018 with diskectomy. 4. Prostate cancer. Surgery in April 2019. He is on Cialis post prostate removal and did tell him to avoid concurrent Nitroglycerin. He is going on a two week cruise to [facility] and I do think this is okay to proceed.       He  has a past medical history of Arthritis, BPH (benign prostatic hyperplasia) (2013), Cancer ([address] (2015), Cancer ([address] (01/2019), Chronic pain, Chronic pelvic pain in male, Collagenous colitis (2011), Diabetes mellitus type 2, controlled, with complications ([address] (3076), Diabetic neuropathy ([address] (2006), Diverticulosis, ED (erectile dysfunction), GERD (gastroesophageal reflux disease) (1990), Gout, Hypertension (1970), Ill-defined condition, Low serum testosterone level, Osteopenia, Psoriasis, Sleep apnea, and Sun-damaged skin. He also has no past medical history of Arsenic suspected exposure, Family history of skin cancer, Pacemaker, Radiation exposure, Skin cancer, Sunburn, blistering, or Tanning bed exposure. All other systems negative except as above. PE  Vitals:    01/03/20 1504   BP: 132/80   Pulse: (!) 118   Resp: 16   SpO2: 97%   Weight: 204 lb 6.4 oz (92.7 kg)   Height: 5' 7\" (1.702 m)    Body mass index is 32.01 kg/m².    General appearance - alert, well appearing, and in no distress  Mental status - affect appropriate to mood  Eyes - sclera anicteric, moist mucous membranes  Neck - supple, no JVD  Chest - clear to auscultation, no wheezes, rales or rhonchi  Heart - normal rate, regular rhythm, normal S1, S2, I/VI systolic murmur LUSB  Abdomen - soft, nontender, nondistended, no masses or organomegaly  Neurological - no focal deficit  Extremities - peripheral pulses normal, no pedal edema    Recent Labs:  Lab Results   Component Value Date/Time    Cholesterol, total 128 07/13/2013 08:05 AM    HDL Cholesterol 40 07/13/2013 08:05 AM    LDL, calculated 59 07/13/2013 08:05 AM    Triglyceride 143 07/13/2013 08:05 AM     Lab Results   Component Value Date/Time    Creatinine (POC) 0.8 01/29/2019 11:24 AM    Creatinine 1.01 04/03/2019 10:46 PM     Lab Results   Component Value Date/Time    BUN 11 04/03/2019 10:46 PM     Lab Results   Component Value Date/Time    Potassium 3.6 04/03/2019 10:46 PM     Lab Results   Component Value Date/Time    Hemoglobin A1c 7.7 (H) 03/21/2019 10:38 AM    Hemoglobin A1c, External 7.8 12/10/2019     Lab Results   Component Value Date/Time    HGB 11.7 (L) 04/03/2019 10:46 PM     Lab Results   Component Value Date/Time    PLATELET 859 25/00/8704 10:46 PM       Reviewed:  Past Medical History:   Diagnosis Date    Arthritis     BACK    BPH (benign prostatic hyperplasia) 2013    [facility] 2015    SKIN - HEAD AND FACE    Cancer ([facility] 2019    PROSTATE    Chronic pain     BACK    Chronic pelvic pain in male     sensitive to touch on the left side down to the left side of the penis    Collagenous colitis     Diabetes mellitus type 2, controlled, with complications ([facility] 0648    Diabetic neuropathy ([facility]     Diverticulosis     ED (erectile dysfunction)     Dr. Angela Hallman GERD (gastroesophageal reflux disease)     Gout     1 episode    Hypertension 1970    Ill-defined condition     BILATERAL PERIPHERAL NEUROPATHY BOTH LEGS    Low serum testosterone level     Dr. Filomena Osgood Osteopenia     Dr. Filomena Osgood Psoriasis     Sleep apnea     USES CPAP    Sun-damaged skin      Social History     Tobacco Use   Smoking Status Former Smoker    Packs/day: 1.00    Years: 12.00    Pack years: 12.00    Last attempt to quit: 1980    Years since quittin.7   Smokeless Tobacco Never Used     Social History     Substance and Sexual Activity   Alcohol Use Yes    Alcohol/week: 0.0 standard drinks    Comment: rarely     Allergies   Allergen Reactions    Pcn [Penicillins] Other (comments)     Infancy was told he had a rash         Current Outpatient Medications   Medication Sig    halobetasol (ULTRAVATE) 0.05 % topical cream     clopidogrel (PLAVIX) 75 mg tab TAKE 1 TABLET BY MOUTH EVERY DAY    atorvastatin (LIPITOR) 40 mg tablet TAKE 1 TABLET BY MOUTH EVERYDAY AT BEDTIME    amLODIPine (NORVASC) 2.5 mg tablet TAKE 1 TABLET BY MOUTH EVERY DAY    pantoprazole (PROTONIX) 40 mg granules for oral suspension 40 mg daily.  nitroglycerin (NITROSTAT) 0.4 mg SL tablet 1 Tab by SubLINGual route every five (5) minutes as needed for Chest Pain. Up to 3 doses.  diclofenac (VOLTAREN) 1 % gel Apply 4 g to affected area four (4) times daily.     TRULICITY 1.5 ND/2.1 mL sub-q pen     ergocalciferol (ERGOCALCIFEROL) 50,000 unit capsule TAKE ONE CAPSULE BY MOUTH EVERY WEEK    FARXIGA 10 mg tab tablet 1 tablet by mouth daily    cyanocobalamin 1,000 mcg tablet Take 100 mcg by mouth.  metFORMIN ER (GLUCOPHAGE XR) 500 mg tablet 2,000mg daily per patient    tadalafil (CIALIS) 5 mg tablet Take 5 mg by mouth daily.  insulin glargine (BASAGLAR KWIKPEN U-100 INSULIN) 100 unit/mL (3 mL) inpn 30 Units by SubCUTAneous route nightly. As directed     insulin lispro (HUMALOG) 100 unit/mL kwikpen by SubCUTAneous route. 10 units as directed    metoprolol succinate (TOPROL-XL) 25 mg XL tablet Take 25 mg by mouth daily.  gabapentin (NEURONTIN) 300 mg capsule Take 1 Cap by mouth three (3) times daily.  NOVOLOG FLEXPEN U-100 INSULIN 100 unit/mL (3 mL) inpn      No current facility-administered medications for this visit.         Risa Mendez MD  [address] [facility] heart and Vascular [place]  [address] [facility] [address]  [facility], [address]

## 2020-01-24 ENCOUNTER — TELEPHONE (OUTPATIENT)
Dept: CARDIOLOGY CLINIC | Age: 70
End: 2020-01-24

## 2020-01-24 NOTE — TELEPHONE ENCOUNTER
Patient states Dr. Maricel Garza referred him to cardiac rehab and he would like to know how he would go about setting this up. Please advise.      Phone: [phone]

## 2020-01-24 NOTE — TELEPHONE ENCOUNTER
Returned call to patient. Two patient indentifiers verified. Pt was informed that number and information was given at his office visit. Pt was given information again. Pt denies any further questions at this time.

## 2020-01-29 ENCOUNTER — TELEPHONE (OUTPATIENT)
Dept: CARDIAC REHAB | Age: 70
End: 2020-01-29

## 2020-01-29 NOTE — TELEPHONE ENCOUNTER
1/29/2020 Cardiac Wellness: Received a voicemail from Mr. Chadwick Meigs about a referral for Cardiac Rehab from Dr. Luke Zuniga. Mr. Nettie Lucas was given a CD of the cath and discharge paperwork from [facility], which have not been scanned in yet to . I will work getting the records from [facility].     1/29/2020 Cardiac Rehab: Called Mr. Sajan Martin back to discuss participation in the Cardiac Rehab Program following STENT while in [place] on 12/24/2019. Made intake appointment for 2/6/2020. Mr. Sajan Martin is without questions or concerns.   Rhonda Huertas

## 2020-02-05 ENCOUNTER — TELEPHONE (OUTPATIENT)
Dept: CARDIAC REHAB | Age: 70
End: 2020-02-05

## 2020-02-06 ENCOUNTER — HOSPITAL ENCOUNTER (OUTPATIENT)
Dept: CARDIAC REHAB | Age: 70
Discharge: HOME | End: 2020-02-06
Payer: MEDICARE

## 2020-02-06 VITALS — BODY MASS INDEX: 31.7 KG/M2 | WEIGHT: 202.4 LBS

## 2020-02-06 PROCEDURE — 93798 PHYS/QHP OP CAR RHAB W/ECG: CPT

## 2020-02-06 RX ORDER — DICLOFENAC SODIUM 10 MG/G
1 GEL TOPICAL AS NEEDED
COMMUNITY
End: 2021-09-14

## 2020-02-06 RX ORDER — METFORMIN HYDROCHLORIDE 1000 MG/1
1000 TABLET ORAL 2 TIMES DAILY WITH MEALS
COMMUNITY
End: 2021-09-14

## 2020-02-06 RX ORDER — GUAIFENESIN 100 MG/5ML
81 LIQUID (ML) ORAL DAILY
COMMUNITY
End: 2021-09-14

## 2020-02-06 NOTE — CARDIO/PULMONARY
Crystal Jameson   68 y.o.    presented to cardiac rehab for orientation and exercise tolerance test today with a primary diagnosis of 1 stent. Crystal Jameson has no significant cardiac history. Cardiac risk factors include family history, dyslipidemia, diabetes mellitus, hypertension and these were reviewed with the patient. Crystal Jameson is  and lives with his wife. They had just arrived in [facility] with their two daughters to go to [employer] when he had what he thought was acid reflux - went to hospital and received 1 stent. PHQ9, depression score, is 1 and this is considered normal. Pt denies stress. 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 He had 2 disckectomies a few years ago, still has some chronic back pain, and his diabetic neuropathy is his biggest issue - feels like it has affected his balance. He has a cane but it is not with him today. Recommended he use the cane to prevent falls. Patient denied chest pain or SOB during 6 minute walk and was in NSR. He is unable to tolerate the treadmill. EF is 50%. Education manual given to and reviewed with patient.

## 2020-02-17 RX ORDER — PANTOPRAZOLE SODIUM 40 MG/1
TABLET, DELAYED RELEASE ORAL
Qty: 180 TAB | OUTPATIENT
Start: 2020-02-17

## 2020-02-19 ENCOUNTER — HOSPITAL ENCOUNTER (OUTPATIENT)
Dept: CARDIAC REHAB | Age: 70
Discharge: HOME | End: 2020-02-19
Payer: MEDICARE

## 2020-02-19 ENCOUNTER — APPOINTMENT (OUTPATIENT)
Dept: CARDIAC REHAB | Age: 70
End: 2020-02-19
Payer: MEDICARE

## 2020-02-19 VITALS — BODY MASS INDEX: 32.17 KG/M2 | WEIGHT: 205.4 LBS

## 2020-02-19 PROCEDURE — 93798 PHYS/QHP OP CAR RHAB W/ECG: CPT

## 2020-02-19 PROCEDURE — 93797 PHYS/QHP OP CAR RHAB WO ECG: CPT | Performed by: DIETITIAN, REGISTERED

## 2020-02-21 ENCOUNTER — HOSPITAL ENCOUNTER (OUTPATIENT)
Dept: CARDIAC REHAB | Age: 70
Discharge: HOME | End: 2020-02-21
Payer: MEDICARE

## 2020-02-21 VITALS — BODY MASS INDEX: 32.11 KG/M2 | WEIGHT: 205 LBS

## 2020-02-21 PROCEDURE — 93798 PHYS/QHP OP CAR RHAB W/ECG: CPT

## 2020-02-26 ENCOUNTER — HOSPITAL ENCOUNTER (OUTPATIENT)
Dept: CARDIAC REHAB | Age: 70
Discharge: HOME | End: 2020-02-26
Payer: MEDICARE

## 2020-02-26 ENCOUNTER — APPOINTMENT (OUTPATIENT)
Dept: CARDIAC REHAB | Age: 70
End: 2020-02-26
Payer: MEDICARE

## 2020-02-26 VITALS — BODY MASS INDEX: 32.11 KG/M2 | WEIGHT: 205 LBS

## 2020-02-26 PROCEDURE — 93798 PHYS/QHP OP CAR RHAB W/ECG: CPT

## 2020-02-26 PROCEDURE — 93797 PHYS/QHP OP CAR RHAB WO ECG: CPT

## 2020-02-28 ENCOUNTER — HOSPITAL ENCOUNTER (OUTPATIENT)
Dept: CARDIAC REHAB | Age: 70
Discharge: HOME | End: 2020-02-28
Payer: MEDICARE

## 2020-02-28 VITALS — BODY MASS INDEX: 32.11 KG/M2 | WEIGHT: 205 LBS

## 2020-02-28 PROCEDURE — 93798 PHYS/QHP OP CAR RHAB W/ECG: CPT

## 2020-03-03 ENCOUNTER — HOSPITAL ENCOUNTER (OUTPATIENT)
Dept: CARDIAC REHAB | Age: 70
Discharge: HOME | End: 2020-03-03
Payer: MEDICARE

## 2020-03-03 PROCEDURE — 93797 PHYS/QHP OP CAR RHAB WO ECG: CPT | Performed by: DIETITIAN, REGISTERED

## 2020-03-03 NOTE — PROGRESS NOTES
Cardiac Rehab Nutrition Assessment - 1:1 Evaluation           NAME: Crystal Jameson : 1950 AGE: 68 y.o. GENDER: male  CARDIAC REHAB ADMITTING DIAGNOSIS: stent    Relevant Comorbidites: diabetes, hypertension, dyslipidemia, cancer of prostate, GERD, gout and obstructive sleep apnea        LABS:   Lab Results   Component Value Date/Time    Hemoglobin A1c 7.7 (H) 2019 10:38 AM    Hemoglobin A1c, External 7.8 12/10/2019     Lab Results   Component Value Date/Time    Cholesterol, total 128 2013 08:05 AM    HDL Cholesterol 40 2013 08:05 AM    LDL, calculated 59 2013 08:05 AM    VLDL, calculated 29 2013 08:05 AM    Triglyceride 143 2013 08:05 AM         MEDICATIONS/SUPPLEMENTS:   [unfilled]  Prior to Admission medications    Medication Sig Start Date End Date Taking? Authorizing Provider   metFORMIN (GLUCOPHAGE) 1,000 mg tablet Take 1,000 mg by mouth two (2) times daily (with meals). Provider, Historical   calcium-cholecalciferol, d3, (CALCIUM 600 + D) 600-125 mg-unit tab Take 1 Tab by mouth daily. Provider, Historical   aspirin 81 mg chewable tablet Take 81 mg by mouth daily. Provider, Historical   diclofenac (VOLTAREN) 1 % gel Apply 1 g to affected area four (4) times daily. Provider, Historical   pantoprazole (PROTONIX) 40 mg granules for oral suspension Take 40 mg by mouth two (2) times a day.  1/3/20   Lyn Deal MD   clopidogrel (PLAVIX) 75 mg tab TAKE 1 TABLET BY MOUTH EVERY DAY 1/3/20   Lyn Deal MD   atorvastatin (LIPITOR) 40 mg tablet TAKE 1 TABLET BY MOUTH EVERYDAY AT BEDTIME 1/3/20   Lyn Deal MD   amLODIPine (NORVASC) 2.5 mg tablet TAKE 1 TABLET BY MOUTH EVERY DAY 1/3/20   Lyn Deal MD   NOVOLOG FLEXPEN U-100 INSULIN 100 unit/mL (3 mL) inpn Varies depending on meals 19   Provider, Historical   halobetasol (ULTRAVATE) 0.05 % topical cream  18   Provider, Historical   nitroglycerin (NITROSTAT) 0.4 mg SL tablet 1 Tab by SubLINGual route every five (5) minutes as needed for Chest Pain. Up to 3 doses. 1/2/20   Cici Salazar MD   TRULICITY 1.5 GQ/0.8 mL sub-q pen 1.5 mg by SubCUTAneous route every seven (7) days. 9/23/19   Provider, Historical   ergocalciferol (ERGOCALCIFEROL) 50,000 unit capsule TAKE ONE CAPSULE BY MOUTH EVERY WEEK 4/29/19   Provider, Historical   FARXIGA 10 mg tab tablet 1 tablet by mouth daily 4/13/19   Provider, Historical   cyanocobalamin 1,000 mcg tablet Take 1,000 mcg by mouth. Provider, Historical   tadalafil (CIALIS) 5 mg tablet Take 5 mg by mouth daily. Provider, Historical   insulin glargine (BASAGLAR KWIKPEN U-100 INSULIN) 100 unit/mL (3 mL) inpn 30 Units by SubCUTAneous route nightly. As directed     Provider, Historical   metoprolol succinate (TOPROL-XL) 25 mg XL tablet Take 25 mg by mouth daily. 11/11/17   Provider, Historical   gabapentin (NEURONTIN) 300 mg capsule Take 1 Cap by mouth three (3) times daily. 4/26/16   Provider, Historical           ANTHROPOMETRICS:    Ht Readings from Last 1 Encounters:   02/06/20 (P) 5' 10\" (1.778 m)      Wt Readings from Last 1 Encounters:   02/28/20 93 kg (205 lb)      IBW:166 # +/- 10%  %IBW: 123 % +/- 10%    BMI: 29.4 kg/M2 Category: Overweight  Waist: 43.5 inches    Reported Wt Hx: about a 10 lb weight gain over the past year attributed to limited physical activity    Reported Diet Hx:    Rate Your Plate Score: 56  (Score 58-72: Making many healthy choices; 41-57: Some choices need improving 24-40: many choices need improving)     24 Hour Diet Recall  Breakfast (at hotel buffet): scrambled eggs, 1 sausage link, 2 bites of toast, fruit, coffee (black)   Lunch    Dinner ([employer] restaurant): Pick 2: broccoli cheddar soup & fuji apple chicken salad, baguette, water    Snacks Fresh fruit (about 2 cups)   Beverages water     Linzie Hunger eats 2-3 meals per day; meal times vary.   Breakfast is usually scrambled eggs with whole wheat english muffin (topped with butter) and fresh fruit. Dinner / lunch is often from [employer]. Wife states she plans to cook more meals at home from now on. Environmental/Social: retired , wife still works and is the primary cook        NUTRITION INTERVENTION:  Nutrition 60 minute one-on-one education & goal setting with Navarro Ly    Reviewed with Navarro Ly relevant labs compared to ideals. Reviewed weight history and patient's verbalized weight goal as well as any real or perceived barriers to obtaining the goal. Collaborated with patient to set a specific short and long term weight goal.     Reviewed Rate Your Plate and conducted a verbal diet recall. Assessed for environmental, financial, psychosocial, physical and comorbidities that may impact the food and eating patterns / behaviors of Zenaida Tariq with patient to set specific nutrient goals as well as specific food / behavior changes that will help patient meet the overall goal of following a heart healthy eating pattern (using guidelines as set forth by the American Heart Association and modeled after healthful eating patterns as recognized by the USDA Dietary Guidelines such as DASH, Mediterranean or plant-based). Briefly reviewed with Navarro Ly the nutrition information in the Cardiac Rehab patient education book and encouraged Navarro Ly to read thoroughly, ask questions as needed, and use for future reference for heart healthy nutrition information. Navarro Ly is scheduled to participate in Cardiac Rehab group nutrition classes.           PATIENT GOALS:    Weight Goals:  Short Term Weight Goal:195 lbs  Long Term Weight Goal:180 lbs    Nutrition Goals:  Daily Recommendations:  Calories: 1700 /day  (using Lonie Divers with AF 1.3 and deducting 500 calories for weight loss)    Saturated Fat: no more than 11 g/day  Trans Fat: 0 g/day  Sodium: no more than 1500 mg/day  Fruit: 2 cups / day (not all at same time)  Vegetables: 2.5 or more cups/day    Other:  - read and compare food labels  - limit restaurant meals and look up nutrition facts to help guide choices when dining out  - no food after dinner (unless needed to keep BG in desired range)      Keeping a food diary was recommended. Questions addressed. Follow-up plans discussed. Inga Oreilly verbalized understanding.             Gloria Patel, RD

## 2020-03-04 ENCOUNTER — HOSPITAL ENCOUNTER (OUTPATIENT)
Dept: CARDIAC REHAB | Age: 70
Discharge: HOME | End: 2020-03-04
Payer: MEDICARE

## 2020-03-04 ENCOUNTER — APPOINTMENT (OUTPATIENT)
Dept: CARDIAC REHAB | Age: 70
End: 2020-03-04
Payer: MEDICARE

## 2020-03-04 VITALS — WEIGHT: 204 LBS | BODY MASS INDEX: 31.95 KG/M2

## 2020-03-04 PROCEDURE — 93798 PHYS/QHP OP CAR RHAB W/ECG: CPT

## 2020-03-04 PROCEDURE — 93797 PHYS/QHP OP CAR RHAB WO ECG: CPT | Performed by: DIETITIAN, REGISTERED

## 2020-03-06 ENCOUNTER — HOSPITAL ENCOUNTER (OUTPATIENT)
Dept: CARDIAC REHAB | Age: 70
Discharge: HOME | End: 2020-03-06
Payer: MEDICARE

## 2020-03-06 VITALS — BODY MASS INDEX: 31.95 KG/M2 | WEIGHT: 204 LBS

## 2020-03-06 PROCEDURE — 93798 PHYS/QHP OP CAR RHAB W/ECG: CPT

## 2020-03-11 ENCOUNTER — HOSPITAL ENCOUNTER (OUTPATIENT)
Dept: CARDIAC REHAB | Age: 70
Discharge: HOME | End: 2020-03-11
Payer: MEDICARE

## 2020-03-11 ENCOUNTER — APPOINTMENT (OUTPATIENT)
Dept: CARDIAC REHAB | Age: 70
End: 2020-03-11
Payer: MEDICARE

## 2020-03-11 PROCEDURE — 93798 PHYS/QHP OP CAR RHAB W/ECG: CPT

## 2020-03-11 PROCEDURE — 93797 PHYS/QHP OP CAR RHAB WO ECG: CPT | Performed by: DIETITIAN, REGISTERED

## 2020-03-13 ENCOUNTER — HOSPITAL ENCOUNTER (OUTPATIENT)
Dept: CARDIAC REHAB | Age: 70
Discharge: HOME | End: 2020-03-13
Payer: MEDICARE

## 2020-03-13 PROCEDURE — 93798 PHYS/QHP OP CAR RHAB W/ECG: CPT

## 2020-03-18 ENCOUNTER — APPOINTMENT (OUTPATIENT)
Dept: CARDIAC REHAB | Age: 70
End: 2020-03-18
Payer: MEDICARE

## 2020-03-19 ENCOUNTER — TELEPHONE (OUTPATIENT)
Dept: CARDIAC REHAB | Age: 70
End: 2020-03-19

## 2020-03-20 ENCOUNTER — TELEPHONE (OUTPATIENT)
Dept: INTERNAL MEDICINE CLINIC | Age: 70
End: 2020-03-20

## 2020-03-20 ENCOUNTER — NURSE TRIAGE (OUTPATIENT)
Dept: OTHER | Facility: CLINIC | Age: 70
End: 2020-03-20

## 2020-03-20 ENCOUNTER — APPOINTMENT (OUTPATIENT)
Dept: CARDIAC REHAB | Age: 70
End: 2020-03-20
Payer: MEDICARE

## 2020-03-20 NOTE — TELEPHONE ENCOUNTER
[place]      Phone Number: [phone]             General Message/Vendor Calls     Caller's first and last name:Antwan/wife       Reason for call: wife states pt has a low grade fever, and dry cough and would like to speak to someone.        Callback required yes/no and why:yes       Best contact number(s):[phone]       Details to clarify the request: Possible nCov 19 illness symptoms-fever , cough

## 2020-03-25 ENCOUNTER — APPOINTMENT (OUTPATIENT)
Dept: CARDIAC REHAB | Age: 70
End: 2020-03-25
Payer: MEDICARE

## 2020-03-25 ENCOUNTER — TELEPHONE (OUTPATIENT)
Dept: CARDIAC REHAB | Age: 70
End: 2020-03-25

## 2020-03-25 NOTE — TELEPHONE ENCOUNTER
Spoke with Sid Lucas regarding Cardiac Rehab weekly support call. Provided psyochsocial support for patient, and answered any questions. Sid Lucas reported that he is walking and doing well. Patient is interested in a dietician call.

## 2020-03-27 ENCOUNTER — APPOINTMENT (OUTPATIENT)
Dept: CARDIAC REHAB | Age: 70
End: 2020-03-27
Payer: MEDICARE

## 2020-04-01 ENCOUNTER — APPOINTMENT (OUTPATIENT)
Dept: CARDIAC REHAB | Age: 70
End: 2020-04-01

## 2020-04-01 ENCOUNTER — TELEPHONE (OUTPATIENT)
Dept: CARDIAC REHAB | Age: 70
End: 2020-04-01

## 2020-04-01 NOTE — TELEPHONE ENCOUNTER
Spoke with Shannan Barcenas regarding Cardiac Rehab weekly support call. Provided psychosocial support for patient, and answered any questions. Shannan Barcenas reported that he is doing well, exercising and staying home as much as possible. He has a daughter that lives in [place], [place] that he is concerned about, she is doing well at the moment.

## 2020-04-03 ENCOUNTER — APPOINTMENT (OUTPATIENT)
Dept: CARDIAC REHAB | Age: 70
End: 2020-04-03

## 2020-04-06 ENCOUNTER — TELEPHONE (OUTPATIENT)
Dept: INTERNAL MEDICINE CLINIC | Age: 70
End: 2020-04-06

## 2020-04-06 NOTE — TELEPHONE ENCOUNTER
Patient has appointment scheduled for 4/7/2020. Left message for patient stating to we are currently offering virtual visits, he may change his appointment to a virtual visit for tomorrow or reschedule for June 2020. Advised patient to call the office.

## 2020-04-07 ENCOUNTER — VIRTUAL VISIT (OUTPATIENT)
Dept: INTERNAL MEDICINE CLINIC | Age: 70
End: 2020-04-07

## 2020-04-07 DIAGNOSIS — I25.10 CORONARY ARTERY DISEASE DUE TO LIPID RICH PLAQUE: ICD-10-CM

## 2020-04-07 DIAGNOSIS — I25.83 CORONARY ARTERY DISEASE DUE TO LIPID RICH PLAQUE: ICD-10-CM

## 2020-04-07 DIAGNOSIS — I10 BENIGN ESSENTIAL HYPERTENSION: ICD-10-CM

## 2020-04-07 DIAGNOSIS — E78.5 HYPERLIPIDEMIA WITH TARGET LDL LESS THAN 100: ICD-10-CM

## 2020-04-07 DIAGNOSIS — E11.40 TYPE 2 DIABETES MELLITUS WITH DIABETIC NEUROPATHY, WITH LONG-TERM CURRENT USE OF INSULIN (HCC): Primary | ICD-10-CM

## 2020-04-07 DIAGNOSIS — S46.012A STRAIN OF MUSC/TEND THE ROTATOR CUFF OF LEFT SHOULDER, INIT: ICD-10-CM

## 2020-04-07 DIAGNOSIS — Z79.4 TYPE 2 DIABETES MELLITUS WITH DIABETIC NEUROPATHY, WITH LONG-TERM CURRENT USE OF INSULIN (HCC): Primary | ICD-10-CM

## 2020-04-07 NOTE — PROGRESS NOTES
Consent: Teresa Menchaca, who was seen by synchronous (real-time) audio-video technology, and/or his healthcare decision maker, is aware that this patient-initiated, Telehealth encounter on 4/7/2020 is a billable service, with coverage as determined by his insurance carrier. He is aware that he may receive a bill and has provided verbal consent to proceed: Yes.     Teresa Menchaca is a 68 y.o. male being evaluated by a Virtual Visit (video visit) encounter to address concerns as mentioned above. A caregiver was present when appropriate. Due to this being a TeleHealth encounter (During KNQDI-01 public health emergency), evaluation of the following organ systems was limited: Vitals/Constitutional/EENT/Resp/CV/GI//MS/Neuro/Skin/Heme-Lymph-Imm. Pursuant to the emergency declaration under the [address] [place] [address], [address] [place] [address] and the [employer] and [employer] Act, this Virtual Visit was conducted with patient's (and/or legal guardian's) consent, to reduce the risk of exposure to COVID-19 and provide necessary medical care. Services were provided through a video synchronous discussion virtually to substitute for in-person encounter. Amanda Bell MD on 4/7/2020 at 10:13 AM    An electronic signature was used to authenticate this note. Subjective:     Teresa Menchaca is a 68 y.o. male seen for follow up of diabetes. He also has hypertension, hyperlipidemia and coronary artery disease. Diabetic Review of Systems - medication compliance: compliant all of the time, diabetic diet compliance: compliant most of the time, home glucose monitoring: is performed regularly, values are usually normal, further diabetic ROS: no polyuria or polydipsia, no chest pain, dyspnea or TIA's, no numbness, tingling or pain in extremities, no unusual visual symptoms, last eye exam approximately within the last year.  He had labs drawn last week followed by a Virtual Visit with Dr. Isaac Chatterjee on 4/2. A1C was 7.2% which is improved. No medication changes were made    Other symptoms and concerns:   CAD - he has established himself with Dr. Jose Middleton and started cardiac rehab. CR has been put on hold secondary to [address] but continuing to walk daily. Denies chest pain or SOB  Called mid March with fever and cough. Directed to Kary Pereyra. He had fever and chills with cough for 3-4 days. Slept in another bedroom from wife but they did not isolate in the rest of the house and she never became ill. Assymptomatic for about 2 weeks now. Increased pain in his left shoulder. Saw Dr. Pete Mcmillan in the past and told was rotator cuff. Had steroid injection which didn't help much but oral steroids did help. Not using the diclofenac gel. Decreased rom. .    Current Outpatient Medications   Medication Sig Dispense Refill    metFORMIN (GLUCOPHAGE) 1,000 mg tablet Take 1,000 mg by mouth two (2) times daily (with meals).  calcium-cholecalciferol, d3, (CALCIUM 600 + D) 600-125 mg-unit tab Take 1 Tab by mouth daily.  aspirin 81 mg chewable tablet Take 81 mg by mouth daily.  diclofenac (VOLTAREN) 1 % gel Apply 1 g to affected area four (4) times daily.  pantoprazole (PROTONIX) 40 mg granules for oral suspension Take 40 mg by mouth two (2) times a day. 180 Each 0    clopidogrel (PLAVIX) 75 mg tab TAKE 1 TABLET BY MOUTH EVERY DAY 90 Tab 3    atorvastatin (LIPITOR) 40 mg tablet TAKE 1 TABLET BY MOUTH EVERYDAY AT BEDTIME 90 Tab 3    amLODIPine (NORVASC) 2.5 mg tablet TAKE 1 TABLET BY MOUTH EVERY DAY 90 Tab 1    NOVOLOG FLEXPEN U-100 INSULIN 100 unit/mL (3 mL) inpn Varies depending on meals      halobetasol (ULTRAVATE) 0.05 % topical cream       nitroglycerin (NITROSTAT) 0.4 mg SL tablet 1 Tab by SubLINGual route every five (5) minutes as needed for Chest Pain. Up to 3 doses.  50 Tab 1    TRULICITY 1.5 SX/8.4 mL sub-q pen 1.5 mg by SubCUTAneous route every seven (7) days.  ergocalciferol (ERGOCALCIFEROL) 50,000 unit capsule TAKE ONE CAPSULE BY MOUTH EVERY WEEK  2    FARXIGA 10 mg tab tablet 1 tablet by mouth daily      cyanocobalamin 1,000 mcg tablet Take 1,000 mcg by mouth.  tadalafil (CIALIS) 5 mg tablet Take 5 mg by mouth daily.  insulin glargine (BASAGLAR KWIKPEN U-100 INSULIN) 100 unit/mL (3 mL) inpn 30 Units by SubCUTAneous route nightly. As directed       metoprolol succinate (TOPROL-XL) 25 mg XL tablet Take 25 mg by mouth daily.  gabapentin (NEURONTIN) 300 mg capsule Take 1 Cap by mouth three (3) times daily. Lab Results   Component Value Date/Time    Hemoglobin A1c 7.7 (H) 03/21/2019 10:38 AM    Hemoglobin A1c 7.3 (H) 07/30/2018 10:04 AM    Hemoglobin A1c 7.3 (H) 07/13/2013 08:05 AM    Hemoglobin A1c, External 7.8 12/10/2019    Hemoglobin A1c, External 5.7 11/04/2017    Hemoglobin A1c, External 6.8 11/08/2016    Glucose 137 (H) 04/03/2019 10:46 PM    Glucose (POC) 179 (H) 04/03/2019 06:17 AM    Microalb/Creat ratio (ug/mg creat.) 2.3 07/13/2013 08:05 AM    LDL, calculated 59 07/13/2013 08:05 AM    Creatinine (POC) 0.8 01/29/2019 11:24 AM    Creatinine 1.01 04/03/2019 10:46 PM      Lab Results   Component Value Date/Time    Cholesterol, total 128 07/13/2013 08:05 AM    HDL Cholesterol 40 07/13/2013 08:05 AM    LDL, calculated 59 07/13/2013 08:05 AM    LDL-C, External 49 12/10/2019    Triglyceride 143 07/13/2013 08:05 AM     Lab Results   Component Value Date/Time    ALT (SGPT) 24 04/03/2019 10:46 PM    AST (SGOT) 16 04/03/2019 10:46 PM    Alk.  phosphatase 62 04/03/2019 10:46 PM    Bilirubin, direct 0.09 06/13/2018 09:42 AM    Bilirubin, total 0.4 04/03/2019 10:46 PM    Albumin 3.3 (L) 04/03/2019 10:46 PM    Protein, total 6.7 04/03/2019 10:46 PM    INR 0.9 07/30/2018 10:04 AM    Prothrombin time 9.6 07/30/2018 10:04 AM    PLATELET 655 04/78/9036 10:46 PM     Lab Results   Component Value Date/Time    GFR est non-AA >60 04/03/2019 10:46 PM    GFRNA, POC >60 01/29/2019 11:24 AM    GFR est AA >60 04/03/2019 10:46 PM    GFRAA, POC >60 01/29/2019 11:24 AM    Creatinine 1.01 04/03/2019 10:46 PM    Creatinine (POC) 0.8 01/29/2019 11:24 AM    BUN 11 04/03/2019 10:46 PM    Sodium 140 04/03/2019 10:46 PM    Potassium 3.6 04/03/2019 10:46 PM    Chloride 108 04/03/2019 10:46 PM    CO2 24 04/03/2019 10:46 PM        Review of Systems  Pertinent items are noted in HPI. Objective: There were no vitals taken for this visit. Appearance: alert, well appearing, and in no distress and oriented to person, place, and time. Exam:   Neck - nml appearance  Lungs - nml effort and resp rate  Left shoulder - decreased rom actively reaching overhead. Assessment/Plan:     diabetes improved controll. Diabetic issues reviewed with him: continue diet and exercise  Continue same meds  F/u . Dr. Silvino Gordon    htn - had not recently checked at home but at cardiac rehab was running nml  Continue same meds  Encouraged to check BP weekly    Hyperlipidemia - controlled in past  Continue same meds    CAD - controlled,  Cont same meds. Continue exercise    Left rotator cuff strain - stretching exercises, restart diclofenac gel. Hold oral nsaids secondary to recent PCI and on plavix + ASA    No orders of the defined types were placed in this encounter.

## 2020-04-08 ENCOUNTER — APPOINTMENT (OUTPATIENT)
Dept: CARDIAC REHAB | Age: 70
End: 2020-04-08

## 2020-04-08 ENCOUNTER — TELEPHONE (OUTPATIENT)
Dept: CARDIAC REHAB | Age: 70
End: 2020-04-08

## 2020-04-08 NOTE — TELEPHONE ENCOUNTER
Spoke with Mercedes gOden to discuss nutrition therapy for the management of CAD and DM, including specifically as it relates to COVID-19 isolation and potential lack of access to desirable food choices. Mercedes Ogden reported they are cooking at home now and using grocery delivery services. Wife stated she just made a soup using no salt added canned tomatoes and no salt added broth. BG are staying within desirable range. Provided educational and psychosocial support for patient and answered any questions. Reinforced heart healthy diet and BG control. Patient is interested in receiving pantry staple heart healthy recipes by email. Follow-up arrangements discussed and RD contact info provided.     Nathan Robles RD

## 2020-04-09 ENCOUNTER — TELEPHONE (OUTPATIENT)
Dept: CARDIAC REHAB | Age: 70
End: 2020-04-09

## 2020-04-10 ENCOUNTER — APPOINTMENT (OUTPATIENT)
Dept: CARDIAC REHAB | Age: 70
End: 2020-04-10

## 2020-04-15 ENCOUNTER — APPOINTMENT (OUTPATIENT)
Dept: CARDIAC REHAB | Age: 70
End: 2020-04-15

## 2020-04-17 ENCOUNTER — APPOINTMENT (OUTPATIENT)
Dept: CARDIAC REHAB | Age: 70
End: 2020-04-17

## 2020-04-22 ENCOUNTER — APPOINTMENT (OUTPATIENT)
Dept: CARDIAC REHAB | Age: 70
End: 2020-04-22

## 2020-04-22 ENCOUNTER — TELEPHONE (OUTPATIENT)
Dept: CARDIAC REHAB | Age: 70
End: 2020-04-22

## 2020-04-22 NOTE — TELEPHONE ENCOUNTER
Spoke with Komal Jolly regarding Cardiac Rehab weekly support call. Provided psychosocial support for patient, and answered any questions. Komal Jolly reported he is walking 6 days a week. His wife is cooking daily which is something she had not been doing for several years. He denies any questions / concerns other than when rehab will reopen Assured patient we will be in touch with any updates on reopen date. .Will follow up with patient.

## 2020-04-24 ENCOUNTER — APPOINTMENT (OUTPATIENT)
Dept: CARDIAC REHAB | Age: 70
End: 2020-04-24

## 2020-04-29 ENCOUNTER — APPOINTMENT (OUTPATIENT)
Dept: CARDIAC REHAB | Age: 70
End: 2020-04-29

## 2020-05-01 ENCOUNTER — APPOINTMENT (OUTPATIENT)
Dept: CARDIAC REHAB | Age: 70
End: 2020-05-01

## 2020-05-05 ENCOUNTER — TELEPHONE (OUTPATIENT)
Dept: CARDIAC REHAB | Age: 70
End: 2020-05-05

## 2020-05-05 NOTE — TELEPHONE ENCOUNTER
Call placed to pt regarding Cardiac Rehab support at this time. Left message for patient to call back with any questions about exercise, stress management, or nutrition. Will attempt to call patient next week.

## 2020-05-06 ENCOUNTER — APPOINTMENT (OUTPATIENT)
Dept: CARDIAC REHAB | Age: 70
End: 2020-05-06

## 2020-05-12 ENCOUNTER — TELEPHONE (OUTPATIENT)
Dept: CARDIOLOGY CLINIC | Age: 70
End: 2020-05-12

## 2020-05-12 NOTE — TELEPHONE ENCOUNTER
5/12/2020 at 10:46- home call to  5/20 11:40 in office w/Dr. Sana Kinsey or telephone-same day, different time or different day & time

## 2020-05-13 ENCOUNTER — HOSPITAL ENCOUNTER (OUTPATIENT)
Dept: MRI IMAGING | Age: 70
Discharge: HOME | End: 2020-05-13
Attending: ORTHOPAEDIC SURGERY
Payer: MEDICARE

## 2020-05-13 ENCOUNTER — TELEPHONE (OUTPATIENT)
Dept: CARDIOLOGY CLINIC | Age: 70
End: 2020-05-13

## 2020-05-13 DIAGNOSIS — M75.102 ROTATOR CUFF SYNDROME OF LEFT SHOULDER: ICD-10-CM

## 2020-05-13 PROCEDURE — 73221 MRI JOINT UPR EXTREM W/O DYE: CPT

## 2020-05-13 NOTE — TELEPHONE ENCOUNTER
Please call Mr. Nicolas Clark at [phone].   While I was speaking with him about rescheduling his 5/20 appointment with Dr. Cooper Jensen his wife was questioning whether or not he should have anything done before 5/19 virtual visit with Dr. Coopre Jensen.     Thank you,     Henri harvey

## 2020-05-13 NOTE — TELEPHONE ENCOUNTER
Returned call to patient. Two patient indentifiers verified. Pt was informed that nothing needs to be done prior to the appointment. Pt verbalized understanding and denies any further questions at this time.

## 2020-05-19 ENCOUNTER — VIRTUAL VISIT (OUTPATIENT)
Dept: CARDIOLOGY CLINIC | Age: 70
End: 2020-05-19

## 2020-05-19 VITALS — HEIGHT: 70 IN | BODY MASS INDEX: 28.2 KG/M2 | WEIGHT: 197 LBS

## 2020-05-19 DIAGNOSIS — I25.10 CORONARY ARTERY DISEASE INVOLVING NATIVE CORONARY ARTERY OF NATIVE HEART WITHOUT ANGINA PECTORIS: Primary | ICD-10-CM

## 2020-05-19 DIAGNOSIS — Z87.891 HISTORY OF TOBACCO USE: ICD-10-CM

## 2020-05-19 DIAGNOSIS — I10 BENIGN ESSENTIAL HTN: ICD-10-CM

## 2020-05-19 NOTE — PROGRESS NOTES
VIRTUAL VISIT DOCUMENTATION     Pursuant to the emergency declaration under the [facility] waiver authority and the [email or web address] and [employer] Act, this Virtual  Visit was conducted, with patient's consent, to reduce the patient's risk of exposure to COVID-19 and provide continuity of care for an established patient. Services were provided through a video synchronous discussion virtually to substitute for in-person clinic visit. CHIEF COMPLAINT      Chapincito Mukherjee is a 68 y.o. male who was seen by synchronous (real-time) audio-video technology on 5/19/2020. Mr. Serg Jimenez is a 67 yo M with CAD, s/p PCI 12/24/19 when they were traveling in [place]. He had left sided upper abdominal pain and went to the hospital.  His cardiac enzymes were negative, but given his symptoms they proceeded with a cardiac catheterization. Cath also noted some moderate plaquing otherwise. Since his last visit, he overall continues to do well. No exertional chest pain or shortness of breath, lightheadedness or dizziness. He gets occasional gastroesophageal reflux symptoms. He does have issues with neuropathy of his legs. This does limit somewhat his exercise. He sees Dr. Roslyn Neville for his shoulder and they talked about possible surgery at some point. He also might need cataract surgery in the near future. Soc hx. Quit tobacco 1980. 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 Hiro Hassan  Fam hx. Dad with CAD. Assessment and Plan:    1. Coronary artery disease. Stable and compensated. Continue aspirin and Plavix for the first year after a stent. Plavix can be stopped in December. Continue statin and beta blocker. Will have him follow back in six months. 2. Preop cardiac evaluation. With regard to urgent or emergent procedures, he is greater than six months post stent and Plavix can be held if needed.   For elective procedures, recommend continuing Plavix for a year and then stopping at that point. Aspirin cannot be stopped for surgeries or procedures given that he is less than one year post stent. 3. Tobacco use. Quit many years ago. 4. Back surgery in 2018 with diskectomy. 5. Prostate cancer, status post surgery in 04/2019. HISTORY OF PRESENTING ILLNESS      Luis Reid is a 68 y.o. male      ASSESSMENT/PLAN:         We discussed the expected course, resolution and complications of the diagnosis(es) in detail. Medication risks, benefits, costs, interactions, and alternatives were discussed as indicated. I advised him to contact the office if his condition worsens, changes or fails to improve as anticipated.  He expressed understanding with the diagnosis(es) and plan       ACTIVE PROBLEM LIST     Patient Active Problem List    Diagnosis Date Noted    Type 2 diabetes mellitus with diabetic neuropathy ([address] 10/23/2019    Prostate cancer ([address] 04/02/2019    HNP (herniated nucleus pulposus) 08/16/2018    Severe obstructive sleep apnea 10/27/2016    History of colonoscopy 02/11/2016    Posterior subcapsular polar senile cataract 02/08/2016    Inflammation of eyelid 02/08/2016    Incomplete RBBB 01/19/2015    Erectile dysfunction 08/15/2014    BPH (benign prostatic hyperplasia) 08/15/2014    Osteopenia 03/14/2014    Gout attack 03/14/2014    Functional abdominal pain syndrome 03/14/2014    Collagenous colitis 03/14/2014    Peripheral neuropathy ([address] 11/19/2012    Hyperlipidemia with target LDL less than 100     Benign essential hypertension 09/15/2011    Diabetes mellitus type 2, controlled ([address] 09/15/2011    GERD without esophagitis 09/15/2011           PAST MEDICAL HISTORY     Past Medical History:   Diagnosis Date    Arthritis     BACK    BPH (benign prostatic hyperplasia) 2013    CAD (coronary artery disease)     Cancer ([address] 2015    SKIN - HEAD AND FACE    Cancer ([address] 01/2019    PROSTATE    Chronic pain     BACK    Chronic pelvic pain in male     sensitive to touch on the left side down to the left side of the penis    Collagenous colitis 2011    Diabetes mellitus type 2, controlled, with complications ([facility] 6497    Diabetic neuropathy ([facility] 2006    Diverticulosis     ED (erectile dysfunction)     Dr. Jayesh Concepcion GERD (gastroesophageal reflux disease) 1990    Gout     1 episode    Hypertension 1970    Ill-defined condition     BILATERAL PERIPHERAL NEUROPATHY BOTH LEGS    Low serum testosterone level     Dr. Khadar Gtz Osteopenia     Dr. Khadar Gtz Psoriasis     Sleep apnea     USES CPAP    Sun-damaged skin            PAST SURGICAL HISTORY     Past Surgical History:   Procedure Laterality Date    CARDIAC SURG PROCEDURE UNLIST  12/24/2019    1 stent    EMG TWO EXTREMITIES LOWER  8/24/2013         ENDOSCOPY, COLON, DIAGNOSTIC      HX COLONOSCOPY      HX HEENT  2017    WISDOM TEETH X4    HX HERNIA REPAIR      x2 bilateral inguinal    HX ORTHOPAEDIC  08/2018    L4-5, L5-S1 DISKECTOMY    HX OTHER SURGICAL  2015,2017    MOHS    HX PROSTATE SURGERY  04/02/2019    removed    NCV SENSORY OR MIXED  8/24/2013               ALLERGIES     Allergies   Allergen Reactions    Pcn [Penicillins] Other (comments)     Infancy was told he had a rash          FAMILY HISTORY     Family History   Problem Relation Age of Onset    Diabetes Father     Heart Disease Father 62        at least 2 MI's    Stroke Father         x2    Cancer Mother         Bladder    Parkinson's Disease Mother     Hypertension Mother     Hypertension Sister     Diabetes Paternal Grandmother     Diabetes Other         cousins    Anesth Problems Neg Hx     negative for cardiac disease       SOCIAL HISTORY     Social History     Socioeconomic History    Marital status:      Spouse name: Not on file    Number of children: Not on file    Years of education: Not on file    Highest education level: Not on file Occupational History    Occupation:      Employer: OTHER   Tobacco Use    Smoking status: Former Smoker     Packs/day: 1.00     Years: 12.00     Pack years: 12.00     Last attempt to quit: 1980     Years since quittin.1    Smokeless tobacco: Never Used   Substance and Sexual Activity    Alcohol use: Yes     Alcohol/week: 0.0 standard drinks     Frequency: Monthly or less     Drinks per session: 1 or 2     Binge frequency: Never     Comment: rarely    Drug use: No    Sexual activity: Yes     Partners: Female   Other Topics Concern   Social History Narrative    Lives in [place] with wife of 39 years. Has 2 daughters. Works as a  for [employer]. His wife owns a dance school. MEDICATIONS     Current Outpatient Medications   Medication Sig    metFORMIN (GLUCOPHAGE) 1,000 mg tablet Take 1,000 mg by mouth two (2) times daily (with meals).  calcium-cholecalciferol, d3, (CALCIUM 600 + D) 600-125 mg-unit tab Take 1 Tab by mouth daily.  aspirin 81 mg chewable tablet Take 81 mg by mouth daily.  diclofenac (VOLTAREN) 1 % gel Apply 1 g to affected area as needed.  pantoprazole (PROTONIX) 40 mg granules for oral suspension Take 40 mg by mouth two (2) times a day.  clopidogrel (PLAVIX) 75 mg tab TAKE 1 TABLET BY MOUTH EVERY DAY    atorvastatin (LIPITOR) 40 mg tablet TAKE 1 TABLET BY MOUTH EVERYDAY AT BEDTIME    amLODIPine (NORVASC) 2.5 mg tablet TAKE 1 TABLET BY MOUTH EVERY DAY    NOVOLOG FLEXPEN U-100 INSULIN 100 unit/mL (3 mL) inpn Varies depending on meals    halobetasol (ULTRAVATE) 0.05 % topical cream as needed.  nitroglycerin (NITROSTAT) 0.4 mg SL tablet 1 Tab by SubLINGual route every five (5) minutes as needed for Chest Pain. Up to 3 doses.  TRULICITY 1.5 CY/1.3 mL sub-q pen 1.5 mg by SubCUTAneous route every seven (7) days.     ergocalciferol (ERGOCALCIFEROL) 50,000 unit capsule TAKE ONE CAPSULE BY MOUTH EVERY WEEK    FARXIGA 10 mg tab tablet 1 tablet by mouth daily    cyanocobalamin 1,000 mcg tablet Take 1,000 mcg by mouth.  tadalafil (CIALIS) 5 mg tablet Take 5 mg by mouth daily.  insulin glargine (BASAGLAR KWIKPEN U-100 INSULIN) 100 unit/mL (3 mL) inpn 30 Units by SubCUTAneous route nightly. As directed     metoprolol succinate (TOPROL-XL) 25 mg XL tablet Take 25 mg by mouth daily.  gabapentin (NEURONTIN) 300 mg capsule Take 1 Cap by mouth three (3) times daily. No current facility-administered medications for this visit. I have reviewed the nurses notes, vitals, problem list, allergy list, medical history, family, social history and medications. REVIEW OF SYMPTOMS     Constitutional: Negative for fever, chills, malaise/fatigue and diaphoresis. Respiratory: Negative for cough, hemoptysis, sputum production, shortness of breath and wheezing. Cardiovascular: Negative for chest pain, palpitations, orthopnea, claudication, leg swelling and PND. Gastrointestinal: Negative for heartburn, nausea, vomiting, blood in stool and melena. Genitourinary: Negative for dysuria and flank pain. Musculoskeletal: Negative for joint pain and back pain. Skin: Negative for rash. Neurological: Negative for focal weakness, seizures, loss of consciousness, weakness and headaches. Endo/Heme/Allergies: Negative for abnormal bleeding. Psychiatric/Behavioral: Negative for memory loss. PHYSICAL EXAMINATION      Due to this being a TeleHealth evaluation, many elements of the physical examination are unable to be assessed. General: Well developed, in no acute distress, cooperative and alert  HEENT: Pupils equal/round. No marked JVD visible on video. Respiratory: No audible wheezing, no signs of respiratory distress, lips non cyanotic  Extremities:  No edema  Neuro: A&Ox3, speech clear, no facial droop, answering questions appropriately  Skin: Skin color is normal. No rashes or lesions.  Non diaphoretic on visible skin during exam       DIAGNOSTIC DATA      No specialty comments available. LABORATORY DATA      Lab Results   Component Value Date/Time    WBC 6.7 04/03/2019 10:46 PM    HGB 11.7 (L) 04/03/2019 10:46 PM    HCT 35.3 (L) 04/03/2019 10:46 PM    PLATELET 513 28/63/3906 10:46 PM    MCV 93.1 04/03/2019 10:46 PM      Lab Results   Component Value Date/Time    Sodium 140 04/03/2019 10:46 PM    Potassium 3.6 04/03/2019 10:46 PM    Chloride 108 04/03/2019 10:46 PM    CO2 24 04/03/2019 10:46 PM    Anion gap 8 04/03/2019 10:46 PM    Glucose 137 (H) 04/03/2019 10:46 PM    BUN 11 04/03/2019 10:46 PM    Creatinine 1.01 04/03/2019 10:46 PM    BUN/Creatinine ratio 11 (L) 04/03/2019 10:46 PM    GFR est AA >60 04/03/2019 10:46 PM    GFR est non-AA >60 04/03/2019 10:46 PM    Calcium 8.4 (L) 04/03/2019 10:46 PM    Bilirubin, total 0.4 04/03/2019 10:46 PM    AST (SGOT) 16 04/03/2019 10:46 PM    Alk. phosphatase 62 04/03/2019 10:46 PM    Protein, total 6.7 04/03/2019 10:46 PM    Albumin 3.3 (L) 04/03/2019 10:46 PM    Globulin 3.4 04/03/2019 10:46 PM    A-G Ratio 1.0 (L) 04/03/2019 10:46 PM    ALT (SGPT) 24 04/03/2019 10:46 PM            Patient was made aware and verbalized understanding that an appointment will be scheduled for them for a virtual visit and/or office visit within the above time frame. Patient understanding his/her responsibility to call and change time/date if he/she so chooses. Greater than 20 minutes was spent in direct video patient care, planning and chart review. This visit was conducted using [employer] services.        Julián Orozco MD    [address] [place], [address] [place] [address],[address]  David Hawkins  [phone] / [phone] Fax

## 2020-06-19 ENCOUNTER — TELEPHONE (OUTPATIENT)
Dept: CARDIAC REHAB | Age: 70
End: 2020-06-19

## 2020-06-19 NOTE — TELEPHONE ENCOUNTER
6/19/2020: Cardiac Rehab: Called Mr. Prachi Tyson to discuss returning to CR since COVID19 closure. We have different hours currently and would like to get Mr. Prachi Tyson back on the schedule. Mr. Prachi Tyson is without question or concerns at this time. He is currently on the fence about returning and wants to wait a few more weeks. Mr. Nicolas Clark said he would call us when he is ready to return.  Clem Steve

## 2020-06-24 RX ORDER — AMLODIPINE BESYLATE 2.5 MG/1
TABLET ORAL
Qty: 90 TAB | Refills: 0 | Status: SHIPPED | OUTPATIENT
Start: 2020-06-24 | End: 2020-12-01

## 2020-06-24 RX ORDER — AMLODIPINE BESYLATE 2.5 MG/1
TABLET ORAL
Qty: 90 TAB | Refills: 0 | Status: SHIPPED | OUTPATIENT
Start: 2020-06-24 | End: 2020-11-18 | Stop reason: SDUPTHER

## 2020-08-03 ENCOUNTER — OFFICE VISIT (OUTPATIENT)
Dept: INTERNAL MEDICINE CLINIC | Age: 70
End: 2020-08-03
Payer: MEDICARE

## 2020-08-03 DIAGNOSIS — M77.8 THUMB TENDONITIS: Primary | ICD-10-CM

## 2020-08-03 PROCEDURE — 3017F COLORECTAL CA SCREEN DOC REV: CPT | Performed by: INTERNAL MEDICINE

## 2020-08-03 PROCEDURE — 99213 OFFICE O/P EST LOW 20 MIN: CPT | Performed by: INTERNAL MEDICINE

## 2020-08-03 PROCEDURE — 1101F PT FALLS ASSESS-DOCD LE1/YR: CPT | Performed by: INTERNAL MEDICINE

## 2020-08-03 PROCEDURE — G8427 DOCREV CUR MEDS BY ELIG CLIN: HCPCS | Performed by: INTERNAL MEDICINE

## 2020-08-03 PROCEDURE — G8536 NO DOC ELDER MAL SCRN: HCPCS | Performed by: INTERNAL MEDICINE

## 2020-08-03 PROCEDURE — G8417 CALC BMI ABV UP PARAM F/U: HCPCS | Performed by: INTERNAL MEDICINE

## 2020-08-03 PROCEDURE — G8756 NO BP MEASURE DOC: HCPCS | Performed by: INTERNAL MEDICINE

## 2020-08-03 PROCEDURE — G8510 SCR DEP NEG, NO PLAN REQD: HCPCS | Performed by: INTERNAL MEDICINE

## 2020-08-03 NOTE — PROGRESS NOTES
1. Have you been to the ER, urgent care clinic since your last visit? Hospitalized since your last visit? No    2. Have you seen or consulted any other health care providers outside of the [address] [place] [address] since your last visit? Include any pap smears or colon screening. Yes  Chief Complaint   Patient presents with    Hand Pain     swollen and painful to touch.  if touch pain is 5 out of 10

## 2020-08-03 NOTE — PROGRESS NOTES
Mckenna Elder, who was evaluated through a synchronous (real-time) audio-video encounter, and/or his healthcare decision maker, is aware that it is a billable service, with coverage as determined by his insurance carrier. He provided verbal consent to proceed: Yes, and patient identification was verified. It was conducted pursuant to the emergency declaration under the [address] [place] [address] and the [employer] Act. A caregiver was present when appropriate. Ability to conduct physical exam was limited. I was at home. The patient was at home. Mckenna Elder is a 68 y.o. male being evaluated by a Virtual Visit (video visit) encounter to address concerns as mentioned above. A caregiver was present when appropriate. Due to this being a TeleHealth encounter (During NTCVS-06 public health emergency), evaluation of the following organ systems was limited: Vitals/Constitutional/EENT/Resp/CV/GI//MS/Neuro/Skin/Heme-Lymph-Imm. Pursuant to the emergency declaration under the [address] [place] [address] and the [employer] Act, this Virtual Visit was conducted with patient's (and/or legal guardian's) consent, to reduce the risk of exposure to COVID-19 and provide necessary medical care. Services were provided through a video synchronous discussion virtually to substitute for in-person encounter. --Kendrick Landers MD on 8/3/2020 at 12:55 PM    An electronic signature was used to authenticate this note. HISTORY OF PRESENT ILLNESS  Mckenna Elder is a 68 y.o. male. HPI  Base of left thumb painful x 1 month. No fall or injury. Tender to touch, swollen and worse over the weekend. Radiates down into wrist and forearm. Tylenol does not help. Tried diclofenac gel briefly without improvement.     Had also pain in left shoulder. Seeing Frankie Valdez. Had several injections without improvement. Had MRI which showed inflammation only but rotator cough was not torn or damaged. Current Outpatient Medications:     amLODIPine (NORVASC) 2.5 mg tablet, TAKE 1 TABLET BY MOUTH EVERY DAY, Disp: 90 Tab, Rfl: 0    metFORMIN (GLUCOPHAGE) 1,000 mg tablet, Take 1,000 mg by mouth two (2) times daily (with meals). , Disp: , Rfl:     calcium-cholecalciferol, d3, (CALCIUM 600 + D) 600-125 mg-unit tab, Take 1 Tab by mouth daily. , Disp: , Rfl:     aspirin 81 mg chewable tablet, Take 81 mg by mouth daily. , Disp: , Rfl:     diclofenac (VOLTAREN) 1 % gel, Apply 1 g to affected area as needed. , Disp: , Rfl:     pantoprazole (PROTONIX) 40 mg granules for oral suspension, Take 40 mg by mouth two (2) times a day., Disp: 180 Each, Rfl: 0    clopidogrel (PLAVIX) 75 mg tab, TAKE 1 TABLET BY MOUTH EVERY DAY, Disp: 90 Tab, Rfl: 3    atorvastatin (LIPITOR) 40 mg tablet, TAKE 1 TABLET BY MOUTH EVERYDAY AT BEDTIME, Disp: 90 Tab, Rfl: 3    NOVOLOG FLEXPEN U-100 INSULIN 100 unit/mL (3 mL) inpn, Varies depending on meals, Disp: , Rfl:     halobetasol (ULTRAVATE) 0.05 % topical cream, as needed. , Disp: , Rfl:     nitroglycerin (NITROSTAT) 0.4 mg SL tablet, 1 Tab by SubLINGual route every five (5) minutes as needed for Chest Pain. Up to 3 doses. , Disp: 50 Tab, Rfl: 1    TRULICITY 1.5 FL/3.6 mL sub-q pen, 1.5 mg by SubCUTAneous route every seven (7) days. , Disp: , Rfl:     ergocalciferol (ERGOCALCIFEROL) 50,000 unit capsule, TAKE ONE CAPSULE BY MOUTH EVERY WEEK, Disp: , Rfl: 2    FARXIGA 10 mg tab tablet, 1 tablet by mouth daily, Disp: , Rfl:     cyanocobalamin 1,000 mcg tablet, Take 1,000 mcg by mouth., Disp: , Rfl:     tadalafil (CIALIS) 5 mg tablet, Take 5 mg by mouth daily. , Disp: , Rfl:     insulin glargine (BASAGLAR KWIKPEN U-100 INSULIN) 100 unit/mL (3 mL) inpn, 30 Units by SubCUTAneous route nightly.  As directed , Disp: , Rfl:   metoprolol succinate (TOPROL-XL) 25 mg XL tablet, Take 25 mg by mouth daily. , Disp: , Rfl:     gabapentin (NEURONTIN) 300 mg capsule, Take 1 Cap by mouth three (3) times daily. , Disp: , Rfl:     amLODIPine (NORVASC) 2.5 mg tablet, TAKE 1 TABLET BY MOUTH EVERY DAY, Disp: 90 Tab, Rfl: 0    There were no vitals taken for this visit. ROS  See above  Physical Exam  Vitals signs reviewed. HENT:      Head: Normocephalic and atraumatic. Musculoskeletal:      Comments: Left hand/thumb - mild swelling over thenar muscles with tenderness to touch. Good rom of thumb although pain. Pain with wrist flexion. Pronation and supination without pain. Neurological:      Mental Status: He is alert. ASSESSMENT and PLAN  Thumb tendonitis, left - diclofenac gel 2g qid, stretching exercises. No orders of the defined types were placed in this encounter.

## 2020-08-03 NOTE — PATIENT INSTRUCTIONS
Thumb Sprain: Rehab Exercises Introduction Here are some examples of exercises for you to try. The exercises may be suggested for a condition or for rehabilitation. Start each exercise slowly. Ease off the exercises if you start to have pain. You will be told when to start these exercises and which ones will work best for you. How to do the exercises Thumb IP flexion 1. Place your forearm and hand on a table with your affected thumb pointing up. 2. With your other hand, hold your thumb steady just below the joint nearest your thumbnail. 3. Bend the tip of your thumb downward, then straighten it. 4. Repeat 8 to 12 times. Thumb MP flexion 1. Place your forearm and hand on a table with your affected thumb pointing up. 2. With your other hand, hold the base of your thumb and palm steady. 3. Bend your thumb downward where it meets your palm, then straighten it. 4. Repeat 8 to 12 times. Thumb opposition 1. With your affected hand, point your fingers and thumb straight up. Your wrist should be relaxed, following the line of your fingers and thumb. 2. Touch your affected thumb to each finger, one finger at a time. This will look like an \"okay\" sign, but try to keep your other fingers straight and pointing upward as much as you can. 3. Repeat 8 to 12 times. Follow-up care is a key part of your treatment and safety. Be sure to make and go to all appointments, and call your doctor if you are having problems. It's also a good idea to know your test results and keep a list of the medicines you take. Where can you learn more? Go to http://garo-kelle.info/ Enter D278 in the search box to learn more about \"Thumb Sprain: Rehab Exercises. \" Current as of: March 2, 2020               Content Version: 12.5 © 1916-5261 [employer]wise, [employer].   
Care instructions adapted under license by [employer] (which disclaims liability or warranty for this information). If you have questions about a medical condition or this instruction, always ask your healthcare professional. [place] [address] any warranty or liability for your use of this information. Restart Diclofenac Gel 2gm 4 times a day.

## 2020-09-11 ENCOUNTER — TELEPHONE (OUTPATIENT)
Dept: CARDIAC REHAB | Age: 70
End: 2020-09-11

## 2020-09-11 NOTE — TELEPHONE ENCOUNTER
9/11/2020: Cardiac Rehab: Called Mr. Abdoul Mancuso to discuss returning to [facility] since COVID19 closure. Left voicemail message for him to let us know if he still wants to return or not, I mentioned that my next call would be a final call. If we do not hear back then we will discharge him. Adri Romano     6/19/2020: Cardiac Rehab: Called Mr. Abdoul Mancuso to discuss returning to [facility] since [address] closure. We have different hours currently and would like to get Mr. Abdoul Mancuso back on the schedule. Mr. Abdoul Mancuso is without question or concerns at this time. He is currently on the fence about returning and wants to wait a few more weeks. . Jose Luis Orange said he would call us when he is ready to return.  Adri Romano      Electronically signed by Isabel Peace at 06/19/20 [id number]

## 2020-09-22 ENCOUNTER — HOSPITAL ENCOUNTER (OUTPATIENT)
Dept: MAMMOGRAPHY | Age: 70
Discharge: HOME | End: 2020-09-22
Attending: INTERNAL MEDICINE
Payer: MEDICARE

## 2020-09-22 DIAGNOSIS — M81.0 POSTMENOPAUSAL OSTEOPOROSIS: ICD-10-CM

## 2020-09-22 PROCEDURE — 77080 DXA BONE DENSITY AXIAL: CPT

## 2020-11-18 ENCOUNTER — OFFICE VISIT (OUTPATIENT)
Dept: CARDIOLOGY CLINIC | Age: 70
End: 2020-11-18
Payer: MEDICARE

## 2020-11-18 VITALS
OXYGEN SATURATION: 97 % | HEIGHT: 67 IN | DIASTOLIC BLOOD PRESSURE: 60 MMHG | TEMPERATURE: 98.6 F | RESPIRATION RATE: 18 BRPM | SYSTOLIC BLOOD PRESSURE: 100 MMHG | BODY MASS INDEX: 33.27 KG/M2 | WEIGHT: 212 LBS | HEART RATE: 78 BPM

## 2020-11-18 DIAGNOSIS — Z85.46 HISTORY OF PROSTATE CANCER: ICD-10-CM

## 2020-11-18 DIAGNOSIS — I10 BENIGN ESSENTIAL HTN: ICD-10-CM

## 2020-11-18 DIAGNOSIS — I25.10 CORONARY ARTERY DISEASE INVOLVING NATIVE CORONARY ARTERY OF NATIVE HEART WITHOUT ANGINA PECTORIS: Primary | ICD-10-CM

## 2020-11-18 DIAGNOSIS — Z87.891 HISTORY OF TOBACCO USE: ICD-10-CM

## 2020-11-18 PROCEDURE — G8754 DIAS BP LESS 90: HCPCS | Performed by: INTERNAL MEDICINE

## 2020-11-18 PROCEDURE — G8510 SCR DEP NEG, NO PLAN REQD: HCPCS | Performed by: INTERNAL MEDICINE

## 2020-11-18 PROCEDURE — 1101F PT FALLS ASSESS-DOCD LE1/YR: CPT | Performed by: INTERNAL MEDICINE

## 2020-11-18 PROCEDURE — G0463 HOSPITAL OUTPT CLINIC VISIT: HCPCS | Performed by: INTERNAL MEDICINE

## 2020-11-18 PROCEDURE — G8752 SYS BP LESS 140: HCPCS | Performed by: INTERNAL MEDICINE

## 2020-11-18 PROCEDURE — G8417 CALC BMI ABV UP PARAM F/U: HCPCS | Performed by: INTERNAL MEDICINE

## 2020-11-18 PROCEDURE — 3017F COLORECTAL CA SCREEN DOC REV: CPT | Performed by: INTERNAL MEDICINE

## 2020-11-18 PROCEDURE — G8427 DOCREV CUR MEDS BY ELIG CLIN: HCPCS | Performed by: INTERNAL MEDICINE

## 2020-11-18 PROCEDURE — G8536 NO DOC ELDER MAL SCRN: HCPCS | Performed by: INTERNAL MEDICINE

## 2020-11-18 PROCEDURE — 93005 ELECTROCARDIOGRAM TRACING: CPT | Performed by: INTERNAL MEDICINE

## 2020-11-18 PROCEDURE — 93010 ELECTROCARDIOGRAM REPORT: CPT | Performed by: INTERNAL MEDICINE

## 2020-11-18 PROCEDURE — 99214 OFFICE O/P EST MOD 30 MIN: CPT | Performed by: INTERNAL MEDICINE

## 2020-11-18 NOTE — PROGRESS NOTES
[address] [address]: [place]  [id number]    History of Present Illness:   Mr. Marisela Lutz is a 69 yo M with CAD, s/p PCI 12/24/19 when they were traveling in [place]. He had left sided upper abdominal pain and went to the hospital.  His cardiac enzymes were negative, but given his symptoms they proceeded with a cardiac catheterization. Cath also noted some moderate plaquing otherwise. Since his last visit, he has been doing well. He denies any chest pain, shortness of breath, palpitations. He has been having some issues with his shoulder, followed by Dr. Sue Cash. He is compensated on exam with clear lungs and no lower extremity edema. Soc hx. Quit tobacco 1980. 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 Louisa Kyles  Fam hx. Dad with CAD. Assessment and Plan:    1. Coronary artery disease. Stable and compensated. Continue aspirin, statin, beta blocker. He will be able to stop Plavix in December and we discussed this. Will have him follow back in six months. 2. Tobacco use. Quit many years ago. 3. Back surgery in 2018 with diskectomy. 4. Prostate cancer, status post surgery in 04/2019. He  has a past medical history of Arthritis, BPH (benign prostatic hyperplasia) (2013), CAD (coronary artery disease), Cancer ([address] (2015), Cancer ([address] (01/2019), Chronic pain, Chronic pelvic pain in male, Collagenous colitis (2011), Diabetes mellitus type 2, controlled, with complications ([address] (8918), Diabetic neuropathy ([address] (2006), Diverticulosis, ED (erectile dysfunction), GERD (gastroesophageal reflux disease) (1990), Gout, Hypertension (1970), Ill-defined condition, Low serum testosterone level, Osteopenia, Psoriasis, Sleep apnea, and Sun-damaged skin. He also has no past medical history of Arsenic suspected exposure, Family history of skin cancer, Pacemaker, Radiation exposure, Skin cancer, Sunburn, blistering, or Tanning bed exposure. All other systems negative except as above.      PE  Vitals:    11/18/20 1033   BP: 100/60   Pulse: 78   Resp: 18   Temp: 98.6 °F (37 °C)   SpO2: 97%   Weight: 212 lb (96.2 kg)   Height: 5' 7\" (1.702 m)    Body mass index is 33.2 kg/m².    General appearance - alert, well appearing, and in no distress  Mental status - affect appropriate to mood  Eyes - sclera anicteric, moist mucous membranes  Neck - supple, no JVD  Chest - clear to auscultation, no wheezes, rales or rhonchi  Heart - normal rate, regular rhythm, normal S1, S2, I/VI systolic murmur LUSB  Abdomen - soft, nontender, nondistended, no masses or organomegaly  Extremities - peripheral pulses normal, no pedal edema    Recent Labs:  Lab Results   Component Value Date/Time    Cholesterol, total 128 07/13/2013 08:05 AM    HDL Cholesterol 40 07/13/2013 08:05 AM    LDL, calculated 59 07/13/2013 08:05 AM    Triglyceride 143 07/13/2013 08:05 AM     Lab Results   Component Value Date/Time    Creatinine (POC) 0.8 01/29/2019 11:24 AM    Creatinine 1.01 04/03/2019 10:46 PM     Lab Results   Component Value Date/Time    BUN 11 04/03/2019 10:46 PM     Lab Results   Component Value Date/Time    Potassium 3.6 04/03/2019 10:46 PM     Lab Results   Component Value Date/Time    Hemoglobin A1c 7.7 (H) 03/21/2019 10:38 AM    Hemoglobin A1c, External 7.8 12/10/2019     Lab Results   Component Value Date/Time    HGB 11.7 (L) 04/03/2019 10:46 PM     Lab Results   Component Value Date/Time    PLATELET 522 08/59/7796 10:46 PM       Reviewed:  Past Medical History:   Diagnosis Date    Arthritis     BACK    BPH (benign prostatic hyperplasia) 2013    CAD (coronary artery disease)     Cancer ([address] 2015    SKIN - HEAD AND FACE    Cancer ([address] 01/2019    PROSTATE    Chronic pain     BACK    Chronic pelvic pain in male     sensitive to touch on the left side down to the left side of the penis    Collagenous colitis 2011    Diabetes mellitus type 2, controlled, with complications ([address] 3116    Diabetic neuropathy ([address] 2006    Diverticulosis     ED (erectile dysfunction)     Dr. Prasanna Briones GERD (gastroesophageal reflux disease)     Gout     1 episode    Hypertension 1970    Ill-defined condition     BILATERAL PERIPHERAL NEUROPATHY BOTH LEGS    Low serum testosterone level     Dr. Jax La Osteopenia     Dr. Jax La Psoriasis     Sleep apnea     USES CPAP    Sun-damaged skin      Social History     Tobacco Use   Smoking Status Former Smoker    Packs/day: 1.00    Years: 12.00    Pack years: 12.00    Last attempt to quit: 1980    Years since quittin.6   Smokeless Tobacco Never Used     Social History     Substance and Sexual Activity   Alcohol Use Yes    Alcohol/week: 0.0 standard drinks    Frequency: Monthly or less    Drinks per session: 1 or 2    Binge frequency: Never    Comment: rarely     Allergies   Allergen Reactions    Pcn [Penicillins] Other (comments)     Infancy was told he had a rash         Current Outpatient Medications   Medication Sig    amLODIPine (NORVASC) 2.5 mg tablet TAKE 1 TABLET BY MOUTH EVERY DAY    metFORMIN (GLUCOPHAGE) 1,000 mg tablet Take 1,000 mg by mouth two (2) times daily (with meals).  calcium-cholecalciferol, d3, (CALCIUM 600 + D) 600-125 mg-unit tab Take 1 Tab by mouth daily.  aspirin 81 mg chewable tablet Take 81 mg by mouth daily.  diclofenac (VOLTAREN) 1 % gel Apply 1 g to affected area as needed.  pantoprazole (PROTONIX) 40 mg granules for oral suspension Take 40 mg by mouth two (2) times a day.  clopidogrel (PLAVIX) 75 mg tab TAKE 1 TABLET BY MOUTH EVERY DAY    atorvastatin (LIPITOR) 40 mg tablet TAKE 1 TABLET BY MOUTH EVERYDAY AT BEDTIME    NOVOLOG FLEXPEN U-100 INSULIN 100 unit/mL (3 mL) inpn Varies depending on meals    halobetasol (ULTRAVATE) 0.05 % topical cream as needed.  nitroglycerin (NITROSTAT) 0.4 mg SL tablet 1 Tab by SubLINGual route every five (5) minutes as needed for Chest Pain. Up to 3 doses.     TRULICITY 1.5 TN/2.0 mL sub-q pen 1.5 mg by SubCUTAneous route every seven (7) days.  ergocalciferol (ERGOCALCIFEROL) 50,000 unit capsule TAKE ONE CAPSULE BY MOUTH EVERY WEEK    FARXIGA 10 mg tab tablet 1 tablet by mouth daily    cyanocobalamin 1,000 mcg tablet Take 1,000 mcg by mouth.  tadalafil (CIALIS) 5 mg tablet Take 5 mg by mouth daily.  insulin glargine (BASAGLAR KWIKPEN U-100 INSULIN) 100 unit/mL (3 mL) inpn 30 Units by SubCUTAneous route nightly. As directed     metoprolol succinate (TOPROL-XL) 25 mg XL tablet Take 25 mg by mouth daily.  gabapentin (NEURONTIN) 300 mg capsule Take 1 Cap by mouth three (3) times daily. No current facility-administered medications for this visit.         Humberto Herron MD  [address] [facility] [place]  [address] [facility] [address]  [address]

## 2020-12-01 RX ORDER — AMLODIPINE BESYLATE 2.5 MG/1
TABLET ORAL
Qty: 90 TAB | Refills: 3 | Status: SHIPPED | OUTPATIENT
Start: 2020-12-01 | End: 2021-02-03

## 2020-12-01 NOTE — TELEPHONE ENCOUNTER
Requested Prescriptions     Signed Prescriptions Disp Refills    amLODIPine (NORVASC) 2.5 mg tablet 90 Tab 3     Sig: TAKE 1 TABLET BY MOUTH EVERY DAY     Authorizing Provider: Kirit Rogers     Ordering User: Elan Ivan       Last office visit 11/18/2020    Per Dr. Timur Vallecillo   Date Time Provider Eric Hicks   5/19/2021 10:40 AM MD RK Tamayo AMB

## 2020-12-21 RX ORDER — ATORVASTATIN CALCIUM 40 MG/1
TABLET, FILM COATED ORAL
Qty: 90 TAB | Refills: 3 | Status: SHIPPED | OUTPATIENT
Start: 2020-12-21

## 2020-12-21 RX ORDER — CLOPIDOGREL BISULFATE 75 MG/1
TABLET ORAL
Qty: 90 TAB | Refills: 3 | OUTPATIENT
Start: 2020-12-21

## 2020-12-21 NOTE — TELEPHONE ENCOUNTER
Requested Prescriptions     Signed Prescriptions Disp Refills    atorvastatin (LIPITOR) 40 mg tablet 90 Tab 3     Sig: TAKE 1 TABLET BY MOUTH EVERY DAY AT BEDTIME     Authorizing Provider: Santana Dean     Ordering User: Manuel Reyes       Last office visit 11/18/2020    Per Dr. Barron Marshall   Date Time Provider Eric Hicks   5/19/2021 10:40 AM MD RK Simons AMB

## 2021-01-14 NOTE — CARDIO/PULMONARY
Carlos Blackmon  65 y.o. With diagnosis of stent on 12/24/2019, attended phase II cardiac rehab for 14 sessions from 02/06/2020 - 03/13/2020. We have called Mr. Louisa Chaudhry several times and he has not returned our calls about returning to cardiac rehab .   He has been discharged from our program.    Leandro Castorena RN  1/14/2021

## 2021-01-18 ENCOUNTER — VIRTUAL VISIT (OUTPATIENT)
Dept: INTERNAL MEDICINE CLINIC | Age: 71
End: 2021-01-18
Payer: MEDICARE

## 2021-01-18 DIAGNOSIS — R60.0 BILATERAL LEG EDEMA: Primary | ICD-10-CM

## 2021-01-18 PROCEDURE — G8756 NO BP MEASURE DOC: HCPCS | Performed by: INTERNAL MEDICINE

## 2021-01-18 PROCEDURE — G8432 DEP SCR NOT DOC, RNG: HCPCS | Performed by: INTERNAL MEDICINE

## 2021-01-18 PROCEDURE — 99213 OFFICE O/P EST LOW 20 MIN: CPT | Performed by: INTERNAL MEDICINE

## 2021-01-18 PROCEDURE — G8536 NO DOC ELDER MAL SCRN: HCPCS | Performed by: INTERNAL MEDICINE

## 2021-01-18 PROCEDURE — 3017F COLORECTAL CA SCREEN DOC REV: CPT | Performed by: INTERNAL MEDICINE

## 2021-01-18 PROCEDURE — G8417 CALC BMI ABV UP PARAM F/U: HCPCS | Performed by: INTERNAL MEDICINE

## 2021-01-18 PROCEDURE — G0463 HOSPITAL OUTPT CLINIC VISIT: HCPCS | Performed by: INTERNAL MEDICINE

## 2021-01-18 PROCEDURE — 1101F PT FALLS ASSESS-DOCD LE1/YR: CPT | Performed by: INTERNAL MEDICINE

## 2021-01-18 PROCEDURE — G8427 DOCREV CUR MEDS BY ELIG CLIN: HCPCS | Performed by: INTERNAL MEDICINE

## 2021-01-18 RX ORDER — INSULIN LISPRO 100 [IU]/ML
INJECTION, SOLUTION INTRAVENOUS; SUBCUTANEOUS
COMMUNITY
End: 2021-09-14

## 2021-01-18 RX ORDER — FUROSEMIDE 20 MG/1
20 TABLET ORAL DAILY
Qty: 10 TAB | Refills: 0 | Status: SHIPPED | OUTPATIENT
Start: 2021-01-18 | End: 2021-01-29 | Stop reason: DRUGHIGH

## 2021-01-18 NOTE — PROGRESS NOTES
Bayron Ochoa, who was evaluated through a synchronous (real-time) audio-video encounter, and/or his healthcare decision maker, is aware that it is a billable service, with coverage as determined by his insurance carrier. He provided verbal consent to proceed: Yes, and patient identification was verified. It was conducted pursuant to the emergency declaration under the [address], [facility] and the [employer] Act. A caregiver was present when appropriate. Ability to conduct physical exam was limited. I was at home. The patient was at home. HISTORY OF PRESENT ILLNESS  Bayron Ochoa is a 65 y.o. male. HPI  bilat foot swelling for 2 weeks. Foot and ankle. + pitting. Does not resolve overnight. No change. No SOB, orthopnea. No chest pain or tightness. No recent BPs. No increase in salt. Walking 5 nights a week. Fluid intake is ok - urine is a darker yellow. No abdominal bloating. He has gained some weight 15lbs since March. Had CAD s/p PCI 12/24/2019. Seeing Dr. Venice Singh.        Current Outpatient Medications:     atorvastatin (LIPITOR) 40 mg tablet, TAKE 1 TABLET BY MOUTH EVERY DAY AT BEDTIME, Disp: 90 Tab, Rfl: 3    amLODIPine (NORVASC) 2.5 mg tablet, TAKE 1 TABLET BY MOUTH EVERY DAY, Disp: 90 Tab, Rfl: 3    metFORMIN (GLUCOPHAGE) 1,000 mg tablet, Take 1,000 mg by mouth two (2) times daily (with meals). , Disp: , Rfl:     calcium-cholecalciferol, d3, (CALCIUM 600 + D) 600-125 mg-unit tab, Take 1 Tab by mouth daily. , Disp: , Rfl:     aspirin 81 mg chewable tablet, Take 81 mg by mouth daily. , Disp: , Rfl:     pantoprazole (PROTONIX) 40 mg granules for oral suspension, Take 40 mg by mouth two (2) times a day., Disp: 180 Each, Rfl: 0    halobetasol (ULTRAVATE) 0.05 % topical cream, as needed. , Disp: , Rfl:     nitroglycerin (NITROSTAT) 0.4 mg SL tablet, 1 Tab by SubLINGual route every five (5) minutes as needed for Chest Pain. Up to 3 doses. , Disp: 50 Tab, Rfl: 1    TRULICITY 1.5 UW/6.3 mL sub-q pen, 1.5 mg by SubCUTAneous route every seven (7) days. , Disp: , Rfl:     ergocalciferol (ERGOCALCIFEROL) 50,000 unit capsule, TAKE ONE CAPSULE BY MOUTH EVERY WEEK, Disp: , Rfl: 2    cyanocobalamin 1,000 mcg tablet, Take 1,000 mcg by mouth., Disp: , Rfl:     tadalafil (CIALIS) 5 mg tablet, Take 5 mg by mouth daily. , Disp: , Rfl:     insulin glargine (BASAGLAR KWIKPEN U-100 INSULIN) 100 unit/mL (3 mL) inpn, 30 Units by SubCUTAneous route nightly. As directed , Disp: , Rfl:     metoprolol succinate (TOPROL-XL) 25 mg XL tablet, Take 25 mg by mouth daily. , Disp: , Rfl:     gabapentin (NEURONTIN) 300 mg capsule, Take 1 Cap by mouth three (3) times daily. , Disp: , Rfl:     diclofenac (VOLTAREN) 1 % gel, Apply 1 g to affected area as needed. , Disp: , Rfl:     FARXIGA 10 mg tab tablet, 1 tablet by mouth daily, Disp: , Rfl:     . Patient-Reported Vitals 1/18/2021   Patient-Reported Weight 215lb      ROS  See above  Physical Exam  Constitutional:       Appearance: Normal appearance. HENT:      Head: Normocephalic and atraumatic. Pulmonary:      Effort: Pulmonary effort is normal.      Comments: nml rate  Musculoskeletal:      Right lower leg: Edema (1+ edema to ankles. no redness.  ) present. Left lower leg: Left lower leg edema: 1+ edema to ankles. no redness. Comments: Varicose veins and spider veins bilat lower legs and feet. Neurological:      Mental Status: He is alert and oriented to person, place, and time. ASSESSMENT and PLAN  bilat le edema- differential includes venous insufficiency, mild CHF (no other symptoms), overindulgence in salt over holidays, less likely amlodipine (only on 2.5mg every day). Will have him elevate legs, increase water intake, decrease salt intake. Lasix 20mg every day x 4 days. Will let me know how he is doing later in the week.   If swelling continues will refer to Dr. Davion Robertson for ECHO.     Orders Placed This Encounter    insulin lispro (HumaLOG U-100 Insulin) 100 unit/mL injection    furosemide (LASIX) 20 mg tablet

## 2021-01-18 NOTE — PROGRESS NOTES
1. Have you been to the ER, urgent care clinic since your last visit? Hospitalized since your last visit? No    2. Have you seen or consulted any other health care providers outside of the [address] [place] [address] since your last visit? Include any pap smears or colon screening.  Yes    Chief Complaint   Patient presents with    Foot Swelling     both feet for the last couple of weeks     Please send link to [phone]

## 2021-01-29 ENCOUNTER — OFFICE VISIT (OUTPATIENT)
Dept: INTERNAL MEDICINE CLINIC | Age: 71
End: 2021-01-29
Payer: MEDICARE

## 2021-01-29 VITALS
HEART RATE: 95 BPM | RESPIRATION RATE: 18 BRPM | BODY MASS INDEX: 33.74 KG/M2 | SYSTOLIC BLOOD PRESSURE: 134 MMHG | WEIGHT: 215 LBS | HEIGHT: 67 IN | OXYGEN SATURATION: 98 % | DIASTOLIC BLOOD PRESSURE: 76 MMHG | TEMPERATURE: 95 F

## 2021-01-29 DIAGNOSIS — I25.10 CORONARY ARTERY DISEASE DUE TO LIPID RICH PLAQUE: ICD-10-CM

## 2021-01-29 DIAGNOSIS — I25.83 CORONARY ARTERY DISEASE DUE TO LIPID RICH PLAQUE: ICD-10-CM

## 2021-01-29 DIAGNOSIS — R60.0 BILATERAL LEG EDEMA: Primary | ICD-10-CM

## 2021-01-29 PROCEDURE — G8510 SCR DEP NEG, NO PLAN REQD: HCPCS | Performed by: INTERNAL MEDICINE

## 2021-01-29 PROCEDURE — G0463 HOSPITAL OUTPT CLINIC VISIT: HCPCS | Performed by: INTERNAL MEDICINE

## 2021-01-29 PROCEDURE — 99213 OFFICE O/P EST LOW 20 MIN: CPT | Performed by: INTERNAL MEDICINE

## 2021-01-29 PROCEDURE — G8752 SYS BP LESS 140: HCPCS | Performed by: INTERNAL MEDICINE

## 2021-01-29 PROCEDURE — G8417 CALC BMI ABV UP PARAM F/U: HCPCS | Performed by: INTERNAL MEDICINE

## 2021-01-29 PROCEDURE — 1101F PT FALLS ASSESS-DOCD LE1/YR: CPT | Performed by: INTERNAL MEDICINE

## 2021-01-29 PROCEDURE — G8536 NO DOC ELDER MAL SCRN: HCPCS | Performed by: INTERNAL MEDICINE

## 2021-01-29 PROCEDURE — 3017F COLORECTAL CA SCREEN DOC REV: CPT | Performed by: INTERNAL MEDICINE

## 2021-01-29 PROCEDURE — G8427 DOCREV CUR MEDS BY ELIG CLIN: HCPCS | Performed by: INTERNAL MEDICINE

## 2021-01-29 PROCEDURE — G8754 DIAS BP LESS 90: HCPCS | Performed by: INTERNAL MEDICINE

## 2021-01-29 RX ORDER — FUROSEMIDE 40 MG/1
40 TABLET ORAL DAILY
Qty: 30 TAB | Refills: 0 | Status: SHIPPED | OUTPATIENT
Start: 2021-01-29 | End: 2021-02-03

## 2021-01-29 NOTE — PROGRESS NOTES
HISTORY OF PRESENT ILLNESS  Alexis Reina is a 65 y.o. male. HPI  VV 1/18 for le edema. No other symptoms at that time. Started on Lasix 20mg every day. Comes in for follow up as his Le edema has not improved much. After taking the pills x 10 days and stopping, doesn't feel swelling has worsened. No orthopnea, PND, sob, WISEMAN (walking several days a week), chest pain or palpitations. Trying to elevate legs. Trying to limit salt but admits to having chips with lunch today. Of note, had PTCA for angina 12/2019 in [place]. Sees Marissa Marshall.        Current Outpatient Medications:     insulin lispro (HumaLOG U-100 Insulin) 100 unit/mL injection, by SubCUTAneous route. Sliding scale with meals. , Disp: , Rfl:     atorvastatin (LIPITOR) 40 mg tablet, TAKE 1 TABLET BY MOUTH EVERY DAY AT BEDTIME, Disp: 90 Tab, Rfl: 3    amLODIPine (NORVASC) 2.5 mg tablet, TAKE 1 TABLET BY MOUTH EVERY DAY, Disp: 90 Tab, Rfl: 3    metFORMIN (GLUCOPHAGE) 1,000 mg tablet, Take 1,000 mg by mouth two (2) times daily (with meals). , Disp: , Rfl:     calcium-cholecalciferol, d3, (CALCIUM 600 + D) 600-125 mg-unit tab, Take 1 Tab by mouth daily. , Disp: , Rfl:     aspirin 81 mg chewable tablet, Take 81 mg by mouth daily. , Disp: , Rfl:     pantoprazole (PROTONIX) 40 mg granules for oral suspension, Take 40 mg by mouth two (2) times a day., Disp: 180 Each, Rfl: 0    nitroglycerin (NITROSTAT) 0.4 mg SL tablet, 1 Tab by SubLINGual route every five (5) minutes as needed for Chest Pain. Up to 3 doses. , Disp: 50 Tab, Rfl: 1    TRULICITY 1.5 KU/3.8 mL sub-q pen, 1.5 mg by SubCUTAneous route every seven (7) days. , Disp: , Rfl:     ergocalciferol (ERGOCALCIFEROL) 50,000 unit capsule, TAKE ONE CAPSULE BY MOUTH EVERY WEEK, Disp: , Rfl: 2    FARXIGA 10 mg tab tablet, 1 tablet by mouth daily, Disp: , Rfl:     cyanocobalamin 1,000 mcg tablet, Take 1,000 mcg by mouth., Disp: , Rfl:     tadalafil (CIALIS) 5 mg tablet, Take 5 mg by mouth daily. , Disp: , Rfl:     insulin glargine (BASAGLAR KWIKPEN U-100 INSULIN) 100 unit/mL (3 mL) inpn, 30 Units by SubCUTAneous route nightly. As directed , Disp: , Rfl:     gabapentin (NEURONTIN) 300 mg capsule, Take 1 Cap by mouth three (3) times daily. , Disp: , Rfl:     diclofenac (VOLTAREN) 1 % gel, Apply 1 g to affected area as needed. , Disp: , Rfl:     halobetasol (ULTRAVATE) 0.05 % topical cream, as needed. , Disp: , Rfl:     metoprolol succinate (TOPROL-XL) 25 mg XL tablet, Take 25 mg by mouth daily. , Disp: , Rfl:     Visit Vitals  /76   Pulse 95   Temp (!) 95 °F (35 °C) (Temporal)   Resp 18   Ht 5' 7\" (1.702 m)   Wt 215 lb (97.5 kg)   SpO2 98%   BMI 33.67 kg/m²               ROS  See above  Physical Exam  Vitals signs reviewed. Constitutional:       Appearance: Normal appearance. HENT:      Head: Normocephalic and atraumatic. Neck:      Musculoskeletal: Neck supple. Vascular: No carotid bruit. Comments: No JVD  Cardiovascular:      Rate and Rhythm: Normal rate and regular rhythm. Pulses: Normal pulses. Heart sounds: Normal heart sounds. Pulmonary:      Effort: Pulmonary effort is normal.      Breath sounds: Normal breath sounds. Musculoskeletal:      Comments: 1+ right LE edema, trace left LE edema. No erythema. No calf tenderness, negative juan manuel's sign. Neurological:      Mental Status: He is alert. ASSESSMENT and PLAN  LE edema - ? Venous insuffiency vs CHF. No other symptoms for CHF. 20mg lasix worked some, will try 40mg. Check BMP and BNP. Referred back to Dr. Juanjose Valladares for ECHO. If ECHO nml we could also try stopping Norvasc but on a very low dose.     Orders Placed This Encounter    METABOLIC PANEL, BASIC    NT-PRO BNP    furosemide (LASIX) 40 mg tablet

## 2021-01-29 NOTE — PROGRESS NOTES
1. Have you been to the ER, urgent care clinic since your last visit? Hospitalized since your last visit? No    2. Have you seen or consulted any other health care providers outside of the [address] [place] [address] since your last visit? Include any pap smears or colon screening.    Yes    Chief Complaint   Patient presents with    Leg Swelling     both legs

## 2021-01-29 NOTE — PATIENT INSTRUCTIONS
Call Dr. Venice Singh on Monday for an appointment. Have labs drawn on Monday. Does not need to be fasting.

## 2021-02-01 ENCOUNTER — TELEPHONE (OUTPATIENT)
Dept: CARDIOLOGY CLINIC | Age: 71
End: 2021-02-01

## 2021-02-01 NOTE — TELEPHONE ENCOUNTER
Future Appointments   Date Time Provider Eric Hicks   2/3/2021 10:40 AM MD RK Wells AMB   5/19/2021 10:40 AM MD RK Wells BS AMB

## 2021-02-02 NOTE — PROGRESS NOTES
[facility] [address]: Bucky Solis  [phone]    History of Present Illness:   Mr. Sherice Abernathy is a 66 yo M with CAD, s/p PCI 12/24/19 when they were traveling in [place]. He had left sided upper abdominal pain and went to the hospital.  His cardiac enzymes were negative, but given his symptoms they proceeded with a cardiac catheterization. Cath also noted some moderate plaquing otherwise. He is here now due to increased lower extremity edema over this past month. He denies any dietary indiscretion over the holidays, mostly in his feet. He saw his primary care physician and he was started on increased Lasix and did have blood work yesterday and his creatinine did bump to 1.3. From a symptom standpoint, he denies any chest pain, shortness of breath, palpitations or lightheadedness. He has been compliant with his medications. He is compensated on exam with clear lungs. He does have trace lower extremity edema. Soc hx. Quit tobacco 1980. 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 Yohan Eagle hx. Dad with CAD. Assessment and Plan:    1. Lower extremity edema. Will obtain an echocardiogram for further evaluation. This could be venous insufficiency. Additionally, he is on low dose Norvasc and will go ahead and stop this and start him on Losartan 25 mg once daily. Given his elevated creatinine, will drop his Lasix to 20 mg once daily. Will have him follow back in one month. At that time if his lower extremity edema is minimal and his echocardiogram is normal, will potentially stop his Lasix. 2. Coronary artery disease. Stable and compensated. Continue aspirin, statin and beta blocker. 3. Tobacco use. Quit many years ago. 4. Back surgery in 2018 with diskectomy. 5. Prostate cancer, status post surgery in 2019.       He  has a past medical history of Arthritis, BPH (benign prostatic hyperplasia) (2013), CAD (coronary artery disease), Cancer ([address] (2015), Cancer ([facility] (01/2019), Chronic pain, Chronic pelvic pain in male, Collagenous colitis (2011), Diabetes mellitus type 2, controlled, with complications ([facility] (2348), Diabetic neuropathy ([facility] (2006), Diverticulosis, ED (erectile dysfunction), GERD (gastroesophageal reflux disease) (1990), Gout, Hypertension (1970), Ill-defined condition, Low serum testosterone level, Osteopenia, Psoriasis, Sleep apnea, and Sun-damaged skin. He also has no past medical history of Arsenic suspected exposure, Family history of skin cancer, Pacemaker, Radiation exposure, Skin cancer, Sunburn, blistering, or Tanning bed exposure. All other systems negative except as above. PE  Vitals:    02/03/21 1100   BP: 134/72   Pulse: 67   Resp: 16   SpO2: 97%   Weight: 215 lb (97.5 kg)   Height: 5' 7\" (1.702 m)    Body mass index is 33.67 kg/m².    General appearance - alert, well appearing, and in no distress  Mental status - affect appropriate to mood  Eyes - sclera anicteric, moist mucous membranes  Neck - supple, no JVD  Chest - clear to auscultation, no wheezes, rales or rhonchi  Heart - normal rate, regular rhythm, normal S1, S2, I/VI systolic murmur LUSB  Abdomen - soft, nontender, nondistended, no masses or organomegaly  Extremities - peripheral pulses normal, no pedal edema    Recent Labs:  Lab Results   Component Value Date/Time    Cholesterol, total 128 07/13/2013 08:05 AM    HDL Cholesterol 40 07/13/2013 08:05 AM    LDL, calculated 59 07/13/2013 08:05 AM    Triglyceride 143 07/13/2013 08:05 AM     Lab Results   Component Value Date/Time    Creatinine (POC) 0.8 01/29/2019 11:24 AM    Creatinine 1.33 (H) 02/02/2021 02:59 PM     Lab Results   Component Value Date/Time    BUN 21 (H) 02/02/2021 02:59 PM     Lab Results   Component Value Date/Time    Potassium 4.3 02/02/2021 02:59 PM     Lab Results   Component Value Date/Time    Hemoglobin A1c 7.7 (H) 03/21/2019 10:38 AM    Hemoglobin A1c, External 7.8 12/10/2019     Lab Results   Component Value Date/Time    HGB 11.7 (L) 04/03/2019 10:46 PM     Lab Results   Component Value Date/Time    PLATELET 563  10:46 PM       Reviewed:  Past Medical History:   Diagnosis Date    Arthritis     BACK    BPH (benign prostatic hyperplasia)     CAD (coronary artery disease)     [email or web address]     SKIN - HEAD AND FACE    Cancer ([email or web address] 2019    PROSTATE    Chronic pain     BACK    Chronic pelvic pain in male     sensitive to touch on the left side down to the left side of the penis    Collagenous colitis     Diabetes mellitus type 2, controlled, with complications ([email or web address] 2871    Diabetic neuropathy ([email or web address]     Diverticulosis     ED (erectile dysfunction)     Dr. Irina Jacobo GERD (gastroesophageal reflux disease)     Gout     1 episode    Hypertension 1970    Ill-defined condition     BILATERAL PERIPHERAL NEUROPATHY BOTH LEGS    Low serum testosterone level     Dr. Patrizia Riddle Osteopenia     Dr. Patrizia Riddle Psoriasis     Sleep apnea     USES CPAP    Sun-damaged skin      Social History     Tobacco Use   Smoking Status Former Smoker    Packs/day: 1.00    Years: 12.00    Pack years: 12.00    Quit date: 1980    Years since quittin.8   Smokeless Tobacco Never Used     Social History     Substance and Sexual Activity   Alcohol Use Yes    Alcohol/week: 0.0 standard drinks    Frequency: Monthly or less    Drinks per session: 1 or 2    Binge frequency: Never    Comment: rarely     Allergies   Allergen Reactions    Pcn [Penicillins] Other (comments)     Infancy was told he had a rash         Current Outpatient Medications   Medication Sig    furosemide (LASIX) 40 mg tablet Take 1 Tab by mouth daily.  insulin lispro (HumaLOG U-100 Insulin) 100 unit/mL injection by SubCUTAneous route. Sliding scale with meals.     atorvastatin (LIPITOR) 40 mg tablet TAKE 1 TABLET BY MOUTH EVERY DAY AT BEDTIME    amLODIPine (NORVASC) 2.5 mg tablet TAKE 1 TABLET BY MOUTH EVERY DAY    metFORMIN (GLUCOPHAGE) 1,000 mg tablet Take 1,000 mg by mouth two (2) times daily (with meals).  calcium-cholecalciferol, d3, (CALCIUM 600 + D) 600-125 mg-unit tab Take 1 Tab by mouth daily.  aspirin 81 mg chewable tablet Take 81 mg by mouth daily.  diclofenac (VOLTAREN) 1 % gel Apply 1 g to affected area as needed.  pantoprazole (PROTONIX) 40 mg granules for oral suspension Take 40 mg by mouth two (2) times a day.  halobetasol (ULTRAVATE) 0.05 % topical cream as needed.  TRULICITY 1.5 HX/4.2 mL sub-q pen 1.5 mg by SubCUTAneous route every seven (7) days.  ergocalciferol (ERGOCALCIFEROL) 50,000 unit capsule TAKE ONE CAPSULE BY MOUTH EVERY WEEK    FARXIGA 10 mg tab tablet 1 tablet by mouth daily    cyanocobalamin 1,000 mcg tablet Take 1,000 mcg by mouth.  tadalafil (CIALIS) 5 mg tablet Take 5 mg by mouth daily.  insulin glargine (BASAGLAR KWIKPEN U-100 INSULIN) 100 unit/mL (3 mL) inpn 30 Units by SubCUTAneous route nightly. As directed     metoprolol succinate (TOPROL-XL) 25 mg XL tablet Take 25 mg by mouth daily.  gabapentin (NEURONTIN) 300 mg capsule Take 1 Cap by mouth three (3) times daily.  nitroglycerin (NITROSTAT) 0.4 mg SL tablet 1 Tab by SubLINGual route every five (5) minutes as needed for Chest Pain. Up to 3 doses. No current facility-administered medications for this visit.         Sosa Shepard MD  [facility] heart and Vascular [place]  [address] [facility] [address]  [address]

## 2021-02-03 ENCOUNTER — OFFICE VISIT (OUTPATIENT)
Dept: CARDIOLOGY CLINIC | Age: 71
End: 2021-02-03
Payer: MEDICARE

## 2021-02-03 VITALS
OXYGEN SATURATION: 97 % | BODY MASS INDEX: 33.74 KG/M2 | HEART RATE: 67 BPM | DIASTOLIC BLOOD PRESSURE: 72 MMHG | SYSTOLIC BLOOD PRESSURE: 134 MMHG | RESPIRATION RATE: 16 BRPM | WEIGHT: 215 LBS | HEIGHT: 67 IN

## 2021-02-03 DIAGNOSIS — Z87.891 HISTORY OF TOBACCO USE: ICD-10-CM

## 2021-02-03 DIAGNOSIS — I25.10 CORONARY ARTERY DISEASE INVOLVING NATIVE CORONARY ARTERY OF NATIVE HEART WITHOUT ANGINA PECTORIS: ICD-10-CM

## 2021-02-03 DIAGNOSIS — R60.0 BILATERAL LOWER EXTREMITY EDEMA: Primary | ICD-10-CM

## 2021-02-03 DIAGNOSIS — I10 BENIGN ESSENTIAL HTN: ICD-10-CM

## 2021-02-03 DIAGNOSIS — Z85.46 HISTORY OF PROSTATE CANCER: ICD-10-CM

## 2021-02-03 PROCEDURE — G8427 DOCREV CUR MEDS BY ELIG CLIN: HCPCS | Performed by: INTERNAL MEDICINE

## 2021-02-03 PROCEDURE — G8432 DEP SCR NOT DOC, RNG: HCPCS | Performed by: INTERNAL MEDICINE

## 2021-02-03 PROCEDURE — G8536 NO DOC ELDER MAL SCRN: HCPCS | Performed by: INTERNAL MEDICINE

## 2021-02-03 PROCEDURE — G8754 DIAS BP LESS 90: HCPCS | Performed by: INTERNAL MEDICINE

## 2021-02-03 PROCEDURE — G8417 CALC BMI ABV UP PARAM F/U: HCPCS | Performed by: INTERNAL MEDICINE

## 2021-02-03 PROCEDURE — G8752 SYS BP LESS 140: HCPCS | Performed by: INTERNAL MEDICINE

## 2021-02-03 PROCEDURE — 99214 OFFICE O/P EST MOD 30 MIN: CPT | Performed by: INTERNAL MEDICINE

## 2021-02-03 PROCEDURE — G0463 HOSPITAL OUTPT CLINIC VISIT: HCPCS | Performed by: INTERNAL MEDICINE

## 2021-02-03 PROCEDURE — 1101F PT FALLS ASSESS-DOCD LE1/YR: CPT | Performed by: INTERNAL MEDICINE

## 2021-02-03 PROCEDURE — 3017F COLORECTAL CA SCREEN DOC REV: CPT | Performed by: INTERNAL MEDICINE

## 2021-02-03 RX ORDER — FUROSEMIDE 20 MG/1
20 TABLET ORAL DAILY
Qty: 90 TAB | Refills: 1 | Status: SHIPPED | OUTPATIENT
Start: 2021-02-03 | End: 2021-07-21

## 2021-02-03 RX ORDER — LOSARTAN POTASSIUM 25 MG/1
25 TABLET ORAL DAILY
Qty: 90 TAB | Refills: 1 | Status: SHIPPED | OUTPATIENT
Start: 2021-02-03 | End: 2021-07-21

## 2021-02-03 NOTE — PROGRESS NOTES
Loretta Wills is a 65 y.o. male  Chief Complaint   Patient presents with    Foot Swelling     Both     Health Maintenance Due   Topic Date Due    COVID-19 Vaccine (1 of 2) 12/10/1966    Flu Vaccine (1) 09/01/2020    Medicare Yearly Exam  10/23/2020    Foot Exam Q1  10/23/2020    A1C test (Diabetic or Prediabetic)  12/10/2020     Visit Vitals  /72 (BP 1 Location: Left upper arm, BP Patient Position: Sitting, BP Cuff Size: Large adult)   Pulse 67   Resp 16   Ht 5' 7\" (1.702 m)   Wt 215 lb (97.5 kg)   SpO2 97%   BMI 33.67 kg/m²

## 2021-02-05 ENCOUNTER — ANCILLARY PROCEDURE (OUTPATIENT)
Dept: CARDIOLOGY CLINIC | Age: 71
End: 2021-02-05
Payer: MEDICARE

## 2021-02-05 VITALS
HEIGHT: 67 IN | WEIGHT: 215 LBS | BODY MASS INDEX: 33.74 KG/M2 | DIASTOLIC BLOOD PRESSURE: 72 MMHG | SYSTOLIC BLOOD PRESSURE: 134 MMHG

## 2021-02-05 DIAGNOSIS — R60.0 BILATERAL LOWER EXTREMITY EDEMA: ICD-10-CM

## 2021-02-05 LAB
ECHO AO ASC DIAM: 3.2 CM
ECHO AO ROOT DIAM: 3.38 CM
ECHO AV AREA PEAK VELOCITY: 3.8 CM2
ECHO AV AREA VTI: 3.85 CM2
ECHO AV AREA/BSA PEAK VELOCITY: 1.8 CM2/M2
ECHO AV AREA/BSA VTI: 1.8 CM2/M2
ECHO AV MEAN GRADIENT: 3.59 MMHG
ECHO AV PEAK GRADIENT: 6.31 MMHG
ECHO AV PEAK VELOCITY: 125.63 CM/S
ECHO AV VTI: 26.77 CM
ECHO LA AREA 4C: 22.17 CM2
ECHO LA MAJOR AXIS: 3.96 CM
ECHO LA MINOR AXIS: 1.9 CM
ECHO LA VOL 2C: 69.84 ML (ref 18–58)
ECHO LA VOL 4C: 70.1 ML (ref 18–58)
ECHO LA VOL BP: 75.69 ML (ref 18–58)
ECHO LA VOL/BSA BIPLANE: 36.3 ML/M2 (ref 16–28)
ECHO LA VOLUME INDEX A2C: 33.49 ML/M2 (ref 16–28)
ECHO LA VOLUME INDEX A4C: 33.62 ML/M2 (ref 16–28)
ECHO LV E' LATERAL VELOCITY: 7.63 CM/S
ECHO LV E' SEPTAL VELOCITY: 6.68 CM/S
ECHO LV EDV A2C: 81.04 ML
ECHO LV EDV A4C: 113.97 ML
ECHO LV EDV BP: 102.96 ML (ref 67–155)
ECHO LV EDV INDEX A4C: 54.7 ML/M2
ECHO LV EDV INDEX BP: 49.4 ML/M2
ECHO LV EDV NDEX A2C: 38.9 ML/M2
ECHO LV EJECTION FRACTION A2C: 48 PERCENT
ECHO LV EJECTION FRACTION A4C: 63 PERCENT
ECHO LV EJECTION FRACTION BIPLANE: 57.1 PERCENT (ref 55–100)
ECHO LV ESV A2C: 42.17 ML
ECHO LV ESV A4C: 42.62 ML
ECHO LV ESV BP: 44.18 ML (ref 22–58)
ECHO LV ESV INDEX A2C: 20.2 ML/M2
ECHO LV ESV INDEX A4C: 20.4 ML/M2
ECHO LV ESV INDEX BP: 21.2 ML/M2
ECHO LV INTERNAL DIMENSION DIASTOLIC: 4.28 CM (ref 4.2–5.9)
ECHO LV INTERNAL DIMENSION SYSTOLIC: 2.46 CM
ECHO LV IVSD: 1.06 CM (ref 0.6–1)
ECHO LV MASS 2D: 146.4 G (ref 88–224)
ECHO LV MASS INDEX 2D: 70.2 G/M2 (ref 49–115)
ECHO LV POSTERIOR WALL DIASTOLIC: 0.99 CM (ref 0.6–1)
ECHO LVOT DIAM: 2.27 CM
ECHO LVOT PEAK GRADIENT: 5.59 MMHG
ECHO LVOT PEAK VELOCITY: 118.22 CM/S
ECHO LVOT SV: 103 ML
ECHO LVOT VTI: 25.51 CM
ECHO MV A VELOCITY: 92.98 CM/S
ECHO MV AREA PHT: 3.84 CM2
ECHO MV E DECELERATION TIME (DT): 197.63 MS
ECHO MV E VELOCITY: 70.92 CM/S
ECHO MV E/A RATIO: 0.76
ECHO MV E/E' LATERAL: 9.29
ECHO MV E/E' RATIO (AVERAGED): 9.96
ECHO MV E/E' SEPTAL: 10.62
ECHO MV PRESSURE HALF TIME (PHT): 57.31 MS
ECHO RA MAJOR AXIS: 3.5 CM
ECHO RV INTERNAL DIMENSION: 3.31 CM
ECHO RV TAPSE: 1.82 CM (ref 1.5–2)
LA VOL DISK BP: 71.56 ML (ref 18–58)
LVOT MG: 2.93 MMHG

## 2021-02-05 PROCEDURE — 93306 TTE W/DOPPLER COMPLETE: CPT | Performed by: INTERNAL MEDICINE

## 2021-03-03 ENCOUNTER — OFFICE VISIT (OUTPATIENT)
Dept: CARDIOLOGY CLINIC | Age: 71
End: 2021-03-03
Payer: MEDICARE

## 2021-03-03 VITALS
HEIGHT: 67 IN | SYSTOLIC BLOOD PRESSURE: 110 MMHG | OXYGEN SATURATION: 97 % | HEART RATE: 66 BPM | BODY MASS INDEX: 34.26 KG/M2 | WEIGHT: 218.3 LBS | DIASTOLIC BLOOD PRESSURE: 60 MMHG

## 2021-03-03 DIAGNOSIS — I10 BENIGN ESSENTIAL HTN: ICD-10-CM

## 2021-03-03 DIAGNOSIS — I25.10 CORONARY ARTERY DISEASE INVOLVING NATIVE CORONARY ARTERY OF NATIVE HEART WITHOUT ANGINA PECTORIS: ICD-10-CM

## 2021-03-03 DIAGNOSIS — R60.0 BILATERAL LOWER EXTREMITY EDEMA: Primary | ICD-10-CM

## 2021-03-03 DIAGNOSIS — Z87.891 HISTORY OF TOBACCO USE: ICD-10-CM

## 2021-03-03 PROCEDURE — G8536 NO DOC ELDER MAL SCRN: HCPCS | Performed by: INTERNAL MEDICINE

## 2021-03-03 PROCEDURE — G8754 DIAS BP LESS 90: HCPCS | Performed by: INTERNAL MEDICINE

## 2021-03-03 PROCEDURE — G0463 HOSPITAL OUTPT CLINIC VISIT: HCPCS | Performed by: INTERNAL MEDICINE

## 2021-03-03 PROCEDURE — G8432 DEP SCR NOT DOC, RNG: HCPCS | Performed by: INTERNAL MEDICINE

## 2021-03-03 PROCEDURE — 3017F COLORECTAL CA SCREEN DOC REV: CPT | Performed by: INTERNAL MEDICINE

## 2021-03-03 PROCEDURE — 99214 OFFICE O/P EST MOD 30 MIN: CPT | Performed by: INTERNAL MEDICINE

## 2021-03-03 PROCEDURE — 1101F PT FALLS ASSESS-DOCD LE1/YR: CPT | Performed by: INTERNAL MEDICINE

## 2021-03-03 PROCEDURE — G8427 DOCREV CUR MEDS BY ELIG CLIN: HCPCS | Performed by: INTERNAL MEDICINE

## 2021-03-03 PROCEDURE — G8417 CALC BMI ABV UP PARAM F/U: HCPCS | Performed by: INTERNAL MEDICINE

## 2021-03-03 PROCEDURE — G8752 SYS BP LESS 140: HCPCS | Performed by: INTERNAL MEDICINE

## 2021-05-06 LAB
CREATININE, EXTERNAL: 0.98
HEMOGLOBIN A1C: 7.6 %
LDL-C, EXTERNAL: 26
MICROALBUMIN UR TEST STR-MCNC: <0.7 MG/DL

## 2021-05-19 ENCOUNTER — OFFICE VISIT (OUTPATIENT)
Dept: CARDIOLOGY CLINIC | Age: 71
End: 2021-05-19
Payer: MEDICARE

## 2021-05-19 VITALS
WEIGHT: 221 LBS | DIASTOLIC BLOOD PRESSURE: 78 MMHG | HEIGHT: 67 IN | SYSTOLIC BLOOD PRESSURE: 128 MMHG | BODY MASS INDEX: 34.69 KG/M2 | HEART RATE: 72 BPM

## 2021-05-19 DIAGNOSIS — Z85.46 HISTORY OF PROSTATE CANCER: ICD-10-CM

## 2021-05-19 DIAGNOSIS — Z87.891 HISTORY OF TOBACCO USE: ICD-10-CM

## 2021-05-19 DIAGNOSIS — I10 BENIGN ESSENTIAL HTN: ICD-10-CM

## 2021-05-19 DIAGNOSIS — R60.0 BILATERAL LOWER EXTREMITY EDEMA: Primary | ICD-10-CM

## 2021-05-19 DIAGNOSIS — I25.10 CORONARY ARTERY DISEASE INVOLVING NATIVE CORONARY ARTERY OF NATIVE HEART WITHOUT ANGINA PECTORIS: ICD-10-CM

## 2021-05-19 PROCEDURE — G8754 DIAS BP LESS 90: HCPCS | Performed by: INTERNAL MEDICINE

## 2021-05-19 PROCEDURE — G8536 NO DOC ELDER MAL SCRN: HCPCS | Performed by: INTERNAL MEDICINE

## 2021-05-19 PROCEDURE — G8432 DEP SCR NOT DOC, RNG: HCPCS | Performed by: INTERNAL MEDICINE

## 2021-05-19 PROCEDURE — 99214 OFFICE O/P EST MOD 30 MIN: CPT | Performed by: INTERNAL MEDICINE

## 2021-05-19 PROCEDURE — G8752 SYS BP LESS 140: HCPCS | Performed by: INTERNAL MEDICINE

## 2021-05-19 PROCEDURE — 1101F PT FALLS ASSESS-DOCD LE1/YR: CPT | Performed by: INTERNAL MEDICINE

## 2021-05-19 PROCEDURE — G8427 DOCREV CUR MEDS BY ELIG CLIN: HCPCS | Performed by: INTERNAL MEDICINE

## 2021-05-19 PROCEDURE — 3017F COLORECTAL CA SCREEN DOC REV: CPT | Performed by: INTERNAL MEDICINE

## 2021-05-19 PROCEDURE — G8417 CALC BMI ABV UP PARAM F/U: HCPCS | Performed by: INTERNAL MEDICINE

## 2021-05-19 PROCEDURE — G0463 HOSPITAL OUTPT CLINIC VISIT: HCPCS | Performed by: INTERNAL MEDICINE

## 2021-05-19 NOTE — PROGRESS NOTES
[facility] [address]: Raymona Lesch  [phone]    History of Present Illness:   Mr. Erin Maldonado is a 66 yo M with CAD, s/p PCI 12/24/19 when they were traveling in [place]. He had left sided upper abdominal pain and went to the hospital.  His cardiac enzymes were negative, but given his symptoms they proceeded with a cardiac catheterization. Cath also noted some moderate plaquing otherwise. Overall he has been doing okay. He denies any chest pain. His breathing has been stable. He has been trying to do some walking with his wife and has been okay doing that. His wife, Linda Cortez, was on the phone during the visit. He still has swelling in his right leg, though he notes it is not that bothersome and is unchanged. His echocardiogram earlier this year was overall unrevealing with normal systolic function and we rediscussed this. Soc hx. Quit tobacco 1980. 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 Yasmin Knott  Fam hx. Dad with CAD. Assessment and Plan:   1. Venous insufficiency. He does have swelling in his leg, right greater than left, consistent with venous insufficiency. His echocardiogram is normal. Will give him a prescription for compression. Will continue low dose Lasix. On a higher dosage, he had increased creatinine. Will have him follow back in six months. 2. Coronary artery disease. Stable and compensated. Continue aspirin, statin and beta blocker. 3. Tobacco use. Quit many years ago. 4. Back surgery in 2018 with diskectomy. 5. Prostate cancer, status post surgery in 2019.       He  has a past medical history of Arthritis, BPH (benign prostatic hyperplasia) (2013), CAD (coronary artery disease), Cancer ([address] (2015), Cancer ([address] (01/2019), Chronic pain, Chronic pelvic pain in male, Collagenous colitis (2011), Diabetes mellitus type 2, controlled, with complications ([address] (2752), Diabetic neuropathy ([address] (2006), Diverticulosis, ED (erectile dysfunction), GERD (gastroesophageal reflux disease) (1990), Gout, Hypertension (1970), Ill-defined condition, Low serum testosterone level, Osteopenia, Psoriasis, Sleep apnea, and Sun-damaged skin. He also has no past medical history of Arsenic suspected exposure, Family history of skin cancer, Pacemaker, Radiation exposure, Skin cancer, Sunburn, blistering, or Tanning bed exposure. All other systems negative except as above. PE  Vitals:    05/19/21 1115   BP: 128/78   Pulse: 72   Weight: 221 lb (100.2 kg)   Height: 5' 7\" (1.702 m)    Body mass index is 34.61 kg/m².    General appearance - alert, well appearing, and in no distress  Mental status - affect appropriate to mood  Eyes - sclera anicteric, moist mucous membranes  Neck - supple, no JVD  Chest - clear to auscultation, no wheezes, rales or rhonchi  Heart - normal rate, regular rhythm, normal S1, S2, I/VI systolic murmur LUSB  Abdomen - soft, nontender, nondistended, no masses or organomegaly  Extremities - peripheral pulses normal, trace to 1+ LE edema R>L  edema    Recent Labs:  Lab Results   Component Value Date/Time    Cholesterol, total 128 07/13/2013 08:05 AM    HDL Cholesterol 40 07/13/2013 08:05 AM    LDL, calculated 59 07/13/2013 08:05 AM    Triglyceride 143 07/13/2013 08:05 AM     Lab Results   Component Value Date/Time    Creatinine (POC) 0.8 01/29/2019 11:24 AM    Creatinine 1.33 (H) 02/02/2021 02:59 PM     Lab Results   Component Value Date/Time    BUN 21 (H) 02/02/2021 02:59 PM     Lab Results   Component Value Date/Time    Potassium 4.3 02/02/2021 02:59 PM     Lab Results   Component Value Date/Time    Hemoglobin A1c 7.7 (H) 03/21/2019 10:38 AM    Hemoglobin A1c, External 7.8 12/10/2019 12:00 AM     Lab Results   Component Value Date/Time    HGB 11.7 (L) 04/03/2019 10:46 PM     Lab Results   Component Value Date/Time    PLATELET 978 68/49/1423 10:46 PM       Reviewed:  Past Medical History:   Diagnosis Date    Arthritis     BACK    BPH (benign prostatic hyperplasia) 2013    CAD (coronary artery disease)     Cancer ([facility]     SKIN - HEAD AND FACE    Cancer ([facility] 2019    PROSTATE    Chronic pain     BACK    Chronic pelvic pain in male     sensitive to touch on the left side down to the left side of the penis    Collagenous colitis     Diabetes mellitus type 2, controlled, with complications ([facility] 3243    Diabetic neuropathy ([facility]     Diverticulosis     ED (erectile dysfunction)     Dr. Jose Acuña GERD (gastroesophageal reflux disease)     Gout     1 episode    Hypertension 1970    Ill-defined condition     BILATERAL PERIPHERAL NEUROPATHY BOTH LEGS    Low serum testosterone level     Dr. Oma Martínez Osteopenia     Dr. Oma Martínez Psoriasis     Sleep apnea     USES CPAP    Sun-damaged skin      Social History     Tobacco Use   Smoking Status Former Smoker    Packs/day: 1.00    Years: 12.00    Pack years: 12.00    Quit date: 1980    Years since quittin.1   Smokeless Tobacco Never Used     Social History     Substance and Sexual Activity   Alcohol Use Yes    Alcohol/week: 0.0 standard drinks    Comment: rarely     Allergies   Allergen Reactions    Pcn [Penicillins] Other (comments)     Infancy was told he had a rash         Current Outpatient Medications   Medication Sig    furosemide (LASIX) 20 mg tablet Take 1 Tab by mouth daily.  losartan (COZAAR) 25 mg tablet Take 1 Tab by mouth daily.  insulin lispro (HumaLOG U-100 Insulin) 100 unit/mL injection by SubCUTAneous route. Sliding scale with meals.  atorvastatin (LIPITOR) 40 mg tablet TAKE 1 TABLET BY MOUTH EVERY DAY AT BEDTIME    metFORMIN (GLUCOPHAGE) 1,000 mg tablet Take 1,000 mg by mouth two (2) times daily (with meals).  calcium-cholecalciferol, d3, (CALCIUM 600 + D) 600-125 mg-unit tab Take 1 Tab by mouth daily.  aspirin 81 mg chewable tablet Take 81 mg by mouth daily.  diclofenac (VOLTAREN) 1 % gel Apply 1 g to affected area as needed.     pantoprazole (PROTONIX) 40 mg granules for oral suspension Take 40 mg by mouth two (2) times a day. (Patient taking differently: Take 40 mg by mouth daily.)    halobetasol (ULTRAVATE) 0.05 % topical cream as needed.  nitroglycerin (NITROSTAT) 0.4 mg SL tablet 1 Tab by SubLINGual route every five (5) minutes as needed for Chest Pain. Up to 3 doses.  TRULICITY 1.5 BE/4.0 mL sub-q pen 1.5 mg by SubCUTAneous route every seven (7) days.  ergocalciferol (ERGOCALCIFEROL) 50,000 unit capsule TAKE ONE CAPSULE BY MOUTH EVERY WEEK    FARXIGA 10 mg tab tablet 1 tablet by mouth daily    cyanocobalamin 1,000 mcg tablet Take 1,000 mcg by mouth.  tadalafiL (Cialis) 20 mg tablet Take 5 mg by mouth daily as needed for Erectile Dysfunction.  insulin glargine (BASAGLAR KWIKPEN U-100 INSULIN) 100 unit/mL (3 mL) inpn 30 Units by SubCUTAneous route nightly. As directed     metoprolol succinate (TOPROL-XL) 25 mg XL tablet Take 25 mg by mouth daily.  gabapentin (NEURONTIN) 300 mg capsule Take 1 Cap by mouth three (3) times daily. No current facility-administered medications for this visit.        Balbina Ogden MD  [facility] heart and Vascular [place]  [address] [facility]  [address]

## 2021-07-16 ENCOUNTER — TELEPHONE (OUTPATIENT)
Dept: INTERNAL MEDICINE CLINIC | Age: 71
End: 2021-07-16

## 2021-07-16 DIAGNOSIS — Z12.11 SCREEN FOR COLON CANCER: Primary | ICD-10-CM

## 2021-07-16 NOTE — TELEPHONE ENCOUNTER
[facility] is requesting a copy of the patient's referral to see Dr. Aj Gracia  on 07/23/201. [facility] is also requesting a copy of recent notes, lab results, procedures, or radiology reports which I have printed. Pt's last visit with Dr. Lyric Nash was 01/29/2021.

## 2021-07-21 RX ORDER — FUROSEMIDE 20 MG/1
TABLET ORAL
Qty: 90 TABLET | Refills: 1 | Status: SHIPPED | OUTPATIENT
Start: 2021-07-21

## 2021-07-21 RX ORDER — LOSARTAN POTASSIUM 25 MG/1
TABLET ORAL
Qty: 90 TABLET | Refills: 1 | Status: SHIPPED | OUTPATIENT
Start: 2021-07-21

## 2021-07-21 NOTE — TELEPHONE ENCOUNTER
Requested Prescriptions     Signed Prescriptions Disp Refills    losartan (COZAAR) 25 mg tablet 90 Tablet 1     Sig: TAKE 1 TABLET BY MOUTH DAILY     Authorizing Provider: Jonh Pedraza     Ordering User: Dede Client    furosemide (LASIX) 20 mg tablet 90 Tablet 1     Sig: TAKE 1 TABLET BY MOUTH DAILY     Authorizing Provider: Jonh Pedraza     Ordering User: Dede Client       Last office visit 5/19/21    Per Dr. Emely Juarez   Date Time Provider [facility]   9/3/2021 11:40 AM MD RK Romero BS AMB   11/19/2021 10:40 AM MD RK Romero BS AMB

## 2021-08-10 ENCOUNTER — TELEPHONE (OUTPATIENT)
Dept: INTERNAL MEDICINE CLINIC | Age: 71
End: 2021-08-10

## 2021-09-14 RX ORDER — METFORMIN HYDROCHLORIDE 1000 MG/1
1000 TABLET, FILM COATED, EXTENDED RELEASE ORAL 2 TIMES DAILY
COMMUNITY
End: 2021-09-14

## 2021-09-14 RX ORDER — GABAPENTIN 300 MG/1
600 CAPSULE ORAL 2 TIMES DAILY
COMMUNITY

## 2021-09-14 RX ORDER — PANTOPRAZOLE SODIUM 40 MG/1
40 TABLET, DELAYED RELEASE ORAL DAILY
COMMUNITY

## 2021-09-17 ENCOUNTER — TRANSCRIBE ORDER (OUTPATIENT)
Dept: REGISTRATION | Age: 71
End: 2021-09-17

## 2021-09-17 ENCOUNTER — HOSPITAL ENCOUNTER (OUTPATIENT)
Dept: PREADMISSION TESTING | Age: 71
Discharge: HOME | End: 2021-09-17
Payer: MEDICARE

## 2021-09-17 DIAGNOSIS — Z01.812 PRE-PROCEDURE LAB EXAM: Primary | ICD-10-CM

## 2021-09-17 DIAGNOSIS — Z01.812 PRE-PROCEDURE LAB EXAM: ICD-10-CM

## 2021-09-17 LAB — HBA1C MFR BLD HPLC: 7.5 %

## 2021-09-17 PROCEDURE — U0005 INFEC AGEN DETEC AMPLI PROBE: HCPCS

## 2021-09-19 LAB
SARS-COV-2, XPLCVT: NOT DETECTED
SOURCE, COVRS: NORMAL

## 2021-09-21 ENCOUNTER — HOSPITAL ENCOUNTER (OUTPATIENT)
Age: 71
Setting detail: OUTPATIENT SURGERY
Discharge: HOME | End: 2021-09-21
Attending: INTERNAL MEDICINE | Admitting: INTERNAL MEDICINE
Payer: MEDICARE

## 2021-09-21 ENCOUNTER — ANESTHESIA EVENT (OUTPATIENT)
Dept: ENDOSCOPY | Age: 71
End: 2021-09-21
Payer: MEDICARE

## 2021-09-21 ENCOUNTER — ANESTHESIA (OUTPATIENT)
Dept: ENDOSCOPY | Age: 71
End: 2021-09-21
Payer: MEDICARE

## 2021-09-21 VITALS
BODY MASS INDEX: 30.78 KG/M2 | HEIGHT: 70 IN | SYSTOLIC BLOOD PRESSURE: 124 MMHG | TEMPERATURE: 95.7 F | HEART RATE: 71 BPM | OXYGEN SATURATION: 95 % | DIASTOLIC BLOOD PRESSURE: 77 MMHG | WEIGHT: 215 LBS | RESPIRATION RATE: 13 BRPM

## 2021-09-21 LAB
GLUCOSE BLD STRIP.AUTO-MCNC: 116 MG/DL (ref 65–117)
SERVICE CMNT-IMP: NORMAL

## 2021-09-21 PROCEDURE — 77030021593 HC FCPS BIOP ENDOSC BSC -A: Performed by: INTERNAL MEDICINE

## 2021-09-21 PROCEDURE — 76060000031 HC ANESTHESIA FIRST 0.5 HR: Performed by: INTERNAL MEDICINE

## 2021-09-21 PROCEDURE — 77030038604 HC SNR ENDO EXACTO USEN -B: Performed by: INTERNAL MEDICINE

## 2021-09-21 PROCEDURE — 74011000250 HC RX REV CODE- 250: Performed by: NURSE ANESTHETIST, CERTIFIED REGISTERED

## 2021-09-21 PROCEDURE — 74011250636 HC RX REV CODE- 250/636: Performed by: NURSE ANESTHETIST, CERTIFIED REGISTERED

## 2021-09-21 PROCEDURE — 77030039825 HC MSK NSL PAP SUPERNO2VA VYRM -B: Performed by: ANESTHESIOLOGY

## 2021-09-21 PROCEDURE — 82962 GLUCOSE BLOOD TEST: CPT

## 2021-09-21 PROCEDURE — 88305 TISSUE EXAM BY PATHOLOGIST: CPT

## 2021-09-21 PROCEDURE — 76040000019: Performed by: INTERNAL MEDICINE

## 2021-09-21 PROCEDURE — 2709999900 HC NON-CHARGEABLE SUPPLY: Performed by: INTERNAL MEDICINE

## 2021-09-21 RX ORDER — LIDOCAINE HYDROCHLORIDE 20 MG/ML
INJECTION, SOLUTION EPIDURAL; INFILTRATION; INTRACAUDAL; PERINEURAL AS NEEDED
Status: DISCONTINUED | OUTPATIENT
Start: 2021-09-21 | End: 2021-09-21 | Stop reason: HOSPADM

## 2021-09-21 RX ORDER — SODIUM CHLORIDE 0.9 % (FLUSH) 0.9 %
5-40 SYRINGE (ML) INJECTION AS NEEDED
Status: DISCONTINUED | OUTPATIENT
Start: 2021-09-21 | End: 2021-09-21 | Stop reason: HOSPADM

## 2021-09-21 RX ORDER — SODIUM CHLORIDE 0.9 % (FLUSH) 0.9 %
5-40 SYRINGE (ML) INJECTION EVERY 8 HOURS
Status: DISCONTINUED | OUTPATIENT
Start: 2021-09-21 | End: 2021-09-21 | Stop reason: HOSPADM

## 2021-09-21 RX ORDER — ATROPINE SULFATE 0.1 MG/ML
0.5 INJECTION INTRAVENOUS
Status: DISCONTINUED | OUTPATIENT
Start: 2021-09-21 | End: 2021-09-21 | Stop reason: HOSPADM

## 2021-09-21 RX ORDER — DEXTROMETHORPHAN/PSEUDOEPHED 2.5-7.5/.8
1.2 DROPS ORAL
Status: DISCONTINUED | OUTPATIENT
Start: 2021-09-21 | End: 2021-09-21 | Stop reason: HOSPADM

## 2021-09-21 RX ORDER — SODIUM CHLORIDE 9 MG/ML
INJECTION, SOLUTION INTRAVENOUS
Status: DISCONTINUED | OUTPATIENT
Start: 2021-09-21 | End: 2021-09-21 | Stop reason: HOSPADM

## 2021-09-21 RX ORDER — PROPOFOL 10 MG/ML
INJECTION, EMULSION INTRAVENOUS AS NEEDED
Status: DISCONTINUED | OUTPATIENT
Start: 2021-09-21 | End: 2021-09-21 | Stop reason: HOSPADM

## 2021-09-21 RX ORDER — MIDAZOLAM HYDROCHLORIDE 1 MG/ML
.25-5 INJECTION, SOLUTION INTRAMUSCULAR; INTRAVENOUS
Status: DISCONTINUED | OUTPATIENT
Start: 2021-09-21 | End: 2021-09-21 | Stop reason: HOSPADM

## 2021-09-21 RX ORDER — FENTANYL CITRATE 50 UG/ML
200 INJECTION, SOLUTION INTRAMUSCULAR; INTRAVENOUS
Status: DISCONTINUED | OUTPATIENT
Start: 2021-09-21 | End: 2021-09-21 | Stop reason: HOSPADM

## 2021-09-21 RX ORDER — EPINEPHRINE 0.1 MG/ML
1 INJECTION INTRACARDIAC; INTRAVENOUS
Status: DISCONTINUED | OUTPATIENT
Start: 2021-09-21 | End: 2021-09-21 | Stop reason: HOSPADM

## 2021-09-21 RX ORDER — SODIUM CHLORIDE 9 MG/ML
150 INJECTION, SOLUTION INTRAVENOUS CONTINUOUS
Status: DISCONTINUED | OUTPATIENT
Start: 2021-09-21 | End: 2021-09-21 | Stop reason: HOSPADM

## 2021-09-21 RX ADMIN — PROPOFOL 40 MG: 10 INJECTION, EMULSION INTRAVENOUS at 09:12

## 2021-09-21 RX ADMIN — LIDOCAINE HYDROCHLORIDE 40 MG: 20 INJECTION, SOLUTION EPIDURAL; INFILTRATION; INTRACAUDAL; PERINEURAL at 09:07

## 2021-09-21 RX ADMIN — PROPOFOL 90 MG: 10 INJECTION, EMULSION INTRAVENOUS at 09:07

## 2021-09-21 RX ADMIN — PROPOFOL 50 MG: 10 INJECTION, EMULSION INTRAVENOUS at 09:21

## 2021-09-21 RX ADMIN — PROPOFOL 30 MG: 10 INJECTION, EMULSION INTRAVENOUS at 09:14

## 2021-09-21 RX ADMIN — SODIUM CHLORIDE: 900 INJECTION, SOLUTION INTRAVENOUS at 08:40

## 2021-09-21 RX ADMIN — PROPOFOL 40 MG: 10 INJECTION, EMULSION INTRAVENOUS at 09:17

## 2021-09-21 NOTE — ANESTHESIA PREPROCEDURE EVALUATION
Relevant Problems   No relevant active problems       Anesthetic History   No history of anesthetic complications            Review of Systems / Medical History  Patient summary reviewed, nursing notes reviewed and pertinent labs reviewed    Pulmonary        Sleep apnea: CPAP           Neuro/Psych   Within defined limits           Cardiovascular    Hypertension                   GI/Hepatic/Renal     GERD           Endo/Other    Diabetes    Arthritis and cancer     Other Findings              Physical Exam    Airway  Mallampati: II  TM Distance: > 6 cm  Neck ROM: normal range of motion   Mouth opening: Normal     Cardiovascular  Regular rate and rhythm,  S1 and S2 normal,  no murmur, click, rub, or gallop             Dental  No notable dental hx       Pulmonary  Breath sounds clear to auscultation               Abdominal  GI exam deferred       Other Findings            Anesthetic Plan    ASA: 3  Anesthesia type: MAC          Induction: Intravenous  Anesthetic plan and risks discussed with: Patient

## 2021-09-21 NOTE — PERIOP NOTES

## 2021-09-21 NOTE — PROCEDURES
Blanca Tripp [facility] 912 Zaida Monteiro M.D.  [address] [facility], [address] [place] [address]  [phone]               Colonoscopy Procedure Note    NAME: Desiree Clayton  :  1950  MRN:  [id number]    Indications:   Screening colonoscopy     : Loob Raya MD    Referring Provider:  Divina Dahl MD    Staff: Endoscopy Technician-1: Ramesh Szymanski IV  Endoscopy RN-1: Latisha Moreno RN    Prosthetic devices, grafts, tissues, transplant, or devices implanted: none    Medicines:  MAC anesthesia      Procedure Details:  After informed consent was obtained with all risks and benefits of the procedure explained and preprocedure exam completed, the patient was placed in the left lateral decubitus position. Universal protocol for patient identification was performed and documented in the nursing notes. Throughout the procedure, the patient's blood pressure was monitored at least every five minutes; pulse, and oxygen saturations were monitored continuously. All vital signs were documented in the nursing notes. A digital rectal exam was performed and was normal.  The Olympus videocolonoscope  was inserted in the rectum and carefully advanced to the cecum, which was identified by the ileocecal valve and appendiceal orifice. The colonoscope was slowly withdrawn with careful evaluation between folds. Retroflexion in the rectum was performed; findings and interventions are described below. Procedure start time, extent reached time/cecum time, and procedure end time are documented in the nursing notes. The quality of preparation was adequate. Findings:   1. 2 mm sessile polyp in the ascending colon s/p cold forceps polypectomy  2. 4 mm sessile polyp in the descending colon s/p cold snare polypectomy  3. Moderate pan-diverticulosis  4.  Small internal hemorrhoids    Interventions:    2 complete polypectomy were performed using cold snare  /forceps and the polyps were retrieved    Specimens:   ID Type Source Tests Collected by Time Destination   1 : ascending colon polyp Preservative Colon, Ascending  Ember Ruiz MD 9/21/2021 0915 Pathology   2 : descending colon polyp Preservative Colon, Descending  Ember Ruiz MD 9/21/2021 8411 Pathology       EBL:  None. Complications:   No immediate complications     Impression:  -See post-procedure diagnoses. Recommendations:   -If adenoma is present, repeat colonoscopy in 5 years. If < 10 years, reason:  above average risk patient    Resume normal medication(s). Signed by:  Swapna Lopez MD          9/21/2021  9:28 AM

## 2021-09-21 NOTE — H&P
[address], [place] [address]          Pre-procedure History and Physical       NAME:  Eliot Gosselin   :   1950   MRN:   [id number]     CHIEF COMPLAINT/HPI: crcs    PMH:  Past Medical History:   Diagnosis Date    Arthritis     BACK    BPH (benign prostatic hyperplasia)     CAD (coronary artery disease)     Cancer ([facility] 2015    SKIN - HEAD AND FACE    Cancer ([facility] 2019    PROSTATE    Chronic pain     BACK    Chronic pelvic pain in male     sensitive to touch on the left side down to the left side of the penis    Collagenous colitis     Diabetes mellitus type 2, controlled, with complications ([facility] 7891    Diabetic neuropathy ([facility]     Diverticulosis     ED (erectile dysfunction)     Dr. Brandon Botello GERD (gastroesophageal reflux disease)     Gout     1 episode    Hypertension 1970    Ill-defined condition     BILATERAL PERIPHERAL NEUROPATHY BOTH LEGS    Low serum testosterone level     Dr. Krissy Arechiga Osteopenia     Dr. Lurline Favre Sleep apnea     USES CPAP    Sun-damaged skin        PSH:  Past Surgical History:   Procedure Laterality Date    EMG TWO EXTREMITIES LOWER  2013         ENDOSCOPY, COLON, DIAGNOSTIC      HX COLONOSCOPY      HX HEENT  2017    WISDOM TEETH X4    HX HERNIA REPAIR      x2 bilateral inguinal    HX ORTHOPAEDIC  2018    L4-5, L5-S1 DISKECTOMY    HX OTHER SURGICAL  ,    MOHS    HX PROSTATE SURGERY  2019    removed    NCV SENSORY OR MIXED  2013         [place] CARDIAC SURG PROCEDURE UNLIST  2019    1 stent       Allergies: Allergies   Allergen Reactions    Pcn [Penicillins] Other (comments)     Infancy was told he had a rash       Home Medications:  Prior to Admission Medications   Prescriptions Last Dose Informant Patient Reported? Taking? ASPIRIN PO 2021 at Unknown time  Yes Yes   Sig: Take 81 mg by mouth daily.    CALCIUM-VITAMIN D3 PO 2021 at Unknown time  Yes Yes   Sig: Take 600 mg by mouth daily. FARXIGA 10 mg tab tablet 2021 at Unknown time  Yes Yes   Si tablet by mouth daily   OTHER 2021 at Unknown time  Yes Yes   Sig: Metformin ER. Take 1000 mg po twice a day. TRULICITY 1.5 VG/0.1 mL sub-q pen 2021 at Unknown time  Yes Yes   Si.5 mg by SubCUTAneous route every seven (7) days. atorvastatin (LIPITOR) 40 mg tablet 2021 at Unknown time  No Yes   Sig: TAKE 1 TABLET BY MOUTH EVERY DAY AT BEDTIME   cyanocobalamin 1,000 mcg tablet 2021 at Unknown time  Yes Yes   Sig: Take 1,000 mcg by mouth daily. ergocalciferol (ERGOCALCIFEROL) 50,000 unit capsule 2021 at Unknown time  Yes Yes   Sig: TAKE ONE CAPSULE BY MOUTH EVERY WEEK   furosemide (LASIX) 20 mg tablet 2021 at Unknown time  No Yes   Sig: TAKE 1 TABLET BY MOUTH DAILY   gabapentin (NEURONTIN) 300 mg capsule 2021 at Unknown time  Yes Yes   Sig: Take 600 mg by mouth two (2) times a day.   halobetasol (ULTRAVATE) 0.05 % topical cream 2021 at Unknown time  Yes Yes   Sig: as needed. insulin glargine (BASAGLAR KWIKPEN U-100 INSULIN) 100 unit/mL (3 mL) inpn 2021 at Unknown time  Yes Yes   Si Units by SubCUTAneous route nightly. As directed. insulin lispro (HUMALOG KWIKPEN INSULIN SC)   Yes Yes   Sig: by SubCUTAneous route. Up to 40 units SQ 3 times daily with meals. States he usually injects 40 units 3 times daily with meals. losartan (COZAAR) 25 mg tablet 2021 at Unknown time  No Yes   Sig: TAKE 1 TABLET BY MOUTH DAILY   metoprolol succinate (TOPROL-XL) 25 mg XL tablet 2021 at Unknown time  Yes Yes   Sig: Take 25 mg by mouth daily. naproxen sodium (ALEVE PO) 2021 at Unknown time  Yes Yes   Sig: Take  by mouth as needed. nitroglycerin (NITROSTAT) 0.4 mg SL tablet   No Yes   Si Tab by SubLINGual route every five (5) minutes as needed for Chest Pain. Up to 3 doses.    pantoprazole (PROTONIX) 40 mg tablet 2021 at Unknown time  Yes Yes   Sig: Take 40 mg by mouth daily. tadalafiL (Cialis) 20 mg tablet 2021 at Unknown time  Yes Yes   Sig: Take 20 mg by mouth as needed for Erectile Dysfunction. Facility-Administered Medications: None       Hospital Medications:  Current Facility-Administered Medications   Medication Dose Route Frequency    0.9% sodium chloride infusion  150 mL/hr IntraVENous CONTINUOUS    sodium chloride (NS) flush 5-40 mL  5-40 mL IntraVENous Q8H    sodium chloride (NS) flush 5-40 mL  5-40 mL IntraVENous PRN    midazolam (VERSED) injection 0.25-5 mg  0.25-5 mg IntraVENous Multiple    fentaNYL citrate (PF) injection 200 mcg  200 mcg IntraVENous Multiple    simethicone (MYLICON) 67GW/1.1EN oral drops 80 mg  1.2 mL Oral Multiple    atropine injection 0.5 mg  0.5 mg IntraVENous ONCE PRN    EPINEPHrine (ADRENALIN) 0.1 mg/mL syringe 1 mg  1 mg Endoscopically ONCE PRN       Family History:  Family History   Problem Relation Age of Onset    Diabetes Father     Heart Disease Father 62        at least 2 MI's    Stroke Father         x2    Cancer Mother         Bladder    Parkinson's Disease Mother     Hypertension Mother     Hypertension Sister     Diabetes Paternal Grandmother     Diabetes Other         cousins    Anesth Problems Neg Hx        Social History:  Social History     Tobacco Use    Smoking status: Former Smoker     Packs/day: 1.00     Years: 12.00     Pack years: 12.00     Quit date: 1980     Years since quittin.4    Smokeless tobacco: Never Used   Substance Use Topics    Alcohol use: Yes     Alcohol/week: 0.0 standard drinks     Comment: rarely       The patient was counseled at length about the risks of kylah Covid-19 in the barron-operative and post-operative states including the recovery window of their procedure.   The patient was made aware that kylah Covid-19 after a surgical procedure may worsen their prognosis for recovering from the virus and lend to a higher morbidity and or mortality risk. The patient was given the options of postponing their procedure. All of the risks, benefits, and alternatives were discussed. The patient does  wish to proceed with the procedure. PHYSICAL EXAM PRIOR TO SEDATION:  General: Alert, in no acute distress    Lungs:            CTA bilaterally  Heart:  Normal S1, S2    Abdomen: Soft, Non distended, Non tender. Normoactive bowel sounds. Assessment:   Stable for sedation administration.   Date of last colonoscopy: 10 yrs, Polyps  No    Plan:     · Endoscopic procedure with sedation     Signed By: Anurag Torres MD     9/21/2021  9:03 AM

## 2021-09-21 NOTE — DISCHARGE INSTRUCTIONS
Blanca Tripp [facility] [address] Marsha Gifford M.D.  [address] [place] [address]  [phone]          COLONOSCOPY DISCHARGE INSTRUCTIONS    Wilbert Singer  [id number]  1950    DISCOMFORT:  Redness at IV site- apply warm compress to area; if redness or soreness persist- contact your physician  There may be a slight amount of blood passed from the rectum  Gaseous discomfort- walking, belching will help relieve any discomfort  You may not operate a vehicle for 12 hours  You may not engage in an occupation involving machinery or appliances for the  rest of today  You may not drink alcoholic beverages for at least 12 hours  Avoid making any critical decisions for at least 24 hours    DIET:   You may resume your normal diet, but some patients find that heavy or large  meals may lead to indigestion or vomiting. I suggest a light meal as first food  intake. I recommend a whole food, plant-based diet for your overall health. ACTIVITY:  You may resume your normal daily activities. It is recommended that you spend the remainder of the day resting - avoid any strenuous activity. CALL MROOPA IF ANY SIGN OF:   Increasing pain, nausea, vomiting  Abdominal distension (swelling)  Significant bleeding (oral or rectal)  Fever   Pain in chest area  Shortness of breath    Additional Instructions:   Call Dr. Marsha Gifford if any questions or problems at [phone]   You should receive the biopsy results by phone or mail within 3 weeks, if not, call  my office for the results      Should have a repeat colonoscopy in 5 years if adenomatous colon polyp. Colonoscopy showed 2 polyps removed, diverticulosis, and small internal hemorrhoids. Learning About Coronavirus [phone])  Coronavirus [phone]): Overview  What is coronavirus (COVID-19)? The coronavirus disease (COVID-19) is caused by a virus. It is an illness that was first found in [place], [place], in December 2019.  It has since spread worldwide. The virus can cause fever, cough, and trouble breathing. In severe cases, it can cause pneumonia and make it hard to breathe without help. It can cause death. Coronaviruses are a large group of viruses. They cause the common cold. They also cause more serious illnesses like Middle East respiratory syndrome (MERS) and severe acute respiratory syndrome (SARS). COVID-19 is caused by a novel coronavirus. That means it's a new type that has not been seen in people before. This virus spreads person-to-person through droplets from coughing and sneezing. It can also spread when you are close to someone who is infected. And it can spread when you touch something that has the virus on it, such as a doorknob or a tabletop. What can you do to protect yourself from coronavirus (COVID-19)? The best way to protect yourself from getting sick is to:  · Avoid areas where there is an outbreak. · Avoid contact with people who may be infected. · Wash your hands often with soap or alcohol-based hand sanitizers. · Avoid crowds and try to stay at least 6 feet away from other people. · Wash your hands often, especially after you cough or sneeze. Use soap and water, and scrub for at least 20 seconds. If soap and water aren't available, use an alcohol-based hand . · Avoid touching your mouth, nose, and eyes. What can you do to avoid spreading the virus to others? To help avoid spreading the virus to others:  · Cover your mouth with a tissue when you cough or sneeze. Then throw the tissue in the trash. · Use a disinfectant to clean things that you touch often. · Stay home if you are sick or have been exposed to the virus. Don't go to school, work, or public areas. And don't use public transportation. · If you are sick:  ? Leave your home only if you need to get medical care. But call the doctor's office first so they know you're coming.  And wear a face mask, if you have one.  ? If you have a face mask, wear it whenever you're around other people. It can help stop the spread of the virus when you cough or sneeze. ? Clean and disinfect your home every day. Use household  and disinfectant wipes or sprays. Take special care to clean things that you grab with your hands. These include doorknobs, remote controls, phones, and handles on your refrigerator and microwave. And don't forget countertops, tabletops, bathrooms, and computer keyboards. When to call for help  Call 911 anytime you think you may need emergency care. For example, call if:  · You have severe trouble breathing. (You can't talk at all.)  · You have constant chest pain or pressure. · You are severely dizzy or lightheaded. · You are confused or can't think clearly. · Your face and lips have a blue color. · You pass out (lose consciousness) or are very hard to wake up. Call your doctor now if you develop symptoms such as:  · Shortness of breath. · Fever. · Cough. If you need to get care, call ahead to the doctor's office for instructions before you go. Make sure you wear a face mask, if you have one, to prevent exposing other people to the virus. Where can you get the latest information? The following health organizations are tracking and studying this virus. Their websites contain the most up-to-date information. Jeredprice Mock also learn what to do if you think you may have been exposed to the virus. · U.S. Centers for Disease Control and Prevention (CDC): The CDC provides updated news about the disease and travel advice. The website also tells you how to prevent the spread of infection. www.cdc.gov  · World Health Organization [facility]): WHO offers information about the virus outbreaks. WHO also has travel advice. www.who.int  Current as of: April 1, 2020               Content Version: 12.4  © 5447-1705 [employer], Incorporated.    Care instructions adapted under license by your healthcare professional. If you have questions about a medical condition or this instruction, always ask your healthcare professional. [place] [address] any warranty or liability for your use of this information.

## 2021-09-21 NOTE — ANESTHESIA POSTPROCEDURE EVALUATION
Post-Anesthesia Evaluation and Assessment    Patient: Princess Galvez MRN: [id number]  SSN: [id number]    YOB: 1950  Age: 65 y.o. Sex: male      I have evaluated the patient and they are stable and ready for discharge from the PACU. Cardiovascular Function/Vital Signs  Visit Vitals  /74   Pulse 66   Temp (!) 35.4 °C (95.7 °F)   Resp 19   Ht 5' 10\" (1.778 m)   Wt 97.5 kg (215 lb)   SpO2 96%   BMI 30.85 kg/m²       Patient is status post MAC anesthesia for Procedure(s):  COLONOSCOPY   :-  ENDOSCOPIC POLYPECTOMY. Nausea/Vomiting: None    Postoperative hydration reviewed and adequate. Pain:  Pain Scale 1: Adult Nonverbal Pain Scale (09/21/21 0934)  Pain Intensity 1: 0 (09/21/21 0934)   Managed    Neurological Status: At baseline    Mental Status, Level of Consciousness: Alert and  oriented to person, place, and time    Pulmonary Status:   O2 Device: None (Room air) (09/21/21 0934)   Adequate oxygenation and airway patent    Complications related to anesthesia: None    Post-anesthesia assessment completed. No concerns    Signed By: Julieth Platt MD     September 21, 2021              Procedure(s):  COLONOSCOPY   :-  ENDOSCOPIC POLYPECTOMY. MAC    <BSHSIANPOST>    INITIAL Post-op Vital signs:   Vitals Value Taken Time   /78 09/21/21 0935   Temp 35.4 °C (95.7 °F) 09/21/21 0934   Pulse 66 09/21/21 0936   Resp 27 09/21/21 0936   SpO2 95 % 09/21/21 0936   Vitals shown include unvalidated device data.

## 2021-11-19 ENCOUNTER — OFFICE VISIT (OUTPATIENT)
Dept: CARDIOLOGY CLINIC | Age: 71
End: 2021-11-19
Payer: MEDICARE

## 2021-11-19 VITALS
HEIGHT: 70 IN | DIASTOLIC BLOOD PRESSURE: 76 MMHG | WEIGHT: 218 LBS | RESPIRATION RATE: 18 BRPM | SYSTOLIC BLOOD PRESSURE: 120 MMHG | BODY MASS INDEX: 31.21 KG/M2 | HEART RATE: 70 BPM | OXYGEN SATURATION: 97 %

## 2021-11-19 DIAGNOSIS — Z87.891 HISTORY OF TOBACCO USE: ICD-10-CM

## 2021-11-19 DIAGNOSIS — I25.10 CORONARY ARTERY DISEASE INVOLVING NATIVE CORONARY ARTERY OF NATIVE HEART WITHOUT ANGINA PECTORIS: Primary | ICD-10-CM

## 2021-11-19 DIAGNOSIS — I45.10 INCOMPLETE RBBB: ICD-10-CM

## 2021-11-19 DIAGNOSIS — I87.2 VENOUS INSUFFICIENCY: ICD-10-CM

## 2021-11-19 PROCEDURE — G0463 HOSPITAL OUTPT CLINIC VISIT: HCPCS | Performed by: INTERNAL MEDICINE

## 2021-11-19 PROCEDURE — 1101F PT FALLS ASSESS-DOCD LE1/YR: CPT | Performed by: INTERNAL MEDICINE

## 2021-11-19 PROCEDURE — G8417 CALC BMI ABV UP PARAM F/U: HCPCS | Performed by: INTERNAL MEDICINE

## 2021-11-19 PROCEDURE — G8536 NO DOC ELDER MAL SCRN: HCPCS | Performed by: INTERNAL MEDICINE

## 2021-11-19 PROCEDURE — G8754 DIAS BP LESS 90: HCPCS | Performed by: INTERNAL MEDICINE

## 2021-11-19 PROCEDURE — 3017F COLORECTAL CA SCREEN DOC REV: CPT | Performed by: INTERNAL MEDICINE

## 2021-11-19 PROCEDURE — G8427 DOCREV CUR MEDS BY ELIG CLIN: HCPCS | Performed by: INTERNAL MEDICINE

## 2021-11-19 PROCEDURE — G8510 SCR DEP NEG, NO PLAN REQD: HCPCS | Performed by: INTERNAL MEDICINE

## 2021-11-19 PROCEDURE — 99214 OFFICE O/P EST MOD 30 MIN: CPT | Performed by: INTERNAL MEDICINE

## 2021-11-19 PROCEDURE — G8752 SYS BP LESS 140: HCPCS | Performed by: INTERNAL MEDICINE

## 2021-11-19 PROCEDURE — 93010 ELECTROCARDIOGRAM REPORT: CPT | Performed by: INTERNAL MEDICINE

## 2021-11-19 PROCEDURE — 93005 ELECTROCARDIOGRAM TRACING: CPT | Performed by: INTERNAL MEDICINE

## 2021-11-19 RX ORDER — LEVOFLOXACIN 750 MG/1
TABLET ORAL
COMMUNITY
Start: 2021-11-17 | End: 2022-08-22 | Stop reason: ALTCHOICE

## 2021-11-19 RX ORDER — CYCLOBENZAPRINE HCL 5 MG
TABLET ORAL AS NEEDED
COMMUNITY
Start: 2021-11-17 | End: 2022-08-22 | Stop reason: ALTCHOICE

## 2021-11-19 RX ORDER — METFORMIN HYDROCHLORIDE 500 MG/1
TABLET, EXTENDED RELEASE ORAL
COMMUNITY
Start: 2021-09-27

## 2021-11-19 RX ORDER — HYDROCODONE BITARTRATE AND ACETAMINOPHEN 5; 325 MG/1; MG/1
TABLET ORAL AS NEEDED
COMMUNITY
Start: 2021-11-08 | End: 2022-08-22 | Stop reason: ALTCHOICE

## 2021-11-19 NOTE — PROGRESS NOTES
[facility] [place]: Veronica Specter  [phone]    History of Present Illness:   Mr. Sekou Roberts is a 64 yo M with CAD, s/p PCI 12/24/19 when they were traveling in [place]. He had left sided upper abdominal pain and went to the hospital.  His cardiac enzymes were negative, but given his symptoms they proceeded with a cardiac catheterization. Cath also noted some moderate plaquing otherwise. Since his last visit, overall he has been doing well. He denies any exertional chest pain. His breathing has been stable. No significant palpitations, lightheadedness or dizziness. He has been compliant with his medications. He is compensated on exam with clear lungs. He does have trace to 1+ chronic lower extremity edema, right greater than left. Soc hx. Quit tobacco 1980. 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 Tucker Dejesus  Fam hx. Dad with CAD. Assessment and Plan:   1. Venous insufficiency. His echocardiogram earlier this year was normal and we discussed this. Recommend elevating legs, minimizing salt intake, compression stockings. Will have him follow back in six months. 2. Coronary artery disease. Stable and compensated. Continue aspirin, statin and beta blocker. 3. Tobacco use. Quit many years ago. 4. Back surgery in 2018 with diskectomy. 5. Prostate cancer, status post surgery in 2019. He  has a past medical history of Arthritis, BPH (benign prostatic hyperplasia) (2013), CAD (coronary artery disease), Cancer ([address] (2015), Cancer ([address] (01/2019), Chronic pain, Chronic pelvic pain in male, Collagenous colitis (2011), Diabetes mellitus type 2, controlled, with complications ([address] (9760), Diabetic neuropathy ([address] (2006), Diverticulosis, ED (erectile dysfunction), GERD (gastroesophageal reflux disease) (1990), Gout, Hypertension (1970), Ill-defined condition, Low serum testosterone level, Osteopenia, Psoriasis, Sleep apnea, and Sun-damaged skin.  He also has no past medical history of Arsenic suspected exposure, Family history of skin cancer, Pacemaker, Radiation exposure, Skin cancer, Sunburn, blistering, or Tanning bed exposure. All other systems negative except as above. PE  Vitals:    11/19/21 1059   BP: 120/76   Pulse: 70   Resp: 18   SpO2: 97%   Weight: 218 lb (98.9 kg)   Height: 5' 10\" (1.778 m)    Body mass index is 31.28 kg/m².    General appearance - alert, well appearing, and in no distress  Mental status - affect appropriate to mood  Eyes - sclera anicteric, moist mucous membranes  Neck - supple, no JVD  Chest - clear to auscultation, no wheezes, rales or rhonchi  Heart - normal rate, regular rhythm, normal S1, S2, I/VI systolic murmur LUSB  Abdomen - soft, nontender, nondistended, no masses or organomegaly  Extremities - peripheral pulses normal, trace to 1+ LE edema R>L  edema    Recent Labs:  Lab Results   Component Value Date/Time    Cholesterol, total 128 07/13/2013 08:05 AM    HDL Cholesterol 40 07/13/2013 08:05 AM    LDL, calculated 59 07/13/2013 08:05 AM    Triglyceride 143 07/13/2013 08:05 AM     Lab Results   Component Value Date/Time    Creatinine (POC) 0.8 01/29/2019 11:24 AM    Creatinine 1.33 (H) 02/02/2021 02:59 PM     Lab Results   Component Value Date/Time    BUN 21 (H) 02/02/2021 02:59 PM     Lab Results   Component Value Date/Time    Potassium 4.3 02/02/2021 02:59 PM     Lab Results   Component Value Date/Time    Hemoglobin A1c 7.6 05/06/2021 12:00 AM    Hemoglobin A1c 7.7 (H) 03/21/2019 10:38 AM    Hemoglobin A1c, External 7.8 12/10/2019 12:00 AM     Lab Results   Component Value Date/Time    HGB 11.7 (L) 04/03/2019 10:46 PM     Lab Results   Component Value Date/Time    PLATELET 272 59/97/1790 10:46 PM       Reviewed:  Past Medical History:   Diagnosis Date    Arthritis     BACK    BPH (benign prostatic hyperplasia) 2013    CAD (coronary artery disease)     Cancer ([facility] 2015    SKIN - HEAD AND FACE    Cancer ([facility] 01/2019    PROSTATE    Chronic pain     BACK    Chronic pelvic pain in male     sensitive to touch on the left side down to the left side of the penis    Collagenous colitis     Diabetes mellitus type 2, controlled, with complications ([facility] 6025    Diabetic neuropathy ([facility] 2006    Diverticulosis     ED (erectile dysfunction)     Dr. Rupert Amado GERD (gastroesophageal reflux disease)     Gout     1 episode    Hypertension 1970    Ill-defined condition     BILATERAL PERIPHERAL NEUROPATHY BOTH LEGS    Low serum testosterone level     Dr. Adelia Barbosa Osteopenia     Dr. Adelia Barbosa Psoriasis     Sleep apnea     USES CPAP    Sun-damaged skin      Social History     Tobacco Use   Smoking Status Former Smoker    Packs/day: 1.00    Years: 12.00    Pack years: 12.00    Quit date: 1980    Years since quittin.6   Smokeless Tobacco Never Used     Social History     Substance and Sexual Activity   Alcohol Use Yes    Alcohol/week: 0.0 standard drinks    Comment: rarely     Allergies   Allergen Reactions    Pcn [Penicillins] Other (comments)     Infancy was told he had a rash         Current Outpatient Medications   Medication Sig    cyclobenzaprine (FLEXERIL) 5 mg tablet as needed.  HYDROcodone-acetaminophen (NORCO) 5-325 mg per tablet as needed.  levoFLOXacin (LEVAQUIN) 750 mg tablet     metFORMIN ER (GLUCOPHAGE XR) 500 mg tablet TAKE 2 TABLETS BY MOUTH TWICE DAILY FOR 90 DAYS    pantoprazole (PROTONIX) 40 mg tablet Take 40 mg by mouth daily.  gabapentin (NEURONTIN) 300 mg capsule Take 600 mg by mouth two (2) times a day.  CALCIUM-VITAMIN D3 PO Take 600 mg by mouth daily.  ASPIRIN PO Take 81 mg by mouth daily.  insulin lispro (HUMALOG KWIKPEN INSULIN SC) by SubCUTAneous route. Up to 40 units SQ 3 times daily with meals. States he usually injects 40 units 3 times daily with meals.  naproxen sodium (ALEVE PO) Take  by mouth as needed.     losartan (COZAAR) 25 mg tablet TAKE 1 TABLET BY MOUTH DAILY    furosemide (LASIX) 20 mg tablet TAKE 1 TABLET BY MOUTH DAILY    atorvastatin (LIPITOR) 40 mg tablet TAKE 1 TABLET BY MOUTH EVERY DAY AT BEDTIME    halobetasol (ULTRAVATE) 0.05 % topical cream as needed.  nitroglycerin (NITROSTAT) 0.4 mg SL tablet 1 Tab by SubLINGual route every five (5) minutes as needed for Chest Pain. Up to 3 doses.  TRULICITY 1.5 FI/6.4 mL sub-q pen 1.5 mg by SubCUTAneous route every seven (7) days.  ergocalciferol (ERGOCALCIFEROL) 50,000 unit capsule TAKE ONE CAPSULE BY MOUTH EVERY WEEK    FARXIGA 10 mg tab tablet 1 tablet by mouth daily    cyanocobalamin 1,000 mcg tablet Take 1,000 mcg by mouth daily.  tadalafiL (Cialis) 20 mg tablet Take 20 mg by mouth as needed for Erectile Dysfunction.  insulin glargine (BASAGLAR KWIKPEN U-100 INSULIN) 100 unit/mL (3 mL) inpn 40 Units by SubCUTAneous route nightly. As directed.  metoprolol succinate (TOPROL-XL) 25 mg XL tablet Take 25 mg by mouth daily.  OTHER Metformin ER. Take 1000 mg po twice a day. (Patient not taking: Reported on 11/19/2021)     No current facility-administered medications for this visit.        Maxim Abraham MD  [facility] [place]  [address] [facility] [address]  [address]

## 2022-03-18 PROBLEM — C61 PROSTATE CANCER (HCC): Status: ACTIVE | Noted: 2019-04-02

## 2022-03-19 PROBLEM — E11.40 TYPE 2 DIABETES MELLITUS WITH DIABETIC NEUROPATHY (HCC): Status: ACTIVE | Noted: 2019-10-23

## 2022-04-22 LAB — HBA1C MFR BLD HPLC: 7.4 %

## 2022-08-22 ENCOUNTER — OFFICE VISIT (OUTPATIENT)
Dept: INTERNAL MEDICINE CLINIC | Age: 72
End: 2022-08-22
Payer: MEDICARE

## 2022-08-22 VITALS
DIASTOLIC BLOOD PRESSURE: 72 MMHG | WEIGHT: 230.6 LBS | TEMPERATURE: 98.3 F | BODY MASS INDEX: 33.01 KG/M2 | OXYGEN SATURATION: 96 % | SYSTOLIC BLOOD PRESSURE: 134 MMHG | HEIGHT: 70 IN | HEART RATE: 71 BPM | RESPIRATION RATE: 18 BRPM

## 2022-08-22 DIAGNOSIS — C61 PROSTATE CANCER (HCC): ICD-10-CM

## 2022-08-22 DIAGNOSIS — E11.40 TYPE 2 DIABETES MELLITUS WITH DIABETIC NEUROPATHY, WITH LONG-TERM CURRENT USE OF INSULIN (HCC): ICD-10-CM

## 2022-08-22 DIAGNOSIS — Z71.89 ADVANCE DIRECTIVE DISCUSSED WITH PATIENT: ICD-10-CM

## 2022-08-22 DIAGNOSIS — Z00.00 MEDICARE ANNUAL WELLNESS VISIT, SUBSEQUENT: Primary | ICD-10-CM

## 2022-08-22 DIAGNOSIS — Z79.4 TYPE 2 DIABETES MELLITUS WITH DIABETIC NEUROPATHY, WITH LONG-TERM CURRENT USE OF INSULIN (HCC): ICD-10-CM

## 2022-08-22 PROCEDURE — G8536 NO DOC ELDER MAL SCRN: HCPCS | Performed by: INTERNAL MEDICINE

## 2022-08-22 PROCEDURE — G0439 PPPS, SUBSEQ VISIT: HCPCS | Performed by: INTERNAL MEDICINE

## 2022-08-22 PROCEDURE — 3017F COLORECTAL CA SCREEN DOC REV: CPT | Performed by: INTERNAL MEDICINE

## 2022-08-22 PROCEDURE — G8752 SYS BP LESS 140: HCPCS | Performed by: INTERNAL MEDICINE

## 2022-08-22 PROCEDURE — 2022F DILAT RTA XM EVC RTNOPTHY: CPT | Performed by: INTERNAL MEDICINE

## 2022-08-22 PROCEDURE — G8510 SCR DEP NEG, NO PLAN REQD: HCPCS | Performed by: INTERNAL MEDICINE

## 2022-08-22 PROCEDURE — 3046F HEMOGLOBIN A1C LEVEL >9.0%: CPT | Performed by: INTERNAL MEDICINE

## 2022-08-22 PROCEDURE — G8417 CALC BMI ABV UP PARAM F/U: HCPCS | Performed by: INTERNAL MEDICINE

## 2022-08-22 PROCEDURE — G8427 DOCREV CUR MEDS BY ELIG CLIN: HCPCS | Performed by: INTERNAL MEDICINE

## 2022-08-22 PROCEDURE — G8754 DIAS BP LESS 90: HCPCS | Performed by: INTERNAL MEDICINE

## 2022-08-22 PROCEDURE — 1101F PT FALLS ASSESS-DOCD LE1/YR: CPT | Performed by: INTERNAL MEDICINE

## 2022-08-22 NOTE — PATIENT INSTRUCTIONS
Medicare Wellness Visit, Male    The best way to live healthy is to have a lifestyle where you eat a well-balanced diet, exercise regularly, limit alcohol use, and quit all forms of tobacco/nicotine, if applicable. Regular preventive services are another way to keep healthy. Preventive services (vaccines, screening tests, monitoring & exams) can help personalize your care plan, which helps you manage your own care. Screening tests can find health problems at the earliest stages, when they are easiest to treat. [facility] follows the current, evidence-based guidelines published by the [facility] ([facility]) when recommending preventive services for our patients. Because we follow these guidelines, sometimes recommendations change over time as research supports it. (For example, a prostate screening blood test is no longer routinely recommended for men with no symptoms). Of course, you and your doctor may decide to screen more often for some diseases, based on your risk and co-morbidities (chronic disease you are already diagnosed with). Preventive services for you include:  - Medicare offers their members a free annual wellness visit, which is time for you and your primary care provider to discuss and plan for your preventive service needs. Take advantage of this benefit every year!  -All adults over age 72 should receive the recommended pneumonia vaccines. Current USPSTF guidelines recommend a series of two vaccines for the best pneumonia protection.   -All adults should have a flu vaccine yearly and tetanus vaccine every 10 years.  -All adults age 48 and older should receive the shingles vaccines (series of two vaccines).        -All adults age 38-68 who are overweight should have a diabetes screening test once every three years.   -Other screening tests & preventive services for persons with diabetes include: an eye exam to screen for diabetic retinopathy, a kidney function test, a foot exam, and stricter control over your cholesterol.   -Cardiovascular screening for adults with routine risk involves an electrocardiogram (ECG) at intervals determined by the provider.   -Colorectal cancer screening should be done for adults age 54-65 with no increased risk factors for colorectal cancer. There are a number of acceptable methods of screening for this type of cancer. Each test has its own benefits and drawbacks. Discuss with your provider what is most appropriate for you during your annual wellness visit. The different tests include: colonoscopy (considered the best screening method), a fecal occult blood test, a fecal DNA test, and sigmoidoscopy.  -All adults born between [facility] should be screened once for Hepatitis C.  -An Abdominal Aortic Aneurysm (AAA) Screening is recommended for men age 73-68 who has ever smoked in their lifetime.      Here is a list of your current Health Maintenance items (your personalized list of preventive services) with a due date:      Health Maintenance Due   Topic Date Due    Medicare Yearly Exam  08/22/2023    Foot Exam Q1  Through podiatrist - Dr. Shari Pak COVID-19 Vaccine (3 - Booster for [facility] series) Up to date    MICROALBUMIN Q1  Up to date    Eye Exam Retinal or Dilated  Up to date (due Dec 2022)

## 2022-08-22 NOTE — PROGRESS NOTES
1. Have you been to the ER, urgent care clinic since your last visit? Hospitalized since your last visit? No    2. Have you seen or consulted any other health care providers outside of the [address] [place] [address] since your last visit? Include any pap smears or colon screening.  No     Chief Complaint   Patient presents with    Physical

## 2022-08-22 NOTE — PROGRESS NOTES
This is the Subsequent Medicare Annual Wellness Exam, performed 12 months or more after the Initial AWV or the last Subsequent AWV    I have reviewed the patient's medical history in detail and updated the computerized patient record. FELICITY - awakening in am and cannot breath through nose. Has humidifier but the proper hose is on back order. worse left nostril - clears as day progresses. .    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  Seeing Dr. Darvin Richard and getting injections into left wrist.  No surgical fix. Seeing Dr. Gloria Mckeon for diabetes. Last A1C was 7.2% in June. Wearing CGM - taking mealtime insulin. Education and counseling provided:  Are appropriate based on today's review and evaluation  Advanced directive discussed with patient. 1. Medicare annual wellness visit, subsequent  2. Type 2 diabetes mellitus with diabetic neuropathy, with long-term current use of insulin ([facility] [address] - followed by Dr. Gloria Mckeon. Continue same meds  3. Prostate cancer ([facility] [address]  - no sign of recurrence. Followed by urology.       Depression Risk Factor Screening     3 most recent PHQ Screens 8/22/2022   PHQ Not Done -   Little interest or pleasure in doing things Not at all   Feeling down, depressed, irritable, or hopeless Not at all   Total Score PHQ 2 0   Trouble falling or staying asleep, or sleeping too much -   Feeling tired or having little energy -   Poor appetite, weight loss, or overeating -   Feeling bad about yourself - or that you are a failure or have let yourself or your family down -   Trouble concentrating on things such as school, work, reading, or watching TV -   Moving or speaking so slowly that other people could have noticed; or the opposite being so fidgety that others notice -   Thoughts of being better off dead, or hurting yourself in some way -   PHQ 9 Score -   How difficult have these problems made it for you to do your work, take care of your home and get along with others -       Alcohol & Drug Abuse Risk Screen    Do you average more than 1 drink per night or more than 7 drinks a week: No    In the past three months have you have had more than 4 drinks containing alcohol on one occasion: No          Functional Ability and Level of Safety    Hearing: Hearing is good. Activities of Daily Living: The home contains: handrails and grab bars  Patient does total self care      Ambulation: with difficulty, uses a cane outside of house. Balance issues secondary to neuropathy     Fall Risk:  Fall Risk Assessment, last 12 mths 8/22/2022   Able to walk? Yes   Fall in past 12 months? 0   Do you feel unsteady? 1   Are you worried about falling 1   Is TUG test greater than 12 seconds? 0   Is the gait abnormal? 0   Number of falls in past 12 months -   Fall with injury?  -      Abuse Screen:  Patient is not abused     Visit Vitals  /72   Pulse 71   Temp 98.3 °F (36.8 °C) (Temporal)   Resp 18   Ht 5' 10\" (1.778 m)   Wt 230 lb 9.6 oz (104.6 kg)   SpO2 96%   BMI 33.09 kg/m²         Cognitive Screening    Has your family/caregiver stated any concerns about your memory: no     Cognitive Screening: Normal -      Health Maintenance Due     Health Maintenance Due   Topic Date Due    Medicare Yearly Exam  08/22/2023    Foot Exam Q1  Through podiatrist - Dr. Kiara Soni    COVID-19 Vaccine (3 - Booster for [facility] series) Up to date    MICROALBUMIN Q1  Up to date    Eye Exam Retinal or Dilated  Up to date (due Dec 2022)       Patient Care Team   Patient Care Team:  Joaquin Arthur MD as PCP - General (Internal Medicine Physician)  Joaquin Arthur MD as PCP - [facility]  Robby Gardiner MD (Endocrinology Physician)  Carlyle Duverney, MD (Urology)  Kelly Hernandez MD (Ophthalmology)  Fernie Scott MD (Cardiovascular Disease Physician)    History     Patient Active Problem List   Diagnosis Code    Benign essential hypertension I10    Diabetes mellitus type 2, controlled ([address] E11.9    GERD without esophagitis K21.9    Hyperlipidemia with target LDL less than 100 E78.5    Peripheral neuropathy (HCC) G62.9    Osteopenia M85.80    Gout attack M10.9    Functional abdominal pain syndrome R10.9    Collagenous colitis K52.831    Erectile dysfunction N52.9    BPH (benign prostatic hyperplasia) N40.0    Incomplete RBBB I45.10    History of colonoscopy Z98.890    Severe obstructive sleep apnea G47.33    HNP (herniated nucleus pulposus) [id number]    Prostate cancer ([facility] [address] C61    Type 2 diabetes mellitus with diabetic neuropathy (HCC) E11.40    Posterior subcapsular polar senile cataract H25.049    Inflammation of eyelid H01.9     Past Medical History:   Diagnosis Date    Arthritis     BACK    BPH (benign prostatic hyperplasia) 2013    CAD (coronary artery disease)     Cancer ([address] 2015    SKIN - HEAD AND FACE    Cancer ([facility] [address] 01/2019    PROSTATE    Chronic pain     BACK    Chronic pelvic pain in male     sensitive to touch on the left side down to the left side of the penis    Collagenous colitis 2011    Diabetes mellitus type 2, controlled, with complications ([address] 1868    Diabetic neuropathy ([address] 2006    Diverticulosis     ED (erectile dysfunction)     Dr. Wesley Orona    GERD (gastroesophageal reflux disease) 1990    Gout     1 episode    Hypertension 1970    Ill-defined condition     BILATERAL PERIPHERAL NEUROPATHY BOTH LEGS    Low serum testosterone level     Dr. Nneka Pina    Osteopenia     Dr. Nneka Pina    Psoriasis     Sleep apnea     USES CPAP    Sun-damaged skin       Past Surgical History:   Procedure Laterality Date    COLONOSCOPY N/A 9/21/2021    COLONOSCOPY   :- performed by Fabby Garsia [facility] at [facility] ENDOSCOPY    EMG TWO EXTREMITIES LOWER  8/24/2013         ENDOSCOPY, COLON, DIAGNOSTIC      HX COLONOSCOPY      HX HEENT  2017    WISDOM TEETH X4    HX HERNIA REPAIR      x2 bilateral inguinal    HX ORTHOPAEDIC  08/2018    L4-5, L5-S1 DISKECTOMY    HX OTHER SURGICAL  2015,2017    MOHS    HX PROSTATE SURGERY  04/02/2019    removed NCV SENSORY OR MIXED  8/24/2013         [place] CARDIAC SURG PROCEDURE UNLIST  12/24/2019    1 stent     Current Outpatient Medications   Medication Sig Dispense Refill    metFORMIN ER (GLUCOPHAGE XR) 500 mg tablet TAKE 2 TABLETS BY MOUTH TWICE DAILY FOR 90 DAYS      pantoprazole (PROTONIX) 40 mg tablet Take 40 mg by mouth daily. gabapentin (NEURONTIN) 300 mg capsule Take 600 mg by mouth two (2) times a day. CALCIUM-VITAMIN D3 PO Take 600 mg by mouth daily. ASPIRIN PO Take 81 mg by mouth daily. insulin lispro (HUMALOG KWIKPEN INSULIN SC) by SubCUTAneous route. Up to 40 units SQ 3 times daily with meals. States he usually injects 40 units 3 times daily with meals. naproxen sodium (ALEVE PO) Take  by mouth as needed. losartan (COZAAR) 25 mg tablet TAKE 1 TABLET BY MOUTH DAILY 90 Tablet 1    furosemide (LASIX) 20 mg tablet TAKE 1 TABLET BY MOUTH DAILY 90 Tablet 1    atorvastatin (LIPITOR) 40 mg tablet TAKE 1 TABLET BY MOUTH EVERY DAY AT BEDTIME 90 Tab 3    halobetasol (ULTRAVATE) 0.05 % topical cream as needed. nitroglycerin (NITROSTAT) 0.4 mg SL tablet 1 Tab by SubLINGual route every five (5) minutes as needed for Chest Pain. Up to 3 doses. 50 Tab 1    TRULICITY 1.5 DB/0.6 mL sub-q pen 1.5 mg by SubCUTAneous route every seven (7) days. ergocalciferol (ERGOCALCIFEROL) 50,000 unit capsule TAKE ONE CAPSULE BY MOUTH EVERY WEEK  2    FARXIGA 10 mg tab tablet 1 tablet by mouth daily      cyanocobalamin 1,000 mcg tablet Take 1,000 mcg by mouth daily. tadalafiL (CIALIS) 20 mg tablet Take 20 mg by mouth as needed for Erectile Dysfunction. insulin glargine (LANTUS,BASAGLAR) 100 unit/mL (3 mL) inpn 40 Units by SubCUTAneous route nightly. As directed. metoprolol succinate (TOPROL-XL) 25 mg XL tablet Take 25 mg by mouth daily.        Allergies   Allergen Reactions    Pcn [Penicillins] Other (comments)     Infancy was told he had a rash       Family History   Problem Relation Age of Onset    Diabetes Father     Heart Disease Father 62        at least 2 MI's    Stroke Father         x2    Cancer Mother         Bladder    Parkinson's Disease Mother     Hypertension Mother     Hypertension Sister     Diabetes Paternal Grandmother     Diabetes Other         cousins    Anesth Problems Neg Hx      Social History     Tobacco Use    Smoking status: Former     Packs/day: 1.00     Years: 12.00     Pack years: 12.00     Types: Cigarettes     Quit date: 1980     Years since quittin.3    Smokeless tobacco: Never   Substance Use Topics    Alcohol use:  Yes     Alcohol/week: 0.0 standard drinks     Comment: rarely         Milena Means MD

## 2022-09-06 LAB — HBA1C MFR BLD HPLC: 7 %

## 2022-09-12 ENCOUNTER — TRANSCRIBE ORDER (OUTPATIENT)
Dept: SCHEDULING | Age: 72
End: 2022-09-12

## 2022-09-12 DIAGNOSIS — M85.9 DISORDER OF BONE DENSITY AND STRUCTURE, UNSPECIFIED: Primary | ICD-10-CM

## 2022-12-07 ENCOUNTER — HOSPITAL ENCOUNTER (OUTPATIENT)
Dept: GENERAL RADIOLOGY | Age: 72
Discharge: HOME | End: 2022-12-07
Attending: NURSE PRACTITIONER
Payer: MEDICARE

## 2022-12-07 ENCOUNTER — TRANSCRIBE ORDER (OUTPATIENT)
Dept: GENERAL RADIOLOGY | Age: 72
End: 2022-12-07

## 2022-12-07 ENCOUNTER — OFFICE VISIT (OUTPATIENT)
Dept: INTERNAL MEDICINE CLINIC | Age: 72
End: 2022-12-07
Payer: MEDICARE

## 2022-12-07 VITALS
SYSTOLIC BLOOD PRESSURE: 111 MMHG | HEIGHT: 70 IN | DIASTOLIC BLOOD PRESSURE: 65 MMHG | TEMPERATURE: 97 F | BODY MASS INDEX: 31.38 KG/M2 | RESPIRATION RATE: 18 BRPM | HEART RATE: 56 BPM | WEIGHT: 219.2 LBS | OXYGEN SATURATION: 96 %

## 2022-12-07 DIAGNOSIS — R06.02 SOB (SHORTNESS OF BREATH): ICD-10-CM

## 2022-12-07 DIAGNOSIS — R06.2 WHEEZING: ICD-10-CM

## 2022-12-07 DIAGNOSIS — R07.9 CHEST PAIN: Primary | ICD-10-CM

## 2022-12-07 DIAGNOSIS — J06.9 UPPER RESPIRATORY INFECTION WITH COUGH AND CONGESTION: Primary | ICD-10-CM

## 2022-12-07 DIAGNOSIS — R07.9 CHEST PAIN: ICD-10-CM

## 2022-12-07 PROBLEM — G47.20 SLEEP-WAKE SCHEDULE DISORDER: Status: ACTIVE | Noted: 2022-12-07

## 2022-12-07 PROBLEM — E23.7 DISORDER OF PITUITARY GLAND (HCC): Status: ACTIVE | Noted: 2022-12-07

## 2022-12-07 PROBLEM — D53.1 MEGALOBLASTIC ANEMIA DUE TO VITAMIN B12 DEFICIENCY: Status: ACTIVE | Noted: 2022-12-07

## 2022-12-07 PROBLEM — R29.6 RECURRENT FALLS: Status: ACTIVE | Noted: 2022-12-07

## 2022-12-07 PROBLEM — M40.03: Status: ACTIVE | Noted: 2022-12-07

## 2022-12-07 PROBLEM — M93.90 OSTEOCHONDROPATHY: Status: ACTIVE | Noted: 2022-12-07

## 2022-12-07 LAB
QUICKVUE INFLUENZA TEST: NEGATIVE
VALID INTERNAL CONTROL?: YES

## 2022-12-07 PROCEDURE — 87804 INFLUENZA ASSAY W/OPTIC: CPT | Performed by: NURSE PRACTITIONER

## 2022-12-07 PROCEDURE — 71046 X-RAY EXAM CHEST 2 VIEWS: CPT

## 2022-12-07 PROCEDURE — G0463 HOSPITAL OUTPT CLINIC VISIT: HCPCS | Performed by: NURSE PRACTITIONER

## 2022-12-07 RX ORDER — DOXYCYCLINE 100 MG/1
100 CAPSULE ORAL 2 TIMES DAILY
Qty: 14 CAPSULE | Refills: 0 | Status: SHIPPED | OUTPATIENT
Start: 2022-12-07 | End: 2022-12-14

## 2022-12-07 NOTE — PROGRESS NOTES
Chief Complaint   Patient presents with    Cough     Pt states he has had cough over the past week with chest congestion          Vitals:    12/07/22 1137   BP: 111/65   Pulse: (!) 56   Resp: 18   Temp: 97 °F (36.1 °C)   SpO2: 96%   Weight: 219 lb 3.2 oz (99.4 kg)   Height: 5' 10\" (1.778 m)   PainSc:   0 - No pain       Current Outpatient Medications on File Prior to Visit   Medication Sig Dispense Refill    metFORMIN ER (GLUCOPHAGE XR) 500 mg tablet TAKE 2 TABLETS BY MOUTH TWICE DAILY FOR 90 DAYS      pantoprazole (PROTONIX) 40 mg tablet Take 40 mg by mouth daily. gabapentin (NEURONTIN) 300 mg capsule Take 600 mg by mouth two (2) times a day. CALCIUM-VITAMIN D3 PO Take 600 mg by mouth daily. ASPIRIN PO Take 81 mg by mouth daily. insulin lispro (HUMALOG KWIKPEN INSULIN SC) by SubCUTAneous route. Up to 40 units SQ 3 times daily with meals. States he usually injects 40 units 3 times daily with meals. naproxen sodium (ALEVE PO) Take  by mouth as needed. losartan (COZAAR) 25 mg tablet TAKE 1 TABLET BY MOUTH DAILY 90 Tablet 1    atorvastatin (LIPITOR) 40 mg tablet TAKE 1 TABLET BY MOUTH EVERY DAY AT BEDTIME 90 Tab 3    halobetasol (ULTRAVATE) 0.05 % topical cream as needed. TRULICITY 1.5 CO/7.4 mL sub-q pen 1.5 mg by SubCUTAneous route every seven (7) days. ergocalciferol (ERGOCALCIFEROL) 50,000 unit capsule TAKE ONE CAPSULE BY MOUTH EVERY WEEK  2    FARXIGA 10 mg tab tablet 1 tablet by mouth daily      cyanocobalamin 1,000 mcg tablet Take 1,000 mcg by mouth daily. tadalafiL (CIALIS) 20 mg tablet Take 20 mg by mouth as needed for Erectile Dysfunction. insulin glargine (LANTUS,BASAGLAR) 100 unit/mL (3 mL) inpn 40 Units by SubCUTAneous route nightly. As directed. metoprolol succinate (TOPROL-XL) 25 mg XL tablet Take 25 mg by mouth daily.       furosemide (LASIX) 20 mg tablet TAKE 1 TABLET BY MOUTH DAILY (Patient taking differently: 10 mg.) 90 Tablet 1    nitroglycerin (NITROSTAT) 0.4 mg SL tablet 1 Tab by SubLINGual route every five (5) minutes as needed for Chest Pain. Up to 3 doses. (Patient not taking: Reported on 12/7/2022) 50 Tab 1     No current facility-administered medications on file prior to visit. Health Maintenance Due   Topic    Foot Exam Q1     COVID-19 Vaccine (3 - Booster for Moderna series)    MICROALBUMIN Q1     Eye Exam Retinal or Dilated     Flu Vaccine (1)       1. \"Have you been to the ER, urgent care clinic since your last visit? Hospitalized since your last visit? \" No    2. \"Have you seen or consulted any other health care providers outside of the [address] [place] [address] since your last visit? \" No     3. For patients aged 39-70: Has the patient had a colonoscopy / FIT/ Cologuard? Yes - no Care Gap present      If the patient is female:    4. For patients aged 41-77: Has the patient had a mammogram within the past 2 years? NA - based on age or sex      11. For patients aged 21-65: Has the patient had a pap smear?  NA - based on age or sex

## 2022-12-07 NOTE — PROGRESS NOTES
Hannah Rodriguez is a 66 y.o. male  Chief Complaint   Patient presents with    Cough     Pt states he has had cough over the past week with chest congestion     Visit Vitals  /65 (BP 1 Location: Left upper arm, BP Patient Position: Sitting, BP Cuff Size: Adult)   Pulse (!) 56   Temp 97 °F (36.1 °C)   Resp 18   Ht 5' 10\" (1.778 m)   Wt 219 lb 3.2 oz (99.4 kg)   SpO2 96%   BMI 31.45 kg/m²          HPI    Patient here with wife. 1 week history of cough and congestion  Cough described as wet. No fever, sore throat, ear pain. No SOB or wheezing . Temp 100  Has tried  COVID / flu   ROS  Review of Systems   Constitutional:  Positive for fever. HENT:  Positive for congestion. Respiratory:  Positive for cough, shortness of breath and wheezing. Cardiovascular:  Negative for palpitations. Gastrointestinal:  Negative for nausea and vomiting. Musculoskeletal:  Negative for myalgias. Neurological:  Negative for dizziness and headaches. EXAM  Physical Exam  Vitals and nursing note reviewed. Constitutional:       General: He is not in acute distress. Appearance: He is ill-appearing. HENT:      Head: Normocephalic and atraumatic. Nose: Rhinorrhea present. Mouth/Throat:      Mouth: Mucous membranes are moist.   Eyes:      Pupils: Pupils are equal, round, and reactive to light. Cardiovascular:      Rate and Rhythm: Normal rate and regular rhythm. Pulses: Normal pulses. Heart sounds: Normal heart sounds. Pulmonary:      Effort: Pulmonary effort is normal.      Breath sounds: Wheezing and rhonchi present. Musculoskeletal:      Cervical back: Normal range of motion. No tenderness. Lymphadenopathy:      Cervical: No cervical adenopathy. Neurological:      General: No focal deficit present. Mental Status: He is alert.    Psychiatric:         Mood and Affect: Mood normal.   Results for orders placed or performed in visit on 12/07/22   AMB POC RAPID INFLUENZA TEST   Result Value Ref Range    VALID INTERNAL CONTROL POC Yes     QuickVue Influenza test Negative Negative       ASSESSMENT/PLAN      ICD-10-CM ICD-9-CM    1. Upper respiratory infection with cough and congestion  J06.9 465.9 NOVEL CORONAVIRUS (COVID-19)      AMB POC RAPID INFLUENZA TEST      XR CHEST PA LAT      2. Wheezing  R06.2 786.07 XR CHEST PA LAT      3. SOB (shortness of breath)  R06.02 786.05 NOVEL CORONAVIRUS (COVID-19)      XR CHEST PA LAT        X ray today  If + for pneumonia/ start antibiotic  Robitussin for cough  Hydration  Rest  I have discussed with the patient the diagnosis  and intended plan as seen in the orders. Patient received AVS , all questions answered concerning all future plans. I have discussed medication side effects and warnings with the patient/ guardian. Patient instructed to go to ER if symptoms worsen or fail to improve.     Signed By: Alcario Cooks, FNP     December 7, 2022

## 2022-12-08 ENCOUNTER — PATIENT MESSAGE (OUTPATIENT)
Dept: INTERNAL MEDICINE CLINIC | Age: 72
End: 2022-12-08

## 2022-12-08 LAB — SARS-COV-2, NAA: NOT DETECTED

## 2022-12-09 ENCOUNTER — PATIENT MESSAGE (OUTPATIENT)
Dept: INTERNAL MEDICINE CLINIC | Age: 72
End: 2022-12-09

## 2022-12-14 ENCOUNTER — TRANSCRIBE ORDER (OUTPATIENT)
Dept: SCHEDULING | Age: 72
End: 2022-12-14

## 2022-12-14 DIAGNOSIS — M81.0 SENILE OSTEOPOROSIS: Primary | ICD-10-CM

## 2023-01-06 LAB
CREATININE, EXTERNAL: 1.06
HBA1C MFR BLD HPLC: 7.4 %
LDL-C, EXTERNAL: 79

## 2023-01-17 ENCOUNTER — HOSPITAL ENCOUNTER (OUTPATIENT)
Dept: MAMMOGRAPHY | Age: 73
Discharge: HOME | End: 2023-01-17
Attending: INTERNAL MEDICINE
Payer: MEDICARE

## 2023-01-17 DIAGNOSIS — M81.0 SENILE OSTEOPOROSIS: ICD-10-CM

## 2023-01-17 PROCEDURE — 77080 DXA BONE DENSITY AXIAL: CPT

## 2023-02-22 ENCOUNTER — TELEPHONE (OUTPATIENT)
Dept: INTERNAL MEDICINE CLINIC | Age: 73
End: 2023-02-22

## 2023-02-22 NOTE — TELEPHONE ENCOUNTER
----- Message from Ashley Chin sent at 2/22/2023 12:45 PM EST -----  Subject: Message to Provider    QUESTIONS  Information for Provider? Pt needs well visit scheduled- nothing available   please call asap with available appts  ---------------------------------------------------------------------------  --------------  [phone]  [phone]; OK to leave message on voicemail  ---------------------------------------------------------------------------  --------------  SCRIPT ANSWERS  Relationship to Patient? Spouse/Partner  Representative Name? SERGIO  Is the Representative on the appropriate HIPAA document in Epic?  Yes

## 2023-03-31 ENCOUNTER — APPOINTMENT (OUTPATIENT)
Dept: VASCULAR SURGERY | Age: 73
End: 2023-03-31
Attending: NURSE PRACTITIONER
Payer: MEDICARE

## 2023-03-31 ENCOUNTER — APPOINTMENT (OUTPATIENT)
Dept: VASCULAR SURGERY | Age: 73
End: 2023-03-31
Attending: HOSPITALIST
Payer: MEDICARE

## 2023-03-31 ENCOUNTER — DOCUMENTATION ONLY (OUTPATIENT)
Dept: CARDIOLOGY CLINIC | Age: 73
End: 2023-03-31

## 2023-03-31 ENCOUNTER — HOSPITAL ENCOUNTER (INPATIENT)
Age: 73
LOS: 7 days | End: 2023-03-31
Attending: EMERGENCY MEDICINE | Admitting: HOSPITALIST
Payer: MEDICARE

## 2023-03-31 ENCOUNTER — APPOINTMENT (OUTPATIENT)
Dept: GENERAL RADIOLOGY | Age: 73
End: 2023-03-31
Attending: EMERGENCY MEDICINE
Payer: MEDICARE

## 2023-03-31 ENCOUNTER — APPOINTMENT (OUTPATIENT)
Dept: NUCLEAR MEDICINE | Age: 73
End: 2023-03-31
Attending: HOSPITALIST
Payer: MEDICARE

## 2023-03-31 DIAGNOSIS — E11.8 CONTROLLED DIABETES MELLITUS TYPE 2 WITH COMPLICATIONS, UNSPECIFIED WHETHER LONG TERM INSULIN USE (HCC): ICD-10-CM

## 2023-03-31 DIAGNOSIS — I21.4 NON-STEMI (NON-ST ELEVATED MYOCARDIAL INFARCTION) (HCC): ICD-10-CM

## 2023-03-31 DIAGNOSIS — I21.4 NSTEMI (NON-ST ELEVATED MYOCARDIAL INFARCTION) (HCC): ICD-10-CM

## 2023-03-31 DIAGNOSIS — Z98.61 S/P PTCA (PERCUTANEOUS TRANSLUMINAL CORONARY ANGIOPLASTY): ICD-10-CM

## 2023-03-31 DIAGNOSIS — T78.40XA ALLERGIC REACTION, INITIAL ENCOUNTER: Primary | ICD-10-CM

## 2023-03-31 DIAGNOSIS — Z95.1 S/P CABG X 3: ICD-10-CM

## 2023-03-31 DIAGNOSIS — R00.0 TACHYCARDIA: ICD-10-CM

## 2023-03-31 DIAGNOSIS — E78.5 HYPERLIPIDEMIA WITH TARGET LDL LESS THAN 100: ICD-10-CM

## 2023-03-31 LAB
ACT BLD: 149 SECS (ref 79–138)
ALBUMIN SERPL-MCNC: 3.8 G/DL (ref 3.5–5)
ALBUMIN/GLOB SERPL: 1 (ref 1.1–2.2)
ALP SERPL-CCNC: 68 U/L (ref 45–117)
ALT SERPL-CCNC: 37 U/L (ref 12–78)
ANION GAP SERPL CALC-SCNC: 14 MMOL/L (ref 5–15)
APTT PPP: 23.6 SEC (ref 22.1–31)
APTT PPP: 24.3 SEC (ref 22.1–31)
AST SERPL-CCNC: 27 U/L (ref 15–37)
ATRIAL RATE: 125 BPM
ATRIAL RATE: 89 BPM
ATRIAL RATE: 89 BPM
ATRIAL RATE: 91 BPM
BASOPHILS # BLD: 0 K/UL (ref 0–0.1)
BASOPHILS NFR BLD: 0 % (ref 0–1)
BILIRUB SERPL-MCNC: 0.4 MG/DL (ref 0.2–1)
BNP SERPL-MCNC: 189 PG/ML (ref 0–125)
BUN SERPL-MCNC: 23 MG/DL (ref 6–20)
BUN/CREAT SERPL: 17 (ref 12–20)
CALCIUM SERPL-MCNC: 9.2 MG/DL (ref 8.5–10.1)
CALCULATED P AXIS, ECG09: 27 DEGREES
CALCULATED P AXIS, ECG09: 39 DEGREES
CALCULATED P AXIS, ECG09: 43 DEGREES
CALCULATED P AXIS, ECG09: 69 DEGREES
CALCULATED R AXIS, ECG10: -138 DEGREES
CALCULATED R AXIS, ECG10: -23 DEGREES
CALCULATED R AXIS, ECG10: -25 DEGREES
CALCULATED R AXIS, ECG10: -25 DEGREES
CALCULATED T AXIS, ECG11: 168 DEGREES
CALCULATED T AXIS, ECG11: 33 DEGREES
CALCULATED T AXIS, ECG11: 36 DEGREES
CALCULATED T AXIS, ECG11: 37 DEGREES
CHLORIDE SERPL-SCNC: 98 MMOL/L (ref 97–108)
CHOLEST SERPL-MCNC: 121 MG/DL
CO2 SERPL-SCNC: 23 MMOL/L (ref 21–32)
CREAT SERPL-MCNC: 1.36 MG/DL (ref 0.7–1.3)
D DIMER PPP FEU-MCNC: 2.5 MG/L FEU (ref 0–0.65)
DIAGNOSIS, 93000: NORMAL
DIFFERENTIAL METHOD BLD: ABNORMAL
EOSINOPHIL # BLD: 0.1 K/UL (ref 0–0.4)
EOSINOPHIL NFR BLD: 1 % (ref 0–7)
ERYTHROCYTE [DISTWIDTH] IN BLOOD BY AUTOMATED COUNT: 13.8 % (ref 11.5–14.5)
EST. AVERAGE GLUCOSE BLD GHB EST-MCNC: 166 MG/DL
GLOBULIN SER CALC-MCNC: 3.8 G/DL (ref 2–4)
GLUCOSE BLD STRIP.AUTO-MCNC: 190 MG/DL (ref 65–117)
GLUCOSE BLD STRIP.AUTO-MCNC: 242 MG/DL (ref 65–117)
GLUCOSE BLD STRIP.AUTO-MCNC: 256 MG/DL (ref 65–117)
GLUCOSE BLD STRIP.AUTO-MCNC: 267 MG/DL (ref 65–117)
GLUCOSE SERPL-MCNC: 209 MG/DL (ref 65–100)
HBA1C MFR BLD: 7.4 % (ref 4–5.6)
HCT VFR BLD AUTO: 46 % (ref 36.6–50.3)
HDLC SERPL-MCNC: 34 MG/DL
HDLC SERPL: 3.6 (ref 0–5)
HGB BLD-MCNC: 15.6 G/DL (ref 12.1–17)
IMM GRANULOCYTES # BLD AUTO: 0 K/UL (ref 0–0.04)
IMM GRANULOCYTES NFR BLD AUTO: 0 % (ref 0–0.5)
INR PPP: 1 (ref 0.9–1.1)
LDLC SERPL CALC-MCNC: 13.2 MG/DL (ref 0–100)
LYMPHOCYTES # BLD: 1.5 K/UL (ref 0.8–3.5)
LYMPHOCYTES NFR BLD: 14 % (ref 12–49)
MAGNESIUM SERPL-MCNC: 1.8 MG/DL (ref 1.6–2.4)
MCH RBC QN AUTO: 30.8 PG (ref 26–34)
MCHC RBC AUTO-ENTMCNC: 33.9 G/DL (ref 30–36.5)
MCV RBC AUTO: 90.7 FL (ref 80–99)
MONOCYTES # BLD: 0.4 K/UL (ref 0–1)
MONOCYTES NFR BLD: 4 % (ref 5–13)
NEUTS SEG # BLD: 8.4 K/UL (ref 1.8–8)
NEUTS SEG NFR BLD: 81 % (ref 32–75)
NRBC # BLD: 0 K/UL (ref 0–0.01)
NRBC BLD-RTO: 0 PER 100 WBC
P-R INTERVAL, ECG05: 146 MS
P-R INTERVAL, ECG05: 154 MS
P-R INTERVAL, ECG05: 154 MS
P-R INTERVAL, ECG05: 162 MS
PHOSPHATE SERPL-MCNC: 3.6 MG/DL (ref 2.6–4.7)
PLATELET # BLD AUTO: 280 K/UL (ref 150–400)
PMV BLD AUTO: 9.9 FL (ref 8.9–12.9)
POTASSIUM SERPL-SCNC: 3.8 MMOL/L (ref 3.5–5.1)
PROT SERPL-MCNC: 7.6 G/DL (ref 6.4–8.2)
PROTHROMBIN TIME: 9.9 SEC (ref 9–11.1)
Q-T INTERVAL, ECG07: 320 MS
Q-T INTERVAL, ECG07: 372 MS
Q-T INTERVAL, ECG07: 374 MS
Q-T INTERVAL, ECG07: 380 MS
QRS DURATION, ECG06: 104 MS
QRS DURATION, ECG06: 108 MS
QRS DURATION, ECG06: 108 MS
QRS DURATION, ECG06: 98 MS
QTC CALCULATION (BEZET), ECG08: 455 MS
QTC CALCULATION (BEZET), ECG08: 457 MS
QTC CALCULATION (BEZET), ECG08: 461 MS
QTC CALCULATION (BEZET), ECG08: 462 MS
RBC # BLD AUTO: 5.07 M/UL (ref 4.1–5.7)
SARS-COV-2 RDRP RESP QL NAA+PROBE: NOT DETECTED
SERVICE CMNT-IMP: ABNORMAL
SODIUM SERPL-SCNC: 135 MMOL/L (ref 136–145)
SOURCE, COVRS: NORMAL
THERAPEUTIC RANGE,PTTT: NORMAL SECS (ref 58–77)
THERAPEUTIC RANGE,PTTT: NORMAL SECS (ref 58–77)
TRIGL SERPL-MCNC: 369 MG/DL (ref ?–150)
TROPONIN I SERPL HS-MCNC: 106 NG/L (ref 0–76)
TROPONIN I SERPL HS-MCNC: 1093 NG/L (ref 0–57)
TROPONIN I SERPL HS-MCNC: 483 NG/L (ref 0–76)
UFH PPP CHRO-ACNC: 0.35 IU/ML
VENTRICULAR RATE, ECG03: 125 BPM
VENTRICULAR RATE, ECG03: 89 BPM
VENTRICULAR RATE, ECG03: 89 BPM
VENTRICULAR RATE, ECG03: 91 BPM
VLDLC SERPL CALC-MCNC: 73.8 MG/DL
WBC # BLD AUTO: 10.4 K/UL (ref 4.1–11.1)

## 2023-03-31 PROCEDURE — 93970 EXTREMITY STUDY: CPT

## 2023-03-31 PROCEDURE — 93458 L HRT ARTERY/VENTRICLE ANGIO: CPT | Performed by: INTERNAL MEDICINE

## 2023-03-31 PROCEDURE — 74011000250 HC RX REV CODE- 250: Performed by: EMERGENCY MEDICINE

## 2023-03-31 PROCEDURE — 77030019569 HC BND COMPR RAD TERU -B: Performed by: INTERNAL MEDICINE

## 2023-03-31 PROCEDURE — 76937 US GUIDE VASCULAR ACCESS: CPT | Performed by: INTERNAL MEDICINE

## 2023-03-31 PROCEDURE — 74011250637 HC RX REV CODE- 250/637: Performed by: NURSE PRACTITIONER

## 2023-03-31 PROCEDURE — 85730 THROMBOPLASTIN TIME PARTIAL: CPT

## 2023-03-31 PROCEDURE — 80061 LIPID PANEL: CPT

## 2023-03-31 PROCEDURE — 84100 ASSAY OF PHOSPHORUS: CPT

## 2023-03-31 PROCEDURE — 85025 COMPLETE CBC W/AUTO DIFF WBC: CPT

## 2023-03-31 PROCEDURE — C1894 INTRO/SHEATH, NON-LASER: HCPCS | Performed by: INTERNAL MEDICINE

## 2023-03-31 PROCEDURE — 83735 ASSAY OF MAGNESIUM: CPT

## 2023-03-31 PROCEDURE — 87635 SARS-COV-2 COVID-19 AMP PRB: CPT

## 2023-03-31 PROCEDURE — 77030040934 HC CATH DIAG DXTERITY MEDT -A: Performed by: INTERNAL MEDICINE

## 2023-03-31 PROCEDURE — 99152 MOD SED SAME PHYS/QHP 5/>YRS: CPT | Performed by: INTERNAL MEDICINE

## 2023-03-31 PROCEDURE — 74011250636 HC RX REV CODE- 250/636: Performed by: EMERGENCY MEDICINE

## 2023-03-31 PROCEDURE — 71045 X-RAY EXAM CHEST 1 VIEW: CPT

## 2023-03-31 PROCEDURE — 74011250636 HC RX REV CODE- 250/636: Performed by: INTERNAL MEDICINE

## 2023-03-31 PROCEDURE — 93005 ELECTROCARDIOGRAM TRACING: CPT

## 2023-03-31 PROCEDURE — 74011250637 HC RX REV CODE- 250/637: Performed by: HOSPITALIST

## 2023-03-31 PROCEDURE — 83036 HEMOGLOBIN GLYCOSYLATED A1C: CPT

## 2023-03-31 PROCEDURE — 74011000250 HC RX REV CODE- 250: Performed by: INTERNAL MEDICINE

## 2023-03-31 PROCEDURE — 93880 EXTRACRANIAL BILAT STUDY: CPT

## 2023-03-31 PROCEDURE — 82962 GLUCOSE BLOOD TEST: CPT

## 2023-03-31 PROCEDURE — 2709999900 HC NON-CHARGEABLE SUPPLY: Performed by: INTERNAL MEDICINE

## 2023-03-31 PROCEDURE — 74011636637 HC RX REV CODE- 636/637: Performed by: FAMILY MEDICINE

## 2023-03-31 PROCEDURE — 74011000250 HC RX REV CODE- 250: Performed by: NURSE PRACTITIONER

## 2023-03-31 PROCEDURE — 85347 COAGULATION TIME ACTIVATED: CPT

## 2023-03-31 PROCEDURE — 74011250637 HC RX REV CODE- 250/637: Performed by: EMERGENCY MEDICINE

## 2023-03-31 PROCEDURE — 77030013744: Performed by: INTERNAL MEDICINE

## 2023-03-31 PROCEDURE — 85379 FIBRIN DEGRADATION QUANT: CPT

## 2023-03-31 PROCEDURE — 93922 UPR/L XTREMITY ART 2 LEVELS: CPT

## 2023-03-31 PROCEDURE — 65270000046 HC RM TELEMETRY

## 2023-03-31 PROCEDURE — 74011000636 HC RX REV CODE- 636: Performed by: INTERNAL MEDICINE

## 2023-03-31 PROCEDURE — 85520 HEPARIN ASSAY: CPT

## 2023-03-31 PROCEDURE — 81001 URINALYSIS AUTO W/SCOPE: CPT

## 2023-03-31 PROCEDURE — 99153 MOD SED SAME PHYS/QHP EA: CPT | Performed by: INTERNAL MEDICINE

## 2023-03-31 PROCEDURE — 96374 THER/PROPH/DIAG INJ IV PUSH: CPT

## 2023-03-31 PROCEDURE — 96375 TX/PRO/DX INJ NEW DRUG ADDON: CPT

## 2023-03-31 PROCEDURE — 74011636637 HC RX REV CODE- 636/637: Performed by: CLINICAL NURSE SPECIALIST

## 2023-03-31 PROCEDURE — 84484 ASSAY OF TROPONIN QUANT: CPT

## 2023-03-31 PROCEDURE — C1769 GUIDE WIRE: HCPCS | Performed by: INTERNAL MEDICINE

## 2023-03-31 PROCEDURE — 74011000250 HC RX REV CODE- 250: Performed by: HOSPITALIST

## 2023-03-31 PROCEDURE — 80053 COMPREHEN METABOLIC PANEL: CPT

## 2023-03-31 PROCEDURE — 85610 PROTHROMBIN TIME: CPT

## 2023-03-31 PROCEDURE — 36415 COLL VENOUS BLD VENIPUNCTURE: CPT

## 2023-03-31 PROCEDURE — 83880 ASSAY OF NATRIURETIC PEPTIDE: CPT

## 2023-03-31 PROCEDURE — 99223 1ST HOSP IP/OBS HIGH 75: CPT | Performed by: INTERNAL MEDICINE

## 2023-03-31 PROCEDURE — 74011250637 HC RX REV CODE- 250/637: Performed by: FAMILY MEDICINE

## 2023-03-31 PROCEDURE — 74011250636 HC RX REV CODE- 250/636: Performed by: HOSPITALIST

## 2023-03-31 PROCEDURE — 99285 EMERGENCY DEPT VISIT HI MDM: CPT

## 2023-03-31 RX ORDER — DIPHENHYDRAMINE HYDROCHLORIDE 50 MG/ML
50 INJECTION, SOLUTION INTRAMUSCULAR; INTRAVENOUS
Status: COMPLETED | OUTPATIENT
Start: 2023-03-31 | End: 2023-03-31

## 2023-03-31 RX ORDER — INSULIN GLARGINE 100 [IU]/ML
30 INJECTION, SOLUTION SUBCUTANEOUS
Status: DISCONTINUED | OUTPATIENT
Start: 2023-03-31 | End: 2023-04-02

## 2023-03-31 RX ORDER — MIDAZOLAM HYDROCHLORIDE 1 MG/ML
INJECTION, SOLUTION INTRAMUSCULAR; INTRAVENOUS AS NEEDED
Status: DISCONTINUED | OUTPATIENT
Start: 2023-03-31 | End: 2023-03-31 | Stop reason: HOSPADM

## 2023-03-31 RX ORDER — IBUPROFEN 200 MG
4 TABLET ORAL AS NEEDED
Status: DISCONTINUED | OUTPATIENT
Start: 2023-03-31 | End: 2023-03-31 | Stop reason: SDUPTHER

## 2023-03-31 RX ORDER — ATORVASTATIN CALCIUM 40 MG/1
80 TABLET, FILM COATED ORAL
Status: DISPENSED
Start: 2023-03-31

## 2023-03-31 RX ORDER — METOPROLOL SUCCINATE 25 MG/1
25 TABLET, EXTENDED RELEASE ORAL DAILY
Status: DISCONTINUED | OUTPATIENT
Start: 2023-04-01 | End: 2023-04-01

## 2023-03-31 RX ORDER — IBUPROFEN 200 MG
4 TABLET ORAL AS NEEDED
Status: DISCONTINUED | OUTPATIENT
Start: 2023-03-31 | End: 2023-04-03

## 2023-03-31 RX ORDER — PANTOPRAZOLE SODIUM 40 MG/1
40 TABLET, DELAYED RELEASE ORAL DAILY
Status: DISCONTINUED | OUTPATIENT
Start: 2023-04-01 | End: 2023-03-31

## 2023-03-31 RX ORDER — ACETAMINOPHEN 325 MG/1
650 TABLET ORAL
Status: DISCONTINUED | OUTPATIENT
Start: 2023-03-31 | End: 2023-04-03

## 2023-03-31 RX ORDER — FERROUS SULFATE, DRIED 160(50) MG
1 TABLET, EXTENDED RELEASE ORAL DAILY
Status: DISCONTINUED | OUTPATIENT
Start: 2023-04-01 | End: 2023-04-03

## 2023-03-31 RX ORDER — HEPARIN SODIUM 10000 [USP'U]/100ML
9-25 INJECTION, SOLUTION INTRAVENOUS
Status: DISPENSED | OUTPATIENT
Start: 2023-03-31 | End: 2023-04-03

## 2023-03-31 RX ORDER — ATORVASTATIN CALCIUM 20 MG/1
20 TABLET, FILM COATED ORAL
Status: DISCONTINUED | OUTPATIENT
Start: 2023-03-31 | End: 2023-03-31

## 2023-03-31 RX ORDER — GUAIFENESIN 100 MG/5ML
81 LIQUID (ML) ORAL DAILY
Status: DISCONTINUED | OUTPATIENT
Start: 2023-04-01 | End: 2023-04-03 | Stop reason: SDUPTHER

## 2023-03-31 RX ORDER — PREDNISONE 20 MG/1
20 TABLET ORAL
Status: DISCONTINUED | OUTPATIENT
Start: 2023-04-01 | End: 2023-04-03

## 2023-03-31 RX ORDER — FENTANYL CITRATE 50 UG/ML
INJECTION, SOLUTION INTRAMUSCULAR; INTRAVENOUS AS NEEDED
Status: DISCONTINUED | OUTPATIENT
Start: 2023-03-31 | End: 2023-03-31 | Stop reason: HOSPADM

## 2023-03-31 RX ORDER — HEPARIN SODIUM 1000 [USP'U]/ML
4000 INJECTION, SOLUTION INTRAVENOUS; SUBCUTANEOUS ONCE
Status: COMPLETED | OUTPATIENT
Start: 2023-03-31 | End: 2023-03-31

## 2023-03-31 RX ORDER — INSULIN LISPRO 100 [IU]/ML
INJECTION, SOLUTION INTRAVENOUS; SUBCUTANEOUS
Status: DISCONTINUED | OUTPATIENT
Start: 2023-03-31 | End: 2023-03-31

## 2023-03-31 RX ORDER — SODIUM CHLORIDE 0.9 % (FLUSH) 0.9 %
5-40 SYRINGE (ML) INJECTION EVERY 8 HOURS
Status: DISCONTINUED | OUTPATIENT
Start: 2023-03-31 | End: 2023-04-03

## 2023-03-31 RX ORDER — SODIUM CHLORIDE 9 MG/ML
75 INJECTION, SOLUTION INTRAVENOUS CONTINUOUS
Status: DISPENSED | OUTPATIENT
Start: 2023-03-31 | End: 2023-03-31

## 2023-03-31 RX ORDER — ASPIRIN 325 MG
325 TABLET ORAL ONCE
Status: COMPLETED | OUTPATIENT
Start: 2023-03-31 | End: 2023-03-31

## 2023-03-31 RX ORDER — NITROGLYCERIN 0.4 MG/1
0.4 TABLET SUBLINGUAL
Status: DISCONTINUED | OUTPATIENT
Start: 2023-03-31 | End: 2023-04-03

## 2023-03-31 RX ORDER — MUPIROCIN 20 MG/G
OINTMENT TOPICAL EVERY 12 HOURS
Status: DISCONTINUED | OUTPATIENT
Start: 2023-03-31 | End: 2023-04-03

## 2023-03-31 RX ORDER — CETIRIZINE HYDROCHLORIDE 10 MG/1
10 TABLET ORAL DAILY
Status: DISCONTINUED | OUTPATIENT
Start: 2023-04-01 | End: 2023-04-03

## 2023-03-31 RX ORDER — LIDOCAINE HYDROCHLORIDE 10 MG/ML
INJECTION INFILTRATION; PERINEURAL AS NEEDED
Status: DISCONTINUED | OUTPATIENT
Start: 2023-03-31 | End: 2023-03-31 | Stop reason: HOSPADM

## 2023-03-31 RX ORDER — LANOLIN ALCOHOL/MO/W.PET/CERES
1000 CREAM (GRAM) TOPICAL DAILY
Status: DISCONTINUED | OUTPATIENT
Start: 2023-03-31 | End: 2023-04-03

## 2023-03-31 RX ORDER — SODIUM CHLORIDE 0.9 % (FLUSH) 0.9 %
5-40 SYRINGE (ML) INJECTION AS NEEDED
Status: DISCONTINUED | OUTPATIENT
Start: 2023-03-31 | End: 2023-04-03

## 2023-03-31 RX ORDER — DIPHENHYDRAMINE HYDROCHLORIDE 50 MG/ML
25 INJECTION, SOLUTION INTRAMUSCULAR; INTRAVENOUS
Status: DISCONTINUED | OUTPATIENT
Start: 2023-03-31 | End: 2023-04-03

## 2023-03-31 RX ORDER — INSULIN LISPRO 100 [IU]/ML
INJECTION, SOLUTION INTRAVENOUS; SUBCUTANEOUS
Status: DISCONTINUED | OUTPATIENT
Start: 2023-03-31 | End: 2023-04-03

## 2023-03-31 RX ORDER — GABAPENTIN 300 MG/1
600 CAPSULE ORAL 2 TIMES DAILY
Status: DISCONTINUED | OUTPATIENT
Start: 2023-03-31 | End: 2023-04-03

## 2023-03-31 RX ORDER — CHLORHEXIDINE GLUCONATE 1.2 MG/ML
15 RINSE ORAL EVERY 12 HOURS
Status: DISCONTINUED | OUTPATIENT
Start: 2023-03-31 | End: 2023-04-03

## 2023-03-31 RX ORDER — INSULIN GLARGINE 100 [IU]/ML
30 INJECTION, SOLUTION SUBCUTANEOUS
Status: DISCONTINUED | OUTPATIENT
Start: 2023-03-31 | End: 2023-03-31

## 2023-03-31 RX ADMIN — DIPHENHYDRAMINE HYDROCHLORIDE 50 MG: 50 INJECTION, SOLUTION INTRAMUSCULAR; INTRAVENOUS at 02:37

## 2023-03-31 RX ADMIN — Medication 3 UNITS: at 09:57

## 2023-03-31 RX ADMIN — Medication 3 UNITS: at 21:54

## 2023-03-31 RX ADMIN — SODIUM CHLORIDE, PRESERVATIVE FREE 10 ML: 5 INJECTION INTRAVENOUS at 13:03

## 2023-03-31 RX ADMIN — Medication 5 UNITS: at 13:02

## 2023-03-31 RX ADMIN — ASPIRIN 325 MG: 325 TABLET ORAL at 07:23

## 2023-03-31 RX ADMIN — HEPARIN SODIUM 4000 UNITS: 1000 INJECTION INTRAVENOUS; SUBCUTANEOUS at 07:23

## 2023-03-31 RX ADMIN — MUPIROCIN: 20 OINTMENT TOPICAL at 21:45

## 2023-03-31 RX ADMIN — FAMOTIDINE 20 MG: 10 INJECTION INTRAVENOUS at 21:45

## 2023-03-31 RX ADMIN — Medication 3 UNITS: at 19:08

## 2023-03-31 RX ADMIN — INSULIN GLARGINE 30 UNITS: 100 INJECTION, SOLUTION SUBCUTANEOUS at 21:54

## 2023-03-31 RX ADMIN — HEPARIN SODIUM 9 UNITS/KG/HR: 10000 INJECTION, SOLUTION INTRAVENOUS at 07:23

## 2023-03-31 RX ADMIN — FAMOTIDINE 20 MG: 10 INJECTION INTRAVENOUS at 02:36

## 2023-03-31 RX ADMIN — CHLORHEXIDINE GLUCONATE 15 ML: 1.2 RINSE ORAL at 21:45

## 2023-03-31 RX ADMIN — ATORVASTATIN CALCIUM 80 MG: 40 TABLET, FILM COATED ORAL at 21:45

## 2023-03-31 RX ADMIN — GABAPENTIN 600 MG: 300 CAPSULE ORAL at 09:58

## 2023-03-31 RX ADMIN — GABAPENTIN 600 MG: 300 CAPSULE ORAL at 19:09

## 2023-03-31 RX ADMIN — METHYLPREDNISOLONE SODIUM SUCCINATE 125 MG: 125 INJECTION, POWDER, FOR SOLUTION INTRAMUSCULAR; INTRAVENOUS at 02:36

## 2023-03-31 NOTE — H&P
History and Physical    Date of Service:  3/31/2023  Primary Care Provider: Caitlin Atkins MD  Source of information: The patient, Chart review, and Spouse/family member    Chief Complaint: Shortness of Breath and Allergic Reaction      History of Presenting Illness:   Charmaine Dunn is a 74 y.o. male with a significant PMH of CAD s/p stent placement, type 2 diabetes with neuropathy, sleep apnea on BiPAP, GERD presented to the [place] ED with skin rash on the back of the neck, chills, shortness of breath and raspy voice. He had taken Benadryl and Allegra at home with some improvement. No recently started new medications, not on ACEI. Upon initial evaluation in the ED he was tachycardic  SPO2 90% on room air, was afebrile. Labs significant for sodium 135, creatinine 1.36, initial troponin 106 which went up to 483 on repeat. Chest x-ray was negative for any acute process. In the ED treatment in the ED: Aspirin 325 mg x 1, Benadryl 50 mg IV x1, famotidine 20 mg IV x1, Solu-Medrol 125 mg IV x1.  ED had consulted and discussed with cardiology. IV heparin drip for non-STEMI. Patient's transferred to [facility] for further management          The patient denies any headache, blurry vision, sore throat, trouble swallowing, trouble with speech, chest pain, SOB, cough, fever, chills, N/V/D, abd pain, urinary symptoms, constipation, recent travels, sick contacts, focal or generalized neurological symptoms, falls, injuries, rashes, contact with COVID-19 diagnosed patients, hematemesis, melena, hemoptysis, hematuria, rashes, denies starting any new medications and denies any other concerns or problems besides as mentioned above. REVIEW OF SYSTEMS:  A comprehensive review of systems was negative except for that written in the History of Present Illness.      Past Medical History:   Diagnosis Date    Arthritis     BACK    BPH (benign prostatic hyperplasia) 2013    CAD (coronary artery disease) Cancer ([facility] 2015    SKIN - HEAD AND FACE    Cancer ([facility] [address] 01/2019    PROSTATE    Chronic pain     BACK    Chronic pelvic pain in male     sensitive to touch on the left side down to the left side of the penis    Collagenous colitis 2011    Diabetes mellitus type 2, controlled, with complications ([facility] [address] [facility] 4733    Diabetic neuropathy ([facility] [address] 2006    Diverticulosis     ED (erectile dysfunction)     Dr. Miri Laurent    GERD (gastroesophageal reflux disease) 1990    Gout     1 episode    Hypertension 1970    Ill-defined condition     BILATERAL PERIPHERAL NEUROPATHY BOTH LEGS    Low serum testosterone level     Dr. Delisa Rodríguez    Osteopenia     Dr. Delisa Rodríguez    Psoriasis     Sleep apnea     USES CPAP    Sun-damaged skin       Past Surgical History:   Procedure Laterality Date    COLONOSCOPY N/A 9/21/2021    COLONOSCOPY   :- performed by Marc Lowe [facility] at [facility] ENDOSCOPY    EMG TWO EXTREMITIES LOWER  8/24/2013         ENDOSCOPY, COLON, DIAGNOSTIC      HX COLONOSCOPY      HX HEENT  2017    WISDOM TEETH X4    HX HERNIA REPAIR      x2 bilateral inguinal    HX ORTHOPAEDIC  08/2018    L4-5, L5-S1 DISKECTOMY    HX OTHER SURGICAL  2015,2017    MOHS    HX PROSTATE SURGERY  04/02/2019    removed    NCV SENSORY OR MIXED  8/24/2013         [place] UNLISTED PROCEDURE CARDIAC SURGERY  12/24/2019    1 stent     Prior to Admission medications    Medication Sig Start Date End Date Taking? Authorizing Provider   metFORMIN ER (GLUCOPHAGE XR) 500 mg tablet TAKE 2 TABLETS BY MOUTH TWICE DAILY FOR 90 DAYS 9/27/21   Provider, Historical   pantoprazole (PROTONIX) 40 mg tablet Take 40 mg by mouth daily. Provider, Historical   gabapentin (NEURONTIN) 300 mg capsule Take 600 mg by mouth two (2) times a day. Provider, Historical   CALCIUM-VITAMIN D3 PO Take 600 mg by mouth daily. Provider, Historical   ASPIRIN PO Take 81 mg by mouth daily. Provider, Historical   insulin lispro (HUMALOG KWIKPEN INSULIN SC) by SubCUTAneous route.  Up to 40 units SQ 3 times daily with meals. States he usually injects 40 units 3 times daily with meals. Provider, Historical   naproxen sodium (ALEVE PO) Take  by mouth as needed. Provider, Historical   losartan (COZAAR) 25 mg tablet TAKE 1 TABLET BY MOUTH DAILY 7/21/21   Sisi Gonzalez MD   furosemide (LASIX) 20 mg tablet TAKE 1 TABLET BY MOUTH DAILY  Patient taking differently: 10 mg. 7/21/21   Sisi Gonzalez MD   atorvastatin (LIPITOR) 40 mg tablet TAKE 1 TABLET BY MOUTH EVERY DAY AT BEDTIME 12/21/20   Sisi Gonzalez MD   halobetasol (ULTRAVATE) 0.05 % topical cream as needed. 6/21/18   Provider, Historical   nitroglycerin (NITROSTAT) 0.4 mg SL tablet 1 Tab by SubLINGual route every five (5) minutes as needed for Chest Pain. Up to 3 doses. Patient not taking: Reported on 12/7/2022 1/2/20   Olga Rivera MD   TRULICITY 1.5 XT/4.1 mL sub-q pen 1.5 mg by SubCUTAneous route every seven (7) days. 9/23/19   Provider, Historical   ergocalciferol (ERGOCALCIFEROL) 50,000 unit capsule TAKE ONE CAPSULE BY MOUTH EVERY WEEK 4/29/19   Provider, Historical   FARXIGA 10 mg tab tablet 1 tablet by mouth daily 4/13/19   Provider, Historical   cyanocobalamin 1,000 mcg tablet Take 1,000 mcg by mouth daily. Provider, Historical   tadalafiL (CIALIS) 20 mg tablet Take 20 mg by mouth as needed for Erectile Dysfunction. Provider, Historical   insulin glargine (LANTUS,BASAGLAR) 100 unit/mL (3 mL) inpn 40 Units by SubCUTAneous route nightly. As directed. Provider, Historical   metoprolol succinate (TOPROL-XL) 25 mg XL tablet Take 25 mg by mouth daily.  11/11/17   Provider, Historical     Allergies   Allergen Reactions    Pcn [Penicillins] Other (comments)     Infancy was told he had a rash      Family History   Problem Relation Age of Onset    Diabetes Father     Heart Disease Father 62        at least 2 MI's    Stroke Father         x2    Cancer Mother         Bladder    Parkinson's Disease Mother     Hypertension Mother     Hypertension Sister Diabetes Paternal Grandmother     Diabetes Other         cousins    Anesth Problems Neg Hx       Social History:  reports that he quit smoking about 42 years ago. His smoking use included cigarettes. He has a 12.00 pack-year smoking history. He has never used smokeless tobacco. He reports current alcohol use. He reports that he does not use drugs. Social Determinants of Health     Tobacco Use: Medium Risk    Smoking Tobacco Use: Former    Smokeless Tobacco Use: Never    Passive Exposure: Not on file   Alcohol Use: Not on file   Financial Resource Strain: Not on file   Food Insecurity: Not on file   Transportation Needs: Not on file   Physical Activity: Not on file   Stress: Not on file   Social Connections: Not on file   Intimate Partner Violence: Not on file   Depression: Not at risk    PHQ-2 Score: 0   Housing Stability: Not on file        Medications were reconciled to the best of my ability given all available resources at the time of admission. Route is PO if not otherwise noted. Family and social history were personally reviewed, all pertinent and relevant details are outlined as above. Objective:   Visit Vitals  BP (!) 163/80   Pulse 92   Temp 97.6 °F (36.4 °C)   Resp 23   Wt 102.9 kg (226 lb 13.7 oz)   SpO2 92%   BMI 32.55 kg/m²    O2 Flow Rate (L/min): 2 l/min O2 Device: Nasal cannula    PHYSICAL EXAM:   General: Alert and oriented x3, not in distress  HEENT: No tongue or lip swelling. PEERL, EOMI, moist mucus membranes  Neck: Supple, no JVD, no meningeal signs  Chest: On oxygen via nasal cannula, clear to auscultation bilaterally   CVS: RRR, S1 S2 heard, no murmurs/rubs/gallops  Abd/ANNETTA: Soft, non-tender, non-distended, +bowel sounds          No CVA or suprapubic tenderness    Musculoskeletal/extremity: No peripheral edema. No cyanosis. No deformity.   Neuro/Psych:   Pleasant mood and affect  And oriented x3  CN 2-12 grossly intact, sensory grossly within normal limit, Strength 5/5 in all extremities, DTR 1+ x 4  Skin: Erythematous rash on the posterior neck and posterior upper back. Data Review:   I have independently reviewed and interpreted patient's lab and all other diagnostic data    Abnormal Labs Reviewed   CBC WITH AUTOMATED DIFF - Abnormal; Notable for the following components:       Result Value    NEUTROPHILS 81 (*)     MONOCYTES 4 (*)     ABS. NEUTROPHILS 8.4 (*)     All other components within normal limits   METABOLIC PANEL, COMPREHENSIVE - Abnormal; Notable for the following components:    Sodium 135 (*)     Glucose 209 (*)     BUN 23 (*)     Creatinine 1.36 (*)     eGFR 55 (*)     A-G Ratio 1.0 (*)     All other components within normal limits   TROPONIN-HIGH SENSITIVITY - Abnormal; Notable for the following components:    Troponin-High Sensitivity 106 (*)     All other components within normal limits   NT-PRO BNP - Abnormal; Notable for the following components:    NT pro- (*)     All other components within normal limits   TROPONIN-HIGH SENSITIVITY - Abnormal; Notable for the following components:    Troponin-High Sensitivity 483 (*)     All other components within normal limits   GLUCOSE, POC - Abnormal; Notable for the following components:    Glucose (POC) 242 (*)     All other components within normal limits   GLUCOSE, POC - Abnormal; Notable for the following components:    Glucose (POC) 256 (*)     All other components within normal limits       All Micro Results       Procedure Component Value Units Date/Time    COVID-19 RAPID TEST [[id number]] Collected: 03/31/23 1018    Order Status: Completed Specimen: Nasopharyngeal Updated: 03/31/23 1049     Specimen source Nasopharyngeal        COVID-19 rapid test Not detected        Comment: Rapid Abbott ID Now       Rapid NAAT:  The specimen is NEGATIVE for SARS-CoV-2, the novel coronavirus associated with COVID-19.        Negative results should be treated as presumptive and, if inconsistent with clinical signs and symptoms or necessary for patient management, should be tested with an alternative molecular assay. Negative results do not preclude SARS-CoV-2 infection and should not be used as the sole basis for patient management decisions. This test has been authorized by the FDA under an Emergency Use Authorization (EUA) for use by authorized laboratories. Fact sheet for Healthcare Providers:  http://www.roseann.jeovanny/  Fact sheet for Patients: http://www.roseann.jeovanny/       Methodology: Isothermal Nucleic Acid Amplification                 IMAGING:   XR CHEST PORT   Final Result   No acute process. ECG/ECHO:    Results for orders placed or performed during the hospital encounter of 03/31/23   EKG, 12 LEAD, INITIAL   Result Value Ref Range    Ventricular Rate 91 BPM    Atrial Rate 91 BPM    P-R Interval 154 ms    QRS Duration 108 ms    Q-T Interval 372 ms    QTC Calculation (Bezet) 457 ms    Calculated P Axis 27 degrees    Calculated R Axis -25 degrees    Calculated T Axis 37 degrees    Diagnosis       Normal sinus rhythm  Incomplete right bundle branch block  Septal infarct , age undetermined  When compared with ECG of 31-MAR-2023 10:13,  MANUAL COMPARISON REQUIRED, DATA IS UNCONFIRMED            Notes reviewed from all clinical/nonclinical/nursing services involved in patient's clinical care. Care coordination discussions were held with appropriate clinical/nonclinical/ nursing providers based on care coordination needs. Assessment and plan   Given the patient's current clinical presentation, there is a high level of concern for decompensation if discharged from the emergency department. Complex decision making was performed, which includes reviewing the patient's available past medical records, laboratory results, and imaging studies. Unspecified allergic reaction with possible angioedema.   Patient presented with skin rash and urticaria on the back with shortness of breath and change in voice. Denied difficulty swallowing or painful swallowing.  -Status post IV Benadryl, IV famotidine and IV Solu-Medrol  -Continue antihistamines, short course steroid    Non-STEMI  -Troponin rising, repeat pending  -Has received a loading dose of aspirin. Continue with aspirin 81 mg daily, Lipitor and Toprol.  -Nuclear stress test could not be done due to nuclear medicine limitations per cardiology    Tachycardia and mild hypoxia on presentation. CXR negative. Patient with BiPAP for FELICITY. -On heparin drip for the above. Agree with D-dimer, if elevated will obtain venous Doppler, VQ scan to avoid CTA due to elevated creatinine besides he will be exposed to contrast if he can just to go to cardiac cath    DM2: Accu-Cheks, Humalog sliding scale with hypoglycemic protocol    CKD 3, creatinine was 1.33 about a year ago, 1.36 now. Monitor. FELICITY: He is wife to bring home CPAP machine. DIET: DIET NPO   ISOLATION PRECAUTIONS: There are currently no Active Isolations  CODE STATUS: Full Code   DVT PROPHYLAXIS: Heparin  FUNCTIONAL STATUS PRIOR TO HOSPITALIZATION: Fully active and ambulatory; able to carry on all self-care without restriction. Ambulatory status/function: By self   EARLY MOBILITY ASSESSMENT: Recommend routine ambulation while hospitalized with the assistance of nursing staff  ANTICIPATED DISCHARGE: Greater than 48 hours. ANTICIPATED DISPOSITION: Home  EMERGENCY CONTACT/SURROGATE DECISION MAKER:   Extended Emergency Contact Information  Primary Emergency Contact: Christie He  Address: [address] [place] [address] Phone: [phone]  Mobile Phone: [phone]  Relation: Spouse      CRITICAL CARE WAS PERFORMED FOR THIS ENCOUNTER: NO.      Signed By: Isabel Reyez MD     March 31, 2023         Please note that this dictation may have been completed with Dragon, the nGame voice recognition software.   Quite often unanticipated grammatical, syntax, homophones, and other interpretive errors are inadvertently transcribed by the computer software. Please disregard these errors. Please excuse any errors that have escaped final proofreading.

## 2023-03-31 NOTE — PROGRESS NOTES
I was called by Dr Nathan Arora from [facility] at 6 30 am this morning that patient has Hs troponin 483 and he requested cardiology consult when he gets admitted to hospitalist at [facility]    Per Dr Francheska Langford noted: he has CAD, s/p PCI 12/24/19 when they were traveling in [place]    I informed Dr Enrique Taylor today

## 2023-03-31 NOTE — PROGRESS NOTES
CATH LAB to RECOVERY ROOM REPORT    Procedure: [facility]     MD:Dr. Smith    The procedure was diagnostic only    Verbal Report given to Recovery Nurse on patient being transferred to Cardiac Cath Lab RR for routine post-op. Patient stable upon transfer to . Vitals, mental status, MAR, procedural summary discussed with recovery RN.     Medication given during procedure:    Contrast:91mL                              Versed:3mg                                                               Fentanyl:50mcg                                                             CABG consult          Sheaths:    Right radial sheath pulled at 3:30 pm, band at 13mL of air

## 2023-03-31 NOTE — ED NOTES
Bedside and Verbal shift change report given to Mendez Forbes RN (oncoming nurse) by Ciaran Roberts RN (offgoing nurse). Report included the following information ED Summary.

## 2023-03-31 NOTE — PROGRESS NOTES
Transitions of care : TBD Return back home when medically stable. Patient expressed having gait issues history of neuropathy and dme (cane). Reason for Admission:  NSTEMI                     RUR Score:   9%                   Plan for utilizing home health:   Patient receptive if recommended       PCP: First and Last name:  Caitlin Atkins MD     Name of Practice:    Are you a current patient: Yes/No:  Yes   Approximate date of last visit: 12/7/22   Can you participate in a virtual visit with your PCP:                     Current Advanced Directive/Advance Care Plan: Full Code / AMD on file     Healthcare Decision Maker:   Click here to complete [address] including selection of the [address] Relationship (ie \"Primary\")             Primary Decision Maker: Abdulaziz Shepardne - [phone]                    EMR reviewed. Noted history of CAD, s/p stent, DM w/ neuropathy, BiPAP, and GERD . Met w/patient and introduced to role of CM. Patient is A/O X4 . Mr. Rohith Cedillo lives home w/ spouse Terri Ludwig [phone]. Patient is independent w/ ADLs . DME includes a cane informed of history of type 2 DM w/ neuropathy and has affected his walking. [facility] Medicare A/B and AARP are insurance providers. [facility] ([facility] is local pharmacy. No transitional care needs at this time. CM Department will follow. Care Management Interventions  PCP Verified by CM:  Yes  Last Visit to PCP: 12/07/22  Palliative Care Criteria Met (RRAT>21 & CHF Dx)?: No  Transition of Care Consult (CM Consult): Discharge Planning  MyChart Signup: Yes  Discharge Durable Medical Equipment: No  Health Maintenance Reviewed: Yes  Physical Therapy Consult: No  Occupational Therapy Consult: No  Speech Therapy Consult: No  Support Systems: Spouse/Significant Other  [place] Resource Information Provided?: No  Discharge Location  Patient Expects to be Discharged to[de-identified]  (TBD)

## 2023-03-31 NOTE — ED PROVIDER NOTES
58-year-old male with extensive medical history that include arthritis, BPH, coronary disease status post stent placement, skin cancer, prostate cancer, chronic back pain, colitis, diabetes, diabetic neuropathy, diverticulosis, rectal dysfunction, GERD, gout, hypertension, psoriasis, sleep apnea surgeries. Bilateral inguinal hernia repair, discectomy, prostatectomy who presents to the ER accompanied by wife with a complaint of dry malaise, subjective feeling of fever, chills and skin rash with this evening. The patient took Benadryl without any relief discomfort. The patient also state that he feels raspy, congested with associated feeling of shortness of breath. He denies any nausea or vomiting, headache, neck and back pain, abdominal pain, diarrhea constipation, dysuria, sick contact, recent travel, prior history of the same.        Past Medical History:   Diagnosis Date    Arthritis     BACK    BPH (benign prostatic hyperplasia) 2013    CAD (coronary artery disease)     Cancer (HCC) 2015    SKIN - HEAD AND FACE    Cancer ([facility] [address] 01/2019    PROSTATE    Chronic pain     BACK    Chronic pelvic pain in male     sensitive to touch on the left side down to the left side of the penis    Collagenous colitis 2011    Diabetes mellitus type 2, controlled, with complications ([facility] [address] 7231    Diabetic neuropathy ([address] 2006    Diverticulosis     ED (erectile dysfunction)     Dr. Yasmani Patel    GERD (gastroesophageal reflux disease) 1990    Gout     1 episode    Hypertension 1970    Ill-defined condition     BILATERAL PERIPHERAL NEUROPATHY BOTH LEGS    Low serum testosterone level     Dr. Maria Luisa De La Cruz    Osteopenia     Dr. Maria Luisa De La Cruz    Psoriasis     Sleep apnea     USES CPAP    Sun-damaged skin        Past Surgical History:   Procedure Laterality Date    COLONOSCOPY N/A 9/21/2021    COLONOSCOPY   :- performed by Earl Olivas MD at [address] [place] [address]  8/24/2013         ENDOSCOPY, COLON, DIAGNOSTIC      HX COLONOSCOPY      HX HEENT  2017    WISDOM TEETH X4    HX HERNIA REPAIR      x2 bilateral inguinal    HX ORTHOPAEDIC  2018    L4-5, L5-S1 DISKECTOMY    HX OTHER SURGICAL  ,    MOHS    HX PROSTATE SURGERY  2019    removed    NCV SENSORY OR MIXED  2013         MN UNLISTED PROCEDURE CARDIAC SURGERY  2019    1 stent         Family History:   Problem Relation Age of Onset    Diabetes Father     Heart Disease Father 62        at least 2 MI's    Stroke Father         x2    Cancer Mother         Bladder    Parkinson's Disease Mother     Hypertension Mother     Hypertension Sister     Diabetes Paternal Grandmother     Diabetes Other         cousins    Anesth Problems Neg Hx        Social History     Socioeconomic History    Marital status:      Spouse name: Not on file    Number of children: Not on file    Years of education: Not on file    Highest education level: Not on file   Occupational History    Occupation:      Employer: OTHER   Tobacco Use    Smoking status: Former     Packs/day: 1.00     Years: 12.00     Pack years: 12.00     Types: Cigarettes     Quit date: 1980     Years since quittin.9    Smokeless tobacco: Never   Vaping Use    Vaping Use: Never used   Substance and Sexual Activity    Alcohol use: Yes     Alcohol/week: 0.0 standard drinks     Comment: rarely    Drug use: No    Sexual activity: Yes     Partners: Female   Other Topics Concern     Service Not Asked    Blood Transfusions Not Asked    Caffeine Concern Not Asked    Occupational Exposure Not Asked    Hobby Hazards Not Asked    Sleep Concern Not Asked    Stress Concern Not Asked    Weight Concern Not Asked    Special Diet Not Asked    Back Care Not Asked    Exercise Not Asked    Bike Helmet Not Asked    Seat Belt Not Asked    Self-Exams Not Asked   Social History Narrative    Lives in [place] with wife of 39 years. Has 2 daughters. Works as a  for [employer].      His wife owns a dance school. Social Determinants of Health     Financial Resource Strain: Not on file   Food Insecurity: Not on file   Transportation Needs: Not on file   Physical Activity: Not on file   Stress: Not on file   Social Connections: Not on file   Intimate Partner Violence: Not on file   Housing Stability: Not on file         ALLERGIES: Pcn [penicillins]    Review of Systems   All other systems reviewed and are negative. Vitals:    03/31/23 0224   BP: (!) 165/101   Pulse: (!) 114   Resp: 18   Temp: 98.9 °F (37.2 °C)   Weight: 102.9 kg (226 lb 13.7 oz)            Physical Exam  Vitals and nursing note reviewed. CONSTITUTIONAL: Frail elderly male, well-nourished. Pale and diaphoretic and chronically ill-appearing  HEAD: Normocephalic; atraumatic  EYES: PERRL; EOM intact; conjunctiva and sclera are clear bilaterally. ENT: No rhinorrhea; normal pharynx with no tonsillar hypertrophy; mucous membranes pink/moist, no erythema, no exudate. NECK: Supple; non-tender; no cervical lymphadenopathy  CARD: Normal S1, S2; no murmurs, rubs, or gallops. Increased regular rate and rhythm. RESP: Normal respiratory effort; breath sounds clear and equal bilaterally; no wheezes, rhonchi, or rales. ABD: Normal bowel sounds; non-distended; non-tender; no palpable organomegaly, no masses, no bruits. Back Exam: Normal inspection; no vertebral point tenderness, no CVA tenderness. Normal range of motion. EXT: Normal ROM in all four extremities; non-tender to palpation; no swelling or deformity; distal pulses are normal, no edema. SKIN: Warm; dry; generalized erythematous maculopapular rash with blanching consistent with hives  NEURO:Alert and oriented x 3, coherent, AMARIS-XII grossly intact, sensory and motor are non-focal.      Medical Decision Making  Assessment: 77-year-old male who presented to the ER for evaluation for shortness of breath with hives and general malaise. He remained hemodynamically stable.   The patient is covered in hives better. #1 stable. Need evaluation for allergic reaction, ACS, electrolyte abnormality, CHF, serum sickness rule out pneumonia    Plan: Lab/EKG/chest x-ray/IV fluid/Solu-Medrol, Benadryl and Pepcid/serial exam/ Monitor and Reevaluate. Amount and/or Complexity of Data Reviewed  Independent Historian: spouse     Details: Wife at the bedside is the main historian  External Data Reviewed: labs, radiology and ECG. Details: Previous visit from previous admission located in [facility] where reviewed  Labs: ordered. Radiology: ordered. ECG/medicine tests: ordered. Risk  OTC drugs. Prescription drug management. Decision regarding hospitalization. Procedures    ED EKG interpretation:  Rhythm: sinus tachycardia; and regular . Rate (approx.): 125; Axis: Right superior axis deviation; P wave: normal; QRS interval: normal ; ST/T wave: non-specific changes; in  Lead: Diffusely; Other findings: abnormal ekg. This EKG was interpreted by Calvin Orellana MD,ED Provider. XRAY INTERPRETATION (ED MD)  Chest Xray  No acute process seen. Normal heart size. No bony abnormalities. No infiltrate. Feliciano Sutherland MD 2:39 AM        PROGRESS NOTE:  Pt has been reexamined by Feliciano Sutherland MD all available results have been reviewed with pt and any available family. Pt understands sx, dx, and tx in ED. Care plan has been outlined and questions have been answered. Pt and any available family understands and agrees to need for admission to hospital for further tx not available in ED. Pt is ready for admission. Written by Calvin Orellana MD,  5:57 AM    CONSULT NOTE:  Feliciano Sutherland MD spoke with Dr. Neo Pinedo of [facility]. Discussed patient's presentation, history, physical assessment, and available diagnostic results. Will see the patient consult with the hospitalist and agrees with current plan of care.     .  CONSULT NOTE:  Feliciano Sutherland MD spoke with Dr. Herminia Lopez of the adult hospitalist team. Discussed patient's presentation, history, physical assessment, and available diagnostic results. He will evaluate, write orders and admit the patient to the hospital. 5:57 AM     Perfect Serve Consult for Admission  5:58 AM    ED Room Number: SER07/07  Patient Name and age:  Jai Guerrero 74 y.o.  male  Working Diagnosis:   1. Allergic reaction, initial encounter    2. NSTEMI (non-ST elevated myocardial infarction) ([facility] [address]        COVID-19 Suspicion:  no  Sepsis present:  no  Reassessment needed: no  Code Status:  Full Code  Readmission: no  Isolation Requirements:  no  Recommended Level of Care:  telemetry  Department: [facility] - [phone]  Admitting Provider: Dr. Katerina Chowdary    Other: Total critical care time spent exclusive of procedures:  75 minutes. Repeat ED EKG interpretation:  Rhythm: normal sinus rhythm; and regular . Rate (approx.): 89; Axis: left axis deviation; P wave: normal; QRS interval: prolonged; ST/T wave: non-specific changes; in  Lead: Diffusely; incomplete right bundle branch block other findings: abnormal ekg. when compared to the one performed 2 years ago, this EKG is essentially the same. this EKG was interpreted by Alberto Devlin MD,ED Provider.

## 2023-03-31 NOTE — ED NOTES
Pt sleeping quietly in bed. arousable to verbal stimuli. Wife remains at bedside. 2nd troponin drawn and sent to lab.   Awaiting results

## 2023-03-31 NOTE — PROGRESS NOTES
Pt was seen by Dr. Christopher Fraga. Noted HS troponin elevation. Will repeat. No active chest pain. His sats are in low 90s-96 on 2 L NC. Will obtain Ddimer. Unable to do lexiscan stress test today due to nuclear medicine limitations/schedule. Will repeat HS troponin now. Full consult note to follow.

## 2023-03-31 NOTE — ED NOTES
Introduced self to patient and his wife. Both parties given an update on the plan of care, including 30 minute eta for medical transport to [facility]. Patient repositioned in stretcher for comfort and breakfast ordered. Call bell at bedside. Heparin drip infusing per orders.

## 2023-03-31 NOTE — ED NOTES
B12 not given at [facility] because it has not been verified by pharmacy.

## 2023-03-31 NOTE — PROGRESS NOTES
TRANSFER - IN REPORT:    Verbal report received from [facility] on Sera Noel  being received from Procedural area for routine post - op. Report consisted of patients Situation, Background, Assessment and Recommendations(SBAR). Information from the following report(s) SBAR, Procedure Summary, MAR, and Cardiac Rhythm NSR  was reviewed with the receiving clinician. Opportunity for questions and clarification was provided. Assessment completed upon patients arrival to [address] [place] [address] and care [facility]. Cardiac Cath Lab Recovery Arrival Note:    Sera Noel arrived to [facility] recovery area. Patient procedure= LHC. Patient on cardiac monitor, non-invasive blood pressure, SPO2 monitor. On O2 @ 2 lpm via NC. Patient status doing well without problems. Patient is A&Ox 4. Patient reports no discomfort. PROCEDURE SITE CHECK:    Procedure site:without any bleeding or hematoma, no pain/discomfort reported at procedure site.      1725: [facility]

## 2023-03-31 NOTE — CONSULTS
CSS  Consult    Subjective:      Sol Fournier is a 74 y.o. male who was referred for cardiac evaluation by Dr. Larry Henriquez. He has a PMH of CAD s/p stent (PCI to [address] [place] [address] 2020), HTN,  sleep apena (uses BiPAP), DM Type II, diabetic neuropathy, chronic back pain, GERD, skin and prostate cancer, and BPH. He presented to the ED at [place] with complaints of a skin rash with hives, pruritis, and SOB. He had taken Benadryl with no improvement. He was found positive NSTEMI with rising troponin levels. He was transferred to [facility] and underwent cardiac catherization with Dr. Larry Henriquez. Mr. Dionna Villa states he was having some SOB when he presented but denied any chest pain at that time. When questioned he said he had noticed for a few days that he was having a little bit of chest tightness with some mild SOB but did not feel like it did when he had the stent placed. He denies any dizziness, palpitations, or LE edema. Mr. Dionna Villa lives with his wife and is independent of his ADL's. He is a retired . He states he smoked for maybe 5-10 years but quit in the 1980s. He drinks rarely and denies any other drug use. He is COVID and flu vaccinated. Family history noted below but significant for father with CAD and multiple MI's, Mother with HTN, sister with HTN, and multiple members with DM. Cardiac Testing    Cardiac catheterization on 3/31 with Dr. Larry Henriquez:  Conclusion    Ultrasound-guided right radial artery access. Angiographically significant three-vessel coronary artery disease. Normal left ventricular systolic function, estimated ejection fraction of 55 to 60%. Cardiac surgery consulted. Complications    Complications documented before study signed (3/31/2023  6:14 PM)     No complications were associated with this study.    Documented by Kimberly Easton MD - 3/31/2023  4:14 PM        Coronary Findings      Diagnostic  Dominance: Right    Left Anterior Descending   Prox LAD to Mid LAD lesion, 99% stenosed. First Diagonal Branch   1st Diag lesion, 70% stenosed. Left Circumflex   Prox Cx lesion, 80% stenosed. First Obtuse Marginal Branch   Second Obtuse Marginal Branch   Previously placed 2nd Mrg stent (unknown type) is widely patent. Right Coronary Artery   Dist RCA lesion, 85% stenosed. Right Posterior Descending Artery   RPDA lesion, 80% stenosed. First Right Posterolateral Branch   The vessel is small. 1st RPL lesion, 99% stenosed.        ECHO: Pending, reported normal EF on cath    Past Medical History:   Diagnosis Date    Arthritis     BACK    BPH (benign prostatic hyperplasia) 2013    CAD (coronary artery disease)     Cancer ([address] 2015    SKIN - HEAD AND FACE    Cancer ([address] 01/2019    PROSTATE    Chronic pain     BACK    Chronic pelvic pain in male     sensitive to touch on the left side down to the left side of the penis    Collagenous colitis 2011    Diabetes mellitus type 2, controlled, with complications ([address] 5326    Diabetic neuropathy ([address] 2006    Diverticulosis     ED (erectile dysfunction)     Dr. Mehdi Kay    GERD (gastroesophageal reflux disease) 1990    Gout     1 episode    Hypertension 1970    Ill-defined condition     BILATERAL PERIPHERAL NEUROPATHY BOTH LEGS    Low serum testosterone level     Dr. Nain Contreras    Osteopenia     Dr. Nain Contreras    Psoriasis     Sleep apnea     USES CPAP    Sun-damaged skin      Past Surgical History:   Procedure Laterality Date    COLONOSCOPY N/A 9/21/2021    COLONOSCOPY   :- performed by Latisha Castellon MD at [address] [place] [address]  8/24/2013         ENDOSCOPY, COLON, DIAGNOSTIC      HX COLONOSCOPY      HX HEENT  2017    WISDOM TEETH X4    HX HERNIA REPAIR      x2 bilateral inguinal    HX ORTHOPAEDIC  08/2018    L4-5, L5-S1 DISKECTOMY    HX OTHER SURGICAL  2015,2017    MOHS    HX PROSTATE SURGERY  04/02/2019    removed    NCV SENSORY OR MIXED  8/24/2013         [place] UNLISTED PROCEDURE CARDIAC SURGERY  12/24/2019    1 stent Social History     Tobacco Use    Smoking status: Former     Packs/day: 1.00     Years: 12.00     Pack years: 12.00     Types: Cigarettes     Quit date: 1980     Years since quittin.9    Smokeless tobacco: Never   Substance Use Topics    Alcohol use: Yes     Alcohol/week: 0.0 standard drinks     Comment: rarely      Family History   Problem Relation Age of Onset    Diabetes Father     Heart Disease Father 62        at least 2 MI's    Stroke Father         x2    Cancer Mother         Bladder    Parkinson's Disease Mother     Hypertension Mother     Hypertension Sister     Diabetes Paternal Grandmother     Diabetes Other         cousins    Anesth Problems Neg Hx      Prior to Admission medications    Medication Sig Start Date End Date Taking? Authorizing Provider   metFORMIN ER (GLUCOPHAGE XR) 500 mg tablet TAKE 2 TABLETS BY MOUTH TWICE DAILY FOR 90 DAYS 21   Provider, Historical   pantoprazole (PROTONIX) 40 mg tablet Take 40 mg by mouth daily. Provider, Historical   gabapentin (NEURONTIN) 300 mg capsule Take 600 mg by mouth two (2) times a day. Provider, Historical   CALCIUM-VITAMIN D3 PO Take 600 mg by mouth daily. Provider, Historical   ASPIRIN PO Take 81 mg by mouth daily. Provider, Historical   insulin lispro (HUMALOG KWIKPEN INSULIN SC) by SubCUTAneous route. Up to 40 units SQ 3 times daily with meals. States he usually injects 40 units 3 times daily with meals. Provider, Historical   naproxen sodium (ALEVE PO) Take  by mouth as needed. Provider, Historical   losartan (COZAAR) 25 mg tablet TAKE 1 TABLET BY MOUTH DAILY 21   Courtney Blancas MD   furosemide (LASIX) 20 mg tablet TAKE 1 TABLET BY MOUTH DAILY  Patient taking differently: 10 mg. 21   Courtney Blancas MD   atorvastatin (LIPITOR) 40 mg tablet TAKE 1 TABLET BY MOUTH EVERY DAY AT BEDTIME 20   Courtney Blancas MD   halobetasol (ULTRAVATE) 0.05 % topical cream as needed.  18   Provider, Historical nitroglycerin (NITROSTAT) 0.4 mg SL tablet 1 Tab by SubLINGual route every five (5) minutes as needed for Chest Pain. Up to 3 doses. Patient not taking: Reported on 12/7/2022 1/2/20   Carolee Lopez MD   TRULICITY 1.5 FS/2.8 mL sub-q pen 1.5 mg by SubCUTAneous route every seven (7) days. 9/23/19   Provider, Historical   ergocalciferol (ERGOCALCIFEROL) 50,000 unit capsule TAKE ONE CAPSULE BY MOUTH EVERY WEEK 4/29/19   Provider, Historical   FARXIGA 10 mg tab tablet 1 tablet by mouth daily 4/13/19   Provider, Historical   cyanocobalamin 1,000 mcg tablet Take 1,000 mcg by mouth daily. Provider, Historical   tadalafiL (CIALIS) 20 mg tablet Take 20 mg by mouth as needed for Erectile Dysfunction. Provider, Historical   insulin glargine (LANTUS,BASAGLAR) 100 unit/mL (3 mL) inpn 40 Units by SubCUTAneous route nightly. As directed. Provider, Historical   metoprolol succinate (TOPROL-XL) 25 mg XL tablet Take 25 mg by mouth daily. 11/11/17   Provider, Historical       Allergies   Allergen Reactions    Pcn [Penicillins] Other (comments)     Infancy was told he had a rash     Review of Systems: Pertinent positives per HPI  Consititutional: Denies fever or chills. Eyes:  Denies use of glasses or vision problems(cataracts). ENT:  Denies hearing or swallowing difficulty. CV: Denies CP, claudication, HTN. Resp: Denies dyspnea, productive cough. : Denies dialysis or kidney problems. GI: Denies ulcers, esophageal strictures, liver problems. M/S: Denies joint or bone problems, or implanted artificial hardware. Skin: Denies varicose veins, edema. Neuro: Denies strokes, or TIAs. Psych: Denies anxiety or depression. Endocrine: Denies thyroid problems or diabetes. Heme/Lymphatic: Denies easy bruising or lymphedema.      Objective:     VS: BP (!) 176/90   Pulse 94   Temp 98.2 °F (36.8 °C)   Resp 23   Wt 226 lb 13.7 oz (102.9 kg)   SpO2 94%   BMI 32.55 kg/m²     Physical Exam:    General appearance: alert, cooperative, no distress  Head: normocephalic, without obvious abnormality; atraumatic  Eyes: conjunctivae/corneas clear; EOM's intact. Nose: nares normal; no drainage. Neck: no carotid bruit and no JVD  Lungs: clear to auscultation bilaterally  Heart: regular rate and rhythm; no murmur  Abdomen: soft, non-tender; bowel sounds normal  Extremities: moves all extremities; no weakness. Skin: Skin color normal; No varicose veins or edema. Mild rash still on neck and back some, no hives noted, patient states improved significantly  Neurologic: Grossly normal      Labs:   Recent Labs     03/31/23  1235 03/31/23  0949 03/31/23  0245 03/31/23  0233   WBC  --   --   --  10.4   HGB  --   --   --  15.6   HCT  --   --   --  46.0   PLT  --   --   --  280   NA  --   --   --  135*   K  --   --   --  3.8   BUN  --   --   --  23*   CREA  --   --   --  1.36*   GLU  --   --   --  209*   GLUCPOC 256*   < >  --   --    INR  --   --  1.0  --     < > = values in this interval not displayed. Diagnostics:   PA and lateral:Pending, last 12/27/22 below      Carotid doppler:  Preliminary Report  Interpretation Summary    Consistent with less than 50% stenosis (high end of range) of the right internal carotid. Borderline 50% cannot be excluded. Consistent with less than 50% stenosis of the left internal carotid. Vertebrals are patent with antegrade flow. Cerebrovascular Findings    Right Carotid    The right CCA is patent. There is mild stenosis in the right ICA (<50%) and at the proximal segment. The right ICA has mild and calcific plaque. The right ECA is patent. The right vertebral is antegrade. Left Carotid    The left CCA is patent. There is mild stenosis in the left ICA (<50%) and at the proximal segment . The left ICA has mild and calcific plaque. The left ECA is patent. The left vertebral is antegrade.        HOMERO: Preliminary Report  Interpretation Summary    No evidence of hemodynamically significant right or left lower extremity arterial obstruction. Lower Extremity Arterial Findings    HOMERO    Left brachial not accessible for pressure measurement. HOMERO is normal on the right and left. PVR waveforms are normal at the right and left ankle. PPG waveforms are normal at the right and left great toe. PFTS-FEV1: Pending    EKG:     Assessment:     Active Problems:    Allergic reaction, initial encounter (3/31/2023)      Tachycardia (3/31/2023)      NSTEMI (non-ST elevated myocardial infarction) ([address] (3/31/2023)        Plan:   The risk and benefit of surgery were reviewed with patient and family and all questions answered and the patient wishes to proceed. Risk include infection, bleeding, stroke, heart attack, irregular heart rhythm, kidney failure and death. Surgery is scheduled for Monday, 4/3/23 with Dr. Rojas Jerez. STS Risk Calculator V2.81 - Discussed by surgeon with patient.  -- Calculated on current findings  Isolated CAB  Risk of Mortality:  1.926%  Renal Failure:  2.398%  Permanent Stroke:  1.176%  Prolonged Ventilation:  9.579%  DSW Infection:  0.292%  Reoperation:  1.746%  Morbidity or Mortality:  13.987%  Short Length of Stay:  38.418%  Long Length of Stay:  5.252%    Treatment Plan:    CAD/NSTEMI: Hx Stent to [facility] 2020; Presented +NSTEMI, troponin currently 1093  - Plan for CABG with Dr. Rojas Jerez Monday  - Pre-op testing pending -- needs PFTs, ABG, PA/Lateral, and ECHO  - Repeat troponin levels every 6 hours until troponin levels start to   - Continue heparin drip, DC on Monday at midnight  - Continue ASA 81mg, Toprol XL 25mg, and atorvastatin  - Add mupirocin and chlorhexidine for pre-op decolonization    Hypertension: PTA Toprol XL 25 and Losartan 25mg  - Continue Toprol  - Hold Losartan -- no ACE/ARB for 48 hours prior to surgery    HLD: PTA atorvastatin 40mg  - Continue PTA Atorvastatin    Sleep Apnea: Uses BiPAP at home  - Wife bringing in home machine, please utilize    Hx Smoker: only 5-10 years, quit in 1980  - PFTs ordered but likely will not be completed over weekend  - Would request RT comes to bedside and completes ABG if unable to do PFTs    Diabetes Mellitus, Type II: A1c 7.4%; PTA Metformin 500mg (2 tabs BID), Trulicity 0.6UQ every 7 days, Farxiga 10mg daily, Lispro 40 units TID with meals, Lantus 40 units nightly  - Diabetes Management consulted for assistance inpatient  - Hold Metformin  and Farxiga pre-operatively    Peripheral Neuropathy: PTA Gabapentin 600mg BID; PRN Aleve PO  - Would resume home gabapentin, patient endorses significant lower extremity pain when he does not take it    Chronic Back Pain s/p Diskectomy: PRN Aleve PTA  - Monitor inpatient, would recommend PRN tramadol if needed    GERD: PTA pantroprazole 40mg daily  - Continue PTA protonix    Rest of care per Primary Care Team. Plan for OR on Monday for CABG. We need ECHO completed to ensure no valve issues. Plan to DC heparin drip at midnight on Monday 4/3. NPO at midnight 4/3 also. Patient still needs consent. Time Spent: I spent a total of 60 minutes chart reviewing, interviewing and assessing the patient, and devising and documenting a plan of care.       Signed By: Carmen Allen NP     March 31, 2023       Pt seen and HPI and PMHx reviewed  Cath and echo reviewed  Detailed discussion regarding indications and risks of cabg

## 2023-03-31 NOTE — PROGRESS NOTES
1135 Pt arrived via stretcher from [address] [place] [address]. BP elevated. Pt denies pain and SOB. Heparin gtt verified and restarted. MD George paged that pt has arrived to unit. Orders reviewed. Pt oriented to unit routine. Call bell and belongings left within reach. 1200 Cardiac procedures folder given to pt to review. TRANSFER - OUT REPORT:    Verbal report given to RN on Charmayne Cheraw  being transferred to Cath Lab(unit) for ordered procedure       Report consisted of patients Situation, Background, Assessment and   Recommendations(SBAR). Information from the following report(s) SBAR, Kardex, and Cardiac Rhythm NSR BBB  was reviewed with the receiving nurse. Lines:   Peripheral IV 03/31/23 Right Antecubital (Active)   Site Assessment Clean, dry, & intact 03/31/23 1135   Phlebitis Assessment 0 03/31/23 1135   Infiltration Assessment 0 03/31/23 1135   Dressing Status Clean, dry, & intact 03/31/23 1135   Dressing Type Transparent 03/31/23 1135   Hub Color/Line Status Pink; Infusing 03/31/23 1135   Action Taken Other (comment) 03/31/23 1033   Alcohol Cap Used Yes 03/31/23 1135        Opportunity for questions and clarification was provided.       Patient transported with:   O2 @ 2 liters  Tech

## 2023-03-31 NOTE — CONSULTS
[facility]                    Cardiology Care Note     [x]Initial Encounter     []Follow-up    Patient Name: Radha RAWLS:[id number] - [id number]  Primary Cardiologist: [facility] Physicians: Erica Bartholomew MD  Consulting Cardiologist: [facility] Physicians: Jeannie Mendes MD     Reason for encounter: elevated troponin. Patient seen and examined by me with the above nurse practitioner. I personally performed all components of the history, physical, and medical decision making and agree with the assessment and plan with minor modifications as noted. Today the patient presents with rash, subjective fever, chills, had transient chest discomfort and troponin elevated at 500 and then greater than 1000. History of CAD. General PE  Gen:  NAD  Mental Status - Alert. General Appearance - Not in acute distress. HEENT:  PERRL, no carotid bruits or JVD  Chest and Lung Exam   Inspection: Accessory muscles - No use of accessory muscles in breathing. Auscultation:   Breath sounds: - Normal.   Cardiovascular   Inspection: Jugular vein - Bilateral - Inspection Normal.   Palpation/Percussion:   Apical Impulse: - Normal.   Auscultation: Rhythm - Regular. Heart Sounds - S1 WNL and S2 WNL. No S3 or S4. Murmurs & Other Heart Sounds: Auscultation of the heart reveals - No Murmurs. Peripheral Vascular   Upper Extremity: Inspection - Bilateral - No Cyanotic nailbeds or Digital clubbing. Lower Extremity:   Palpation: Edema - Bilateral - No edema. Abdomen:   Soft, non-tender, bowel sounds are active. Neuro: A&O times 3, CN and motor grossly WNL    Today the patient presents with rash, subjective fever, chills, had transient chest discomfort and troponin elevated at 500 and then greater than 1000. History of CAD.   I discussed the risks and benefits of cardiac catheterization plus or minus PCI with the patient who expressed understanding. See completed cardiac catheterization report. Angiographically significant three-vessel coronary disease, normal LVEF. Discussed with cardiac surgery nurse practitioner Mayra Sutherland, left consult in office as well. Considering extremely tight LAD, would probably benefit from CABG prior to discharge. Monitor blood pressure. Check lipids. Aspirin, beta-blocker, statin. Resume heparin without bolus 1 hour after TR band removed. HPI:   Charles Medrano is a 74 y.o. male with PMH significant for HTN,  CAD s/p stent (1/2020 PCI/stent to OM2, additional disease includes RPDA 45-50%, RPLS 60% and prox LCX 40% disease), DM type II, prostate CA s/p surgery 2019, , venous insufficiency,back surgery in 2018/diskectomy,  former tobacco use. Pt presented to [place] [facility] with chief c/o rash, subjective fever, chills. He also had SOB and a brief episode of left sided chest discomfort. In ER, he was noted to have elevated troponin of 106 which trended to 483. He was given IV steroid, benadryl and ASA and was started on ACS heparin. 02 sats were 90% on RA and he was placed on O2, His BP on presentation was 165/101 and he was in sinus tachycardia on presenting EKG. EKG showed sinus tachycardia, repeat NSR with RBBB, q waves inferiorly. Denies using any new soaps, detergent, no new meds. SH: former smoker. 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 FH: dad has CAD  Subjective:  Denies any current chest pain or SOB but remains on O2. Assessment and Plan      Elevated troponin: 106-483. Trending up. EKG NSR, q waves inferiorly (also noted q waves on prior EKGlead II, AVG from 2020. He had one brief episode of mild chest pain PTA but No current chest pain. HS troponin elevation could be due to demand. Repeat HS troponin now. Noted, on ACS heparin, BB, ASA, and statin. Further plan after repeat troponin. Unable to do lexiscan stress test today due to nuclear med schedule.    02 sats were 90% on RA on presentation- will check Ddimer. Keep NPO for now in case LHC needed. History of CAD  S/p PCI/VAL to OM3. Residual disease as above. Continue ASA, statin, BB    3. HTN:  BP labile. Currently elevated. Continue toprol XL. Was on Losartan 25 mg PTA. 4.  History of venous insufficiency:    Had NL EF on echo 5/2021.     5. Elevated creatinine:   1.36.  was 1.33 in 2021. ?CKD. Recheck in a.m. Receiving IVF. 6. DM type II: A1C 7.4  Management per primary team.               ____________________________________________________________    Cardiac testing  02/05/21    ECHO ADULT COMPLETE 02/05/2021 2/5/2021    Interpretation Summary  · LV: Estimated LVEF is 55 - 60%. Biplane method used to measure ejection fraction. Normal cavity size and systolic function (ejection fraction normal). Mild concentric hypertrophy.  Wall motion: normal.    Signed by: Lamin Elena MD on 2/5/2021  1:10 PM              Most recent HS troponins:  Recent Labs     03/31/23  0520 03/31/23  0233   TROPHS 483* 106*       ECG: Media Information  Document In    [facility], [facility]       Review of Systems:    [x]All other systems reviewed and all negative except as written in HPI    [] Patient unable to provide secondary to condition    Past Medical History:   Diagnosis Date    Arthritis     BACK    BPH (benign prostatic hyperplasia) 2013    CAD (coronary artery disease)     Cancer ([address] 2015    SKIN - HEAD AND FACE    Cancer ([address] 01/2019    PROSTATE    Chronic pain     BACK    Chronic pelvic pain in male     sensitive to touch on the left side down to the left side of the penis    Collagenous colitis 2011    Diabetes mellitus type 2, controlled, with complications ([address] 9841    Diabetic neuropathy ([address] 2006    Diverticulosis     ED (erectile dysfunction)     Dr. Margarita Correa    GERD (gastroesophageal reflux disease) 1990    Gout     1 episode    Hypertension 1970    Ill-defined condition BILATERAL PERIPHERAL NEUROPATHY BOTH LEGS    Low serum testosterone level     Dr. Cherise Tony    Osteopenia     Dr. Cherise Tony    Psoriasis     Sleep apnea     USES CPAP    Sun-damaged skin      Past Surgical History:   Procedure Laterality Date    COLONOSCOPY N/A 9/21/2021    COLONOSCOPY   :- performed by Florencio Guan [facility] at [facility] ENDOSCOPY    EMG TWO EXTREMITIES LOWER  8/24/2013         ENDOSCOPY, COLON, DIAGNOSTIC      HX COLONOSCOPY      HX HEENT  2017    WISDOM TEETH X4    HX HERNIA REPAIR      x2 bilateral inguinal    HX ORTHOPAEDIC  08/2018    L4-5, L5-S1 DISKECTOMY    HX OTHER SURGICAL  2015,2017    MOHS    HX PROSTATE SURGERY  04/02/2019    removed    NCV SENSORY OR MIXED  8/24/2013         GA UNLISTED PROCEDURE CARDIAC SURGERY  12/24/2019    1 stent     Social Hx:  reports that he quit smoking about 42 years ago. His smoking use included cigarettes. He has a 12.00 pack-year smoking history. He has never used smokeless tobacco. He reports current alcohol use. He reports that he does not use drugs. Family Hx: family history includes Cancer in his mother; Diabetes in his father, paternal grandmother, and another family member; Heart Disease (age of onset: 62) in his father; Hypertension in his mother and sister; Parkinson's Disease in his mother; Stroke in his father.   Allergies   Allergen Reactions    Pcn [Penicillins] Other (comments)     Infancy was told he had a rash          OBJECTIVE:  Wt Readings from Last 3 Encounters:   03/31/23 102.9 kg (226 lb 13.7 oz)   12/07/22 99.4 kg (219 lb 3.2 oz)   08/22/22 104.6 kg (230 lb 9.6 oz)     No intake or output data in the 24 hours ending 03/31/23 1215    Physical Exam:    Vitals:   Vitals:    03/31/23 1007 03/31/23 1008 03/31/23 1020 03/31/23 1135   BP:   138/89 (!) 163/80   Pulse: 90 95 92 92   Resp: 22 16 20 23   Temp:   97.5 °F (36.4 °C) 97.6 °F (36.4 °C)   SpO2: 96% 93% 92%    Weight:         Telemetry: NSR    Gen: Well-developed, , in no acute distress  Neck: Supple, No JVD,   Resp: No accessory muscle use, Clear breath sounds, No rales or rhonchi  Card: Regular Rate,Rythm, Normal S1, S2, No murmurs, rubs or gallop. No thrills. Abd:   Soft, non-tender, non-distended, BS+   MSK: No cyanosis  Skin: No rashes    Neuro: Moving all four extremities, follows commands appropriately  Psych: Good insight, oriented to person, place, alert, Nml Affect  LE: No edema    Data Review:     Radiology:   XR Results (most recent):  Results from Hospital Encounter encounter on 03/31/23    XR CHEST PORT    Narrative  INDICATION: Chest pain    EXAM:  AP CHEST RADIOGRAPH    COMPARISON: 12/7/2022    FINDINGS:    AP portable view of the chest obtained in lordotic position demonstrates a  normal cardiomediastinal silhouette. There is no edema, effusion, consolidation,  or pneumothorax. The osseous structures are unremarkable. Impression  No acute process. Recent Labs     03/31/23 0245 03/31/23 0233   NA  --  135*   K  --  3.8   CL  --  98   CO2  --  23   BUN  --  23*   CREA  --  1.36*   GLU  --  209*   PHOS 3.6  --    CA  --  9.2     Recent Labs     03/31/23 0233   WBC 10.4   HGB 15.6   HCT 46.0        Recent Labs     03/31/23 0245 03/31/23 0233   PTP 9.9  --    INR 1.0  --    AP  --  68     No results for input(s): CHOL, LDLC in the last 72 hours.     No lab exists for component: TGL, HDLC,  HBA1C      Current meds:    Current Facility-Administered Medications:     heparin 25,000 units in D5W 250 ml infusion, 9-25 Units/kg/hr, IntraVENous, TITRATE, Kavitha Cardoso MD, Last Rate: 9.3 mL/hr at 03/31/23 1122, 9 Units/kg/hr at 03/31/23 1122    glucose chewable tablet 16 g, 4 Tablet, Oral, PRN, Sary Vazquez MD    glucagon (GLUCAGEN) injection 1 mg, 1 mg, IntraMUSCular, PRN, GeorgeSachin lópez MD    dextrose 10 % infusion 0-250 mL, 0-250 mL, IntraVENous, PRN, Sary Vazquez MD    insulin lispro (HUMALOG) injection, , SubCUTAneous, AC&HS, Terril, Sary Dunn MD, 3 Units at 03/31/23 5029    acetaminophen (TYLENOL) tablet 650 mg, 650 mg, Oral, Q6H PRN, Suzie Vazquez MD    .PHARMACY TO SUBSTITUTE PER PROTOCOL (Reordered from: insulin glargine (LANTUS,BASAGLAR) 100 unit/mL (3 mL) inpn), , , Per Protocol, Suzie Vazquez MD    .PHARMACY TO SUBSTITUTE PER PROTOCOL (Reordered from: insulin lispro (HUMALOG KWIKPEN INSULIN SC)), , , Per Protocol, Suzie Vazquez MD    gabapentin (NEURONTIN) capsule 600 mg, 600 mg, Oral, BID, Suzie Vazquez MD, 600 mg at 03/31/23 4051    cyanocobalamin (VITAMIN B12) tablet 1,000 mcg, 1,000 mcg, Oral, DAILY, Sachin Vazquez MD    .PHARMACY TO SUBSTITUTE PER PROTOCOL (Reordered from: CALCIUM-VITAMIN D3 PO), , , Per Protocol, Suzie Vazquez MD    sodium chloride (NS) flush 5-40 mL, 5-40 mL, IntraVENous, Q8H, Nilsa WYXQPTZ MD STUART    sodium chloride (NS) flush 5-40 mL, 5-40 mL, IntraVENous, PRN, Nilsa JUVHHFX MD STUART    nitroglycerin (NITROSTAT) tablet 0.4 mg, 0.4 mg, SubLINGual, Q5MIN PRN, Nilsa VSDAAQH MD STUART    glucose chewable tablet 16 g, 4 Tablet, Oral, PRN, Nilsa KROVTBZ MD STUART    glucagon (GLUCAGEN) injection 1 mg, 1 mg, IntraMUSCular, PRN, Nilsa OYXIHBG MD STUART    dextrose 10 % infusion 0-250 mL, 0-250 mL, IntraVENous, PRN, Nilsa PHRLPGSTEPHANIE DAVIDSON MD    [START ON 4/1/2023] metoprolol succinate (TOPROL-XL) XL tablet 25 mg, 25 mg, Oral, DAILY, JOSE Ash MD    [START ON 4/1/2023] pantoprazole (PROTONIX) tablet 40 mg, 40 mg, Oral, DAILY, Nilsa XBDEJJT MD STUART    0.9% sodium chloride infusion, 75 mL/hr, IntraVENous, CONTINUOUS, YO AshPCLAUDIA DAVIDSON MD    [START ON 4/1/2023] aspirin chewable tablet 81 mg, 81 mg, Oral, DAILY, IERNE Ash MD    atorvastatin (LIPITOR) tablet 80 mg, 80 mg, Oral, QHS, Divya Ash MD Clent Priestly. SHILO Estrada    [facility] Cardiology  Call center: U) [phone]  (H) [phone]      CC: Rita Pascual MD

## 2023-03-31 NOTE — ED NOTES
Patient left our ED in no new acute distress or change. Patient continues to be on 2L NC. 20g IV patent with heparin infusing. No patient belongings left at bedside.

## 2023-03-31 NOTE — DIABETES MGMT
[facility]  DIABETES MANAGEMENT CONSULT    Consulted by  Aimee Peoples MD   for advanced nursing evaluation and care for inpatient blood glucose management. Evaluation and Action Plan   Sixto Leal is a 74year old gentleman, with Type 2 Diabetes on high dose basal/bolus insulin along with metformin, trulicity and farxiga, who is admitted with an NSTEMI. His most recent A1C was 7.4% in January 2023 but on review of his CGM, he does have significant glucose variability mostly surrounding mealtime. Due to concerns for an allergic reaction, 125mg methylprednisolone was given in the ED (now out of system) and he remains NPO for possible cath. He missed his basal insulin last night prior to presentation to the ED. BG now 256 and would resume basal insulin at a reduced dose. Due to differences in oral intake at home vs what foods are provided in the hospital, when PO resumes would start with a reduced bolus insulin dose. Inpatient BG target is 140-180mg/dl. Management Rationale Action Plan   Medication   Basal needs Using 0.3 units/kg/D Starting Lantus 30 units HS  If fasting BG over 180mg/dl, resume home dose 40 units HS   Nutritional needs  When PO resumes, would order humalog at a reduced dose from PTA    20 units Humalog with meals (home dose is 40/meal)   Corrective insulin Using normal sensitivity  Normal sensitivity ACHS   Lab [x]        Hemoglobin A1c add-on     Additional orders  Diet advancement per primary team.  When advanced, please include consistent carbohydrate component of diet (60 grams CHO/meal)- this can be added to clear and full liq diets. Ayala Fox for her to wear a continuous glucose monitor (CGM) but all hospital doses should be made off fingerstick glucose values (serum glucose). CGMs measure glucose in the interstitial space and there is a lag time bw interstitial glucose and blood glucose.           Initial Presentation   Nydia Salomon is a 74 y.o. male who presented to [facility] ED 3/31/23 with a 1 day c/o SOB, chest pain and hives. In the ED, he was given Aspirin 325 mg x 1, Benadryl 50 mg IV x1, famotidine 20 mg IV x1, Solu-Medrol 125 mg IV x1.  IV heparin drip for non-STEMI and transferred to [facility] for cardiology consultation   LAB: , Trop 106 (repeat 483), BUN 23, Creat 1.36, GFR 55  CXR: No acute process    ED EKG interpretation:  Rhythm: sinus tachycardia; and regular . Rate (approx.): 125; Axis: Right superior axis deviation; P wave: normal; QRS interval: normal ; ST/T wave: non-specific changes; in  Lead: Diffusely; Other findings: abnormal ekg. HX:   Past Medical History:   Diagnosis Date    Arthritis     BACK    BPH (benign prostatic hyperplasia) 2013    CAD (coronary artery disease)     Cancer ([facility]) 2015    SKIN - HEAD AND FACE    Cancer ([facility] 01/2019    PROSTATE    Chronic pain     BACK    Chronic pelvic pain in male     sensitive to touch on the left side down to the left side of the penis    Collagenous colitis 2011    Diabetes mellitus type 2, controlled, with complications ([address] 2799    Diabetic neuropathy ([facility] 2006    Diverticulosis     ED (erectile dysfunction)     Dr. Darylene Provencal    GERD (gastroesophageal reflux disease) 1990    Gout     1 episode    Hypertension 1970    Ill-defined condition     BILATERAL PERIPHERAL NEUROPATHY BOTH LEGS    Low serum testosterone level     Dr. Iesha Morales    Osteopenia     Dr. Iesha Morales    Psoriasis     Sleep apnea     USES CPAP    Sun-damaged skin         INITIAL DX:   NSTEMI (non-ST elevated myocardial infarction) ([facility]) [I21.4]  Allergic reaction, initial encounter [T78.40XA]  Tachycardia [R00.0]     Current Treatment     TX: Transfer to [facility]: ASA, Heparin, serial troponin, cardiology consultation        Hospital Course   Clinical progress has been uncomplicated.      Diabetes History   Type 2 Diabetes: Diagnosed in 2006  Family History positive for Diabetes: Father , Paternal Grandmother   Ambulatory BG management provided by: [facility]- Last visit 2/22/23    Diabetes-related Medical History  Neurological complications  Impotence and Peripheral neuropathy  Microvascular disease  Nephropathy  Macrovascular disease  CAD  Other associated conditions     FELICITY, Skin Cancer, Prostate Cancer, Osteoporosis     Diabetes Medication History  Key Antihyperglycemic Medications               metFORMIN ER (GLUCOPHAGE XR) 500 mg tablet TAKE 2 TABLETS BY MOUTH TWICE DAILY FOR 90 DAYS    insulin lispro (HUMALOG KWIKPEN INSULIN SC) by SubCUTAneous route. Up to 40 units SQ 3 times daily with meals. States he usually injects 40 units 3 times daily with meals. TRULICITY 1.5 BN/7.0 mL sub-q pen 1.5 mg by SubCUTAneous route every seven (7) days. FARXIGA 10 mg tab tablet 1 tablet by mouth daily    insulin glargine (LANTUS,BASAGLAR) 100 unit/mL (3 mL) inpn 40 Units by SubCUTAneous route nightly. As directed. Basaglar switched to [place] at same dose    Diabetes self-management practices:   Eating pattern  [x] Breakfast  Eggs, English muffin, sausage and coffee  [x] Lunch   Soup, salad  [x] Dinner   Likes to go out to eat dinner.   May order salmon/steak with a baked potato  [x] Bedtime  Chips, something sweet  [x] Snacks   Likes tortilla chips   [x] Beverages  Diet Mt Dew, Water  [x] Dentition status Normal   Physical activity pattern   Sedentary   Monitoring pattern   Wearing Libre2 CGM   90day review:  Glucose below 70: 9% of the time   70-99: 9%   100-180: 61%   181-240: 25%   Over 240 0%  Taking medications pattern  [x] Inconsistent administration: May skip a lunch dose  [x] Affordable  Reducing risks  [] Influenza:   Immunization History   Administered Date(s) Administered    Influenza High Dose Vaccine PF 09/20/2019    Influenza Vaccine 10/01/2014, 10/03/2015, 10/01/2016, 10/17/2017     [] Pneumonia:   Immunization History   Administered Date(s) Administered    (RETIRED) Pneumococcal Vaccine (Unspecified Type) 2007    Pneumococcal Conjugate (PCV-13) 2015    Pneumococcal Polysaccharide (PPSV-23) 08/15/2014    Pneumococcal Vaccine (Unspecified Type) 2007     [] Hepatitis:   Immunization History   Administered Date(s) Administered    Hep A Vaccine 10/11/2019    Hep A Vaccine (Adult) 10/11/2019     Social determinants of health impacting diabetes self-management practices   Concerned that you need to know more about how to stay healthy with diabetes  Overall evaluation:    [x] Not achieving A1c target with drug therapy & self-care practices    , Has 2 Adult Daughters  Works as a    Former smoker (quit )  Subjective   My sugar is so high.      Objective   Physical exam  General Obese white male in no acute distress/ill-appearing. Conversant and cooperative  Neuro  Alert, oriented   Vital Signs Visit Vitals  /89   Pulse 92   Temp 97.5 °F (36.4 °C)   Resp 20   Wt 102.9 kg (226 lb 13.7 oz)   SpO2 92%   BMI 32.55 kg/m²     Skin  Warm and dry. Acanthosis noted along neckline. No lipohypertrophy or lipoatrophy noted at injection sites   Heart   Regular rate and rhythm.  No murmurs, rubs or gallops  Lungs  Clear to auscultation without rales or rhonchi  Extremities No foot wounds      Laboratory  Recent Labs     23  0233   *   AGAP 14   WBC 10.4   CREA 1.36*   AST 27   ALT 37       Factors impacting BG management  Factor Dose Comments   Nutrition:  Standard meals   NPO    Drugs:  Steroids     Methylprednisolone 125mg x1 in ED   Impairs insulin action   Pain Chronic back pain    Other:   Kidney function GFR 55      Blood glucose pattern    Significant diabetes-related events over the past 24-72 hours  A1C 7.4%  Fasting B  -256 while NPO today  Correction: 8 units since this am  COVID Neg  Methylprednisolone 125mg x1 at 0236 this am for hives   Trop [phone]  NPO for possible [facility]    Assessment and Nursing Intervention   Nursing Diagnosis Risk for unstable blood glucose pattern   Nursing Intervention Domain 5250 Decision-making Support   Nursing Interventions Examined current inpatient diabetes/blood glucose control   Explored factors facilitating and impeding inpatient management  Explored corrective strategies with patient and responsible inpatient provider   Informed patient of rational for insulin strategy while hospitalized     Nursing Diagnosis 80033 Ineffective Health Management   Nursing Intervention Domain 5250 Decision-making Support   Nursing Interventions Identified diabetes self-management practices impeding diabetes control  Discussed diabetes survival skills related to  Healthy Plate eating plan; given handouts  Role of physical activity in improving insulin sensitivity and action  Procedure for blood glucose monitoring & options for low-cost products  Medications plan at discharge     Billing Code(s)   [x] 90997 IP initial hospital care - 55 minutes     Before making these care recommendations, I personally reviewed the hospitalization record, including notes, laboratory & diagnostic data and current medications, and examined the patient at the bedside (circumstances permitting) before determining care. More than fifty (50) percent of the time was spent in patient counseling and/or care coordination.   Total minutes: 60    ARNULFO Segura  Diabetes Clinical Nurse Specialist  [facility]  Access via [employer]

## 2023-04-01 ENCOUNTER — ANESTHESIA EVENT (OUTPATIENT)
Dept: CARDIOTHORACIC SURGERY | Age: 73
End: 2023-04-01
Payer: MEDICARE

## 2023-04-01 ENCOUNTER — APPOINTMENT (OUTPATIENT)
Dept: GENERAL RADIOLOGY | Age: 73
End: 2023-04-01
Attending: NURSE PRACTITIONER
Payer: MEDICARE

## 2023-04-01 ENCOUNTER — APPOINTMENT (OUTPATIENT)
Dept: NON INVASIVE DIAGNOSTICS | Age: 73
End: 2023-04-01
Attending: NURSE PRACTITIONER
Payer: MEDICARE

## 2023-04-01 LAB
ANION GAP SERPL CALC-SCNC: 10 MMOL/L (ref 5–15)
APPEARANCE UR: CLEAR
ARTERIAL PATENCY WRIST A: POSITIVE
BACTERIA URNS QL MICRO: NEGATIVE /HPF
BASE EXCESS BLD CALC-SCNC: 2.6 MMOL/L
BDY SITE: ABNORMAL
BILIRUB UR QL: NEGATIVE
BUN SERPL-MCNC: 19 MG/DL (ref 6–20)
BUN/CREAT SERPL: 18 (ref 12–20)
CALCIUM SERPL-MCNC: 9.5 MG/DL (ref 8.5–10.1)
CHLORIDE SERPL-SCNC: 103 MMOL/L (ref 97–108)
CO2 SERPL-SCNC: 24 MMOL/L (ref 21–32)
COLOR UR: ABNORMAL
COMMENT, HOLDF: NORMAL
CREAT SERPL-MCNC: 1.05 MG/DL (ref 0.7–1.3)
ECHO AO ROOT DIAM: 3.6 CM
ECHO AO ROOT INDEX: 1.64 CM/M2
ECHO AV AREA PEAK VELOCITY: 2 CM2
ECHO AV AREA/BSA PEAK VELOCITY: 0.9 CM2/M2
ECHO AV PEAK GRADIENT: 8 MMHG
ECHO AV PEAK VELOCITY: 1.4 M/S
ECHO AV VELOCITY RATIO: 0.71
ECHO LA DIAMETER INDEX: 1.86 CM/M2
ECHO LA DIAMETER: 4.1 CM
ECHO LA TO AORTIC ROOT RATIO: 1.14
ECHO LA VOL 2C: 91 ML (ref 18–58)
ECHO LA VOL 4C: 81 ML (ref 18–58)
ECHO LA VOLUME AREA LENGTH: 89 ML
ECHO LA VOLUME INDEX A2C: 41 ML/M2 (ref 16–34)
ECHO LA VOLUME INDEX A4C: 37 ML/M2 (ref 16–34)
ECHO LA VOLUME INDEX AREA LENGTH: 40 ML/M2 (ref 16–34)
ECHO LV E' LATERAL VELOCITY: 8 CM/S
ECHO LV E' SEPTAL VELOCITY: 7 CM/S
ECHO LV EJECTION FRACTION A2C: 59 %
ECHO LV EJECTION FRACTION A4C: 52 %
ECHO LV FRACTIONAL SHORTENING: 27 % (ref 28–44)
ECHO LV INTERNAL DIMENSION DIASTOLE INDEX: 1.86 CM/M2
ECHO LV INTERNAL DIMENSION DIASTOLIC: 4.1 CM (ref 4.2–5.9)
ECHO LV INTERNAL DIMENSION SYSTOLIC INDEX: 1.36 CM/M2
ECHO LV INTERNAL DIMENSION SYSTOLIC: 3 CM
ECHO LV IVSD: 1.2 CM (ref 0.6–1)
ECHO LV MASS 2D: 171.7 G (ref 88–224)
ECHO LV MASS INDEX 2D: 78.1 G/M2 (ref 49–115)
ECHO LV POSTERIOR WALL DIASTOLIC: 1.2 CM (ref 0.6–1)
ECHO LV RELATIVE WALL THICKNESS RATIO: 0.59
ECHO LVOT AREA: 2.8 CM2
ECHO LVOT DIAM: 1.9 CM
ECHO LVOT PEAK GRADIENT: 4 MMHG
ECHO LVOT PEAK VELOCITY: 1 M/S
ECHO MV A VELOCITY: 0.91 M/S
ECHO MV AREA PHT: 3.5 CM2
ECHO MV E DECELERATION TIME (DT): 218.5 MS
ECHO MV E VELOCITY: 0.87 M/S
ECHO MV E/A RATIO: 0.96
ECHO MV E/E' LATERAL: 10.88
ECHO MV E/E' RATIO (AVERAGED): 11.65
ECHO MV E/E' SEPTAL: 12.43
ECHO MV PRESSURE HALF TIME (PHT): 63.4 MS
ECHO PV MAX VELOCITY: 0.6 M/S
ECHO PV PEAK GRADIENT: 2 MMHG
ECHO RV FREE WALL PEAK S': 8 CM/S
ECHO RV TAPSE: 1.1 CM (ref 1.7–?)
ECHO TV REGURGITANT MAX VELOCITY: 2.14 M/S
ECHO TV REGURGITANT PEAK GRADIENT: 18 MMHG
EPITH CASTS URNS QL MICRO: ABNORMAL /LPF
ERYTHROCYTE [DISTWIDTH] IN BLOOD BY AUTOMATED COUNT: 13.6 % (ref 11.5–14.5)
GAS FLOW.O2 O2 DELIVERY SYS: ABNORMAL
GLUCOSE BLD STRIP.AUTO-MCNC: 193 MG/DL (ref 65–117)
GLUCOSE BLD STRIP.AUTO-MCNC: 223 MG/DL (ref 65–117)
GLUCOSE BLD STRIP.AUTO-MCNC: 237 MG/DL (ref 65–117)
GLUCOSE BLD STRIP.AUTO-MCNC: 284 MG/DL (ref 65–117)
GLUCOSE SERPL-MCNC: 207 MG/DL (ref 65–100)
GLUCOSE UR STRIP.AUTO-MCNC: 250 MG/DL
HCO3 BLD-SCNC: 27.2 MMOL/L (ref 22–26)
HCT VFR BLD AUTO: 42.9 % (ref 36.6–50.3)
HGB BLD-MCNC: 14.2 G/DL (ref 12.1–17)
HGB UR QL STRIP: NEGATIVE
HYALINE CASTS URNS QL MICRO: ABNORMAL /LPF (ref 0–5)
KETONES UR QL STRIP.AUTO: 15 MG/DL
LEFT ABI: 1.05
LEFT CCA DIST DIAS: 14.2 CM/S
LEFT CCA DIST SYS: 79 CM/S
LEFT CCA PROX DIAS: 14.4 CM/S
LEFT CCA PROX SYS: 93 CM/S
LEFT ECA DIAS: 0 CM/S
LEFT ECA SYS: 142.3 CM/S
LEFT ICA DIST DIAS: 29.4 CM/S
LEFT ICA DIST SYS: 87.1 CM/S
LEFT ICA MID DIAS: 24.4 CM/S
LEFT ICA MID SYS: 83.4 CM/S
LEFT ICA PROX DIAS: 9.7 CM/S
LEFT ICA PROX SYS: 82.2 CM/S
LEFT ICA/CCA SYS: 1.1 NO UNITS
LEFT POSTERIOR TIBIAL: 152 MMHG
LEFT VERTEBRAL DIAS: 11.2 CM/S
LEFT VERTEBRAL SYS: 57.5 CM/S
LEUKOCYTE ESTERASE UR QL STRIP.AUTO: NEGATIVE
MCH RBC QN AUTO: 31 PG (ref 26–34)
MCHC RBC AUTO-ENTMCNC: 33.1 G/DL (ref 30–36.5)
MCV RBC AUTO: 93.7 FL (ref 80–99)
NITRITE UR QL STRIP.AUTO: NEGATIVE
NRBC # BLD: 0 K/UL (ref 0–0.01)
NRBC BLD-RTO: 0 PER 100 WBC
O2/TOTAL GAS SETTING VFR VENT: 21 %
PCO2 BLD: 40.9 MMHG (ref 35–45)
PH BLD: 7.43 (ref 7.35–7.45)
PH UR STRIP: 6 (ref 5–8)
PLATELET # BLD AUTO: 267 K/UL (ref 150–400)
PMV BLD AUTO: 10.1 FL (ref 8.9–12.9)
PO2 BLD: 83 MMHG (ref 80–100)
POTASSIUM SERPL-SCNC: 4 MMOL/L (ref 3.5–5.1)
PROT UR STRIP-MCNC: NEGATIVE MG/DL
RBC # BLD AUTO: 4.58 M/UL (ref 4.1–5.7)
RBC #/AREA URNS HPF: ABNORMAL /HPF (ref 0–5)
RIGHT ABI: 1.14
RIGHT ARM BP: 152 MMHG
RIGHT CCA DIST DIAS: 8.5 CM/S
RIGHT CCA DIST SYS: 83.4 CM/S
RIGHT CCA PROX DIAS: 14.4 CM/S
RIGHT CCA PROX SYS: 120.3 CM/S
RIGHT ECA DIAS: 0 CM/S
RIGHT ECA SYS: 138 CM/S
RIGHT ICA DIST DIAS: 19.7 CM/S
RIGHT ICA DIST SYS: 63.6 CM/S
RIGHT ICA MID DIAS: 18.1 CM/S
RIGHT ICA MID SYS: 74.7 CM/S
RIGHT ICA PROX DIAS: 36 CM/S
RIGHT ICA PROX SYS: 160.4 CM/S
RIGHT ICA/CCA SYS: 1.9 NO UNITS
RIGHT POSTERIOR TIBIAL: 174 MMHG
RIGHT VERTEBRAL DIAS: 5.6 CM/S
RIGHT VERTEBRAL SYS: 35.8 CM/S
SAMPLES BEING HELD,HOLD: NORMAL
SAO2 % BLD: 96.5 % (ref 92–97)
SERVICE CMNT-IMP: ABNORMAL
SODIUM SERPL-SCNC: 137 MMOL/L (ref 136–145)
SP GR UR REFRACTOMETRY: >1.03
SPECIMEN TYPE: ABNORMAL
TSH SERPL DL<=0.05 MIU/L-ACNC: 1.27 UIU/ML (ref 0.36–3.74)
UA: UC IF INDICATED,UAUC: ABNORMAL
UFH PPP CHRO-ACNC: 0.25 IU/ML
UFH PPP CHRO-ACNC: 0.38 IU/ML
UFH PPP CHRO-ACNC: 0.41 IU/ML
UROBILINOGEN UR QL STRIP.AUTO: 0.2 EU/DL (ref 0.2–1)
VAS LEFT DORSALIS PEDIS BP: 159 MMHG
VAS RIGHT DORSALIS PEDIS BP: 159 MMHG
WBC # BLD AUTO: 9.8 K/UL (ref 4.1–11.1)
WBC URNS QL MICRO: ABNORMAL /HPF (ref 0–4)

## 2023-04-01 PROCEDURE — 74011250637 HC RX REV CODE- 250/637: Performed by: HOSPITALIST

## 2023-04-01 PROCEDURE — 93306 TTE W/DOPPLER COMPLETE: CPT

## 2023-04-01 PROCEDURE — 74011250636 HC RX REV CODE- 250/636

## 2023-04-01 PROCEDURE — 80048 BASIC METABOLIC PNL TOTAL CA: CPT

## 2023-04-01 PROCEDURE — 85520 HEPARIN ASSAY: CPT

## 2023-04-01 PROCEDURE — 82803 BLOOD GASES ANY COMBINATION: CPT

## 2023-04-01 PROCEDURE — 65660000001 HC RM ICU INTERMED STEPDOWN

## 2023-04-01 PROCEDURE — 99233 SBSQ HOSP IP/OBS HIGH 50: CPT | Performed by: SPECIALIST

## 2023-04-01 PROCEDURE — 71046 X-RAY EXAM CHEST 2 VIEWS: CPT

## 2023-04-01 PROCEDURE — 36415 COLL VENOUS BLD VENIPUNCTURE: CPT

## 2023-04-01 PROCEDURE — 74011636637 HC RX REV CODE- 636/637: Performed by: HOSPITALIST

## 2023-04-01 PROCEDURE — 74011000250 HC RX REV CODE- 250: Performed by: HOSPITALIST

## 2023-04-01 PROCEDURE — 36600 WITHDRAWAL OF ARTERIAL BLOOD: CPT

## 2023-04-01 PROCEDURE — 84443 ASSAY THYROID STIM HORMONE: CPT

## 2023-04-01 PROCEDURE — 74011250637 HC RX REV CODE- 250/637: Performed by: FAMILY MEDICINE

## 2023-04-01 PROCEDURE — 74011636637 HC RX REV CODE- 636/637: Performed by: FAMILY MEDICINE

## 2023-04-01 PROCEDURE — 93306 TTE W/DOPPLER COMPLETE: CPT | Performed by: SPECIALIST

## 2023-04-01 PROCEDURE — 85027 COMPLETE CBC AUTOMATED: CPT

## 2023-04-01 PROCEDURE — 74011250636 HC RX REV CODE- 250/636: Performed by: HOSPITALIST

## 2023-04-01 PROCEDURE — 82962 GLUCOSE BLOOD TEST: CPT

## 2023-04-01 PROCEDURE — 74011636637 HC RX REV CODE- 636/637: Performed by: CLINICAL NURSE SPECIALIST

## 2023-04-01 PROCEDURE — 74011250637 HC RX REV CODE- 250/637: Performed by: NURSE PRACTITIONER

## 2023-04-01 RX ORDER — CARVEDILOL 6.25 MG/1
6.25 TABLET ORAL 2 TIMES DAILY WITH MEALS
Status: DISCONTINUED | OUTPATIENT
Start: 2023-04-01 | End: 2023-04-03

## 2023-04-01 RX ORDER — AMLODIPINE BESYLATE 5 MG/1
2.5 TABLET ORAL DAILY
Status: DISCONTINUED | OUTPATIENT
Start: 2023-04-01 | End: 2023-04-03

## 2023-04-01 RX ORDER — PANTOPRAZOLE SODIUM 40 MG/1
40 TABLET, DELAYED RELEASE ORAL
Status: DISCONTINUED | OUTPATIENT
Start: 2023-04-02 | End: 2023-04-03

## 2023-04-01 RX ORDER — HEPARIN SODIUM 1000 [USP'U]/ML
2000 INJECTION, SOLUTION INTRAVENOUS; SUBCUTANEOUS ONCE
Status: COMPLETED | OUTPATIENT
Start: 2023-04-01 | End: 2023-04-01

## 2023-04-01 RX ADMIN — GABAPENTIN 600 MG: 300 CAPSULE ORAL at 17:17

## 2023-04-01 RX ADMIN — ASPIRIN 81 MG CHEWABLE TABLET 81 MG: 81 TABLET CHEWABLE at 08:39

## 2023-04-01 RX ADMIN — INSULIN GLARGINE 30 UNITS: 100 INJECTION, SOLUTION SUBCUTANEOUS at 21:10

## 2023-04-01 RX ADMIN — ATORVASTATIN CALCIUM 80 MG: 40 TABLET, FILM COATED ORAL at 21:10

## 2023-04-01 RX ADMIN — CARVEDILOL 6.25 MG: 6.25 TABLET, FILM COATED ORAL at 17:17

## 2023-04-01 RX ADMIN — Medication 3 UNITS: at 17:17

## 2023-04-01 RX ADMIN — CYANOCOBALAMIN TAB 500 MCG 1000 MCG: 500 TAB at 08:39

## 2023-04-01 RX ADMIN — Medication 3 UNITS: at 21:18

## 2023-04-01 RX ADMIN — Medication 2 UNITS: at 08:39

## 2023-04-01 RX ADMIN — MUPIROCIN: 20 OINTMENT TOPICAL at 08:48

## 2023-04-01 RX ADMIN — DIPHENHYDRAMINE HYDROCHLORIDE 25 MG: 50 INJECTION, SOLUTION INTRAMUSCULAR; INTRAVENOUS at 06:22

## 2023-04-01 RX ADMIN — MUPIROCIN: 20 OINTMENT TOPICAL at 21:11

## 2023-04-01 RX ADMIN — METOPROLOL SUCCINATE 25 MG: 25 TABLET, EXTENDED RELEASE ORAL at 08:39

## 2023-04-01 RX ADMIN — GABAPENTIN 600 MG: 300 CAPSULE ORAL at 08:39

## 2023-04-01 RX ADMIN — CHLORHEXIDINE GLUCONATE 15 ML: 1.2 RINSE ORAL at 08:48

## 2023-04-01 RX ADMIN — PREDNISONE 20 MG: 20 TABLET ORAL at 08:39

## 2023-04-01 RX ADMIN — Medication 1 TABLET: at 08:39

## 2023-04-01 RX ADMIN — Medication 3 UNITS: at 11:35

## 2023-04-01 RX ADMIN — CETIRIZINE HYDROCHLORIDE 10 MG: 10 TABLET, FILM COATED ORAL at 08:39

## 2023-04-01 RX ADMIN — SODIUM CHLORIDE, PRESERVATIVE FREE 10 ML: 5 INJECTION INTRAVENOUS at 13:28

## 2023-04-01 RX ADMIN — HEPARIN SODIUM 2000 UNITS: 1000 INJECTION INTRAVENOUS; SUBCUTANEOUS at 05:29

## 2023-04-01 RX ADMIN — AMLODIPINE BESYLATE 2.5 MG: 5 TABLET ORAL at 11:35

## 2023-04-01 RX ADMIN — CARVEDILOL 6.25 MG: 6.25 TABLET, FILM COATED ORAL at 11:35

## 2023-04-01 RX ADMIN — FAMOTIDINE 20 MG: 10 INJECTION INTRAVENOUS at 21:10

## 2023-04-01 RX ADMIN — HEPARIN SODIUM 11 UNITS/KG/HR: 10000 INJECTION, SOLUTION INTRAVENOUS at 15:39

## 2023-04-01 RX ADMIN — SODIUM CHLORIDE, PRESERVATIVE FREE 10 ML: 5 INJECTION INTRAVENOUS at 08:50

## 2023-04-01 RX ADMIN — FAMOTIDINE 20 MG: 10 INJECTION INTRAVENOUS at 08:38

## 2023-04-01 RX ADMIN — SODIUM CHLORIDE, PRESERVATIVE FREE 10 ML: 5 INJECTION INTRAVENOUS at 21:12

## 2023-04-01 RX ADMIN — CHLORHEXIDINE GLUCONATE 15 ML: 1.2 RINSE ORAL at 21:11

## 2023-04-01 NOTE — PROGRESS NOTES
0730: Bedside shift change report given to Yasemin Solis RN (oncoming nurse) by Eirc Rouse RN (offgoing nurse). Report included the following information SBAR, MAR, and Recent Results. 2000: Bedside shift change report given to FLOYD Bolanos (oncoming nurse) by Yasemin Solis RN (offgoing nurse). Report included the following information SBAR, MAR, and Recent Results.

## 2023-04-01 NOTE — CONSULTS
[facility]                    Cardiology Care Note     []Initial Encounter     [x]Follow-up    Patient Name: Lucero Hinson - PSQ:[id number] - XYW:[id number]  Primary Cardiologist: [facility] Phys[facility]: Carola Ballesteros MD  Consulting Cardiologist: [facility] Phys[facility]: Paige Hector MD     Reason for encounter: elevated troponin. Cath showed severe 3 vessel disease, waiting for CABG on Monday. No chest pain. General PE  Gen:  NAD  Mental Status - Alert. General Appearance - Not in acute distress. HEENT:  PERRL, no carotid bruits or JVD  Chest and Lung Exam   Inspection: Accessory muscles - No use of accessory muscles in breathing. Auscultation:   Breath sounds: - Normal.   Cardiovascular   Inspection: Jugular vein - Bilateral - Inspection Normal.   Palpation/Percussion:   Apical Impulse: - Normal.   Auscultation: Rhythm - Regular. Heart Sounds - S1 WNL and S2 WNL. No S3 or S4. Murmurs & Other Heart Sounds: Auscultation of the heart reveals - No Murmurs. Peripheral Vascular   Upper Extremity: Inspection - Bilateral - No Cyanotic nailbeds or Digital clubbing. Lower Extremity:   Palpation: Edema - Bilateral - No edema. Abdomen:   Soft, non-tender, bowel sounds are active. Neuro: A&O times 3, CN and motor grossly WNL      HPI:   Lucero Hinson is a 74 y.o. male with PMH significant for HTN,  CAD s/p stent (1/2020 PCI/stent to OM2, additional disease includes RPDA 45-50%, RPLS 60% and prox LCX 40% disease), DM type II, prostate CA s/p surgery 2019, , venous insufficiency,back surgery in 2018/diskectomy,  former tobacco use. Pt presented to [place] ER with chief c/o rash, subjective fever, chills. He also had SOB and a brief episode of left sided chest discomfort. In ER, he was noted to have elevated troponin of 106 which trended to 483.   He was given IV steroid, benadryl and ASA and was started on ACS heparin. 02 sats were 90% on RA and he was placed on O2, His BP on presentation was 165/101 and he was in sinus tachycardia on presenting EKG. EKG showed sinus tachycardia, repeat NSR with RBBB, q waves inferiorly. Denies using any new soaps, detergent, no new meds. SH: former smoker. 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 FH: dad has CAD  Subjective:  Denies any current chest pain or SOB but remains on O2. Assessment and Plan      Elevated troponin: 106-483. Trending up. EKG NSR, q waves inferiorly (also noted q waves on prior EKGlead II, AVG from 2020. He had one brief episode of mild chest pain PTA but No current chest pain. HS troponin elevation could be due to demand. Repeat HS troponin now. Noted, on ACS heparin, BB, ASA, and statin. Further plan after repeat troponin. Unable to do lexiscan stress test today due to nuclear med schedule. 02 sats were 90% on RA on presentation- will check Ddimer. Keep NPO for now in case LHC needed. History of CAD  S/p PCI/VAL to OM3. Residual disease as above. Continue ASA, statin, BB    3. HTN:  BP labile. Currently elevated. Continue toprol XL. Was on Losartan 25 mg PTA. 4.  History of venous insufficiency:    Had NL EF on echo 5/2021.     5. Elevated creatinine:   1.36.  was 1.33 in 2021. ?CKD. Recheck in a.m. Receiving IVF. 6. DM type II: A1C 7.4  Management per primary team.      For CABG Monday.         ____________________________________________________________    Cardiac testing  02/05/21    ECHO ADULT COMPLETE 02/05/2021 2/5/2021    Interpretation Summary  · LV: Estimated LVEF is 55 - 60%. Biplane method used to measure ejection fraction. Normal cavity size and systolic function (ejection fraction normal). Mild concentric hypertrophy.  Wall motion: normal.    Signed by: Josafat Whelan MD on 2/5/2021  1:10 PM              Most recent HS troponins:  Recent Labs     03/31/23  1216 03/31/23  0520 03/31/23  0233   TROPHS 1,093* 483* 106* ECG: Media Information  Document In    [facility], [facility]       Review of Systems:    [x]All other systems reviewed and all negative except as written in HPI    [] Patient unable to provide secondary to condition    Past Medical History:   Diagnosis Date    Arthritis     BACK    BPH (benign prostatic hyperplasia) 2013    CAD (coronary artery disease)     Cancer ([address] 2015    SKIN - HEAD AND FACE    Cancer ([facility] 01/2019    PROSTATE    Chronic pain     BACK    Chronic pelvic pain in male     sensitive to touch on the left side down to the left side of the penis    Collagenous colitis 2011    Diabetes mellitus type 2, controlled, with complications ([facility] 1922    Diabetic neuropathy ([facility] 2006    Diverticulosis     ED (erectile dysfunction)     Dr. Ernestine Cadena    GERD (gastroesophageal reflux disease) 1990    Gout     1 episode    Hypertension 1970    Ill-defined condition     BILATERAL PERIPHERAL NEUROPATHY BOTH LEGS    Low serum testosterone level     Dr. Amarilis Martino    Osteopenia     Dr. Amarilis Martino    Psoriasis     Sleep apnea     USES CPAP    Sun-damaged skin      Past Surgical History:   Procedure Laterality Date    COLONOSCOPY N/A 9/21/2021    COLONOSCOPY   :- performed by George Hart MD at [facility]    EMG TWO EXTREMITIES LOWER  8/24/2013         ENDOSCOPY, COLON, DIAGNOSTIC      HX COLONOSCOPY      HX HEENT  2017    WISDOM TEETH X4    HX HERNIA REPAIR      x2 bilateral inguinal    HX ORTHOPAEDIC  08/2018    L4-5, L5-S1 DISKECTOMY    HX OTHER SURGICAL  2015,2017    MOHS    HX PROSTATE SURGERY  04/02/2019    removed    NCV SENSORY OR MIXED  8/24/2013         HI UNLISTED PROCEDURE CARDIAC SURGERY  12/24/2019    1 stent     Social Hx:  reports that he quit smoking about 42 years ago. His smoking use included cigarettes. He has a 12.00 pack-year smoking history. He has never used smokeless tobacco. He reports current alcohol use.  He reports that he does not use drugs. Family Hx: family history includes Cancer in his mother; Diabetes in his father, paternal grandmother, and another family member; Heart Disease (age of onset: 62) in his father; Hypertension in his mother and sister; Parkinson's Disease in his mother; Stroke in his father. Allergies   Allergen Reactions    Pcn [Penicillins] Other (comments)     Infancy was told he had a rash          OBJECTIVE:  Wt Readings from Last 3 Encounters:   03/31/23 226 lb 13.7 oz (102.9 kg)   12/07/22 219 lb 3.2 oz (99.4 kg)   08/22/22 230 lb 9.6 oz (104.6 kg)       Intake/Output Summary (Last 24 hours) at 4/1/2023 1138  Last data filed at 4/1/2023 1131  Gross per 24 hour   Intake 783.34 ml   Output 600 ml   Net 183.34 ml       Physical Exam:    Vitals:   Vitals:    04/01/23 0656 04/01/23 0800 04/01/23 0830 04/01/23 1131   BP: (!) 153/70 (!) 170/82 (!) 170/82 135/74   Pulse: 76 83 78 76   Resp: 28 20 17 19   Temp: 98.3 °F (36.8 °C)  98.3 °F (36.8 °C)    SpO2:    95%   Weight:         Telemetry: NSR    Gen: Well-developed, , in no acute distress  Neck: Supple, No JVD,   Resp: No accessory muscle use, Clear breath sounds, No rales or rhonchi  Card: Regular Rate,Rythm, Normal S1, S2, No murmurs, rubs or gallop. No thrills. Abd:   Soft, non-tender, non-distended, BS+   MSK: No cyanosis  Skin: No rashes    Neuro: Moving all four extremities, follows commands appropriately  Psych: Good insight, oriented to person, place, alert, Nml Affect  LE: No edema    Data Review:     Radiology:   XR Results (most recent):  Results from [place] encounter on 03/31/23    XR CHEST PA LAT    Narrative  EXAM: XR CHEST PA LAT    INDICATION: Preop CABG    COMPARISON: 3/31/2023    TECHNIQUE: PA and lateral chest 2 views    FINDINGS: The cardiac size is within normal limits. The pulmonary vasculature is  within normal limits. The lungs and pleural spaces are clear. The visualized bones and upper abdomen  are age-appropriate.  Mild degenerative changes in the thoracic spine. Impression  No acute cardiopulmonary process.       Recent Labs     04/01/23 0243 03/31/23 0245 03/31/23 0233     --  135*   K 4.0  --  3.8     --  98   CO2 24  --  23   BUN 19  --  23*   CREA 1.05  --  1.36*   *  --  209*   PHOS  --  3.6  --    CA 9.5  --  9.2       Recent Labs     04/01/23 0243 03/31/23 0233   WBC 9.8 10.4   HGB 14.2 15.6   HCT 42.9 46.0    280       Recent Labs     03/31/23 0245 03/31/23 0233   PTP 9.9  --    INR 1.0  --    AP  --  68       Recent Labs     03/31/23 0245   CHOL 121   LDLC 13.2         Current meds:    Current Facility-Administered Medications:     carvediloL (COREG) tablet 6.25 mg, 6.25 mg, Oral, BID WITH MEALS, Forrestine Diamond STRINGER NP, 6.25 mg at 04/01/23 1135    amLODIPine (NORVASC) tablet 2.5 mg, 2.5 mg, Oral, DAILY, Tomytine Diamond D, NP, 2.5 mg at 04/01/23 1135    heparin 25,000 units in D5W 250 ml infusion, 9-25 Units/kg/hr, IntraVENous, TITRATE, Timur Strauss NP, Last Rate: 11.3 mL/hr at 04/01/23 0506, 11 Units/kg/hr at 04/01/23 0506    insulin lispro (HUMALOG) injection, , SubCUTAneous, AC&HS, Ana Paula Saxena MD, 3 Units at 04/01/23 1135    acetaminophen (TYLENOL) tablet 650 mg, 650 mg, Oral, Q6H PRN, Dwain Vazquez MD    gabapentin (NEURONTIN) capsule 600 mg, 600 mg, Oral, BID, Sachin Vazquez MD, 600 mg at 04/01/23 6032    cyanocobalamin (VITAMIN B12) tablet 1,000 mcg, 1,000 mcg, Oral, DAILY, Dwain Vazquez MD, 1,000 mcg at 04/01/23 5293    calcium-vitamin D (OS-RODOLFO +D3) 500 mg-200 unit per tablet 1 Tablet, 1 Tablet, Oral, DAILY, [place], Sachin BONILLA MD, 1 Tablet at 04/01/23 0839    sodium chloride (NS) flush 5-40 mL, 5-40 mL, IntraVENous, Q8H, Karl Ash MD, 10 mL at 04/01/23 0850    sodium chloride (NS) flush 5-40 mL, 5-40 mL, IntraVENous, PRN, MILA Ash MD    nitroglycerin (NITROSTAT) tablet 0.4 mg, 0.4 mg, SubLINGual, Q5MIN PRN, TRAM Ash MD    glucose chewable tablet 16 g, 4 Tablet, Oral, PRN, Nilsa JVLTABQ MD STUART    glucagon (GLUCAGEN) injection 1 mg, 1 mg, IntraMUSCular, PRN, Nilsa RTTLKFI MD STUART    dextrose 10 % infusion 0-250 mL, 0-250 mL, IntraVENous, PRN, Nilsa WPJONAS DAVIDSON MD    aspirin chewable tablet 81 mg, 81 mg, Oral, DAILY, Nilsa ANGGXVW MD STUART, 81 mg at 04/01/23 0839    atorvastatin (LIPITOR) tablet 80 mg, 80 mg, Oral, QHS, Nilsa PBJVYHZ MD STUART, 80 mg at 03/31/23 2145    dextrose 10 % infusion 0-250 mL, 0-250 mL, IntraVENous, PRN, Rema Phillips, CNS    famotidine (PF) (PEPCID) 20 mg in 0.9% sodium chloride 10 mL injection, 20 mg, IntraVENous, Q12H, MARY Ash MD, 20 mg at 04/01/23 0838    diphenhydrAMINE (BENADRYL) injection 25 mg, 25 mg, IntraVENous, Q6H PRN, Nilsa GWZWNZF MD STUART, 25 mg at 04/01/23 0622    insulin glargine (LANTUS) injection 30 Units, 30 Units, SubCUTAneous, QHS, Rema Phillips CNS, 30 Units at 03/31/23 2154    mupirocin (BACTROBAN) 2 % ointment, , Both Nostrils, Q12H, Bernard Tucker, SHILO, Given at 04/01/23 0848    chlorhexidine (PERIDEX) 0.12 % mouthwash 15 mL, 15 mL, Oral, Q12H, Heidi Dickens NP, 15 mL at 04/01/23 0848    cetirizine (ZYRTEC) tablet 10 mg, 10 mg, Oral, DAILY, ALANIS Ash MD, 10 mg at 04/01/23 0839    predniSONE (DELTASONE) tablet 20 mg, 20 mg, Oral, DAILY WITH BREAKFAST, COLTEN Ash MD, 20 mg at 04/01/23 [address], MD    [facility]  Call center: (l) [phone]  (P) [phone]      CC: Hesham Easton MD

## 2023-04-01 NOTE — PROGRESS NOTES
[facility]  Hospitalist Group                                                                                          Hospitalist Progress Note  Mary Ann Sigala MD  Answering service: [phone] from in house phone        Date of Service:  2023  NAME:  Zen Rivas  :  1950  MRN:  [id number]       Admission Summary:   Zen Rivas is a 74 y.o. male with a significant PMH of CAD s/p stent placement, type 2 diabetes with neuropathy, sleep apnea on BiPAP, GERD presented to the [place] ED with skin rash on the back of the neck, chills, shortness of breath and raspy voice. He had taken Benadryl and Allegra at home with some improvement. No recently started new medications, not on ACEI. Upon initial evaluation in the ED he was tachycardic  SPO2 90% on room air, was afebrile. Labs significant for sodium 135, creatinine 1.36, initial troponin 106 which went up to 483 on repeat. Chest x-ray was negative for any acute process. In the ED treatment in the ED: Aspirin 325 mg x 1, Benadryl 50 mg IV x1, famotidine 20 mg IV x1, Solu-Medrol 125 mg IV x1.  ED had consulted and discussed with cardiology. IV heparin drip for non-STEMI. Patient's transferred to [facility] for further management       Interval history / Subjective:   Patient denies any chest pain or SOB today. Reports he feels well and again reports how this all started because he broke out in hives and went for evaluation to his PCP . Assessment & Plan:         Unspecified allergic reaction with possible angioedema.   -  Patient presented with skin rash and urticaria on the back with shortness of breath and change in voice.   Denied difficulty swallowing or painful swallowing.  -Status post IV Benadryl, IV famotidine and IV Solu-Medrol  -Continue antihistamines, short course steroid  - Symptoms improving, no significant tongue/mouth swelling      Elevated troponin   Multivessel coronary artery disease   -A few days of chest tightness prior to admission; denies CP or SOB at this time   - Underwent LHC on 3/31 with Dr Chery Ochoa, found to have multi-vessel disease, recommended for CABG   - EF 55-60%  - Evaluated by CT surgery, will plan for CABG on Monday   - Continue heparin, will hold at midnight Monday morning   - Cardiology also following, appreciate recomemendations      Tachycardia and mild hypoxia on presentation.    - CXR negative. Patient with BiPAP for FELICITY. - D-dimer elevated, duplex negative for DVT  - Discussed with CT surgeon, will defer CTA given recent contrast load for cath; will order NM VQ scan though may not be completed prior to CABG on Monday morning   - Continue heparin drip      DM2: Accu-Cheks, Humalog sliding scale with hypoglycemic protocol     CKD 3, creatinine was 1.33 about a year ago, 1.36 on presentation, improved today. Continue to monitor and avoid nephrotoxic agents. FELICITY: He is wife to bring home CPAP machine.           Code status: Full   Prophylaxis: Heparin gtt  Care Plan discussed with: patient, nursing, specialist    Anticipated Disposition: >48h   Inpatient  Cardiac monitoring: Telemetry     Hospital Problems  Date Reviewed: 12/7/2022            Codes Class Noted POA    Allergic reaction, initial encounter ICD-10-CM: T78.40XA  ICD-9-CM: 995.3  3/31/2023 Unknown        Tachycardia ICD-10-CM: R00.0  ICD-9-CM: 785.0  3/31/2023 Unknown        NSTEMI (non-ST elevated myocardial infarction) [facility]) ICD-10-CM: I21.4  ICD-9-CM: 410.70  3/31/2023 Unknown           Social Determinants of Health     Tobacco Use: Medium Risk    Smoking Tobacco Use: Former    Smokeless Tobacco Use: Never    Passive Exposure: Not on file   Alcohol Use: Not on file   Financial Resource Strain: Not on file   Food Insecurity: Not on file   Transportation Needs: Not on file   Physical Activity: Not on file   Stress: Not on file   Social Connections: Not on file   Intimate Partner Violence: Not on file Depression: Not at risk    PHQ-2 Score: 0   Housing Stability: Not on file       Review of Systems:   A comprehensive review of systems was negative except for that written in the HPI. Vital Signs:    Last 24hrs VS reviewed since prior progress note. Most recent are:  Visit Vitals  /64   Pulse 80   Temp 98.4 °F (36.9 °C)   Resp 18   Ht 5' 10\" (1.778 m)   Wt 102.5 kg (226 lb)   SpO2 95%   BMI 32.43 kg/m²         Intake/Output Summary (Last 24 hours) at 4/1/2023 1338  Last data filed at 4/1/2023 1131  Gross per 24 hour   Intake 783.34 ml   Output 600 ml   Net 183.34 ml        Physical Examination:     I had a face to face encounter with this patient and independently examined them on 4/1/2023 as outlined below:          General : alert x 3, awake, no acute distress,   HEENT: PEERL, EOMI, moist mucus membrane, dried blister on lip, no tongue or lip swelling    Neck: supple, no JVD, no meningeal signs  Chest: Clear to auscultation bilaterally   CVS: S1 S2 heard, Capillary refill less than 2 seconds  Abd: soft, non tender, non distended, BS physiological,   Ext: no clubbing, no cyanosis, no edema, brisk 2+ DP pulses  Neuro/Psych: pleasant mood and affect, CN 2-12 grossly intact, sensory grossly within normal limit,   Skin: warm, faint urticaria on arms/chest     Data Review:    Review and/or order of clinical lab test  Review and/or order of tests in the radiology section of CPT  Review and/or order of tests in the medicine section of CPT      I have personally and independently reviewed all pertinent labs, diagnostic studies, imaging, and have provided independent interpretation of the same.      Labs:     Recent Labs     04/01/23 0243 03/31/23 0233   WBC 9.8 10.4   HGB 14.2 15.6   HCT 42.9 46.0    280     Recent Labs     04/01/23 0243 03/31/23 0245 03/31/23 0233     --  135*   K 4.0  --  3.8     --  98   CO2 24  --  23   BUN 19  --  23*   CREA 1.05  --  1.36*   *  --  209* CA 9.5  --  9.2   MG  --  1.8  --    PHOS  --  3.6  --      Recent Labs     03/31/23  0233   ALT 37   AP 68   TBILI 0.4   TP 7.6   ALB 3.8   GLOB 3.8     Recent Labs     03/31/23  0719 03/31/23  0245   INR  --  1.0   PTP  --  9.9   APTT 24.3 23.6      No results for input(s): FE, TIBC, PSAT, FERR in the last 72 hours. Lab Results   Component Value Date/Time    Folate 12.0 12/20/2012 03:59 PM      No results for input(s): PH, PCO2, PO2 in the last 72 hours. No results for input(s): CPK, CKNDX, TROIQ in the last 72 hours. No lab exists for component: CPKMB  Lab Results   Component Value Date/Time    Cholesterol, total 121 03/31/2023 02:45 AM    HDL Cholesterol 34 03/31/2023 02:45 AM    LDL, calculated 13.2 03/31/2023 02:45 AM    Triglyceride 369 (H) 03/31/2023 02:45 AM    CHOL/HDL Ratio 3.6 03/31/2023 02:45 AM     Lab Results   Component Value Date/Time    Glucose (POC) 237 (H) 04/01/2023 11:02 AM    Glucose (POC) 193 (H) 04/01/2023 07:49 AM    Glucose (POC) 267 (H) 03/31/2023 09:48 PM    Glucose (POC) 190 (H) 03/31/2023 06:26 PM    Glucose (POC) 256 (H) 03/31/2023 12:35 PM     Lab Results   Component Value Date/Time    Color YELLOW/STRAW 03/31/2023 11:13 PM    Appearance CLEAR 03/31/2023 11:13 PM    Specific gravity >1.030 03/31/2023 11:13 PM    Specific gravity 1.025 04/03/2019 11:41 PM    pH (UA) 6.0 03/31/2023 11:13 PM    Protein Negative 03/31/2023 11:13 PM    Glucose 250 (A) 03/31/2023 11:13 PM    Ketone 15 (A) 03/31/2023 11:13 PM    Bilirubin Negative 03/31/2023 11:13 PM    Urobilinogen 0.2 03/31/2023 11:13 PM    Nitrites Negative 03/31/2023 11:13 PM    Leukocyte Esterase Negative 03/31/2023 11:13 PM    Epithelial cells FEW 03/31/2023 11:13 PM    Bacteria Negative 03/31/2023 11:13 PM    WBC 0-4 03/31/2023 11:13 PM    RBC 0-5 03/31/2023 11:13 PM       Notes reviewed from all clinical/nonclinical/nursing services involved in patient's clinical care.  Care coordination discussions were held with appropriate clinical/nonclinical/ nursing providers based on care coordination needs. Patients current active Medications were reviewed, considered, added and adjusted based on the clinical condition today. Home Medications were reconciled to the best of my ability given all available resources at the time of admission. Route is PO if not otherwise noted.       Admission Status:[id number]:::1}      Medications Reviewed:     Current Facility-Administered Medications   Medication Dose Route Frequency    carvediloL (COREG) tablet 6.25 mg  6.25 mg Oral BID WITH MEALS    amLODIPine (NORVASC) tablet 2.5 mg  2.5 mg Oral DAILY    heparin 25,000 units in D5W 250 ml infusion  9-25 Units/kg/hr IntraVENous TITRATE    insulin lispro (HUMALOG) injection   SubCUTAneous AC&HS    acetaminophen (TYLENOL) tablet 650 mg  650 mg Oral Q6H PRN    gabapentin (NEURONTIN) capsule 600 mg  600 mg Oral BID    cyanocobalamin (VITAMIN B12) tablet 1,000 mcg  1,000 mcg Oral DAILY    calcium-vitamin D (OS-ROODLFO +D3) 500 mg-200 unit per tablet 1 Tablet  1 Tablet Oral DAILY    sodium chloride (NS) flush 5-40 mL  5-40 mL IntraVENous Q8H    sodium chloride (NS) flush 5-40 mL  5-40 mL IntraVENous PRN    nitroglycerin (NITROSTAT) tablet 0.4 mg  0.4 mg SubLINGual Q5MIN PRN    glucose chewable tablet 16 g  4 Tablet Oral PRN    glucagon (GLUCAGEN) injection 1 mg  1 mg IntraMUSCular PRN    dextrose 10 % infusion 0-250 mL  0-250 mL IntraVENous PRN    aspirin chewable tablet 81 mg  81 mg Oral DAILY    atorvastatin (LIPITOR) tablet 80 mg  80 mg Oral QHS    dextrose 10 % infusion 0-250 mL  0-250 mL IntraVENous PRN    famotidine (PF) (PEPCID) 20 mg in 0.9% sodium chloride 10 mL injection  20 mg IntraVENous Q12H    diphenhydrAMINE (BENADRYL) injection 25 mg  25 mg IntraVENous Q6H PRN    insulin glargine (LANTUS) injection 30 Units  30 Units SubCUTAneous QHS    mupirocin (BACTROBAN) 2 % ointment   Both Nostrils Q12H    chlorhexidine (PERIDEX) 0.12 % mouthwash 15 mL  15 mL Oral Q12H    cetirizine (ZYRTEC) tablet 10 mg  10 mg Oral DAILY    predniSONE (DELTASONE) tablet 20 mg  20 mg Oral DAILY WITH BREAKFAST     ______________________________________________________________________  EXPECTED LENGTH OF STAY: - - -  ACTUAL LENGTH OF STAY:          1                 Barbie Esteban MD

## 2023-04-02 LAB
ANION GAP SERPL CALC-SCNC: 6 MMOL/L (ref 5–15)
BASOPHILS # BLD: 0 K/UL (ref 0–0.1)
BASOPHILS NFR BLD: 0 % (ref 0–1)
BUN SERPL-MCNC: 21 MG/DL (ref 6–20)
BUN/CREAT SERPL: 19 (ref 12–20)
CALCIUM SERPL-MCNC: 8.9 MG/DL (ref 8.5–10.1)
CHLORIDE SERPL-SCNC: 104 MMOL/L (ref 97–108)
CO2 SERPL-SCNC: 27 MMOL/L (ref 21–32)
CREAT SERPL-MCNC: 1.1 MG/DL (ref 0.7–1.3)
DIFFERENTIAL METHOD BLD: ABNORMAL
EOSINOPHIL # BLD: 0.1 K/UL (ref 0–0.4)
EOSINOPHIL NFR BLD: 2 % (ref 0–7)
ERYTHROCYTE [DISTWIDTH] IN BLOOD BY AUTOMATED COUNT: 13.6 % (ref 11.5–14.5)
GLUCOSE BLD STRIP.AUTO-MCNC: 177 MG/DL (ref 65–117)
GLUCOSE BLD STRIP.AUTO-MCNC: 255 MG/DL (ref 65–117)
GLUCOSE BLD STRIP.AUTO-MCNC: 281 MG/DL (ref 65–117)
GLUCOSE BLD STRIP.AUTO-MCNC: 286 MG/DL (ref 65–117)
GLUCOSE SERPL-MCNC: 209 MG/DL (ref 65–100)
HCT VFR BLD AUTO: 40.4 % (ref 36.6–50.3)
HGB BLD-MCNC: 13.5 G/DL (ref 12.1–17)
IMM GRANULOCYTES # BLD AUTO: 0 K/UL (ref 0–0.04)
IMM GRANULOCYTES NFR BLD AUTO: 1 % (ref 0–0.5)
LYMPHOCYTES # BLD: 1.9 K/UL (ref 0.8–3.5)
LYMPHOCYTES NFR BLD: 34 % (ref 12–49)
MAGNESIUM SERPL-MCNC: 2.3 MG/DL (ref 1.6–2.4)
MCH RBC QN AUTO: 31.3 PG (ref 26–34)
MCHC RBC AUTO-ENTMCNC: 33.4 G/DL (ref 30–36.5)
MCV RBC AUTO: 93.5 FL (ref 80–99)
MONOCYTES # BLD: 0.4 K/UL (ref 0–1)
MONOCYTES NFR BLD: 7 % (ref 5–13)
NEUTS SEG # BLD: 3.2 K/UL (ref 1.8–8)
NEUTS SEG NFR BLD: 56 % (ref 32–75)
NRBC # BLD: 0 K/UL (ref 0–0.01)
NRBC BLD-RTO: 0 PER 100 WBC
PLATELET # BLD AUTO: 235 K/UL (ref 150–400)
PMV BLD AUTO: 10.1 FL (ref 8.9–12.9)
POTASSIUM SERPL-SCNC: 3.6 MMOL/L (ref 3.5–5.1)
RBC # BLD AUTO: 4.32 M/UL (ref 4.1–5.7)
SERVICE CMNT-IMP: ABNORMAL
SODIUM SERPL-SCNC: 137 MMOL/L (ref 136–145)
UFH PPP CHRO-ACNC: 0.43 IU/ML
WBC # BLD AUTO: 5.6 K/UL (ref 4.1–11.1)

## 2023-04-02 PROCEDURE — 74011636637 HC RX REV CODE- 636/637: Performed by: FAMILY MEDICINE

## 2023-04-02 PROCEDURE — 80048 BASIC METABOLIC PNL TOTAL CA: CPT

## 2023-04-02 PROCEDURE — 74011250637 HC RX REV CODE- 250/637: Performed by: NURSE PRACTITIONER

## 2023-04-02 PROCEDURE — 82962 GLUCOSE BLOOD TEST: CPT

## 2023-04-02 PROCEDURE — 74011000250 HC RX REV CODE- 250: Performed by: HOSPITALIST

## 2023-04-02 PROCEDURE — 65660000001 HC RM ICU INTERMED STEPDOWN

## 2023-04-02 PROCEDURE — 74011250637 HC RX REV CODE- 250/637: Performed by: STUDENT IN AN ORGANIZED HEALTH CARE EDUCATION/TRAINING PROGRAM

## 2023-04-02 PROCEDURE — 74011250637 HC RX REV CODE- 250/637: Performed by: FAMILY MEDICINE

## 2023-04-02 PROCEDURE — 74011250637 HC RX REV CODE- 250/637: Performed by: HOSPITALIST

## 2023-04-02 PROCEDURE — 86923 COMPATIBILITY TEST ELECTRIC: CPT

## 2023-04-02 PROCEDURE — 74011636637 HC RX REV CODE- 636/637: Performed by: STUDENT IN AN ORGANIZED HEALTH CARE EDUCATION/TRAINING PROGRAM

## 2023-04-02 PROCEDURE — 99233 SBSQ HOSP IP/OBS HIGH 50: CPT | Performed by: SPECIALIST

## 2023-04-02 PROCEDURE — 74011250636 HC RX REV CODE- 250/636: Performed by: HOSPITALIST

## 2023-04-02 PROCEDURE — 83735 ASSAY OF MAGNESIUM: CPT

## 2023-04-02 PROCEDURE — 74011000250 HC RX REV CODE- 250: Performed by: NURSE PRACTITIONER

## 2023-04-02 PROCEDURE — 85025 COMPLETE CBC W/AUTO DIFF WBC: CPT

## 2023-04-02 PROCEDURE — 74011636637 HC RX REV CODE- 636/637: Performed by: HOSPITALIST

## 2023-04-02 PROCEDURE — 86900 BLOOD TYPING SEROLOGIC ABO: CPT

## 2023-04-02 PROCEDURE — 36415 COLL VENOUS BLD VENIPUNCTURE: CPT

## 2023-04-02 PROCEDURE — 85520 HEPARIN ASSAY: CPT

## 2023-04-02 PROCEDURE — 74011250636 HC RX REV CODE- 250/636

## 2023-04-02 RX ORDER — FAMOTIDINE 20 MG/1
20 TABLET, FILM COATED ORAL EVERY 12 HOURS
Status: DISCONTINUED | OUTPATIENT
Start: 2023-04-02 | End: 2023-04-03

## 2023-04-02 RX ORDER — AMOXICILLIN 250 MG
1 CAPSULE ORAL
Status: COMPLETED | OUTPATIENT
Start: 2023-04-02 | End: 2023-04-02

## 2023-04-02 RX ORDER — INSULIN GLARGINE 100 [IU]/ML
20 INJECTION, SOLUTION SUBCUTANEOUS DAILY
Status: DISCONTINUED | OUTPATIENT
Start: 2023-04-02 | End: 2023-04-03

## 2023-04-02 RX ADMIN — ASPIRIN 81 MG CHEWABLE TABLET 81 MG: 81 TABLET CHEWABLE at 08:17

## 2023-04-02 RX ADMIN — INSULIN GLARGINE 20 UNITS: 100 INJECTION, SOLUTION SUBCUTANEOUS at 09:14

## 2023-04-02 RX ADMIN — GABAPENTIN 600 MG: 300 CAPSULE ORAL at 08:16

## 2023-04-02 RX ADMIN — AMLODIPINE BESYLATE 2.5 MG: 5 TABLET ORAL at 08:17

## 2023-04-02 RX ADMIN — FAMOTIDINE 20 MG: 20 TABLET, FILM COATED ORAL at 21:36

## 2023-04-02 RX ADMIN — SODIUM CHLORIDE, PRESERVATIVE FREE 10 ML: 5 INJECTION INTRAVENOUS at 07:28

## 2023-04-02 RX ADMIN — HEPARIN SODIUM 11 UNITS/KG/HR: 10000 INJECTION, SOLUTION INTRAVENOUS at 18:20

## 2023-04-02 RX ADMIN — MUPIROCIN: 20 OINTMENT TOPICAL at 08:18

## 2023-04-02 RX ADMIN — CHLORHEXIDINE GLUCONATE 15 ML: 1.2 RINSE ORAL at 21:35

## 2023-04-02 RX ADMIN — CARVEDILOL 6.25 MG: 6.25 TABLET, FILM COATED ORAL at 07:27

## 2023-04-02 RX ADMIN — Medication 5 UNITS: at 22:03

## 2023-04-02 RX ADMIN — PREDNISONE 20 MG: 20 TABLET ORAL at 08:16

## 2023-04-02 RX ADMIN — SODIUM CHLORIDE, PRESERVATIVE FREE 10 ML: 5 INJECTION INTRAVENOUS at 21:37

## 2023-04-02 RX ADMIN — FAMOTIDINE 20 MG: 10 INJECTION INTRAVENOUS at 08:28

## 2023-04-02 RX ADMIN — SENNOSIDES AND DOCUSATE SODIUM 1 TABLET: 50; 8.6 TABLET ORAL at 09:14

## 2023-04-02 RX ADMIN — Medication 1 TABLET: at 09:00

## 2023-04-02 RX ADMIN — CYANOCOBALAMIN TAB 500 MCG 1000 MCG: 500 TAB at 08:16

## 2023-04-02 RX ADMIN — ATORVASTATIN CALCIUM 80 MG: 40 TABLET, FILM COATED ORAL at 21:36

## 2023-04-02 RX ADMIN — CHLORHEXIDINE GLUCONATE 15 ML: 1.2 RINSE ORAL at 08:18

## 2023-04-02 RX ADMIN — PANTOPRAZOLE SODIUM 40 MG: 40 TABLET, DELAYED RELEASE ORAL at 07:27

## 2023-04-02 RX ADMIN — MUPIROCIN: 20 OINTMENT TOPICAL at 21:35

## 2023-04-02 RX ADMIN — GABAPENTIN 600 MG: 300 CAPSULE ORAL at 17:27

## 2023-04-02 RX ADMIN — SODIUM CHLORIDE, PRESERVATIVE FREE 10 ML: 5 INJECTION INTRAVENOUS at 15:26

## 2023-04-02 RX ADMIN — Medication 5 UNITS: at 18:47

## 2023-04-02 RX ADMIN — CETIRIZINE HYDROCHLORIDE 10 MG: 10 TABLET, FILM COATED ORAL at 08:17

## 2023-04-02 RX ADMIN — Medication 5 UNITS: at 13:11

## 2023-04-02 RX ADMIN — Medication 2 UNITS: at 09:14

## 2023-04-02 RX ADMIN — CARVEDILOL 6.25 MG: 6.25 TABLET, FILM COATED ORAL at 17:27

## 2023-04-02 RX ADMIN — HEPARIN SODIUM 11 UNITS/KG/HR: 10000 INJECTION, SOLUTION INTRAVENOUS at 20:01

## 2023-04-02 NOTE — PROGRESS NOTES
Pt seen and examined    No complaints of cp     Vitals stable    Will hold hep and NPO at midnight    Dr. Mishra Kishore to see today for OR tomorrow

## 2023-04-02 NOTE — CONSULTS
[facility]                    Cardiology Care Note     []Initial Encounter     [x]Follow-up    Patient Name: Kelsie Centeno - KCW:[id number] - SFK:[id number]  Primary Cardiologist: [facility] Cardiology Physicians: Pb Schuster MD  Consulting Cardiologist: [facility] Cardiology Physicians: Adama Ramos MD     Reason for encounter: elevated troponin. Cath showed severe 3 vessel disease, waiting for CABG on Monday. No chest pain. General PE  Gen:  NAD  Mental Status - Alert. General Appearance - Not in acute distress. HEENT:  PERRL, no carotid bruits or JVD  Chest and Lung Exam   Inspection: Accessory muscles - No use of accessory muscles in breathing. Auscultation:   Breath sounds: - Normal.   Cardiovascular   Inspection: Jugular vein - Bilateral - Inspection Normal.   Palpation/Percussion:   Apical Impulse: - Normal.   Auscultation: Rhythm - Regular. Heart Sounds - S1 WNL and S2 WNL. No S3 or S4. Murmurs & Other Heart Sounds: Auscultation of the heart reveals - No Murmurs. Peripheral Vascular   Upper Extremity: Inspection - Bilateral - No Cyanotic nailbeds or Digital clubbing. Lower Extremity:   Palpation: Edema - Bilateral - No edema. Abdomen:   Soft, non-tender, bowel sounds are active. Neuro: A&O times 3, CN and motor grossly WNL      HPI:   Kelsie Centeno is a 74 y.o. male with PMH significant for HTN,  CAD s/p stent (1/2020 PCI/stent to OM2, additional disease includes RPDA 45-50%, RPLS 60% and prox LCX 40% disease), DM type II, prostate CA s/p surgery 2019, , venous insufficiency,back surgery in 2018/diskectomy,  former tobacco use. Pt presented to [place] ER with chief c/o rash, subjective fever, chills. He also had SOB and a brief episode of left sided chest discomfort. In ER, he was noted to have elevated troponin of 106 which trended to 483.   He was given IV steroid, benadryl and ASA and was started on ACS heparin. 02 sats were 90% on RA and he was placed on O2, His BP on presentation was 165/101 and he was in sinus tachycardia on presenting EKG. EKG showed sinus tachycardia, repeat NSR with RBBB, q waves inferiorly. Denies using any new soaps, detergent, no new meds. SH: former smoker. 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 FH: dad has CAD  Subjective:  Denies any current chest pain or SOB but remains on O2. Assessment and Plan      Elevated troponin: 106-483. Trending up. EKG NSR, q waves inferiorly (also noted q waves on prior EKGlead II, AVG from 2020. He had one brief episode of mild chest pain PTA but No current chest pain. HS troponin elevation could be due to demand. Repeat HS troponin now. Noted, on ACS heparin, BB, ASA, and statin. Further plan after repeat troponin. Unable to do lexiscan stress test today due to nuclear med schedule. 02 sats were 90% on RA on presentation- will check Ddimer. Keep NPO for now in case LHC needed. History of CAD  S/p PCI/VAL to OM3. Residual disease as above. Continue ASA, statin, BB    3. HTN:  BP labile. Currently elevated. Continue toprol XL. Was on Losartan 25 mg PTA. 4.  History of venous insufficiency:    Had NL EF on echo 5/2021.     5. Elevated creatinine:   1.36.  was 1.33 in 2021. ?CKD. Recheck in a.m. Receiving IVF. 6. DM type II: A1C 7.4  Management per primary team.      For CABG Monday. Spoke with patient and wife at bedside, cath findings reviewed. Will sign off.         ____________________________________________________________    Cardiac testing  02/05/21    ECHO ADULT COMPLETE 02/05/2021 2/5/2021    Interpretation Summary  · LV: Estimated LVEF is 55 - 60%. Biplane method used to measure ejection fraction. Normal cavity size and systolic function (ejection fraction normal). Mild concentric hypertrophy.  Wall motion: normal.    Signed by: Trinity Lizama MD on 2/5/2021  1:10 PM              Most recent HS troponins:  Recent Labs 03/31/23  1216 03/31/23  0520 03/31/23  0233   TROPHS 1,093* 483* 106*         ECG: Media Information  Document In    Source Information    [address], [address] [facility]       Review of Systems:    [x]All other systems reviewed and all negative except as written in HPI    [] Patient unable to provide secondary to condition    Past Medical History:   Diagnosis Date    Arthritis     BACK    BPH (benign prostatic hyperplasia) 2013    CAD (coronary artery disease)     Cancer ([address] 2015    SKIN - HEAD AND FACE    Cancer ([address] 01/2019    PROSTATE    Chronic pain     BACK    Chronic pelvic pain in male     sensitive to touch on the left side down to the left side of the penis    Collagenous colitis 2011    Diabetes mellitus type 2, controlled, with complications ([address] 0791    Diabetic neuropathy ([facility] 2006    Diverticulosis     ED (erectile dysfunction)     Dr. Yuliya Carney    GERD (gastroesophageal reflux disease) 1990    Gout     1 episode    Hypertension 1970    Ill-defined condition     BILATERAL PERIPHERAL NEUROPATHY BOTH LEGS    Low serum testosterone level     Dr. Fredy Albright    Osteopenia     Dr. Fredy Albright    Psoriasis     Sleep apnea     USES CPAP    Sun-damaged skin      Past Surgical History:   Procedure Laterality Date    COLONOSCOPY N/A 9/21/2021    COLONOSCOPY   :- performed by Heladio Roth MD at [facility] ENDOSCOPY    EMG TWO EXTREMITIES LOWER  8/24/2013         ENDOSCOPY, COLON, DIAGNOSTIC      HX COLONOSCOPY      HX HEENT  2017    WISDOM TEETH X4    HX HERNIA REPAIR      x2 bilateral inguinal    HX ORTHOPAEDIC  08/2018    L4-5, L5-S1 DISKECTOMY    HX OTHER SURGICAL  2015,2017    MOHS    HX PROSTATE SURGERY  04/02/2019    removed    NCV SENSORY OR MIXED  8/24/2013         [place] UNLISTED PROCEDURE CARDIAC SURGERY  12/24/2019    1 stent     Social Hx:  reports that he quit smoking about 42 years ago. His smoking use included cigarettes. He has a 12.00 pack-year smoking history.  He has never used smokeless tobacco. He reports current alcohol use. He reports that he does not use drugs. Family Hx: family history includes Cancer in his mother; Diabetes in his father, paternal grandmother, and another family member; Heart Disease (age of onset: 62) in his father; Hypertension in his mother and sister; Parkinson's Disease in his mother; Stroke in his father. Allergies   Allergen Reactions    Pcn [Penicillins] Other (comments)     Infancy was told he had a rash          OBJECTIVE:  Wt Readings from Last 3 Encounters:   04/02/23 226 lb 12.8 oz (102.9 kg)   12/07/22 219 lb 3.2 oz (99.4 kg)   08/22/22 230 lb 9.6 oz (104.6 kg)       Intake/Output Summary (Last 24 hours) at 4/2/2023 1300  Last data filed at 4/2/2023 1130  Gross per 24 hour   Intake 1521.01 ml   Output 800 ml   Net 721.01 ml         Physical Exam:    Vitals:   Vitals:    04/02/23 0817 04/02/23 1000 04/02/23 1130 04/02/23 1200   BP: 128/74  (!) 140/74    Pulse: 70 80 75 73   Resp:   15    Temp:   98.4 °F (36.9 °C)    SpO2:   93%    Weight:       Height:         Telemetry: NSR    Gen: Well-developed, , in no acute distress  Neck: Supple, No JVD,   Resp: No accessory muscle use, Clear breath sounds, No rales or rhonchi  Card: Regular Rate,Rythm, Normal S1, S2, No murmurs, rubs or gallop. No thrills. Abd:   Soft, non-tender, non-distended, BS+   MSK: No cyanosis  Skin: No rashes    Neuro: Moving all four extremities, follows commands appropriately  Psych: Good insight, oriented to person, place, alert, Nml Affect  LE: No edema    Data Review:     Radiology:   XR Results (most recent):  Results from [place] encounter on 03/31/23    XR CHEST PA LAT    Narrative  EXAM: XR CHEST PA LAT    INDICATION: Preop CABG    COMPARISON: 3/31/2023    TECHNIQUE: PA and lateral chest 2 views    FINDINGS: The cardiac size is within normal limits. The pulmonary vasculature is  within normal limits. The lungs and pleural spaces are clear.  The visualized bones and upper abdomen  are age-appropriate. Mild degenerative changes in the thoracic spine. Impression  No acute cardiopulmonary process.       Recent Labs     04/02/23 0510 04/01/23 0243 03/31/23 0245    137  --    K 3.6 4.0  --     103  --    CO2 27 24  --    BUN 21* 19  --    CREA 1.10 1.05  --    * 207*  --    PHOS  --   --  3.6   CA 8.9 9.5  --        Recent Labs     04/02/23 0510 04/01/23 0243   WBC 5.6 9.8   HGB 13.5 14.2   HCT 40.4 42.9    267       Recent Labs     03/31/23 0245 03/31/23 0233   PTP 9.9  --    INR 1.0  --    AP  --  68       Recent Labs     03/31/23 0245   CHOL 121   LDLC 13.2           Current meds:    Current Facility-Administered Medications:     famotidine (PEPCID) tablet 20 mg, 20 mg, Oral, Q12H, Keila Andrews MD    insulin glargine (LANTUS) injection 20 Units, 20 Units, SubCUTAneous, DAILY, Keila Andrews MD, 20 Units at 04/02/23 0914    carvediloL (COREG) tablet 6.25 mg, 6.25 mg, Oral, BID WITH MEALS, Chao STRINGER NP, 6.25 mg at 04/02/23 0727    amLODIPine (NORVASC) tablet 2.5 mg, 2.5 mg, Oral, DAILY, Choa STRINGER NP, 2.5 mg at 04/02/23 0817    pantoprazole (PROTONIX) tablet 40 mg, 40 mg, Oral, ACB, Keila Andrews MD, 40 mg at 04/02/23 0727    heparin 25,000 units in D5W 250 ml infusion, 9-25 Units/kg/hr, IntraVENous, TITRATE, Sally Juarez NP, Last Rate: 11.3 mL/hr at 04/02/23 0748, 11 Units/kg/hr at 04/02/23 0748    insulin lispro (HUMALOG) injection, , SubCUTAneous, AC&HS, Deysi Elliott MD, 2 Units at 04/02/23 7737    acetaminophen (TYLENOL) tablet 650 mg, 650 mg, Oral, Q6H PRN, Donta Vazquez MD    gabapentin (NEURONTIN) capsule 600 mg, 600 mg, Oral, BID, Donta Vazquez MD, 600 mg at 04/02/23 0816    cyanocobalamin (VITAMIN B12) tablet 1,000 mcg, 1,000 mcg, Oral, DAILY, Donta Vazquez MD, 1,000 mcg at 04/02/23 0816    calcium-vitamin D (OS-RODOLFO +D3) 500 mg-200 unit per tablet 1 Tablet, 1 Tablet, Oral, DAILY, Donta Vazquez MD, 1 Tablet at 04/02/23 0900    sodium chloride (NS) flush 5-40 mL, 5-40 mL, IntraVENous, Q8H, NENA Ash MD, 10 mL at 04/02/23 0728    sodium chloride (NS) flush 5-40 mL, 5-40 mL, IntraVENous, PRN, BING Ash MD    nitroglycerin (NITROSTAT) tablet 0.4 mg, 0.4 mg, SubLINGual, Q5MIN PRN, TOM AshFMLOS DAVIDSON MD    glucose chewable tablet 16 g, 4 Tablet, Oral, PRN, FLOYD AshTUYESSENIAYC MD STUART    glucagon (GLUCAGEN) injection 1 mg, 1 mg, IntraMUSCular, PRN, KATIA Ash MD    dextrose 10 % infusion 0-250 mL, 0-250 mL, IntraVENous, PRN, KERRI Ash MD    aspirin chewable tablet 81 mg, 81 mg, Oral, DAILY, ZOE Ash MD, 81 mg at 04/02/23 0817    atorvastatin (LIPITOR) tablet 80 mg, 80 mg, Oral, QHS, Karl Ash MD, 80 mg at 04/01/23 2110    dextrose 10 % infusion 0-250 mL, 0-250 mL, IntraVENous, PRN, Rema Phillips, CNS    diphenhydrAMINE (BENADRYL) injection 25 mg, 25 mg, IntraVENous, Q6H PRN, Nilsa TPGZDARLENE DAVIDSON MD, 25 mg at 04/01/23 0622    mupirocin (BACTROBAN) 2 % ointment, , Both Nostrils, Q12H, Winston Russell NP, Given at 04/02/23 0818    chlorhexidine (PERIDEX) 0.12 % mouthwash 15 mL, 15 mL, Oral, Q12H, Heidi Dickens NP, 15 mL at 04/02/23 0818    cetirizine (ZYRTEC) tablet 10 mg, 10 mg, Oral, DAILY, LIVIA Ash MD, 10 mg at 04/02/23 0817    predniSONE (DELTASONE) tablet 20 mg, 20 mg, Oral, DAILY WITH BREAKFAST, HONORIO Ash MD, 20 mg at 04/02/23 Jan Mendes MD    [facility] Cardiology  Call center: (B) [phone]  (O) [phone]      CC: Tricia Carrizales MD

## 2023-04-02 NOTE — PROGRESS NOTES
Pt seen and examined    No complaints this AM    Understands he will need surgery Monday    Continue current regiment for now

## 2023-04-02 NOTE — PROGRESS NOTES
0730: Bedside shift change report given to Douglas Shipley (oncoming nurse) by Douglas Walker (offgoing nurse). Report included the following information SBAR, MAR, and Recent Results. 2000: Bedside shift change report given to [facility] AT [facility] and Douglas Liu (oncoming nurse) by Douglas Shipley (offgoing nurse). Report included the following information SBAR, MAR, and Recent Results. Charting and patient care of Chen Avalos by Donnie Stoner RN from 0730 to 2000 was supervised and reviewed by this RN.

## 2023-04-02 NOTE — PROGRESS NOTES
[facility]  Hospitalist Group                                                                                          Hospitalist Progress Note  Milo Archuleta MD  Answering service: [phone] from in house phone        Date of Service:  2023  NAME:  Dada Whittaker  :  1950  MRN:  [id number]       Admission Summary:   Dada Whittaker is a 74 y.o. male with a significant PMH of CAD s/p stent placement, type 2 diabetes with neuropathy, sleep apnea on BiPAP, GERD presented to the [place] ED with skin rash on the back of the neck, chills, shortness of breath and raspy voice. He had taken Benadryl and Allegra at home with some improvement. No recently started new medications, not on ACEI. Upon initial evaluation in the ED he was tachycardic  SPO2 90% on room air, was afebrile. Labs significant for sodium 135, creatinine 1.36, initial troponin 106 which went up to 483 on repeat. Chest x-ray was negative for any acute process. In the ED treatment in the ED: Aspirin 325 mg x 1, Benadryl 50 mg IV x1, famotidine 20 mg IV x1, Solu-Medrol 125 mg IV x1.  ED had consulted and discussed with cardiology. IV heparin drip for non-STEMI. Patient's transferred to [facility] for further management       Interval history / Subjective:   Patient denies any chest pain or SOB today. Hives/rash has completely resolved. No acute concerns or complaints. Has not had a BM. Pt's wife at bedside. Assessment & Plan:        Idiopathic urticaria   -  Patient presented with skin rash and urticaria on the back with shortness of breath and change in voice.   Denied difficulty swallowing or painful swallowing.  -Status post IV Benadryl, IV famotidine and IV Solu-Medrol  -Continue antihistamines, short course oral steroid  - Symptoms improving, no significant tongue/mouth swelling      Elevated troponin   Multivessel coronary artery disease   -A few days of chest tightness prior to admission; denies CP or SOB at this time   - Underwent LHC on 3/31 with Dr Bryan Ludwig, found to have multi-vessel disease, recommended for CABG   - EF 55-60%  - Evaluated by CT surgery, will plan for CABG tomorrow   - Continue heparin, will hold at midnight Monday morning   - Cardiology also following, appreciate recomemendations        DM2 with hyperglycemuima: Accu-Cheks, Humalog sliding scale with hypoglycemic protocol  - Elevated on steroids   - Give lantus 20U this morning, will hold subcu insulin dosing tomorrow and will likely need an insulin drip during surgery and thereafter      Constipation  - Give pericolace today   - Plan for post-op bowel regimen as well      Tachycardia and mild hypoxia on presentation, resolved    - CXR negative. Patient with BiPAP for FELICITY. - D-dimer elevated, duplex negative for DVT  - Discussed with CT surgeon, will defer CTA given recent contrast load for cath; will order NM VQ scan though may not be completed prior to CABG on Monday morning   - Continue heparin drip     CKD 3, creatinine was 1.33 about a year ago, 1.36 on presentation, improved today. Continue to monitor and avoid nephrotoxic agents. FELICITY: He is wife to bring home CPAP machine.       Code status: Full   Prophylaxis: Heparin gtt  Care Plan discussed with: patient, nursing, specialist    Anticipated Disposition: >48h, CABG tomorrow with CT surgery and transfer to ICU thereafter  Inpatient  Cardiac monitoring: Telemetry     Hospital Problems  Date Reviewed: 12/7/2022            Codes Class Noted POA    Allergic reaction, initial encounter ICD-10-CM: T78.40XA  ICD-9-CM: 995.3  3/31/2023 Unknown        Tachycardia ICD-10-CM: R00.0  ICD-9-CM: 785.0  3/31/2023 Unknown        NSTEMI (non-ST elevated myocardial infarction) [facility]) ICD-10-CM: I21.4  ICD-9-CM: 410.70  3/31/2023 Unknown           Social Determinants of Health     Tobacco Use: Medium Risk    Smoking Tobacco Use: Former    Smokeless Tobacco Use: Never Passive Exposure: Not on file   Alcohol Use: Not on file   Financial Resource Strain: Not on file   Food Insecurity: Not on file   Transportation Needs: Not on file   Physical Activity: Not on file   Stress: Not on file   Social Connections: Not on file   Intimate Partner Violence: Not on file   Depression: Not at risk    PHQ-2 Score: 0   Housing Stability: Not on file       Review of Systems:   A comprehensive review of systems was negative except for that written in the HPI. Vital Signs:    Last 24hrs VS reviewed since prior progress note. Most recent are:  Visit Vitals  /74   Pulse 70   Temp 97.6 °F (36.4 °C)   Resp 14   Ht 5' 10\" (1.778 m)   Wt 102.9 kg (226 lb 12.8 oz)   SpO2 93%   BMI 32.54 kg/m²         Intake/Output Summary (Last 24 hours) at 4/2/2023 0901  Last data filed at 4/2/2023 0700  Gross per 24 hour   Intake 1094.25 ml   Output 400 ml   Net 694.25 ml        Physical Examination:     I had a face to face encounter with this patient and independently examined them on 4/2/2023 as outlined below:          General : alert x 3, awake, no acute distress,   HEENT: PEERL, EOMI, moist mucus membrane, dried blister on lip, no tongue or lip swelling    Neck: supple, no JVD, no meningeal signs  Chest: Clear to auscultation bilaterally   CVS: S1 S2 heard, Capillary refill less than 2 seconds  Abd: soft, non tender, non distended, BS physiological,   Ext: no clubbing, no cyanosis, no edema, brisk 2+ DP pulses  Neuro/Psych: pleasant mood and affect, CN 2-12 grossly intact, sensory grossly within normal limit,   Skin: warm, faint urticaria on arms/chest       Data Review:    Review and/or order of clinical lab test  Review and/or order of tests in the radiology section of CPT  Review and/or order of tests in the medicine section of CPT      I have personally and independently reviewed all pertinent labs, diagnostic studies, imaging, and have provided independent interpretation of the same.      Labs: Recent Labs     04/02/23  0510 04/01/23 0243   WBC 5.6 9.8   HGB 13.5 14.2   HCT 40.4 42.9    267     Recent Labs     04/02/23  0510 04/01/23 0243 03/31/23 0245 03/31/23 0233    137  --  135*   K 3.6 4.0  --  3.8    103  --  98   CO2 27 24  --  23   BUN 21* 19  --  23*   CREA 1.10 1.05  --  1.36*   * 207*  --  209*   CA 8.9 9.5  --  9.2   MG 2.3  --  1.8  --    PHOS  --   --  3.6  --      Recent Labs     03/31/23 0233   ALT 37   AP 68   TBILI 0.4   TP 7.6   ALB 3.8   GLOB 3.8     Recent Labs     03/31/23  0719 03/31/23 0245   INR  --  1.0   PTP  --  9.9   APTT 24.3 23.6      No results for input(s): FE, TIBC, PSAT, FERR in the last 72 hours. Lab Results   Component Value Date/Time    Folate 12.0 12/20/2012 03:59 PM      No results for input(s): PH, PCO2, PO2 in the last 72 hours. No results for input(s): CPK, CKNDX, TROIQ in the last 72 hours.     No lab exists for component: CPKMB  Lab Results   Component Value Date/Time    Cholesterol, total 121 03/31/2023 02:45 AM    HDL Cholesterol 34 03/31/2023 02:45 AM    LDL, calculated 13.2 03/31/2023 02:45 AM    Triglyceride 369 (H) 03/31/2023 02:45 AM    CHOL/HDL Ratio 3.6 03/31/2023 02:45 AM     Lab Results   Component Value Date/Time    Glucose (POC) 177 (H) 04/02/2023 07:32 AM    Glucose (POC) 284 (H) 04/01/2023 09:08 PM    Glucose (POC) 223 (H) 04/01/2023 04:24 PM    Glucose (POC) 237 (H) 04/01/2023 11:02 AM    Glucose (POC) 193 (H) 04/01/2023 07:49 AM     Lab Results   Component Value Date/Time    Color YELLOW/STRAW 03/31/2023 11:13 PM    Appearance CLEAR 03/31/2023 11:13 PM    Specific gravity >1.030 03/31/2023 11:13 PM    Specific gravity 1.025 04/03/2019 11:41 PM    pH (UA) 6.0 03/31/2023 11:13 PM    Protein Negative 03/31/2023 11:13 PM    Glucose 250 (A) 03/31/2023 11:13 PM    Ketone 15 (A) 03/31/2023 11:13 PM    Bilirubin Negative 03/31/2023 11:13 PM    Urobilinogen 0.2 03/31/2023 11:13 PM    Nitrites Negative 03/31/2023 11:13 PM    Leukocyte Esterase Negative 03/31/2023 11:13 PM    Epithelial cells FEW 03/31/2023 11:13 PM    Bacteria Negative 03/31/2023 11:13 PM    WBC 0-4 03/31/2023 11:13 PM    RBC 0-5 03/31/2023 11:13 PM       Notes reviewed from all clinical/nonclinical/nursing services involved in patient's clinical care. Care coordination discussions were held with appropriate clinical/nonclinical/ nursing providers based on care coordination needs. Patients current active Medications were reviewed, considered, added and adjusted based on the clinical condition today. Home Medications were reconciled to the best of my ability given all available resources at the time of admission. Route is PO if not otherwise noted.       Admission Status:[id number]:::1}      Medications Reviewed:     Current Facility-Administered Medications   Medication Dose Route Frequency    famotidine (PEPCID) tablet 20 mg  20 mg Oral Q12H    insulin glargine (LANTUS) injection 20 Units  20 Units SubCUTAneous DAILY    senna-docusate (PERICOLACE) 8.6-50 mg per tablet 1 Tablet  1 Tablet Oral NOW    carvediloL (COREG) tablet 6.25 mg  6.25 mg Oral BID WITH MEALS    amLODIPine (NORVASC) tablet 2.5 mg  2.5 mg Oral DAILY    pantoprazole (PROTONIX) tablet 40 mg  40 mg Oral ACB    heparin 25,000 units in D5W 250 ml infusion  9-25 Units/kg/hr IntraVENous TITRATE    insulin lispro (HUMALOG) injection   SubCUTAneous AC&HS    acetaminophen (TYLENOL) tablet 650 mg  650 mg Oral Q6H PRN    gabapentin (NEURONTIN) capsule 600 mg  600 mg Oral BID    cyanocobalamin (VITAMIN B12) tablet 1,000 mcg  1,000 mcg Oral DAILY    calcium-vitamin D (OS-RODOLFO +D3) 500 mg-200 unit per tablet 1 Tablet  1 Tablet Oral DAILY    sodium chloride (NS) flush 5-40 mL  5-40 mL IntraVENous Q8H    sodium chloride (NS) flush 5-40 mL  5-40 mL IntraVENous PRN    nitroglycerin (NITROSTAT) tablet 0.4 mg  0.4 mg SubLINGual Q5MIN PRN    glucose chewable tablet 16 g  4 Tablet Oral PRN    glucagon (GLUCAGEN) injection 1 mg  1 mg IntraMUSCular PRN    dextrose 10 % infusion 0-250 mL  0-250 mL IntraVENous PRN    aspirin chewable tablet 81 mg  81 mg Oral DAILY    atorvastatin (LIPITOR) tablet 80 mg  80 mg Oral QHS    dextrose 10 % infusion 0-250 mL  0-250 mL IntraVENous PRN    diphenhydrAMINE (BENADRYL) injection 25 mg  25 mg IntraVENous Q6H PRN    mupirocin (BACTROBAN) 2 % ointment   Both Nostrils Q12H    chlorhexidine (PERIDEX) 0.12 % mouthwash 15 mL  15 mL Oral Q12H    cetirizine (ZYRTEC) tablet 10 mg  10 mg Oral DAILY    predniSONE (DELTASONE) tablet 20 mg  20 mg Oral DAILY WITH BREAKFAST     ______________________________________________________________________  EXPECTED LENGTH OF STAY: - - -  ACTUAL LENGTH OF STAY:          2                 Keila Ruiz MD

## 2023-04-02 NOTE — PROGRESS NOTES
I have reviewed and agree with Radha Mackenzie RN charting, assessment, and med admin    Discussion with pt and wife regarding expectations for cardiac surgery post-operatively.  Questions answered

## 2023-04-02 NOTE — PROGRESS NOTES
2000: Bedside and Verbal shift change report given to FLOYD Jovel (oncoming nurse) by Elida Dalton RN (offgoing nurse). Report included the following information SBAR, Intake/Output, MAR, Recent Results, Med Rec Status, and Cardiac Rhythm NSR .     0000: Pt. Put himself on his home CPAP machine for the night     1930: Bedside and Verbal shift change report given to FLOYD Dodd (oncoming nurse) by Tgire Odell RN (offgoing nurse). Report included the following information SBAR, Intake/Output, MAR, Recent Results, and Cardiac Rhythm NSR . Shift summary: Uneventful shift; pt.  Has no complaints of discomfort, hep gtt still therapeutic

## 2023-04-03 ENCOUNTER — HOSPITAL ENCOUNTER (OUTPATIENT)
Dept: NON INVASIVE DIAGNOSTICS | Age: 73
End: 2023-04-03
Attending: THORACIC SURGERY (CARDIOTHORACIC VASCULAR SURGERY)

## 2023-04-03 ENCOUNTER — ANESTHESIA (OUTPATIENT)
Dept: CARDIOTHORACIC SURGERY | Age: 73
End: 2023-04-03
Payer: MEDICARE

## 2023-04-03 ENCOUNTER — TRANSCRIBE ORDER (OUTPATIENT)
Dept: CARDIAC REHAB | Age: 73
End: 2023-04-03

## 2023-04-03 ENCOUNTER — APPOINTMENT (OUTPATIENT)
Dept: GENERAL RADIOLOGY | Age: 73
End: 2023-04-03
Attending: PHYSICIAN ASSISTANT
Payer: MEDICARE

## 2023-04-03 DIAGNOSIS — Z95.1 POSTSURGICAL AORTOCORONARY BYPASS STATUS: Primary | ICD-10-CM

## 2023-04-03 DIAGNOSIS — I21.4 ACUTE MYOCARDIAL INFARCTION, SUBENDOCARDIAL INFARCTION, INITIAL EPISODE OF CARE (HCC): ICD-10-CM

## 2023-04-03 LAB
ABO + RH BLD: NORMAL
ACUTE KIDNEY INJURY RISK NEPHROCHECK: 0.94 (ref 0–0.3)
ADMINISTERED INITIALS, ADMINIT: NORMAL
ALBUMIN SERPL-MCNC: 3.2 G/DL (ref 3.5–5)
ALBUMIN SERPL-MCNC: 3.8 G/DL (ref 3.5–5)
ALBUMIN/GLOB SERPL: 1.2 (ref 1.1–2.2)
ALBUMIN/GLOB SERPL: 1.4 (ref 1.1–2.2)
ALP SERPL-CCNC: 38 U/L (ref 45–117)
ALP SERPL-CCNC: 41 U/L (ref 45–117)
ALT SERPL-CCNC: 64 U/L (ref 12–78)
ALT SERPL-CCNC: 79 U/L (ref 12–78)
ANION GAP SERPL CALC-SCNC: 3 MMOL/L (ref 5–15)
ANION GAP SERPL CALC-SCNC: 4 MMOL/L (ref 5–15)
ANION GAP SERPL CALC-SCNC: 5 MMOL/L (ref 5–15)
APTT PPP: 23.2 SEC (ref 22.1–31)
AST SERPL-CCNC: 107 U/L (ref 15–37)
AST SERPL-CCNC: 127 U/L (ref 15–37)
BASE DEFICIT BLD-SCNC: 0.6 MMOL/L
BASE DEFICIT BLD-SCNC: 1.7 MMOL/L
BASOPHILS # BLD: 0 K/UL (ref 0–0.1)
BASOPHILS # BLD: 0 K/UL (ref 0–0.1)
BASOPHILS NFR BLD: 0 % (ref 0–1)
BASOPHILS NFR BLD: 0 % (ref 0–1)
BDY SITE: ABNORMAL
BILIRUB SERPL-MCNC: 0.4 MG/DL (ref 0.2–1)
BILIRUB SERPL-MCNC: 0.5 MG/DL (ref 0.2–1)
BLD PROD TYP BPU: NORMAL
BLD PROD TYP BPU: NORMAL
BLOOD GROUP ANTIBODIES SERPL: NORMAL
BPU ID: NORMAL
BPU ID: NORMAL
BUN SERPL-MCNC: 17 MG/DL (ref 6–20)
BUN SERPL-MCNC: 18 MG/DL (ref 6–20)
BUN SERPL-MCNC: 19 MG/DL (ref 6–20)
BUN/CREAT SERPL: 16 (ref 12–20)
BUN/CREAT SERPL: 16 (ref 12–20)
BUN/CREAT SERPL: 19 (ref 12–20)
CA-I BLD-SCNC: 1.2 MMOL/L (ref 1.12–1.32)
CA-I BLD-SCNC: 1.25 MMOL/L (ref 1.12–1.32)
CALCIUM SERPL-MCNC: 8.1 MG/DL (ref 8.5–10.1)
CALCIUM SERPL-MCNC: 8.6 MG/DL (ref 8.5–10.1)
CALCIUM SERPL-MCNC: 9.2 MG/DL (ref 8.5–10.1)
CHLORIDE SERPL-SCNC: 106 MMOL/L (ref 97–108)
CHLORIDE SERPL-SCNC: 111 MMOL/L (ref 97–108)
CHLORIDE SERPL-SCNC: 113 MMOL/L (ref 97–108)
CO2 SERPL-SCNC: 24 MMOL/L (ref 21–32)
CO2 SERPL-SCNC: 25 MMOL/L (ref 21–32)
CO2 SERPL-SCNC: 26 MMOL/L (ref 21–32)
COHGB MFR BLD: 0.3 % (ref 1–2)
CREAT SERPL-MCNC: 0.93 MG/DL (ref 0.7–1.3)
CREAT SERPL-MCNC: 1.08 MG/DL (ref 0.7–1.3)
CREAT SERPL-MCNC: 1.17 MG/DL (ref 0.7–1.3)
CROSSMATCH RESULT,%XM: NORMAL
CROSSMATCH RESULT,%XM: NORMAL
D50 ADMINISTERED, D50ADM: 0 ML
D50 ORDER, D50ORD: 0 ML
DIFFERENTIAL METHOD BLD: ABNORMAL
DIFFERENTIAL METHOD BLD: ABNORMAL
EOSINOPHIL # BLD: 0 K/UL (ref 0–0.4)
EOSINOPHIL # BLD: 0.1 K/UL (ref 0–0.4)
EOSINOPHIL NFR BLD: 1 % (ref 0–7)
EOSINOPHIL NFR BLD: 1 % (ref 0–7)
ERYTHROCYTE [DISTWIDTH] IN BLOOD BY AUTOMATED COUNT: 13.4 % (ref 11.5–14.5)
ERYTHROCYTE [DISTWIDTH] IN BLOOD BY AUTOMATED COUNT: 13.7 % (ref 11.5–14.5)
GLOBULIN SER CALC-MCNC: 2.7 G/DL (ref 2–4)
GLOBULIN SER CALC-MCNC: 2.7 G/DL (ref 2–4)
GLUCOSE BLD STRIP.AUTO-MCNC: 102 MG/DL (ref 65–117)
GLUCOSE BLD STRIP.AUTO-MCNC: 110 MG/DL (ref 65–117)
GLUCOSE BLD STRIP.AUTO-MCNC: 111 MG/DL (ref 65–117)
GLUCOSE BLD STRIP.AUTO-MCNC: 113 MG/DL (ref 65–117)
GLUCOSE BLD STRIP.AUTO-MCNC: 115 MG/DL (ref 65–117)
GLUCOSE BLD STRIP.AUTO-MCNC: 147 MG/DL (ref 65–117)
GLUCOSE BLD STRIP.AUTO-MCNC: 178 MG/DL (ref 65–117)
GLUCOSE BLD STRIP.AUTO-MCNC: 83 MG/DL (ref 65–117)
GLUCOSE BLD STRIP.AUTO-MCNC: 87 MG/DL (ref 65–117)
GLUCOSE BLD STRIP.AUTO-MCNC: 91 MG/DL (ref 65–117)
GLUCOSE BLD STRIP.AUTO-MCNC: 97 MG/DL (ref 65–117)
GLUCOSE SERPL-MCNC: 113 MG/DL (ref 65–100)
GLUCOSE SERPL-MCNC: 151 MG/DL (ref 65–100)
GLUCOSE SERPL-MCNC: 211 MG/DL (ref 65–100)
GLUCOSE, GLC: 102 MG/DL
GLUCOSE, GLC: 110 MG/DL
GLUCOSE, GLC: 111 MG/DL
GLUCOSE, GLC: 113 MG/DL
GLUCOSE, GLC: 115 MG/DL
GLUCOSE, GLC: 147 MG/DL
GLUCOSE, GLC: 151 MG/DL
GLUCOSE, GLC: 157 MG/DL
GLUCOSE, GLC: 158 MG/DL
GLUCOSE, GLC: 162 MG/DL
GLUCOSE, GLC: 168 MG/DL
GLUCOSE, GLC: 174 MG/DL
GLUCOSE, GLC: 83 MG/DL
GLUCOSE, GLC: 87 MG/DL
GLUCOSE, GLC: 91 MG/DL
GLUCOSE, GLC: 97 MG/DL
GLUCOSE, GLC: 98 MG/DL
HCO3 BLD-SCNC: 24.2 MMOL/L (ref 22–26)
HCO3 BLD-SCNC: 25.9 MMOL/L (ref 22–26)
HCT VFR BLD AUTO: 36.7 % (ref 36.6–50.3)
HCT VFR BLD AUTO: 37.8 % (ref 36.6–50.3)
HCT VFR BLD AUTO: 40.6 % (ref 36.6–50.3)
HGB BLD-MCNC: 12.3 G/DL (ref 12.1–17)
HGB BLD-MCNC: 12.5 G/DL (ref 12.1–17)
HGB BLD-MCNC: 13.5 G/DL (ref 12.1–17)
HIGH TARGET, HITG: 130 MG/DL
HIGH TARGET, HITG: 140 MG/DL
HISTORY CHECKED?,CKHIST: NORMAL
IMM GRANULOCYTES # BLD AUTO: 0.1 K/UL (ref 0–0.04)
IMM GRANULOCYTES # BLD AUTO: 0.2 K/UL (ref 0–0.04)
IMM GRANULOCYTES NFR BLD AUTO: 1 % (ref 0–0.5)
IMM GRANULOCYTES NFR BLD AUTO: 1 % (ref 0–0.5)
INR PPP: 1.1 (ref 0.9–1.1)
INSULIN ADMINSTERED, INSADM: 1.1 UNITS/HOUR
INSULIN ADMINSTERED, INSADM: 1.7 UNITS/HOUR
INSULIN ADMINSTERED, INSADM: 1.8 UNITS/HOUR
INSULIN ADMINSTERED, INSADM: 1.9 UNITS/HOUR
INSULIN ADMINSTERED, INSADM: 2.7 UNITS/HOUR
INSULIN ADMINSTERED, INSADM: 3 UNITS/HOUR
INSULIN ADMINSTERED, INSADM: 3.2 UNITS/HOUR
INSULIN ADMINSTERED, INSADM: 3.9 UNITS/HOUR
INSULIN ADMINSTERED, INSADM: 4.5 UNITS/HOUR
INSULIN ADMINSTERED, INSADM: 4.6 UNITS/HOUR
INSULIN ADMINSTERED, INSADM: 4.8 UNITS/HOUR
INSULIN ADMINSTERED, INSADM: 4.9 UNITS/HOUR
INSULIN ADMINSTERED, INSADM: 5 UNITS/HOUR
INSULIN ADMINSTERED, INSADM: 6.8 UNITS/HOUR
INSULIN ADMINSTERED, INSADM: 7.1 UNITS/HOUR
INSULIN ADMINSTERED, INSADM: 7.3 UNITS/HOUR
INSULIN ADMINSTERED, INSADM: 7.8 UNITS/HOUR
INSULIN ORDER, INSORD: 1.1 UNITS/HOUR
INSULIN ORDER, INSORD: 1.7 UNITS/HOUR
INSULIN ORDER, INSORD: 1.8 UNITS/HOUR
INSULIN ORDER, INSORD: 1.9 UNITS/HOUR
INSULIN ORDER, INSORD: 2.7 UNITS/HOUR
INSULIN ORDER, INSORD: 3 UNITS/HOUR
INSULIN ORDER, INSORD: 3.2 UNITS/HOUR
INSULIN ORDER, INSORD: 3.9 UNITS/HOUR
INSULIN ORDER, INSORD: 4.5 UNITS/HOUR
INSULIN ORDER, INSORD: 4.6 UNITS/HOUR
INSULIN ORDER, INSORD: 4.8 UNITS/HOUR
INSULIN ORDER, INSORD: 4.9 UNITS/HOUR
INSULIN ORDER, INSORD: 5 UNITS/HOUR
INSULIN ORDER, INSORD: 6.8 UNITS/HOUR
INSULIN ORDER, INSORD: 7.1 UNITS/HOUR
INSULIN ORDER, INSORD: 7.3 UNITS/HOUR
INSULIN ORDER, INSORD: 7.8 UNITS/HOUR
LOW TARGET, LOT: 100 MG/DL
LOW TARGET, LOT: 95 MG/DL
LYMPHOCYTES # BLD: 0.9 K/UL (ref 0.8–3.5)
LYMPHOCYTES # BLD: 1.5 K/UL (ref 0.8–3.5)
LYMPHOCYTES NFR BLD: 24 % (ref 12–49)
LYMPHOCYTES NFR BLD: 8 % (ref 12–49)
MAGNESIUM SERPL-MCNC: 2.3 MG/DL (ref 1.6–2.4)
MAGNESIUM SERPL-MCNC: 2.4 MG/DL (ref 1.6–2.4)
MAGNESIUM SERPL-MCNC: 2.5 MG/DL (ref 1.6–2.4)
MCH RBC QN AUTO: 30.8 PG (ref 26–34)
MCH RBC QN AUTO: 31 PG (ref 26–34)
MCHC RBC AUTO-ENTMCNC: 33.1 G/DL (ref 30–36.5)
MCHC RBC AUTO-ENTMCNC: 33.3 G/DL (ref 30–36.5)
MCV RBC AUTO: 92.7 FL (ref 80–99)
MCV RBC AUTO: 93.8 FL (ref 80–99)
METHGB MFR BLD: 0.1 % (ref 0–1.4)
MINUTES UNTIL NEXT BG, NBG: 60 MIN
MONOCYTES # BLD: 0.3 K/UL (ref 0–1)
MONOCYTES # BLD: 1 K/UL (ref 0–1)
MONOCYTES NFR BLD: 5 % (ref 5–13)
MONOCYTES NFR BLD: 9 % (ref 5–13)
MULTIPLIER, MUL: 0.03
MULTIPLIER, MUL: 0.04
MULTIPLIER, MUL: 0.05
MULTIPLIER, MUL: 0.06
MULTIPLIER, MUL: 0.06
MULTIPLIER, MUL: 0.07
MULTIPLIER, MUL: 0.08
MULTIPLIER, MUL: 0.09
NEUTS SEG # BLD: 4.3 K/UL (ref 1.8–8)
NEUTS SEG # BLD: 9.2 K/UL (ref 1.8–8)
NEUTS SEG NFR BLD: 69 % (ref 32–75)
NEUTS SEG NFR BLD: 81 % (ref 32–75)
NRBC # BLD: 0 K/UL (ref 0–0.01)
NRBC # BLD: 0 K/UL (ref 0–0.01)
NRBC BLD-RTO: 0 PER 100 WBC
NRBC BLD-RTO: 0 PER 100 WBC
ORDER INITIALS, ORDINIT: NORMAL
OXYHGB MFR BLD: 74.2 % (ref 94–97)
PCO2 BLD: 44.5 MMHG (ref 35–45)
PCO2 BLD: 49.6 MMHG (ref 35–45)
PH BLD: 7.33 (ref 7.35–7.45)
PH BLD: 7.34 (ref 7.35–7.45)
PLATELET # BLD AUTO: 190 K/UL (ref 150–400)
PLATELET # BLD AUTO: 257 K/UL (ref 150–400)
PMV BLD AUTO: 9.7 FL (ref 8.9–12.9)
PMV BLD AUTO: 9.9 FL (ref 8.9–12.9)
PO2 BLD: 121 MMHG (ref 80–100)
PO2 BLD: 184 MMHG (ref 80–100)
POTASSIUM SERPL-SCNC: 3.9 MMOL/L (ref 3.5–5.1)
POTASSIUM SERPL-SCNC: 4 MMOL/L (ref 3.5–5.1)
POTASSIUM SERPL-SCNC: 4.5 MMOL/L (ref 3.5–5.1)
PROT SERPL-MCNC: 5.9 G/DL (ref 6.4–8.2)
PROT SERPL-MCNC: 6.5 G/DL (ref 6.4–8.2)
PROTHROMBIN TIME: 11.8 SEC (ref 9–11.1)
RBC # BLD AUTO: 4.03 M/UL (ref 4.1–5.7)
RBC # BLD AUTO: 4.38 M/UL (ref 4.1–5.7)
SAO2 % BLD: 75 % (ref 95–99)
SAO2 % BLD: 98.5 % (ref 92–97)
SAO2 % BLD: 99.5 % (ref 92–97)
SERVICE CMNT-IMP: ABNORMAL
SERVICE CMNT-IMP: ABNORMAL
SERVICE CMNT-IMP: NORMAL
SODIUM SERPL-SCNC: 135 MMOL/L (ref 136–145)
SODIUM SERPL-SCNC: 140 MMOL/L (ref 136–145)
SODIUM SERPL-SCNC: 142 MMOL/L (ref 136–145)
SPECIMEN EXP DATE BLD: NORMAL
SPECIMEN SITE: ABNORMAL
SPECIMEN TYPE: ABNORMAL
SPECIMEN TYPE: ABNORMAL
STATUS OF UNIT,%ST: NORMAL
STATUS OF UNIT,%ST: NORMAL
THERAPEUTIC RANGE,PTTT: NORMAL SECS (ref 58–77)
UFH PPP CHRO-ACNC: <0.1 IU/ML
UNIT DIVISION, %UDIV: 0
UNIT DIVISION, %UDIV: 0
WBC # BLD AUTO: 11.3 K/UL (ref 4.1–11.1)
WBC # BLD AUTO: 6.3 K/UL (ref 4.1–11.1)

## 2023-04-03 PROCEDURE — 74011250636 HC RX REV CODE- 250/636: Performed by: NURSE ANESTHETIST, CERTIFIED REGISTERED

## 2023-04-03 PROCEDURE — 83735 ASSAY OF MAGNESIUM: CPT

## 2023-04-03 PROCEDURE — 74011636637 HC RX REV CODE- 636/637: Performed by: NURSE ANESTHETIST, CERTIFIED REGISTERED

## 2023-04-03 PROCEDURE — 77030041238 HC TBNG INSUF MDII -A: Performed by: THORACIC SURGERY (CARDIOTHORACIC VASCULAR SURGERY)

## 2023-04-03 PROCEDURE — 94002 VENT MGMT INPAT INIT DAY: CPT

## 2023-04-03 PROCEDURE — 77030008684 HC TU ET CUF COVD -B: Performed by: ANESTHESIOLOGY

## 2023-04-03 PROCEDURE — 65610000003 HC RM ICU SURGICAL

## 2023-04-03 PROCEDURE — 77030037878 HC DRSG MEPILEX >48IN BORD MOLN -B: Performed by: THORACIC SURGERY (CARDIOTHORACIC VASCULAR SURGERY)

## 2023-04-03 PROCEDURE — 2709999900 HC NON-CHARGEABLE SUPPLY: Performed by: THORACIC SURGERY (CARDIOTHORACIC VASCULAR SURGERY)

## 2023-04-03 PROCEDURE — 74011250636 HC RX REV CODE- 250/636: Performed by: PHYSICIAN ASSISTANT

## 2023-04-03 PROCEDURE — 77030014008 HC SPNG HEMSTAT J&J -C: Performed by: THORACIC SURGERY (CARDIOTHORACIC VASCULAR SURGERY)

## 2023-04-03 PROCEDURE — 77030019579 HC CBL PACE DISP REMG -B: Performed by: THORACIC SURGERY (CARDIOTHORACIC VASCULAR SURGERY)

## 2023-04-03 PROCEDURE — 74011000258 HC RX REV CODE- 258: Performed by: NURSE ANESTHETIST, CERTIFIED REGISTERED

## 2023-04-03 PROCEDURE — 74011636637 HC RX REV CODE- 636/637: Performed by: THORACIC SURGERY (CARDIOTHORACIC VASCULAR SURGERY)

## 2023-04-03 PROCEDURE — 76010000115 HC CV SURG 4.5 TO 5 HR: Performed by: THORACIC SURGERY (CARDIOTHORACIC VASCULAR SURGERY)

## 2023-04-03 PROCEDURE — 74011000250 HC RX REV CODE- 250: Performed by: NURSE ANESTHETIST, CERTIFIED REGISTERED

## 2023-04-03 PROCEDURE — C1713 ANCHOR/SCREW BN/BN,TIS/BN: HCPCS | Performed by: THORACIC SURGERY (CARDIOTHORACIC VASCULAR SURGERY)

## 2023-04-03 PROCEDURE — 77030002973 HC SUT PLEDG CV SFT OVL TELE -B: Performed by: THORACIC SURGERY (CARDIOTHORACIC VASCULAR SURGERY)

## 2023-04-03 PROCEDURE — 77030008772 HC TU NG SALEM SUPM COVD -B: Performed by: ANESTHESIOLOGY

## 2023-04-03 PROCEDURE — 77030010605 HC BLWR MR MAL MEDT -B: Performed by: THORACIC SURGERY (CARDIOTHORACIC VASCULAR SURGERY)

## 2023-04-03 PROCEDURE — 77030040504 HC DRN WND MDII -B: Performed by: THORACIC SURGERY (CARDIOTHORACIC VASCULAR SURGERY)

## 2023-04-03 PROCEDURE — 82962 GLUCOSE BLOOD TEST: CPT

## 2023-04-03 PROCEDURE — 33533 CABG ARTERIAL SINGLE: CPT | Performed by: THORACIC SURGERY (CARDIOTHORACIC VASCULAR SURGERY)

## 2023-04-03 PROCEDURE — 85730 THROMBOPLASTIN TIME PARTIAL: CPT

## 2023-04-03 PROCEDURE — 74011250636 HC RX REV CODE- 250/636: Performed by: THORACIC SURGERY (CARDIOTHORACIC VASCULAR SURGERY)

## 2023-04-03 PROCEDURE — 77030010813: Performed by: THORACIC SURGERY (CARDIOTHORACIC VASCULAR SURGERY)

## 2023-04-03 PROCEDURE — 77030033150: Performed by: THORACIC SURGERY (CARDIOTHORACIC VASCULAR SURGERY)

## 2023-04-03 PROCEDURE — 74011250636 HC RX REV CODE- 250/636: Performed by: ANESTHESIOLOGY

## 2023-04-03 PROCEDURE — 74011000250 HC RX REV CODE- 250: Performed by: PHYSICIAN ASSISTANT

## 2023-04-03 PROCEDURE — 77030010507 HC ADH SKN DERMBND J&J -B: Performed by: THORACIC SURGERY (CARDIOTHORACIC VASCULAR SURGERY)

## 2023-04-03 PROCEDURE — 77030002987 HC SUT PROL J&J -B: Performed by: THORACIC SURGERY (CARDIOTHORACIC VASCULAR SURGERY)

## 2023-04-03 PROCEDURE — 82803 BLOOD GASES ANY COMBINATION: CPT

## 2023-04-03 PROCEDURE — 77030022199 HC SYS HARV VESL GTNG -G1: Performed by: THORACIC SURGERY (CARDIOTHORACIC VASCULAR SURGERY)

## 2023-04-03 PROCEDURE — 77030026438 HC STYL ET INTUB CARD -A: Performed by: ANESTHESIOLOGY

## 2023-04-03 PROCEDURE — 77030003010 HC SUT SURG STL J&J -B: Performed by: THORACIC SURGERY (CARDIOTHORACIC VASCULAR SURGERY)

## 2023-04-03 PROCEDURE — 77030002524 HC INSTR CLMP FGRTY EDWD -B: Performed by: THORACIC SURGERY (CARDIOTHORACIC VASCULAR SURGERY)

## 2023-04-03 PROCEDURE — 80053 COMPREHEN METABOLIC PANEL: CPT

## 2023-04-03 PROCEDURE — 77030013861 HC PNCH AORT CLNCUT QUES -B: Performed by: THORACIC SURGERY (CARDIOTHORACIC VASCULAR SURGERY)

## 2023-04-03 PROCEDURE — 77030008771 HC TU NG SALEM SUMP -A: Performed by: ANESTHESIOLOGY

## 2023-04-03 PROCEDURE — 82375 ASSAY CARBOXYHB QUANT: CPT

## 2023-04-03 PROCEDURE — 85610 PROTHROMBIN TIME: CPT

## 2023-04-03 PROCEDURE — 36415 COLL VENOUS BLD VENIPUNCTURE: CPT

## 2023-04-03 PROCEDURE — 33518 CABG ARTERY-VEIN TWO: CPT | Performed by: THORACIC SURGERY (CARDIOTHORACIC VASCULAR SURGERY)

## 2023-04-03 PROCEDURE — 93355 ECHO TRANSESOPHAGEAL (TEE): CPT | Performed by: THORACIC SURGERY (CARDIOTHORACIC VASCULAR SURGERY)

## 2023-04-03 PROCEDURE — 33533 CABG ARTERIAL SINGLE: CPT | Performed by: PHYSICIAN ASSISTANT

## 2023-04-03 PROCEDURE — 33518 CABG ARTERY-VEIN TWO: CPT | Performed by: PHYSICIAN ASSISTANT

## 2023-04-03 PROCEDURE — 77030002933 HC SUT MCRYL J&J -A: Performed by: THORACIC SURGERY (CARDIOTHORACIC VASCULAR SURGERY)

## 2023-04-03 PROCEDURE — 33508 ENDOSCOPIC VEIN HARVEST: CPT | Performed by: PHYSICIAN ASSISTANT

## 2023-04-03 PROCEDURE — P9045 ALBUMIN (HUMAN), 5%, 250 ML: HCPCS | Performed by: PHYSICIAN ASSISTANT

## 2023-04-03 PROCEDURE — 77030020061 HC IV BLD WRMR ADMIN SET 3M -B: Performed by: ANESTHESIOLOGY

## 2023-04-03 PROCEDURE — 77030006247 HC LD PCMKR MYOCRD BPLR TEMP MEDT -B: Performed by: THORACIC SURGERY (CARDIOTHORACIC VASCULAR SURGERY)

## 2023-04-03 PROCEDURE — 77030012390 HC DRN CHST BTL GTNG -B: Performed by: THORACIC SURGERY (CARDIOTHORACIC VASCULAR SURGERY)

## 2023-04-03 PROCEDURE — 74011250637 HC RX REV CODE- 250/637: Performed by: PHYSICIAN ASSISTANT

## 2023-04-03 PROCEDURE — 71045 X-RAY EXAM CHEST 1 VIEW: CPT

## 2023-04-03 PROCEDURE — 77030041244 HC CBL PACE EXT TEMP REMG -B: Performed by: THORACIC SURGERY (CARDIOTHORACIC VASCULAR SURGERY)

## 2023-04-03 PROCEDURE — 85025 COMPLETE CBC W/AUTO DIFF WBC: CPT

## 2023-04-03 PROCEDURE — 76060000040 HC ANESTHESIA 4.5 TO 5 HR: Performed by: THORACIC SURGERY (CARDIOTHORACIC VASCULAR SURGERY)

## 2023-04-03 PROCEDURE — 77030002986 HC SUT PROL J&J -A: Performed by: THORACIC SURGERY (CARDIOTHORACIC VASCULAR SURGERY)

## 2023-04-03 PROCEDURE — 74011250636 HC RX REV CODE- 250/636: Performed by: STUDENT IN AN ORGANIZED HEALTH CARE EDUCATION/TRAINING PROGRAM

## 2023-04-03 PROCEDURE — 33508 ENDOSCOPIC VEIN HARVEST: CPT | Performed by: THORACIC SURGERY (CARDIOTHORACIC VASCULAR SURGERY)

## 2023-04-03 PROCEDURE — 77030019702 HC WRP THER MENM -C: Performed by: THORACIC SURGERY (CARDIOTHORACIC VASCULAR SURGERY)

## 2023-04-03 PROCEDURE — 77030016441 HC APPL CLP LIG1 J&J -B: Performed by: THORACIC SURGERY (CARDIOTHORACIC VASCULAR SURGERY)

## 2023-04-03 PROCEDURE — 85018 HEMOGLOBIN: CPT

## 2023-04-03 PROCEDURE — 77030006689 HC BLD OPHTH BVR BD -A: Performed by: THORACIC SURGERY (CARDIOTHORACIC VASCULAR SURGERY)

## 2023-04-03 PROCEDURE — 74011250636 HC RX REV CODE- 250/636

## 2023-04-03 PROCEDURE — 77030013798 HC KT TRNSDUC PRSSR EDWD -B: Performed by: THORACIC SURGERY (CARDIOTHORACIC VASCULAR SURGERY)

## 2023-04-03 PROCEDURE — 77030010389 HC WRE ATR PACE AEMC -B: Performed by: THORACIC SURGERY (CARDIOTHORACIC VASCULAR SURGERY)

## 2023-04-03 PROCEDURE — 77030010818: Performed by: THORACIC SURGERY (CARDIOTHORACIC VASCULAR SURGERY)

## 2023-04-03 DEVICE — 6 FOOT DISPOSABLE EXTENSION CABLE WITH SAFE CONNECT / SCREW-DOWN: Type: IMPLANTABLE DEVICE | Status: FUNCTIONAL

## 2023-04-03 DEVICE — LEAD PCMKR MYOCARDL BPLR TEMP. --: Type: IMPLANTABLE DEVICE | Status: FUNCTIONAL

## 2023-04-03 DEVICE — PUTTY BONE HEMSTAT ABSORB MTRX 2.0GRAM: Type: IMPLANTABLE DEVICE | Status: FUNCTIONAL

## 2023-04-03 DEVICE — TEMP PACING WIRE
Type: IMPLANTABLE DEVICE | Status: FUNCTIONAL
Brand: MYO/WIRE

## 2023-04-03 RX ORDER — CEFAZOLIN SODIUM 1 G/3ML
1 INJECTION, POWDER, FOR SOLUTION INTRAMUSCULAR; INTRAVENOUS ONCE
Status: DISCONTINUED | OUTPATIENT
Start: 2023-04-03 | End: 2023-04-03 | Stop reason: HOSPADM

## 2023-04-03 RX ORDER — ONDANSETRON 2 MG/ML
4 INJECTION INTRAMUSCULAR; INTRAVENOUS
Status: DISCONTINUED
Start: 2023-04-03 | End: 2023-04-05

## 2023-04-03 RX ORDER — SODIUM CHLORIDE 0.9 % (FLUSH) 0.9 %
5-40 SYRINGE (ML) INJECTION EVERY 8 HOURS
Status: CANCELLED
Start: 2023-04-03

## 2023-04-03 RX ORDER — FENTANYL CITRATE 50 UG/ML
50 INJECTION, SOLUTION INTRAMUSCULAR; INTRAVENOUS AS NEEDED
Status: DISCONTINUED | OUTPATIENT
Start: 2023-04-03 | End: 2023-04-03

## 2023-04-03 RX ORDER — ESMOLOL HYDROCHLORIDE 10 MG/ML
INJECTION INTRAVENOUS AS NEEDED
Status: DISCONTINUED
Start: 2023-04-03 | End: 2023-04-03 | Stop reason: HOSPADM

## 2023-04-03 RX ORDER — SODIUM CHLORIDE 9 MG/ML
50 INJECTION, SOLUTION INTRAVENOUS CONTINUOUS
Status: CANCELLED
Start: 2023-04-03

## 2023-04-03 RX ORDER — MORPHINE SULFATE 2 MG/ML
2 INJECTION, SOLUTION INTRAMUSCULAR; INTRAVENOUS
Status: CANCELLED
Start: 2023-04-03

## 2023-04-03 RX ORDER — MUPIROCIN 20 MG/G
OINTMENT TOPICAL 2 TIMES DAILY
Status: DISPENSED
Start: 2023-04-03 | End: 2023-04-08

## 2023-04-03 RX ORDER — INSULIN LISPRO 100 [IU]/ML
INJECTION, SOLUTION INTRAVENOUS; SUBCUTANEOUS
Status: DISPENSED
Start: 2023-04-03

## 2023-04-03 RX ORDER — GLYCOPYRROLATE 0.2 MG/ML
INJECTION INTRAMUSCULAR; INTRAVENOUS AS NEEDED
Status: DISCONTINUED
Start: 2023-04-03 | End: 2023-04-03 | Stop reason: HOSPADM

## 2023-04-03 RX ORDER — SODIUM CHLORIDE, SODIUM LACTATE, POTASSIUM CHLORIDE, CALCIUM CHLORIDE 600; 310; 30; 20 MG/100ML; MG/100ML; MG/100ML; MG/100ML
75 INJECTION, SOLUTION INTRAVENOUS CONTINUOUS
Status: CANCELLED
Start: 2023-04-03

## 2023-04-03 RX ORDER — HEPARIN SODIUM 1000 [USP'U]/ML
INJECTION, SOLUTION INTRAVENOUS; SUBCUTANEOUS AS NEEDED
Status: DISCONTINUED
Start: 2023-04-03 | End: 2023-04-03 | Stop reason: HOSPADM

## 2023-04-03 RX ORDER — CHLORHEXIDINE GLUCONATE 1.2 MG/ML
10 RINSE ORAL EVERY 12 HOURS
Status: DISCONTINUED
Start: 2023-04-03 | End: 2023-04-05

## 2023-04-03 RX ORDER — LIDOCAINE HYDROCHLORIDE 20 MG/ML
INJECTION, SOLUTION EPIDURAL; INFILTRATION; INTRACAUDAL; PERINEURAL AS NEEDED
Status: DISCONTINUED
Start: 2023-04-03 | End: 2023-04-03 | Stop reason: HOSPADM

## 2023-04-03 RX ORDER — HEPARIN SODIUM 1000 [USP'U]/ML
2000 INJECTION, SOLUTION INTRAVENOUS; SUBCUTANEOUS ONCE
Status: COMPLETED | OUTPATIENT
Start: 2023-04-03 | End: 2023-04-03

## 2023-04-03 RX ORDER — LIDOCAINE HYDROCHLORIDE 10 MG/ML
0.1 INJECTION, SOLUTION EPIDURAL; INFILTRATION; INTRACAUDAL; PERINEURAL AS NEEDED
Status: DISCONTINUED | OUTPATIENT
Start: 2023-04-03 | End: 2023-04-03

## 2023-04-03 RX ORDER — AMOXICILLIN 250 MG
1 CAPSULE ORAL 2 TIMES DAILY
Status: DISPENSED
Start: 2023-04-04

## 2023-04-03 RX ORDER — POLYETHYLENE GLYCOL 3350 17 G/17G
17 POWDER, FOR SOLUTION ORAL DAILY
Status: DISPENSED
Start: 2023-04-04

## 2023-04-03 RX ORDER — SODIUM CHLORIDE 9 MG/ML
50 INJECTION, SOLUTION INTRAVENOUS CONTINUOUS
Status: DISCONTINUED | OUTPATIENT
Start: 2023-04-03 | End: 2023-04-03

## 2023-04-03 RX ORDER — PHENYLEPHRINE HCL IN 0.9% NACL 0.4MG/10ML
SYRINGE (ML) INTRAVENOUS AS NEEDED
Status: DISCONTINUED
Start: 2023-04-03 | End: 2023-04-03 | Stop reason: HOSPADM

## 2023-04-03 RX ORDER — SODIUM CHLORIDE 0.9 % (FLUSH) 0.9 %
5-40 SYRINGE (ML) INJECTION AS NEEDED
Status: DISCONTINUED
Start: 2023-04-03 | End: 2023-04-05

## 2023-04-03 RX ORDER — INSULIN GLARGINE 100 [IU]/ML
1-50 INJECTION, SOLUTION SUBCUTANEOUS
Status: DISCONTINUED
Start: 2023-04-03 | End: 2023-04-05

## 2023-04-03 RX ORDER — OXYCODONE HYDROCHLORIDE 5 MG/1
5 TABLET ORAL
Status: DISCONTINUED
Start: 2023-04-03 | End: 2023-04-04

## 2023-04-03 RX ORDER — ONDANSETRON 2 MG/ML
4 INJECTION INTRAMUSCULAR; INTRAVENOUS AS NEEDED
Status: CANCELLED
Start: 2023-04-03

## 2023-04-03 RX ORDER — MAGNESIUM SULFATE 1 G/100ML
1 INJECTION INTRAVENOUS AS NEEDED
Status: DISCONTINUED
Start: 2023-04-03 | End: 2023-04-05

## 2023-04-03 RX ORDER — PAPAVERINE HYDROCHLORIDE 30 MG/ML
10 INJECTION INTRAMUSCULAR; INTRAVENOUS ONCE
Status: COMPLETED | OUTPATIENT
Start: 2023-04-03 | End: 2023-04-03

## 2023-04-03 RX ORDER — MIDAZOLAM HYDROCHLORIDE 1 MG/ML
1 INJECTION, SOLUTION INTRAMUSCULAR; INTRAVENOUS AS NEEDED
Status: DISCONTINUED | OUTPATIENT
Start: 2023-04-03 | End: 2023-04-03

## 2023-04-03 RX ORDER — BACITRACIN 500 UNIT/G
1 PACKET (EA) TOPICAL AS NEEDED
Status: DISCONTINUED
Start: 2023-04-03 | End: 2023-04-05

## 2023-04-03 RX ORDER — FACIAL-BODY WIPES
10 EACH TOPICAL DAILY PRN
Status: DISCONTINUED
Start: 2023-04-03 | End: 2023-04-05

## 2023-04-03 RX ORDER — OXYCODONE AND ACETAMINOPHEN 5; 325 MG/1; MG/1
1 TABLET ORAL AS NEEDED
Status: CANCELLED
Start: 2023-04-03

## 2023-04-03 RX ORDER — SODIUM CHLORIDE 0.9 % (FLUSH) 0.9 %
5-40 SYRINGE (ML) INJECTION EVERY 8 HOURS
Status: DISCONTINUED | OUTPATIENT
Start: 2023-04-03 | End: 2023-04-03

## 2023-04-03 RX ORDER — DEXMEDETOMIDINE HYDROCHLORIDE 4 UG/ML
.1-1.5 INJECTION, SOLUTION INTRAVENOUS
Status: DISCONTINUED
Start: 2023-04-03 | End: 2023-04-04

## 2023-04-03 RX ORDER — SODIUM CHLORIDE, SODIUM LACTATE, POTASSIUM CHLORIDE, CALCIUM CHLORIDE 600; 310; 30; 20 MG/100ML; MG/100ML; MG/100ML; MG/100ML
INJECTION, SOLUTION INTRAVENOUS
Status: DISCONTINUED
Start: 2023-04-03 | End: 2023-04-03 | Stop reason: HOSPADM

## 2023-04-03 RX ORDER — NALOXONE HYDROCHLORIDE 0.4 MG/ML
0.4 INJECTION, SOLUTION INTRAMUSCULAR; INTRAVENOUS; SUBCUTANEOUS AS NEEDED
Status: DISCONTINUED
Start: 2023-04-03 | End: 2023-04-05

## 2023-04-03 RX ORDER — HYDROMORPHONE HYDROCHLORIDE 1 MG/ML
0.2 INJECTION, SOLUTION INTRAMUSCULAR; INTRAVENOUS; SUBCUTANEOUS
Status: CANCELLED
Start: 2023-04-03

## 2023-04-03 RX ORDER — HYDROMORPHONE HYDROCHLORIDE 1 MG/ML
0.5 INJECTION, SOLUTION INTRAMUSCULAR; INTRAVENOUS; SUBCUTANEOUS
Status: DISCONTINUED
Start: 2023-04-03 | End: 2023-04-03

## 2023-04-03 RX ORDER — SODIUM CHLORIDE 450 MG/100ML
10 INJECTION, SOLUTION INTRAVENOUS CONTINUOUS
Status: DISCONTINUED
Start: 2023-04-03 | End: 2023-04-05

## 2023-04-03 RX ORDER — SODIUM CHLORIDE 0.9 % (FLUSH) 0.9 %
5-40 SYRINGE (ML) INJECTION EVERY 8 HOURS
Status: DISCONTINUED
Start: 2023-04-03 | End: 2023-04-05

## 2023-04-03 RX ORDER — INSULIN LISPRO 100 [IU]/ML
INJECTION, SOLUTION INTRAVENOUS; SUBCUTANEOUS
Status: DISCONTINUED
Start: 2023-04-03 | End: 2023-04-04

## 2023-04-03 RX ORDER — HYDROMORPHONE HYDROCHLORIDE 1 MG/ML
1 INJECTION, SOLUTION INTRAMUSCULAR; INTRAVENOUS; SUBCUTANEOUS
Status: DISCONTINUED
Start: 2023-04-03 | End: 2023-04-05

## 2023-04-03 RX ORDER — GUAIFENESIN 100 MG/5ML
81 LIQUID (ML) ORAL DAILY
Status: DISPENSED
Start: 2023-04-04

## 2023-04-03 RX ORDER — LANOLIN ALCOHOL/MO/W.PET/CERES
400 CREAM (GRAM) TOPICAL 2 TIMES DAILY
Status: DISCONTINUED
Start: 2023-04-04 | End: 2023-04-05

## 2023-04-03 RX ORDER — ALBUTEROL SULFATE 0.83 MG/ML
2.5 SOLUTION RESPIRATORY (INHALATION)
Status: DISCONTINUED
Start: 2023-04-03 | End: 2023-04-05

## 2023-04-03 RX ORDER — PHENYLEPHRINE HYDROCHLORIDE 10 MG/ML
INJECTION INTRAVENOUS
Status: DISCONTINUED
Start: 2023-04-03 | End: 2023-04-03 | Stop reason: HOSPADM

## 2023-04-03 RX ORDER — DIPHENHYDRAMINE HYDROCHLORIDE 50 MG/ML
12.5 INJECTION, SOLUTION INTRAMUSCULAR; INTRAVENOUS AS NEEDED
Status: CANCELLED
Start: 2023-04-03 | End: 2023-04-03

## 2023-04-03 RX ORDER — MIDAZOLAM HYDROCHLORIDE 1 MG/ML
0.5 INJECTION, SOLUTION INTRAMUSCULAR; INTRAVENOUS
Status: CANCELLED
Start: 2023-04-03

## 2023-04-03 RX ORDER — SODIUM CHLORIDE, SODIUM LACTATE, POTASSIUM CHLORIDE, CALCIUM CHLORIDE 600; 310; 30; 20 MG/100ML; MG/100ML; MG/100ML; MG/100ML
75 INJECTION, SOLUTION INTRAVENOUS CONTINUOUS
Status: DISCONTINUED | OUTPATIENT
Start: 2023-04-03 | End: 2023-04-03

## 2023-04-03 RX ORDER — FAMOTIDINE 20 MG/1
20 TABLET, FILM COATED ORAL EVERY 12 HOURS
Status: DISPENSED
Start: 2023-04-04

## 2023-04-03 RX ORDER — POTASSIUM CHLORIDE 29.8 MG/ML
20 INJECTION INTRAVENOUS ONCE
Status: COMPLETED
Start: 2023-04-03 | End: 2023-04-03

## 2023-04-03 RX ORDER — PROTAMINE SULFATE 10 MG/ML
INJECTION, SOLUTION INTRAVENOUS AS NEEDED
Status: DISCONTINUED
Start: 2023-04-03 | End: 2023-04-03 | Stop reason: HOSPADM

## 2023-04-03 RX ORDER — FENTANYL CITRATE 50 UG/ML
25 INJECTION, SOLUTION INTRAMUSCULAR; INTRAVENOUS
Status: CANCELLED
Start: 2023-04-03

## 2023-04-03 RX ORDER — SODIUM CHLORIDE 0.9 % (FLUSH) 0.9 %
5-40 SYRINGE (ML) INJECTION AS NEEDED
Status: DISCONTINUED | OUTPATIENT
Start: 2023-04-03 | End: 2023-04-03

## 2023-04-03 RX ORDER — PROPOFOL 10 MG/ML
INJECTION, EMULSION INTRAVENOUS AS NEEDED
Status: DISCONTINUED
Start: 2023-04-03 | End: 2023-04-03 | Stop reason: HOSPADM

## 2023-04-03 RX ORDER — MORPHINE SULFATE 4 MG/ML
4 INJECTION INTRAVENOUS
Status: DISCONTINUED | OUTPATIENT
Start: 2023-04-03 | End: 2023-04-03

## 2023-04-03 RX ORDER — ALBUMIN HUMAN 50 G/1000ML
12.5 SOLUTION INTRAVENOUS
Status: COMPLETED | OUTPATIENT
Start: 2023-04-03 | End: 2023-04-03

## 2023-04-03 RX ORDER — VANCOMYCIN/0.9 % SOD CHLORIDE 1.5G/250ML
1500 PLASTIC BAG, INJECTION (ML) INTRAVENOUS ONCE
Status: COMPLETED | OUTPATIENT
Start: 2023-04-03 | End: 2023-04-03

## 2023-04-03 RX ORDER — SODIUM CHLORIDE 0.9 % (FLUSH) 0.9 %
5-40 SYRINGE (ML) INJECTION AS NEEDED
Status: CANCELLED
Start: 2023-04-03

## 2023-04-03 RX ORDER — IBUPROFEN 200 MG
4 TABLET ORAL AS NEEDED
Status: ACTIVE
Start: 2023-04-03

## 2023-04-03 RX ORDER — MORPHINE SULFATE 4 MG/ML
4 INJECTION INTRAVENOUS
Status: DISCONTINUED | OUTPATIENT
Start: 2023-04-03 | End: 2023-04-03 | Stop reason: SDUPTHER

## 2023-04-03 RX ORDER — SODIUM CHLORIDE 9 MG/ML
INJECTION, SOLUTION INTRAVENOUS
Status: DISCONTINUED
Start: 2023-04-03 | End: 2023-04-03 | Stop reason: HOSPADM

## 2023-04-03 RX ORDER — AMIODARONE HYDROCHLORIDE 200 MG/1
400 TABLET ORAL EVERY 12 HOURS
Status: DISPENSED
Start: 2023-04-04

## 2023-04-03 RX ORDER — ROCURONIUM BROMIDE 10 MG/ML
INJECTION, SOLUTION INTRAVENOUS AS NEEDED
Status: DISCONTINUED
Start: 2023-04-03 | End: 2023-04-03 | Stop reason: HOSPADM

## 2023-04-03 RX ORDER — NEOSTIGMINE METHYLSULFATE 1 MG/ML
INJECTION, SOLUTION INTRAVENOUS AS NEEDED
Status: DISCONTINUED
Start: 2023-04-03 | End: 2023-04-03 | Stop reason: HOSPADM

## 2023-04-03 RX ORDER — SODIUM CHLORIDE 9 MG/ML
9 INJECTION, SOLUTION INTRAVENOUS CONTINUOUS
Status: DISCONTINUED
Start: 2023-04-03 | End: 2023-04-05

## 2023-04-03 RX ORDER — SUCCINYLCHOLINE CHLORIDE 20 MG/ML
INJECTION INTRAMUSCULAR; INTRAVENOUS AS NEEDED
Status: DISCONTINUED
Start: 2023-04-03 | End: 2023-04-03 | Stop reason: HOSPADM

## 2023-04-03 RX ORDER — FENTANYL CITRATE 50 UG/ML
INJECTION, SOLUTION INTRAMUSCULAR; INTRAVENOUS AS NEEDED
Status: DISCONTINUED
Start: 2023-04-03 | End: 2023-04-03 | Stop reason: HOSPADM

## 2023-04-03 RX ORDER — HYDROMORPHONE HYDROCHLORIDE 1 MG/ML
0.5 INJECTION, SOLUTION INTRAMUSCULAR; INTRAVENOUS; SUBCUTANEOUS
Status: DISCONTINUED
Start: 2023-04-03 | End: 2023-04-04

## 2023-04-03 RX ORDER — MIDAZOLAM HYDROCHLORIDE 1 MG/ML
1 INJECTION, SOLUTION INTRAMUSCULAR; INTRAVENOUS
Status: DISCONTINUED
Start: 2023-04-03 | End: 2023-04-04

## 2023-04-03 RX ORDER — DESMOPRESSIN ACETATE 4 UG/ML
INJECTION, SOLUTION INTRAVENOUS; SUBCUTANEOUS AS NEEDED
Status: DISCONTINUED
Start: 2023-04-03 | End: 2023-04-03 | Stop reason: HOSPADM

## 2023-04-03 RX ORDER — OXYCODONE HYDROCHLORIDE 5 MG/1
10 TABLET ORAL
Status: DISCONTINUED
Start: 2023-04-03 | End: 2023-04-04

## 2023-04-03 RX ORDER — ACETAMINOPHEN 325 MG/1
975 TABLET ORAL EVERY 6 HOURS
Status: DISPENSED
Start: 2023-04-03 | End: 2023-04-04

## 2023-04-03 RX ADMIN — DESMOPRESSIN ACETATE 29 MCG: 4 INJECTION, SOLUTION INTRAVENOUS; SUBCUTANEOUS at 11:12

## 2023-04-03 RX ADMIN — HEPARIN SODIUM 2000 UNITS: 1000 INJECTION, SOLUTION INTRAVENOUS; SUBCUTANEOUS at 08:19

## 2023-04-03 RX ADMIN — ROCURONIUM BROMIDE 10 MG: 10 INJECTION INTRAVENOUS at 07:43

## 2023-04-03 RX ADMIN — PROPOFOL 25 MG: 10 INJECTION, EMULSION INTRAVENOUS at 11:05

## 2023-04-03 RX ADMIN — DEXMEDETOMIDINE HYDROCHLORIDE 0.2 MCG/KG/HR: 4 INJECTION, SOLUTION INTRAVENOUS at 16:37

## 2023-04-03 RX ADMIN — PHENYLEPHRINE HYDROCHLORIDE 40 MCG/KG/MIN: 10 INJECTION INTRAVENOUS at 08:35

## 2023-04-03 RX ADMIN — PROPOFOL 150 MG: 10 INJECTION, EMULSION INTRAVENOUS at 07:43

## 2023-04-03 RX ADMIN — PROPOFOL 25 MG: 10 INJECTION, EMULSION INTRAVENOUS at 09:20

## 2023-04-03 RX ADMIN — PROPOFOL 100 MG: 10 INJECTION, EMULSION INTRAVENOUS at 08:57

## 2023-04-03 RX ADMIN — OXYCODONE 10 MG: 5 TABLET ORAL at 21:43

## 2023-04-03 RX ADMIN — CHLORHEXIDINE GLUCONATE 10 ML: 1.2 RINSE ORAL at 21:44

## 2023-04-03 RX ADMIN — ATORVASTATIN CALCIUM 80 MG: 40 TABLET, FILM COATED ORAL at 21:44

## 2023-04-03 RX ADMIN — PROPOFOL 50 MG: 10 INJECTION, EMULSION INTRAVENOUS at 08:24

## 2023-04-03 RX ADMIN — ALBUMIN (HUMAN) 12.5 G: 12.5 INJECTION, SOLUTION INTRAVENOUS at 16:24

## 2023-04-03 RX ADMIN — HYDROMORPHONE HYDROCHLORIDE 1 MG: 1 INJECTION, SOLUTION INTRAMUSCULAR; INTRAVENOUS; SUBCUTANEOUS at 17:44

## 2023-04-03 RX ADMIN — FENTANYL CITRATE 150 MCG: 50 INJECTION, SOLUTION INTRAMUSCULAR; INTRAVENOUS at 07:55

## 2023-04-03 RX ADMIN — ROCURONIUM BROMIDE 20 MG: 10 INJECTION INTRAVENOUS at 09:29

## 2023-04-03 RX ADMIN — HEPARIN SODIUM 35000 UNITS: 1000 INJECTION, SOLUTION INTRAVENOUS; SUBCUTANEOUS at 09:05

## 2023-04-03 RX ADMIN — ESMOLOL HYDROCHLORIDE 30 MG: 10 INJECTION, SOLUTION INTRAVENOUS at 08:19

## 2023-04-03 RX ADMIN — Medication 100 MCG: at 09:12

## 2023-04-03 RX ADMIN — ALBUMIN (HUMAN) 12.5 G: 12.5 INJECTION, SOLUTION INTRAVENOUS at 15:03

## 2023-04-03 RX ADMIN — ACETAMINOPHEN 975 MG: 325 TABLET, FILM COATED ORAL at 17:46

## 2023-04-03 RX ADMIN — FENTANYL CITRATE 100 MCG/HR: 50 INJECTION, SOLUTION INTRAMUSCULAR; INTRAVENOUS at 07:53

## 2023-04-03 RX ADMIN — ROCURONIUM BROMIDE 20 MG: 10 INJECTION INTRAVENOUS at 08:45

## 2023-04-03 RX ADMIN — MUPIROCIN: 20 OINTMENT TOPICAL at 17:51

## 2023-04-03 RX ADMIN — ALBUMIN (HUMAN) 12.5 G: 12.5 INJECTION, SOLUTION INTRAVENOUS at 13:19

## 2023-04-03 RX ADMIN — Medication 40 MCG: at 11:15

## 2023-04-03 RX ADMIN — POTASSIUM CHLORIDE 20 MEQ: 400 INJECTION, SOLUTION INTRAVENOUS at 14:02

## 2023-04-03 RX ADMIN — CEFAZOLIN 2 G: 1 INJECTION, POWDER, FOR SOLUTION INTRAMUSCULAR; INTRAVENOUS at 18:36

## 2023-04-03 RX ADMIN — MIDAZOLAM 2 MG: 1 INJECTION INTRAMUSCULAR; INTRAVENOUS at 06:45

## 2023-04-03 RX ADMIN — Medication 2 UNITS/HR: at 08:23

## 2023-04-03 RX ADMIN — SODIUM CHLORIDE, POTASSIUM CHLORIDE, SODIUM LACTATE AND CALCIUM CHLORIDE 75 ML/HR: 600; 310; 30; 20 INJECTION, SOLUTION INTRAVENOUS at 06:48

## 2023-04-03 RX ADMIN — SUCCINYLCHOLINE CHLORIDE 200 MG: 20 INJECTION, SOLUTION INTRAMUSCULAR; INTRAVENOUS at 07:44

## 2023-04-03 RX ADMIN — SODIUM CHLORIDE, PRESERVATIVE FREE 10 ML: 5 INJECTION INTRAVENOUS at 13:22

## 2023-04-03 RX ADMIN — LIDOCAINE HYDROCHLORIDE 60 MG: 20 INJECTION, SOLUTION EPIDURAL; INFILTRATION; INTRACAUDAL; PERINEURAL at 07:43

## 2023-04-03 RX ADMIN — ESMOLOL HYDROCHLORIDE 40 MG: 10 INJECTION, SOLUTION INTRAVENOUS at 08:26

## 2023-04-03 RX ADMIN — SODIUM CHLORIDE, PRESERVATIVE FREE 20 MG: 5 INJECTION INTRAVENOUS at 13:21

## 2023-04-03 RX ADMIN — PROTAMINE SULFATE 400 MG: 10 INJECTION, SOLUTION INTRAVENOUS at 11:02

## 2023-04-03 RX ADMIN — MUPIROCIN: 20 OINTMENT TOPICAL at 13:21

## 2023-04-03 RX ADMIN — FENTANYL CITRATE 50 MCG: 50 INJECTION INTRAMUSCULAR; INTRAVENOUS at 06:45

## 2023-04-03 RX ADMIN — ROCURONIUM BROMIDE 10 MG: 10 INJECTION INTRAVENOUS at 10:23

## 2023-04-03 RX ADMIN — MORPHINE SULFATE 4 MG: 4 INJECTION INTRAVENOUS at 14:52

## 2023-04-03 RX ADMIN — GLYCOPYRROLATE 0.4 MG: 0.2 INJECTION INTRAMUSCULAR; INTRAVENOUS at 12:41

## 2023-04-03 RX ADMIN — MORPHINE SULFATE 2 MG: 4 INJECTION INTRAVENOUS at 16:02

## 2023-04-03 RX ADMIN — SODIUM CHLORIDE, PRESERVATIVE FREE 10 ML: 5 INJECTION INTRAVENOUS at 21:45

## 2023-04-03 RX ADMIN — SODIUM CHLORIDE, PRESERVATIVE FREE 20 MG: 5 INJECTION INTRAVENOUS at 21:44

## 2023-04-03 RX ADMIN — SODIUM CHLORIDE 9 ML/HR: 9 INJECTION, SOLUTION INTRAVENOUS at 12:35

## 2023-04-03 RX ADMIN — AMINOCAPROIC ACID 10 G/HR: 250 INJECTION, SOLUTION INTRAVENOUS at 08:27

## 2023-04-03 RX ADMIN — SODIUM CHLORIDE, POTASSIUM CHLORIDE, SODIUM LACTATE AND CALCIUM CHLORIDE: 600; 310; 30; 20 INJECTION, SOLUTION INTRAVENOUS at 07:28

## 2023-04-03 RX ADMIN — PROPOFOL 75 MG: 10 INJECTION, EMULSION INTRAVENOUS at 11:49

## 2023-04-03 RX ADMIN — PROPOFOL 50 MG: 10 INJECTION, EMULSION INTRAVENOUS at 08:28

## 2023-04-03 RX ADMIN — Medication 3 MG: at 12:41

## 2023-04-03 RX ADMIN — Medication 3 UNITS: at 10:52

## 2023-04-03 RX ADMIN — ESMOLOL HYDROCHLORIDE 30 MG: 10 INJECTION, SOLUTION INTRAVENOUS at 08:27

## 2023-04-03 RX ADMIN — Medication 80 MCG: at 11:45

## 2023-04-03 RX ADMIN — CEFAZOLIN 2 G: 1 INJECTION, POWDER, FOR SOLUTION INTRAMUSCULAR; INTRAVENOUS at 13:22

## 2023-04-03 RX ADMIN — CHLORHEXIDINE GLUCONATE 10 ML: 1.2 RINSE ORAL at 13:21

## 2023-04-03 RX ADMIN — DEXMEDETOMIDINE HYDROCHLORIDE 0.6 MCG/KG/HR: 4 INJECTION, SOLUTION INTRAVENOUS at 12:35

## 2023-04-03 RX ADMIN — VANCOMYCIN HYDROCHLORIDE 1500 MG: 10 INJECTION, POWDER, LYOPHILIZED, FOR SOLUTION INTRAVENOUS at 07:11

## 2023-04-03 RX ADMIN — PROPOFOL 75 MG: 10 INJECTION, EMULSION INTRAVENOUS at 11:42

## 2023-04-03 RX ADMIN — SODIUM CHLORIDE 10 ML/HR: 4.5 INJECTION, SOLUTION INTRAVENOUS at 12:35

## 2023-04-03 RX ADMIN — SODIUM CHLORIDE 40 MCG/MIN: 9 INJECTION, SOLUTION INTRAVENOUS at 11:33

## 2023-04-03 RX ADMIN — FENTANYL CITRATE 100 MCG: 50 INJECTION, SOLUTION INTRAMUSCULAR; INTRAVENOUS at 07:37

## 2023-04-03 RX ADMIN — PROPOFOL 50 MG: 10 INJECTION, EMULSION INTRAVENOUS at 07:44

## 2023-04-03 RX ADMIN — SODIUM CHLORIDE: 900 INJECTION, SOLUTION INTRAVENOUS at 07:28

## 2023-04-03 RX ADMIN — ROCURONIUM BROMIDE 40 MG: 10 INJECTION INTRAVENOUS at 07:55

## 2023-04-03 RX ADMIN — Medication 50 MCG: at 11:51

## 2023-04-03 NOTE — BRIEF OP NOTE
BRIEF OP NOTE  Pre-Op Diagnosis: CAD    Post-Op Diagnosis: CAD      Procedure: Procedure(s):  CABG x 3 with CPB. Donor sites: [place] and Right Saphenous vein via [address]; Brendan and EpiAortic U/S by Dr. Trace Bragg. LIMA-LAD, RSVG-Circ, RSVG-PDA    EVH Time:     Surgeon: Radu Edge MD    Assistant(s): Josseline Quintero PA-C    Anesthesia: General     Infusions: Amiodarone, precedex, insulin, Jenaro    Estimated Blood Loss:750 ml    Cell Saver:550 ml    Specimens: * No specimens in log *    Drains and pacing wires: 2 atrial wires, 1 bipolar ventricular wire, 3 marko drains    Complications: none    Findings: See full operative note.     Implants: * No implants in log *

## 2023-04-03 NOTE — PROGRESS NOTES
Occupational Therapy  4/3/2023    OT referral received. Pt is POD 0 CABG. Will follow up tomorrow for OT assessment pending medical stability. Thank you Mindi Heller MS, OTR/L

## 2023-04-03 NOTE — PROGRESS NOTES
I was contacted by nuclear technologist Joe Gutierrez regarding whether or not the patient still needs a VQ scan. We initially ordered a D-dimer due to elevated troponin, prior to the finding of three-vessel coronary artery disease. Now, I believe his mildly elevated D-dimer is nonspecific and that his troponin is due to his severe three-vessel coronary disease. I do not believe he needs a VQ scan at this time. Order canceled. I will notify the physicians assistant taking care of of the patient as well.

## 2023-04-03 NOTE — PROGRESS NOTES
TRANSFER - IN REPORT:    Verbal report received from KHUSHI VALADEZ(name) on Russ Ventura  being received from OR(unit) for routine post - op      Report consisted of patients Situation, Background, Assessment and   Recommendations(SBAR). Information from the following report(s) SBAR, Kardex, OR Summary, Procedure Summary, Intake/Output, MAR, Accordion, Recent Results, and Cardiac Rhythm    was reviewed with the receiving nurse. Opportunity for questions and clarification was provided. Assessment completed upon patients arrival to unit and care assumed.   ________________________________  OR OUT: 1228    Patient arrived to CVICU. Assessment completed and drips verified. BUE restraints applied. [place] @ 47cm, ETT @ 24cm (lip), no OG present. Blood pressures continue to vary with MAPs 50-90s. Jenaro titrated on and off accordingly. Extubate as clinically appropriate per RORY Ramos.    9631 ABG: FiO2 decreased to 60% per Intensivist.   Lab Results   Component Value Date/Time    PHI 7.33 (L) 04/03/2023 12:45 PM    PCO2I 49.6 (H) 04/03/2023 12:45 PM    PO2I 184 (H) 04/03/2023 12:45 PM    HCO3I 25.9 04/03/2023 12:45 PM     1315 CXR at bedside- results reviewed by RORY Le. 1319 1 IV albumin given for MAPs 60s. 1330 Oxygen saturations at 89%. Patient suctioned via ETT with no sputum present. Intensivist informed at bedside. Vent settings changed to SIMV, PEEP 10/FiO2 90%. RN to titrate FiO2 to maintain oxygen saturation of at least 92%. 1355 FiO2 weaned to 80%. Oxygen saturation 94%. Potassium resulted at 3.9- electrolyte replacement protocol followed. 1411 Sedation weaning initiated. [id number] Felipe Joiner,[place] 200 Dr Melony Kwok at bedside- Ventilator settings updated- Half rate/60% FiO2. PEEP to remain at 10 per Intensivist.    1452 Patient thrashing around bed; no command following at this time. 4mg IV PRN morphine administered. 1500 Patient now following simple commands; able to move all extremities to command.     1525  Blade at bedside- updated on patient condition. Continue plan of care. 1555 OK to extubate per Intensivist. ABG:    Lab Results   Component Value Date/Time    PHI 7.34 (L) 04/03/2023 03:48 PM    PCO2I 44.5 04/03/2023 03:48 PM    PO2I 121 (H) 04/03/2023 03:48 PM    HCO3I 24.2 04/03/2023 03:48 PM     1557 PATIENT EXTUBATED TO 4L NASAL CANNULA. Patient able to move all extremities to command; complains of numbness/tingling in BLE; patient reports this is his baseline neuropathy. BUE restraints removed. 2mg IV morphine given per MD for pain. New orders placed for 0.5mg IV dilaudid q3. RN to give another dose of IV albumin. 1640 Patient intrinsic HR is competing with pacemaker. Pacer settings modified to AAI 80/20. Patients rate at 86, NSR.    1730 Patient able to swallow with no difficulty. Scheduled PO tylenol given crushed in applesauce. 1930 Bedside shift change report given to [address] [place] [address] (oncoming nurse) by Tom Denis RN (offgoing nurse). Report included the following information SBAR, Kardex, ED Summary, OR Summary, Procedure Summary, Intake/Output, MAR, Accordion, Recent Results, and Cardiac Rhythm NSR .

## 2023-04-03 NOTE — PROGRESS NOTES
1930: Bedside and Verbal shift change report [facility]FLOYD Poole (oncoming nurse) by Tomasz Aden, [address], RN (offgoing nurse). Report included the following information SBAR, Kardex, Intake/Output, MAR, Recent Results, and Cardiac Rhythm Normal Sinus . 36: TRANSFER - OUT REPORT:    Verbal report given to FLOYD Stauffer(name) on Naye [place]  being transferred to [facility]) for ordered procedure       Report consisted of patients Situation, Background, Assessment and   Recommendations(SBAR). Information from the following report(s) SBAR, Kardex, Intake/Output, MAR, Recent Results, and Cardiac Rhythm Normal Sinus  was reviewed with the receiving nurse. Lines:   Peripheral IV 03/31/23 Right Antecubital (Active)   Site Assessment Clean, dry, & intact 04/02/23 2009   Phlebitis Assessment 0 04/02/23 2009   Infiltration Assessment 0 04/02/23 2009   Dressing Status Clean, dry, & intact 04/02/23 2009   Dressing Type Tape;Transparent 04/02/23 2009   Hub Color/Line Status Pink; Infusing;Flushed;Capped 04/02/23 2009   Action Taken Open ports on tubing capped 04/02/23 2009   Alcohol Cap Used Yes 04/02/23 2009        Opportunity for questions and clarification was provided. Patient transported with:   Monitor  Registered Nurse  Tech      Care Plan:   Problem: Diabetes Self-Management  Goal: *Disease process and treatment process  Description: Define diabetes and identify own type of diabetes; list 3 options for treating diabetes. Outcome: Progressing Towards Goal  Goal: *Incorporating nutritional management into lifestyle  Description: Describe effect of type, amount and timing of food on blood glucose; list 3 methods for planning meals. Outcome: Progressing Towards Goal  Goal: *Incorporating physical activity into lifestyle  Description: State effect of exercise on blood glucose levels.   Outcome: Progressing Towards Goal  Goal: *Developing strategies to promote health/change behavior  Description: Define the ABC's of diabetes; identify appropriate screenings, schedule and personal plan for screenings. Outcome: Progressing Towards Goal  Goal: *Using medications safely  Description: State effect of diabetes medications on diabetes; name diabetes medication taking, action and side effects. Outcome: Progressing Towards Goal  Goal: *Monitoring blood glucose, interpreting and using results  Description: Identify recommended blood glucose targets  and personal targets. Outcome: Progressing Towards Goal  Goal: *Prevention, detection, treatment of acute complications  Description: List symptoms of hyper- and hypoglycemia; describe how to treat low blood sugar and actions for lowering  high blood glucose level. Outcome: Progressing Towards Goal  Goal: *Prevention, detection and treatment of chronic complications  Description: Define the natural course of diabetes and describe the relationship of blood glucose levels to long term complications of diabetes. Outcome: Progressing Towards Goal  Goal: *Developing strategies to address psychosocial issues  Description: Describe feelings about living with diabetes; identify support needed and support network  Outcome: Progressing Towards Goal  Goal: *Insulin pump training  Outcome: Progressing Towards Goal  Goal: *Sick day guidelines  Outcome: Progressing Towards Goal  Goal: *Patient Specific Goal (EDIT GOAL, INSERT TEXT)  Outcome: Progressing Towards Goal     Problem: Patient Education: Go to Patient Education Activity  Goal: Patient/Family Education  Outcome: Progressing Towards Goal     Problem: Falls - Risk of  Goal: *Absence of Falls  Description: Document Maggie Fall Risk and appropriate interventions in the flowsheet.   Outcome: Progressing Towards Goal  Note: Fall Risk Interventions:     Problem: Patient Education: Go to Patient Education Activity  Goal: Patient/Family Education  Outcome: Progressing Towards Goal     Problem: Patient Education: Go to Patient Education Activity  Goal: Patient/Family Education  Outcome: Progressing Towards Goal     Problem: Unstable angina/NSTEMI: Day 2  Goal: Off Pathway (Use only if patient is Off Pathway)  Outcome: Progressing Towards Goal  Goal: Activity/Safety  Outcome: Progressing Towards Goal  Goal: Consults, if ordered  Outcome: Progressing Towards Goal  Goal: Diagnostic Test/Procedures  Outcome: Progressing Towards Goal  Goal: Nutrition/Diet  Outcome: Progressing Towards Goal  Goal: Discharge Planning  Outcome: Progressing Towards Goal  Goal: Medications  Outcome: Progressing Towards Goal  Goal: Respiratory  Outcome: Progressing Towards Goal  Goal: Treatments/Interventions/Procedures  Outcome: Progressing Towards Goal  Goal: Psychosocial  Outcome: Progressing Towards Goal  Goal: *Hemodynamically stable  Outcome: Progressing Towards Goal  Goal: *Optimal pain control at patient's stated goal  Outcome: Progressing Towards Goal  Goal: *Lungs clear or at baseline  Outcome: Progressing Towards Goal     Problem: Patient Education: Go to Patient Education Activity  Goal: Patient/Family Education  Outcome: Progressing Towards Goal     Problem: CABG: Pre-Op Day  Goal: Off Pathway (Use only if patient is Off Pathway)  Outcome: Progressing Towards Goal  Goal: Activity/Safety  Outcome: Progressing Towards Goal  Goal: Consults, if ordered  Outcome: Progressing Towards Goal  Goal: Diagnostic Test/Procedures  Outcome: Progressing Towards Goal  Goal: Nutrition/Diet  Outcome: Progressing Towards Goal  Goal: Medications  Outcome: Progressing Towards Goal  Goal: Treatments/Interventions/Procedures  Outcome: Progressing Towards Goal  Goal: Discharge Planning  Outcome: Progressing Towards Goal  Goal: Psychosocial  Outcome: Progressing Towards Goal  Goal: *Hemodynamically stable  Outcome: Progressing Towards Goal  Goal: *Consent obtained, permits and diagnostics complete  Outcome: Progressing Towards Goal

## 2023-04-03 NOTE — PERIOP NOTES
Patient stated his full name, date of birth and procedure in pre-op holding; Pt to OR via bed. With all wheels locked, pt transferred to OR table via slide board by anesthesia and OR staff X 3. Right IJ [place] aspen and Right radial arterial line placed in pre-op holding and intact.

## 2023-04-03 NOTE — CONSULTS
CRITICAL CARE NOTE      Name: Ronna Langford   : 1950   MRN: [id number]   Date: 4/3/2023      REASON FOR ICU ADMISSION:  CAD s/p CABG x3 ( LIMA-LAD, VG- OM2, VG - PDA)    PRINCIPAL ICU DIAGNOSIS   CAD s/p CABG 4/3)  SOB -- deferred testing for PE  HTN   T2DM        HPI   72M with HTN CAD s/p VAL , T2DM prstate [place] 2018 diskectomy , +smoker, presented to [place] [facility] for rash -- later complaining of SOB and chest discomfort. Elevated troponin of 106 on admission. Rash resolved with benadryl, steroid course.  Started on heparin gtt, LHC on 3/31 revealed multivessel disease CABG on 4/3 with Dr Chrissy Mejia + V, DDD  Tubes - 3 Blakes    Pre-Fn   RV - nml  LV - nml 60% EF    Post- Fn  RV - nc  LV - nc    Product  IVF - 1500  RBC - 0  Plt - 0  FFP - 0   CellSaver - 550  EBL - 750      ASSESSMENT & PLAN   Neuro  SAT per protocol  APAP PRN, fever  Precedex    Cardiac  Titrate Pressors to MAP>65  PAC Numbers per protocol  Telemetry  K>4, Mg>2  A+V wires -- bradycardic underlying at close  Amio per protocol  ASA, Statin      Pulmonary  SBT  ABG PRN  HoB>30  IS when extubated  SpO2>92%, titrate to goal    GI  NPO    Renal  Steve  Strict I/O    Endocrine  Insulin gtt per protocol  Goal 140-180    ID  Perioperative Vancomycin (pen allergy)    Heme  SCDs  [address] 48hours post-op    DVT- SCDs  Diet - NPO  Mobility - 0  Dispo - ICU, Fast Track    Code Status - FULL              OBJECTIVE     Labs and Data: Reviewed 23  Medications: Reviewed 23  Imaging: Reviewed 23    Visit Vitals  /77   Pulse 80   Temp 97.3 °F (36.3 °C)   Resp 20   Ht 5' 10\" (1.778 m)   Wt 97.6 kg (215 lb 2.7 oz)   SpO2 93%   BMI 30.87 kg/m²    O2 Flow Rate (L/min): 3 l/min O2 Device: Endotracheal tube, Ventilator Temp (24hrs), Av °F (36.7 °C), Min:97.3 °F (36.3 °C), Max:98.7 °F (37.1 °C)      CVP (mmHg): 14 mmHg (23 1305)      Intake/Output:     Intake/Output Summary (Last 24 hours) at 4/3/2023 1308  Last data filed at 4/3/2023 1210  Gross per 24 hour   Intake 1743.32 ml   Output 2325 ml   Net -581.68 ml         General - sedated, ventilated  HEENT- pupils equal,  anicteric  Neuro - sedated, no focal deficit  CV - RRR  Resp - ETT, CTAB  Abd - soft, nontender, no guarding   - + neri  MSK- intact, no sacral ulcer    All Micro Results       Procedure Component Value Units Date/Time    COVID-19 RAPID TEST [[id number]] Collected: 03/31/23 1018    Order Status: Completed Specimen: Nasopharyngeal Updated: 03/31/23 1049     Specimen source Nasopharyngeal        COVID-19 rapid test Not detected        Comment: Rapid Abbott ID Now       Rapid NAAT:  The specimen is NEGATIVE for SARS-CoV-2, the novel coronavirus associated with COVID-19. Negative results should be treated as presumptive and, if inconsistent with clinical signs and symptoms or necessary for patient management, should be tested with an alternative molecular assay. Negative results do not preclude SARS-CoV-2 infection and should not be used as the sole basis for patient management decisions. This test has been authorized by the FDA under an Emergency Use Authorization (EUA) for use by authorized laboratories. Fact sheet for Healthcare Providers:  http://www.feliberto-teresita.jeovanny/  Fact sheet for Patients: http://www.feliberto-teresita.jeovanny/       Methodology: Isothermal Nucleic Acid Amplification                  Imaging  Key imaging reviewed           CRITICAL CARE DOCUMENTATION  I had a face to face encounter with the patient, reviewed and interpreted patient data including clinical events, labs, images, vital signs, I/O's, and examined patient.   I have discussed the case and the plan and management of the patient's care with the consulting services, the bedside nurses and the respiratory therapist.      NOTE OF PERSONAL INVOLVEMENT IN CARE   This patient has a high probability of imminent, clinically significant deterioration, which requires the highest level of preparedness to intervene urgently. I participated in the decision-making and personally managed or directed the management of the following life and organ supporting interventions that required my frequent assessment to treat or prevent imminent deterioration. I personally spent 30 minutes of critical care time. This is time spent at this critically ill patient's bedside actively involved in patient care as well as the coordination of care. This does not include any procedural time which has been billed separately.     Soila Rockwell DO  Staff Intensivist  [facility]  4/3/2023

## 2023-04-03 NOTE — PROGRESS NOTES
Physical Therapy 4/3/2023    Orders received and chart reviewed up to date. Pt POD 0 CABG. Will follow-up tomorrow for PT evaluation/ as appropriate. Recommend with nursing patient to complete as able in order to maintain strength, endurance and independence: OOB to chair 3x/day. Thank you.   Frankey Canavan, PT, DPT

## 2023-04-03 NOTE — OP NOTES
Coronary Artery Bypass Graft Procedure Note      Pre-operative Diagnosis:                                              Disease of the native coronary arteries                                              NSTEMI                                             Diabetes     Post-operative Diagnosis:  same    Surgeon: Venkat Franz MD    Assistant: Roz Beltran PA-C    Anesthesia: General endotracheal anesthesia    EBL:  See perfusion record    Specimen none    Implants none    Complications none        Operation: Coronary Bypass Grafting   Procedure(s):  CABG x 3 with CPB. LIMA to LAD  SVG to OM 2  SVG to PDA   Donor sites: [facility] and Right Saphenous vein via [address]; Brendan and EpiAortic U/S by Dr. Corey Zurita. Operative Findings: At the time of the procedure there was moderate depression ov LV function . The coronary arteries were diffusely diseased The TAMMI was good quality with good flow. The vein was of good quality. Indications:  See pre op diagnosis     Procedure Details     After informed consent was obtained for the above mentioned procedure, the patient was then taken to the operating room. Appropriate monitoring lines were placed by the Anesthesia Department. A complete surgical timeout was completed. The BRENDAN report was reviewed with anesthesia. After administration of general endotracheal anesthesia, the patient was prepped and draped in the usual sterile fashion. The chest was then entered through a standard mediastinotomy incision while simultaneously harvesting of peripheral conduit was undertaken using endoscopic technique following a bolus of heparin. After harvesting the conduit, it was inspected and noted to be adequate. The epiaortic ultrasound was performed and reviewed. The left internal mammary harvested was then undertaken in pedicle distal pedicle was incised and noted to bleed briskly. TAMMI was prepared with papaverine.   The pericardium was then opened and tacked up into a pericardial well.  The patient was heparinized. Pursestring sutures were then placed into the aorta, the right atrial appendage. The patient was then cannulated through these pursestrings at which point, cardiopulmonary bypass was started. A retrograde coronary sinus canula was placed for cardioplegia administration. A cross-clamp was then applied. Cardioplegia was administered initially and then given intermittently throughout the case. Initial attention was directed at the circumflex vessels. SVG to the OM2 . Attention was then directed to the PDA. SVG to the PDA. The LIMA was used to graft the LAD. The proximal anastomosis of the vein graphs constructed to the ascending aorta. A warm dose of cardioplegia administered. The patient was weaned from bypass. Protamine was administered. The aortic cannula and venous cannula removed. The sites were reinforced. Ventricular and atrial pacing wires were then placed upon the heart, brought out through stab wounds inferiorly, and affixed to the skin. Mediastinal and L pleural chest tubes placed. . Hemostasis was assured, the sternum was reapproximated with heavy gauge stainless steel wire. The midline fascia was subsequently closed separately. Vicryl was then used in a running fashion for subcutaneous tissue and skin. Dressings were then applied to all wounds. The patient was then transported with monitors to the intensive care unit, intubated and ventilated. Physician Assistant  (PA) assistance was required due to the difficulty of the procedure. The PA assisted in the dissection of tissues, identification and exposure of pertinent anatomy including endoscopic harvest of the greater saphenous vein, and sternal closure in the operation to assure optimal patient outcome and safe conduct of the operation.                   Js Martinez MD

## 2023-04-03 NOTE — ANESTHESIA PROCEDURE NOTES
JULIEN        Procedure Details: probe placement, image aquisition & interpretation    Site marked  Risks and benefits discussed with the patient and plans are to proceed    Procedure Note    Performed by: Nicolas Taylor DO  Authorized by: Nicolas Taylor DO     Indications: assessment of surgical repair  Modalities: 2D, CF, CWD, contrast, PWD (3D)  Probe Type: multiplane and epiaortic  Insertion: atraumatic  Patient Status: intubated and sedated  Echocardiographic and Doppler Measurements   Aorta  Size  Diam(cm)  Dissection PlaqueThick(mm)  Plaque Mobile    Ascending normal 3 No 0-3 No    Arch normal 2.7 No 0-3 No    Descending normal 2.5 No 0-3 No          Valves  Annulus  Stenosis  Area/Grad  Regurg  Leaflet   Morph  Leaflet   Motion    Aortic normal none  0 normal normal    Mitral normal none  1+ normal normal    Tricuspid normal none  1+ normal normal          Atria  Size  SEC (smoke)  Thrombus  Tumor  Device    Rt Atrium normal No No No Yes    Lt Atrium normal No  No No     Interatrial Septum Morphology: normal      Ventricle  Cavity Size  Cavity Dimension Hypertrophy  Thrombus  Gloal FXN  EF    RV normal  Yes no normal     LV normal   No normal 60%       Regional Function  (1 = normal, 2 = mildly hypokinetic, 3 = severely hypokinetic, 4 = akinetic, 5 = dyskinetic) LAV - [place]   ME LAV = 0  ME LAV = 90  ME LAV = 130   Basal Sept:1 Basal Ant:1 Basal Post:1   Mid Sept:1 Mid Ant:1 Mid Post:1   Apical Sept:1 Apical Ant:1 Basal Ant Sept:1   Basal Lat:1 Basal Inf:1 Mid Ant Sept:1   Mid Lat:1 Mid Inf:1    Apical Lat:1 Apical Inf:1      Diastolic function: S>D  Pericardium: normal    Post Intervention Follow-up Study  Ventricular Global Function: unchanged  Ventricular Regional Function: unchanged     Valve  Function  Regurgitation  Area    Aortic no change      Mitral no change      Tricuspid no change      Prosthetic        Complications: None  Comments: Pre: Atraumatic placement, results discussed with surgeon    Post: Study performed and documentation provided by Dr Ambrocio Valladares DO. No significant changes noted. Aorta intact after decannulation. Findings discussed with surgeon.

## 2023-04-03 NOTE — PROGRESS NOTES
Problem: CABG: Day of Surgery (Initiate SCIP measures for post-op care)  Goal: Off Pathway (Use only if patient is Off Pathway)  Outcome: Progressing Towards Goal  Goal: Activity/Safety  Outcome: Progressing Towards Goal  Goal: Consults, if ordered  Outcome: Progressing Towards Goal  Goal: Diagnostic Test/Procedures  Outcome: Progressing Towards Goal  Goal: Nutrition/Diet  Outcome: Progressing Towards Goal  Goal: Medications  Outcome: Progressing Towards Goal  Goal: Respiratory  Outcome: Progressing Towards Goal  Goal: Treatments/Interventions/Procedures  Outcome: Progressing Towards Goal  Goal: *Hemodynamically stable (eg: Cardiac output/index; pulmonary arterial pressures; mixed venous oxygen saturation within set parameters)  Outcome: Progressing Towards Goal  Goal: *Lungs clear or at baseline  Outcome: Progressing Towards Goal  Goal: *Stable cardiac rhythm  Outcome: Progressing Towards Goal  Goal: *Afebrile  Outcome: Progressing Towards Goal  Goal: *Follows simple commands post anesthesia  Outcome: Progressing Towards Goal  Goal: *Orients easily following extubation  Outcome: Progressing Towards Goal  Goal: *Optimal pain control at patient's stated goal  Outcome: Progressing Towards Goal

## 2023-04-03 NOTE — ANESTHESIA PROCEDURE NOTES
Central Line Placement    Performed by: Nallely Viera DO  Authorized by: Nallely Viera DO     Indications: central pressure monitoring, need for vasopressors and vascular access  Preanesthetic Checklist: patient identified, risks and benefits discussed, anesthesia consent, site marked, patient being monitored and timeout performed      Pre-procedure: All elements of maximal sterile barrier technique followed?  Yes    2% Chlorhexidine for cutaneous antisepsis, Hand hygiene performed prior to catheter insertion and Ultrasound guidance    Sterile Ultrasound Technique followed?: Yes            Procedure:   Prep:  Chlorhexidine    Orientation:  Right  Patient position:  Trendelenburg  Catheter type:  Double lumen  Catheter size:  8 Fr  Catheter length:  12 cm  Number of attempts:  1  Successful placement: Yes      Assessment:   Post-procedure:  Catheter secured, sterile dressing applied and sterile dressing with CHG applied  Assessment:  Blood return through all ports, free fluid flow and guidewire removal verified  Insertion:  Uncomplicated  Patient tolerance:  Patient tolerated the procedure well with no immediate complications

## 2023-04-03 NOTE — PROGRESS NOTES
ABG result is normal,patient following commands,cuff leak present. patient extubated to nasal cannula,RN @bedside.

## 2023-04-03 NOTE — PROGRESS NOTES
Pt to OR for CABG this morning prior to being seen by Hospitalist team.     Hospitalist team will sign off. Thank you for the opportunity to participate in this patient's care.      Kate Valdivia MD

## 2023-04-03 NOTE — PROGRESS NOTES
Cardiac Surgery Coordinator- Unable to locate Mrs Dionna Villa, placed call to her cell phone. Reviewed plan of care and day of surgery expectations for her and her . She stated she is waiting at home and will be in later today. Will continue to follow and update. 1020- Placed follow up call to Mrs Dionna Villa, provided update from the OR. She stated she will be heading back to the hospital soon    1215- Met with Tigre He's family and Dr. Asmita Madrid. Update given, Encouraged family to verbalize and offered emotional support. Reinforced Surgical waiting room instructions, family to wait in the main surgical waiting room until contacted by the nursing staff. 56- Escorted family to the bedside in CVICU, reviewed post op plan of care and encouraged them to verbalize. Exchanged contact information and offered emotional support. They will be going home for the day. Will continue to follow.

## 2023-04-03 NOTE — DIABETES MGMT
[address] [place] [address]  DIABETES MANAGEMENT CONSULT    Consulted by  Anya Real MD   for advanced nursing evaluation and care for inpatient blood glucose management. Evaluation and Action Plan   Frankey Bowling is a 74year old gentleman, with Type 2 Diabetes on high dose basal/bolus insulin along with metformin, trulicity and farxiga, who is admitted with an NSTEMI. His most recent A1C was 7.4% but on review of his CGM, he does have significant glucose variability mostly surrounding mealtime. He underwent a cardiac cath which revealed severe multivessel CAD now s/p CABG x3. Pre-operatively, due to concerns for an allergic reaction, 125mg methylprednisolone was given in the ED followed by 20mg prednisone x2 days. Moderate dose basal insulin was started, but this alone was not sufficient to override BG pattern prior to surgery. He did transition to an insulin gtt following CABG and this will continue for at least one day. I do suspect a significant rise in insulin needs when PO advances. Management Rationale Action Plan   Continue insulin gtt. Please do not transition off prior to talking with our team.  Will need customized doses    Adjusted to 1 unit humalog to every 6 g CHO with meals while on insulin gtt    Diet advancement per primary team.  When advanced, please include consistent carbohydrate component of diet (60 grams CHO/meal)- this can be added to clear and full liq diets. Initial Presentation   Sherine Page is a 74 y.o. male who presented to [facility] 3/31/23 with a 1 day c/o SOB, chest pain and hives.  In the ED, he was given Aspirin 325 mg x 1, Benadryl 50 mg IV x1, famotidine 20 mg IV x1, Solu-Medrol 125 mg IV x1.  IV heparin drip for non-STEMI and transferred to [facility] for cardiology consultation   LAB: , Trop 106 (repeat 483), BUN 23, Creat 1.36, GFR 55  CXR: No acute process    ED EKG interpretation:  Rhythm: sinus tachycardia; and regular . Rate (approx.): 125; Axis: Right superior axis deviation; P wave: normal; QRS interval: normal ; ST/T wave: non-specific changes; in  Lead: Diffusely; Other findings: abnormal ekg. HX:   Past Medical History:   Diagnosis Date    Arthritis     BACK    BPH (benign prostatic hyperplasia) 2013    CAD (coronary artery disease)     Cancer ([facility]) 2015    SKIN - HEAD AND FACE    Cancer ([facility] [address] 01/2019    PROSTATE    Chronic pain     BACK    Chronic pelvic pain in male     sensitive to touch on the left side down to the left side of the penis    Collagenous colitis 2011    Diabetes mellitus type 2, controlled, with complications ([facility] [address] 8201    Diabetic neuropathy ([facility] [address] 2006    Diverticulosis     ED (erectile dysfunction)     Dr. Matthias Arias    GERD (gastroesophageal reflux disease) 1990    Gout     1 episode    Hypertension 1970    Ill-defined condition     BILATERAL PERIPHERAL NEUROPATHY BOTH LEGS    Low serum testosterone level     Dr. Jasmyn Resendiz    Osteopenia     Dr. Jasmyn Resendiz    Psoriasis     Sleep apnea     USES CPAP    Sun-damaged skin         INITIAL DX:   NSTEMI (non-ST elevated myocardial infarction) ([facility]) [I21.4]  Allergic reaction, initial encounter [T78.40XA]  Tachycardia [R00.0]     Current Treatment     TX: Transfer to [facility]: ASA, Heparin, serial troponin, cardiology consultation        Hospital Course   Clinical progress has been uncomplicated. 3/31: Admission.   Cardiac cath with severe multivessel CAD.  4/3: CABG x3  Diabetes History   Type 2 Diabetes: Diagnosed in 2006  Family History positive for Diabetes: Father , Paternal Grandmother   Ambulatory BG management provided by: Edu Rodríguez MD Endocrinologist at [facility]- Last visit 2/22/23    Diabetes-related Medical History  Neurological complications  Impotence and Peripheral neuropathy  Microvascular disease  Nephropathy  Macrovascular disease  CAD  Other associated conditions     FELICITY, Skin Cancer, Prostate Cancer, Osteoporosis     Diabetes Medication History  Key Antihyperglycemic Medications               metFORMIN ER (GLUCOPHAGE XR) 500 mg tablet TAKE 2 TABLETS BY MOUTH TWICE DAILY FOR 90 DAYS    insulin lispro (HUMALOG KWIKPEN INSULIN SC) by SubCUTAneous route. Up to 40 units SQ 3 times daily with meals. States he usually injects 40 units 3 times daily with meals. TRULICITY 1.5 EP/4.9 mL sub-q pen 1.5 mg by SubCUTAneous route every seven (7) days. FARXIGA 10 mg tab tablet 1 tablet by mouth daily    insulin glargine (LANTUS,BASAGLAR) 100 unit/mL (3 mL) inpn 40 Units by SubCUTAneous route nightly. As directed. Basaglar switched to [place] at same dose    Diabetes self-management practices:   Eating pattern  [x] Breakfast  Eggs, English muffin, sausage and coffee  [x] Lunch   Soup, salad  [x] Dinner   Likes to go out to eat dinner.   May order salmon/steak with a baked potato  [x] Bedtime  Chips, something sweet  [x] Snacks   Likes tortilla chips   [x] Beverages  Diet Mt Dew, Water  [x] Dentition status Normal   Physical activity pattern   Sedentary   Monitoring pattern   Wearing Libre2 CGM   90day review:  Glucose below 70: 9% of the time   70-99: 9%   100-180: 61%   181-240: 25%   Over 240 0%  Taking medications pattern  [x] Inconsistent administration: May skip a lunch dose  [x] Affordable  Reducing risks  [] Influenza:   Immunization History   Administered Date(s) Administered    Influenza High Dose Vaccine PF 09/20/2019    Influenza Vaccine 10/01/2014, 10/03/2015, 10/01/2016, 10/17/2017     [] Pneumonia:   Immunization History   Administered Date(s) Administered    (RETIRED) Pneumococcal Vaccine (Unspecified Type) 12/01/2007    Pneumococcal Conjugate (PCV-13) 07/24/2015    Pneumococcal Polysaccharide (PPSV-23) 08/15/2014    Pneumococcal Vaccine (Unspecified Type) 12/01/2007     [] Hepatitis:   Immunization History   Administered Date(s) Administered    Hep A Vaccine 10/11/2019    Hep A Vaccine (Adult) 10/11/2019     Social determinants of health impacting diabetes self-management practices   Concerned that you need to know more about how to stay healthy with diabetes  Overall evaluation:    [x] Not achieving A1c target with drug therapy & self-care practices    , Has 2 Adult Daughters  Works as a    Former smoker (quit 1980)  Subjective   My sugar is so high.      Objective   Physical exam  General Obese white male in no acute distress/ill-appearing. Conversant and cooperative  Neuro  Alert, oriented   Vital Signs Visit Vitals  /77   Pulse 80   Temp 97.3 °F (36.3 °C)   Resp 20   Ht 5' 10\" (1.778 m)   Wt 97.6 kg (215 lb 2.7 oz)   SpO2 95%   BMI 30.87 kg/m²     Skin  Warm and dry. Acanthosis noted along neckline. No lipohypertrophy or lipoatrophy noted at injection sites   Heart   Regular rate and rhythm. No murmurs, rubs or gallops  Lungs  Clear to auscultation without rales or rhonchi  Extremities No foot wounds      Laboratory  Recent Labs     04/03/23  1246 04/03/23  0007 04/02/23  0510   * 211* 209*   AGAP 3* 4* 6   WBC 11.3* 6.3 5.6   CREA 1.08 1.17 1.10   *  --   --    ALT 64  --   --          Factors impacting BG management  Factor Dose Comments   Nutrition:  Standard meals   NPO    Drugs:  Steroids     Methylprednisolone 125mg x1 in ED   Impairs insulin action   Pain Chronic back pain    Other:   Kidney function GFR 55      Blood glucose pattern    Significant diabetes-related events over the past 24-72 hours  A1C 7.4%  Pre-op: Lantus 30 units daily, correctional insulin.   BG over goal.  Prednisone 20mg daily for unspecified allergic reaction 4/2-4/3  Insulin gtt:  4/3: 37.6 units post-op  4/4: 12.1 units since MN      Assessment and Nursing Intervention   Nursing Diagnosis Risk for unstable blood glucose pattern   Nursing Intervention Domain 3501 Decision-making Support   Nursing Interventions Examined current inpatient diabetes/blood glucose control   Explored factors facilitating and impeding inpatient management  Explored corrective strategies with patient and responsible inpatient provider   Informed patient of rational for insulin strategy while hospitalized     Nursing Diagnosis 84146 Ineffective Health Management   Nursing Intervention Domain 7625 Decision-making Support   Nursing Interventions Identified diabetes self-management practices impeding diabetes control  Discussed diabetes survival skills related to  Healthy Plate eating plan; given handouts  Role of physical activity in improving insulin sensitivity and action  Procedure for blood glucose monitoring & options for low-cost products  Medications plan at discharge     Billing Code(s)   [phone]     Before making these care recommendations, I personally reviewed the hospitalization record, including notes, laboratory & diagnostic data and current medications, and examined the patient at the bedside (circumstances permitting) before determining care. More than fifty (50) percent of the time was spent in patient counseling and/or care coordination.   Total minutes: 25    ARNULFO Chambers  Diabetes Clinical Nurse Specialist  [facility]  Access via [employer]

## 2023-04-03 NOTE — ANESTHESIA PREPROCEDURE EVALUATION
Relevant Problems   No relevant active problems       Anesthetic History   No history of anesthetic complications            Review of Systems / Medical History  Patient summary reviewed, nursing notes reviewed and pertinent labs reviewed    Pulmonary        Sleep apnea: CPAP           Neuro/Psych             Comments: BILATERAL PERIPHERAL NEUROPATHY BOTH LEGS Cardiovascular    Hypertension    Angina    Dysrhythmias   Past MI and CAD    Exercise tolerance: <4 METS  Comments: Incomplete RBBB   GI/Hepatic/Renal     GERD: well controlled           Endo/Other    Diabetes    Arthritis and cancer     Other Findings              Physical Exam    Airway  Mallampati: III  TM Distance: 4 - 6 cm  Neck ROM: normal range of motion   Mouth opening: Normal     Cardiovascular  Regular rate and rhythm,  S1 and S2 normal,  no murmur, click, rub, or gallop  Rhythm: regular  Rate: normal         Dental  No notable dental hx       Pulmonary  Breath sounds clear to auscultation               Abdominal  GI exam deferred       Other Findings            Anesthetic Plan    ASA: 4  Anesthesia type: general    Monitoring Plan: Arterial line, BIS, Continuous noninvasive hemodynamic monitoring, CVP, [place] and JULIEN    Post procedure ventilation   Induction: Intravenous  Anesthetic plan and risks discussed with: Patient

## 2023-04-03 NOTE — ANESTHESIA PROCEDURE NOTES
Arterial Line Placement    Performed by: Joy Biswas DO  Authorized by: Joy Biswas DO     Pre-Procedure  Indications:  Arterial pressure monitoring and blood sampling  Preanesthetic Checklist: patient identified, risks and benefits discussed, anesthesia consent, site marked, patient being monitored, timeout performed and patient being monitored      Procedure:   Prep:  ChloraPrep  Seldinger Technique?: Yes    Orientation:  Right  Location:  Radial artery  Catheter size:  18 G  Number of attempts:  1    Assessment:   Post-procedure:  Line secured and sterile dressing applied  Patient Tolerance:  Patient tolerated the procedure well with no immediate complications  Comment:   Collateral perfusion verified

## 2023-04-04 ENCOUNTER — APPOINTMENT (OUTPATIENT)
Dept: GENERAL RADIOLOGY | Age: 73
End: 2023-04-04
Attending: PHYSICIAN ASSISTANT
Payer: MEDICARE

## 2023-04-04 LAB
ACUTE KIDNEY INJURY RISK NEPHROCHECK: 1.53 (ref 0–0.3)
ACUTE KIDNEY INJURY RISK NEPHROCHECK: 2.26 (ref 0–0.3)
ADMINISTERED INITIALS, ADMINIT: NORMAL
ALBUMIN SERPL-MCNC: 3.5 G/DL (ref 3.5–5)
ALBUMIN/GLOB SERPL: 1.3 (ref 1.1–2.2)
ALP SERPL-CCNC: 35 U/L (ref 45–117)
ALT SERPL-CCNC: 61 U/L (ref 12–78)
ANION GAP SERPL CALC-SCNC: 5 MMOL/L (ref 5–15)
ANION GAP SERPL CALC-SCNC: 5 MMOL/L (ref 5–15)
AST SERPL-CCNC: 85 U/L (ref 15–37)
ATRIAL RATE: 93 BPM
BILIRUB SERPL-MCNC: 0.5 MG/DL (ref 0.2–1)
BUN SERPL-MCNC: 23 MG/DL (ref 6–20)
BUN SERPL-MCNC: 23 MG/DL (ref 6–20)
BUN/CREAT SERPL: 23 (ref 12–20)
BUN/CREAT SERPL: 24 (ref 12–20)
CALCIUM SERPL-MCNC: 8.5 MG/DL (ref 8.5–10.1)
CALCIUM SERPL-MCNC: 8.6 MG/DL (ref 8.5–10.1)
CALCULATED P AXIS, ECG09: 30 DEGREES
CALCULATED R AXIS, ECG10: -22 DEGREES
CALCULATED T AXIS, ECG11: 32 DEGREES
CARB RATIO, CHOR: 15
CARB RATIO, CHOR: 15
CARBOHYDRATE EATEN, CHO: 12 G
CARBOHYDRATE EATEN, CHO: 18 G
CHLORIDE SERPL-SCNC: 107 MMOL/L (ref 97–108)
CHLORIDE SERPL-SCNC: 112 MMOL/L (ref 97–108)
CO2 SERPL-SCNC: 24 MMOL/L (ref 21–32)
CO2 SERPL-SCNC: 25 MMOL/L (ref 21–32)
CREAT SERPL-MCNC: 0.97 MG/DL (ref 0.7–1.3)
CREAT SERPL-MCNC: 1.01 MG/DL (ref 0.7–1.3)
D50 ADMINISTERED, D50ADM: 0 ML
D50 ORDER, D50ORD: 0 ML
DIAGNOSIS, 93000: NORMAL
ERYTHROCYTE [DISTWIDTH] IN BLOOD BY AUTOMATED COUNT: 13.9 % (ref 11.5–14.5)
GLOBULIN SER CALC-MCNC: 2.7 G/DL (ref 2–4)
GLUCOSE BLD STRIP.AUTO-MCNC: 105 MG/DL (ref 65–117)
GLUCOSE BLD STRIP.AUTO-MCNC: 106 MG/DL (ref 65–117)
GLUCOSE BLD STRIP.AUTO-MCNC: 108 MG/DL (ref 65–117)
GLUCOSE BLD STRIP.AUTO-MCNC: 113 MG/DL (ref 65–117)
GLUCOSE BLD STRIP.AUTO-MCNC: 115 MG/DL (ref 65–117)
GLUCOSE BLD STRIP.AUTO-MCNC: 116 MG/DL (ref 65–117)
GLUCOSE BLD STRIP.AUTO-MCNC: 119 MG/DL (ref 65–117)
GLUCOSE BLD STRIP.AUTO-MCNC: 120 MG/DL (ref 65–117)
GLUCOSE BLD STRIP.AUTO-MCNC: 125 MG/DL (ref 65–117)
GLUCOSE BLD STRIP.AUTO-MCNC: 127 MG/DL (ref 65–117)
GLUCOSE BLD STRIP.AUTO-MCNC: 135 MG/DL (ref 65–117)
GLUCOSE BLD STRIP.AUTO-MCNC: 148 MG/DL (ref 65–117)
GLUCOSE BLD STRIP.AUTO-MCNC: 163 MG/DL (ref 65–117)
GLUCOSE BLD STRIP.AUTO-MCNC: 180 MG/DL (ref 65–117)
GLUCOSE BLD STRIP.AUTO-MCNC: 92 MG/DL (ref 65–117)
GLUCOSE BLD STRIP.AUTO-MCNC: 94 MG/DL (ref 65–117)
GLUCOSE BLD STRIP.AUTO-MCNC: 94 MG/DL (ref 65–117)
GLUCOSE BLD STRIP.AUTO-MCNC: 96 MG/DL (ref 65–117)
GLUCOSE BLD STRIP.AUTO-MCNC: 98 MG/DL (ref 65–117)
GLUCOSE BLD STRIP.AUTO-MCNC: 99 MG/DL (ref 65–117)
GLUCOSE SERPL-MCNC: 172 MG/DL (ref 65–100)
GLUCOSE SERPL-MCNC: 98 MG/DL (ref 65–100)
GLUCOSE, GLC: 105 MG/DL
GLUCOSE, GLC: 106 MG/DL
GLUCOSE, GLC: 108 MG/DL
GLUCOSE, GLC: 113 MG/DL
GLUCOSE, GLC: 115 MG/DL
GLUCOSE, GLC: 116 MG/DL
GLUCOSE, GLC: 119 MG/DL
GLUCOSE, GLC: 120 MG/DL
GLUCOSE, GLC: 124 MG/DL
GLUCOSE, GLC: 125 MG/DL
GLUCOSE, GLC: 135 MG/DL
GLUCOSE, GLC: 148 MG/DL
GLUCOSE, GLC: 163 MG/DL
GLUCOSE, GLC: 180 MG/DL
GLUCOSE, GLC: 92 MG/DL
GLUCOSE, GLC: 94 MG/DL
GLUCOSE, GLC: 94 MG/DL
GLUCOSE, GLC: 96 MG/DL
GLUCOSE, GLC: 99 MG/DL
HCT VFR BLD AUTO: 35.8 % (ref 36.6–50.3)
HGB BLD-MCNC: 11.8 G/DL (ref 12.1–17)
HIGH TARGET, HITG: 130 MG/DL
INSULIN ADMINSTERED, INSADM: 0.5 UNITS/HOUR
INSULIN ADMINSTERED, INSADM: 0.7 UNITS/HOUR
INSULIN ADMINSTERED, INSADM: 0.9 UNITS/HOUR
INSULIN ADMINSTERED, INSADM: 1 UNITS/HOUR
INSULIN ADMINSTERED, INSADM: 1.2 UNITS/HOUR
INSULIN ADMINSTERED, INSADM: 1.3 UNITS/HOUR
INSULIN ADMINSTERED, INSADM: 1.7 UNITS/HOUR
INSULIN ADMINSTERED, INSADM: 1.8 UNITS/HOUR
INSULIN ADMINSTERED, INSADM: 1.9 UNITS/HOUR
INSULIN ADMINSTERED, INSADM: 2 UNITS/HOUR
INSULIN ADMINSTERED, INSADM: 2.2 UNITS/HOUR
INSULIN ADMINSTERED, INSADM: 2.3 UNITS/HOUR
INSULIN ADMINSTERED, INSADM: 2.9 UNITS/HOUR
INSULIN ADMINSTERED, INSADM: 3.1 UNITS/HOUR
INSULIN ADMINSTERED, INSADM: 3.5 UNITS/HOUR
INSULIN ADMINSTERED, INSADM: 3.5 UNITS/HOUR
INSULIN ADMINSTERED, INSADM: 3.7 UNITS/HOUR
INSULIN BOLUS ADMINISTERED, INSBOLADM: 2 UNITS/HOUR
INSULIN BOLUS ADMINISTERED, INSBOLADM: 3 UNITS/HOUR
INSULIN BOLUS ORDERED, INSBOLORD: 0.8 UNITS/HOUR
INSULIN BOLUS ORDERED, INSBOLORD: 1.2 UNITS/HOUR
INSULIN ORDER, INSORD: 0.5 UNITS/HOUR
INSULIN ORDER, INSORD: 0.7 UNITS/HOUR
INSULIN ORDER, INSORD: 0.9 UNITS/HOUR
INSULIN ORDER, INSORD: 1 UNITS/HOUR
INSULIN ORDER, INSORD: 1.2 UNITS/HOUR
INSULIN ORDER, INSORD: 1.3 UNITS/HOUR
INSULIN ORDER, INSORD: 1.7 UNITS/HOUR
INSULIN ORDER, INSORD: 1.8 UNITS/HOUR
INSULIN ORDER, INSORD: 1.9 UNITS/HOUR
INSULIN ORDER, INSORD: 2 UNITS/HOUR
INSULIN ORDER, INSORD: 2.2 UNITS/HOUR
INSULIN ORDER, INSORD: 2.3 UNITS/HOUR
INSULIN ORDER, INSORD: 2.9 UNITS/HOUR
INSULIN ORDER, INSORD: 3.1 UNITS/HOUR
INSULIN ORDER, INSORD: 3.5 UNITS/HOUR
INSULIN ORDER, INSORD: 3.5 UNITS/HOUR
INSULIN ORDER, INSORD: 3.7 UNITS/HOUR
LACTATE SERPL-SCNC: 1.2 MMOL/L (ref 0.4–2)
LACTATE SERPL-SCNC: 1.4 MMOL/L (ref 0.4–2)
LOW TARGET, LOT: 95 MG/DL
MAGNESIUM SERPL-MCNC: 2.4 MG/DL (ref 1.6–2.4)
MCH RBC QN AUTO: 31.3 PG (ref 26–34)
MCHC RBC AUTO-ENTMCNC: 33 G/DL (ref 30–36.5)
MCV RBC AUTO: 95 FL (ref 80–99)
MINUTES UNTIL NEXT BG, NBG: 60 MIN
MULTIPLIER, MUL: 0.02
MULTIPLIER, MUL: 0.03
MULTIPLIER, MUL: 0.03
MULTIPLIER, MUL: 0.04
MULTIPLIER, MUL: 0.05
NRBC # BLD: 0 K/UL (ref 0–0.01)
NRBC BLD-RTO: 0 PER 100 WBC
ORDER INITIALS, ORDINIT: NORMAL
P-R INTERVAL, ECG05: 150 MS
PLATELET # BLD AUTO: 176 K/UL (ref 150–400)
PMV BLD AUTO: 9.6 FL (ref 8.9–12.9)
POTASSIUM SERPL-SCNC: 3.8 MMOL/L (ref 3.5–5.1)
POTASSIUM SERPL-SCNC: 4.1 MMOL/L (ref 3.5–5.1)
PROT SERPL-MCNC: 6.2 G/DL (ref 6.4–8.2)
Q-T INTERVAL, ECG07: 344 MS
QRS DURATION, ECG06: 98 MS
QTC CALCULATION (BEZET), ECG08: 427 MS
RBC # BLD AUTO: 3.77 M/UL (ref 4.1–5.7)
SERVICE CMNT-IMP: ABNORMAL
SERVICE CMNT-IMP: NORMAL
SODIUM SERPL-SCNC: 137 MMOL/L (ref 136–145)
SODIUM SERPL-SCNC: 141 MMOL/L (ref 136–145)
VENTRICULAR RATE, ECG03: 93 BPM
WBC # BLD AUTO: 8.6 K/UL (ref 4.1–11.1)

## 2023-04-04 PROCEDURE — 93005 ELECTROCARDIOGRAM TRACING: CPT

## 2023-04-04 PROCEDURE — 74011250636 HC RX REV CODE- 250/636: Performed by: THORACIC SURGERY (CARDIOTHORACIC VASCULAR SURGERY)

## 2023-04-04 PROCEDURE — 74011250636 HC RX REV CODE- 250/636: Performed by: STUDENT IN AN ORGANIZED HEALTH CARE EDUCATION/TRAINING PROGRAM

## 2023-04-04 PROCEDURE — 82962 GLUCOSE BLOOD TEST: CPT

## 2023-04-04 PROCEDURE — 83605 ASSAY OF LACTIC ACID: CPT

## 2023-04-04 PROCEDURE — 94003 VENT MGMT INPAT SUBQ DAY: CPT

## 2023-04-04 PROCEDURE — 74011250636 HC RX REV CODE- 250/636: Performed by: PHYSICIAN ASSISTANT

## 2023-04-04 PROCEDURE — 74011250636 HC RX REV CODE- 250/636: Performed by: NURSE PRACTITIONER

## 2023-04-04 PROCEDURE — 74011250637 HC RX REV CODE- 250/637: Performed by: PHYSICIAN ASSISTANT

## 2023-04-04 PROCEDURE — 97530 THERAPEUTIC ACTIVITIES: CPT

## 2023-04-04 PROCEDURE — 74011000258 HC RX REV CODE- 258: Performed by: PHYSICIAN ASSISTANT

## 2023-04-04 PROCEDURE — 85027 COMPLETE CBC AUTOMATED: CPT

## 2023-04-04 PROCEDURE — 97116 GAIT TRAINING THERAPY: CPT

## 2023-04-04 PROCEDURE — 97165 OT EVAL LOW COMPLEX 30 MIN: CPT

## 2023-04-04 PROCEDURE — 94660 CPAP INITIATION&MGMT: CPT

## 2023-04-04 PROCEDURE — 83735 ASSAY OF MAGNESIUM: CPT

## 2023-04-04 PROCEDURE — 74011636637 HC RX REV CODE- 636/637: Performed by: PHYSICIAN ASSISTANT

## 2023-04-04 PROCEDURE — 97161 PT EVAL LOW COMPLEX 20 MIN: CPT

## 2023-04-04 PROCEDURE — 71045 X-RAY EXAM CHEST 1 VIEW: CPT

## 2023-04-04 PROCEDURE — 99231 SBSQ HOSP IP/OBS SF/LOW 25: CPT | Performed by: CLINICAL NURSE SPECIALIST

## 2023-04-04 PROCEDURE — 36415 COLL VENOUS BLD VENIPUNCTURE: CPT

## 2023-04-04 PROCEDURE — 74011000250 HC RX REV CODE- 250: Performed by: NURSE PRACTITIONER

## 2023-04-04 PROCEDURE — 74011000250 HC RX REV CODE- 250: Performed by: PHYSICIAN ASSISTANT

## 2023-04-04 PROCEDURE — 74011250637 HC RX REV CODE- 250/637: Performed by: NURSE PRACTITIONER

## 2023-04-04 PROCEDURE — 65610000003 HC RM ICU SURGICAL

## 2023-04-04 PROCEDURE — 74011636637 HC RX REV CODE- 636/637: Performed by: CLINICAL NURSE SPECIALIST

## 2023-04-04 PROCEDURE — 80053 COMPREHEN METABOLIC PANEL: CPT

## 2023-04-04 RX ORDER — METOPROLOL TARTRATE 25 MG/1
25 TABLET, FILM COATED ORAL EVERY 12 HOURS
Status: DISCONTINUED
Start: 2023-04-04 | End: 2023-04-04

## 2023-04-04 RX ORDER — POTASSIUM CHLORIDE 29.8 MG/ML
20 INJECTION INTRAVENOUS ONCE
Status: COMPLETED
Start: 2023-04-04 | End: 2023-04-04

## 2023-04-04 RX ORDER — INSULIN LISPRO 100 [IU]/ML
INJECTION, SOLUTION INTRAVENOUS; SUBCUTANEOUS
Status: DISCONTINUED
Start: 2023-04-04 | End: 2023-04-05

## 2023-04-04 RX ORDER — GABAPENTIN 100 MG/1
200 CAPSULE ORAL ONCE
Status: COMPLETED
Start: 2023-04-04 | End: 2023-04-04

## 2023-04-04 RX ORDER — HYDROMORPHONE HYDROCHLORIDE 2 MG/1
2 TABLET ORAL
Status: DISCONTINUED
Start: 2023-04-04 | End: 2023-04-05

## 2023-04-04 RX ORDER — LIDOCAINE 4 G/100G
2 PATCH TOPICAL EVERY 24 HOURS
Status: DISPENSED
Start: 2023-04-04

## 2023-04-04 RX ORDER — HYDRALAZINE HYDROCHLORIDE 20 MG/ML
20 INJECTION INTRAMUSCULAR; INTRAVENOUS
Status: ACTIVE
Start: 2023-04-04

## 2023-04-04 RX ORDER — METHOCARBAMOL 500 MG/1
500 TABLET, FILM COATED ORAL 4 TIMES DAILY
Status: DISCONTINUED
Start: 2023-04-04 | End: 2023-04-05

## 2023-04-04 RX ORDER — GABAPENTIN 100 MG/1
100 CAPSULE ORAL 3 TIMES DAILY
Status: DISCONTINUED
Start: 2023-04-04 | End: 2023-04-04

## 2023-04-04 RX ORDER — METOPROLOL TARTRATE 5 MG/5ML
5 INJECTION INTRAVENOUS ONCE
Status: COMPLETED
Start: 2023-04-04 | End: 2023-04-04

## 2023-04-04 RX ORDER — HYDRALAZINE HYDROCHLORIDE 20 MG/ML
10 INJECTION INTRAMUSCULAR; INTRAVENOUS
Status: DISCONTINUED
Start: 2023-04-04 | End: 2023-04-04

## 2023-04-04 RX ORDER — METOPROLOL TARTRATE 50 MG/1
50 TABLET ORAL EVERY 12 HOURS
Status: DISPENSED
Start: 2023-04-04

## 2023-04-04 RX ORDER — HYDROMORPHONE HYDROCHLORIDE 2 MG/1
4 TABLET ORAL
Status: DISCONTINUED
Start: 2023-04-04 | End: 2023-04-05

## 2023-04-04 RX ORDER — GABAPENTIN 300 MG/1
300 CAPSULE ORAL 3 TIMES DAILY
Status: DISCONTINUED
Start: 2023-04-04 | End: 2023-04-05

## 2023-04-04 RX ADMIN — HYDROMORPHONE HYDROCHLORIDE 4 MG: 2 TABLET ORAL at 13:00

## 2023-04-04 RX ADMIN — HYDROMORPHONE HYDROCHLORIDE 1 MG: 1 INJECTION, SOLUTION INTRAMUSCULAR; INTRAVENOUS; SUBCUTANEOUS at 16:14

## 2023-04-04 RX ADMIN — GABAPENTIN 200 MG: 100 CAPSULE ORAL at 11:12

## 2023-04-04 RX ADMIN — METOPROLOL TARTRATE 50 MG: 50 TABLET, FILM COATED ORAL at 20:57

## 2023-04-04 RX ADMIN — SODIUM CHLORIDE, PRESERVATIVE FREE 10 ML: 5 INJECTION INTRAVENOUS at 21:00

## 2023-04-04 RX ADMIN — FAMOTIDINE 20 MG: 20 TABLET, FILM COATED ORAL at 07:19

## 2023-04-04 RX ADMIN — HYDRALAZINE HYDROCHLORIDE 10 MG: 20 INJECTION INTRAMUSCULAR; INTRAVENOUS at 14:45

## 2023-04-04 RX ADMIN — Medication 2 UNITS: at 19:18

## 2023-04-04 RX ADMIN — POLYETHYLENE GLYCOL 3350 17 G: 17 POWDER, FOR SOLUTION ORAL at 08:41

## 2023-04-04 RX ADMIN — MAGNESIUM OXIDE 400 MG (241.3 MG MAGNESIUM) TABLET 400 MG: TABLET at 17:02

## 2023-04-04 RX ADMIN — DOCUSATE SODIUM 50MG AND SENNOSIDES 8.6MG 1 TABLET: 8.6; 5 TABLET, FILM COATED ORAL at 17:02

## 2023-04-04 RX ADMIN — METHOCARBAMOL 500 MG: 500 TABLET ORAL at 17:02

## 2023-04-04 RX ADMIN — POTASSIUM CHLORIDE 20 MEQ: 400 INJECTION, SOLUTION INTRAVENOUS at 17:03

## 2023-04-04 RX ADMIN — CEFAZOLIN 2 G: 1 INJECTION, POWDER, FOR SOLUTION INTRAMUSCULAR; INTRAVENOUS at 13:00

## 2023-04-04 RX ADMIN — OXYCODONE 10 MG: 5 TABLET ORAL at 06:09

## 2023-04-04 RX ADMIN — Medication 3 UNITS: at 13:00

## 2023-04-04 RX ADMIN — ACETAMINOPHEN 975 MG: 325 TABLET, FILM COATED ORAL at 06:09

## 2023-04-04 RX ADMIN — CHLORHEXIDINE GLUCONATE 10 ML: 1.2 RINSE ORAL at 08:41

## 2023-04-04 RX ADMIN — DOCUSATE SODIUM 50MG AND SENNOSIDES 8.6MG 1 TABLET: 8.6; 5 TABLET, FILM COATED ORAL at 08:41

## 2023-04-04 RX ADMIN — SODIUM CHLORIDE, POTASSIUM CHLORIDE, SODIUM LACTATE AND CALCIUM CHLORIDE 500 ML: 600; 310; 30; 20 INJECTION, SOLUTION INTRAVENOUS at 08:41

## 2023-04-04 RX ADMIN — CHLORHEXIDINE GLUCONATE 10 ML: 1.2 RINSE ORAL at 20:56

## 2023-04-04 RX ADMIN — CEFAZOLIN 2 G: 1 INJECTION, POWDER, FOR SOLUTION INTRAMUSCULAR; INTRAVENOUS at 07:18

## 2023-04-04 RX ADMIN — METHOCARBAMOL 500 MG: 500 TABLET ORAL at 21:00

## 2023-04-04 RX ADMIN — SODIUM CHLORIDE, PRESERVATIVE FREE 10 ML: 5 INJECTION INTRAVENOUS at 06:09

## 2023-04-04 RX ADMIN — GABAPENTIN 100 MG: 100 CAPSULE ORAL at 08:41

## 2023-04-04 RX ADMIN — HYDROMORPHONE HYDROCHLORIDE 1 MG: 1 INJECTION, SOLUTION INTRAMUSCULAR; INTRAVENOUS; SUBCUTANEOUS at 02:37

## 2023-04-04 RX ADMIN — HYDROMORPHONE HYDROCHLORIDE 2 MG: 2 TABLET ORAL at 10:23

## 2023-04-04 RX ADMIN — ASPIRIN 81 MG CHEWABLE TABLET 81 MG: 81 TABLET CHEWABLE at 08:41

## 2023-04-04 RX ADMIN — MUPIROCIN: 20 OINTMENT TOPICAL at 08:41

## 2023-04-04 RX ADMIN — ACETAMINOPHEN 975 MG: 325 TABLET, FILM COATED ORAL at 11:12

## 2023-04-04 RX ADMIN — ACETAMINOPHEN 975 MG: 325 TABLET, FILM COATED ORAL at 00:10

## 2023-04-04 RX ADMIN — SODIUM CHLORIDE 0.5 UNITS/HR: 9 INJECTION, SOLUTION INTRAVENOUS at 05:38

## 2023-04-04 RX ADMIN — AMIODARONE HYDROCHLORIDE 400 MG: 200 TABLET ORAL at 17:02

## 2023-04-04 RX ADMIN — GABAPENTIN 300 MG: 300 CAPSULE ORAL at 13:00

## 2023-04-04 RX ADMIN — HYDROMORPHONE HYDROCHLORIDE 4 MG: 2 TABLET ORAL at 22:38

## 2023-04-04 RX ADMIN — OXYCODONE 10 MG: 5 TABLET ORAL at 01:29

## 2023-04-04 RX ADMIN — FAMOTIDINE 20 MG: 20 TABLET, FILM COATED ORAL at 20:56

## 2023-04-04 RX ADMIN — CEFAZOLIN 2 G: 1 INJECTION, POWDER, FOR SOLUTION INTRAMUSCULAR; INTRAVENOUS at 01:29

## 2023-04-04 RX ADMIN — METOPROLOL TARTRATE 25 MG: 25 TABLET, FILM COATED ORAL at 13:00

## 2023-04-04 RX ADMIN — GABAPENTIN 300 MG: 300 CAPSULE ORAL at 21:00

## 2023-04-04 RX ADMIN — CEFAZOLIN 2 G: 1 INJECTION, POWDER, FOR SOLUTION INTRAMUSCULAR; INTRAVENOUS at 18:05

## 2023-04-04 RX ADMIN — MUPIROCIN: 20 OINTMENT TOPICAL at 17:02

## 2023-04-04 RX ADMIN — ATORVASTATIN CALCIUM 80 MG: 40 TABLET, FILM COATED ORAL at 21:00

## 2023-04-04 RX ADMIN — METOPROLOL TARTRATE 5 MG: 5 INJECTION, SOLUTION INTRAVENOUS at 18:05

## 2023-04-04 RX ADMIN — HYDROMORPHONE HYDROCHLORIDE 4 MG: 2 TABLET ORAL at 17:02

## 2023-04-04 RX ADMIN — HYDROMORPHONE HYDROCHLORIDE 0.5 MG: 1 INJECTION, SOLUTION INTRAMUSCULAR; INTRAVENOUS; SUBCUTANEOUS at 00:11

## 2023-04-04 NOTE — PROGRESS NOTES
0800 Bedside shift change report given to [address] (oncoming nurse) by Dinah Gutierrez RN (offgoing nurse). Report included the following information SBAR, Kardex, ED Summary, OR Summary, Procedure Summary, Intake/Output, MAR, Accordion, Recent Results, and Cardiac Rhythm NSR . Assessment completed- patient disoriented to time and states that he \"feels off. \" NP Kayley informed and at bedside. Pain management reviewed. RN to set up end tidal CO2 via nasal cannula. 1641 Systolic blood pressures are sustaining in 140s. NP Rufino Lopez informed- orders given for PO metoprolol. Patient up to chair- tolerated well. 1430 SBP remain elevated- NP Kayley informed. PRN IV Hydralazine ordered. 1530 Scheduled PO Robaxin ordered by NP.     6395 One time dose IV metoprolol given per NP- see MAR.     1930 Bedside shift change report given to [facility] (oncoming nurse) by Khanh Hinojosa RN (offgoing nurse). Report included the following information SBAR, Kardex, ED Summary, OR Summary, Procedure Summary, Intake/Output, MAR, Accordion, Recent Results, and Cardiac Rhythm NSR .

## 2023-04-04 NOTE — ANESTHESIA POSTPROCEDURE EVALUATION
Post-Anesthesia Evaluation and Assessment    Patient: Salima Prasad MRN: [id number]  SSN: [id number]    YOB: 1950  Age: 74 y.o. Sex: male      I have evaluated the patient and they are stable in the ICU. Cardiovascular Function/Vital Signs  HR 80 AV paced  /43  PAP 32/18  CVP 15  SpO2 95%  RR 14    Patient is status post General anesthesia for Procedure(s):  CABG x 3 with CPB. Donor sites: Farooq Beagle and Right Saphenous vein via [address]; Brendan and EpiAortic U/S by Dr. Acacia Pimentel .    Nausea/Vomiting: None    Postoperative hydration reviewed and adequate. Pain:  Managed    Neurological Status:   Intubated and sedated     Pulmonary Status:   Intubated with adequate ventilation and oxygenation     Complications related to anesthesia: None    Post-anesthesia assessment completed. No concerns    Signed By: Dharmesh Anguiano DO     April 3, 2023                Procedure(s):  CABG x 3 with CPB. Donor sites: Farooq Beagle and Right Saphenous vein via [address]; Brendan and EpiAortic U/S by Dr. Acacia Pimentel [facility]y [facility] general    <BSHSIANPOST>  justo device data.

## 2023-04-04 NOTE — PROGRESS NOTES
Problem: Mobility Impaired (Adult and Pediatric)  Goal: *Acute Goals and Plan of Care (Insert Text)  Description: FUNCTIONAL STATUS PRIOR TO ADMISSION: Patient was independent and active without use of DME. Has baseline neuropathy that does affect his gait quality. Cane use PRN when neuropathy is painful. On high doses of gabapentin for neuropathy pain at home. HOME SUPPORT PRIOR TO ADMISSION: The patient lived with his wife. Physical Therapy Goals  Initiated 4/4/2023  1. Patient will move from supine to sit and sit to supine , scoot up and down, and roll side to side in bed with modified independence within 5 days. 2.  Patient will perform sit to/from stand with modified independence within 5 days. 3.  Patient will ambulate 150 feet with least restrictive assistive device and modified independence within 5 days. 4.  Patient will ascend/descend 5 stairs with right handrail(s) with modified independence within 5 days. 5.  Patient will perform cardiac exercises per protocol with independence within 5 days. 6.  Patient will verbally recall and functionally demonstrate mindful-based movements (\"move in the tube\") principles without cues within 5 days. Outcome: Progressing Towards Goal   PHYSICAL THERAPY EVALUATION  Patient: Anup Deras (59 y.o. male)  Date: 4/4/2023  Primary Diagnosis: NSTEMI (non-ST elevated myocardial infarction) ([facility] [address] [I21.4]  Allergic reaction, initial encounter [T78.40XA]  Tachycardia [R00.0]  Procedure(s) (LRB):  CABG x 3 with CPB. Donor sites: [facility] and Right Saphenous vein via [email or web address];   Brendan and EpiAortic U/S by Dr. Wily Corbett. (N/A) 1 Day Post-Op   Precautions:   Fall (move in the tube)      ASSESSMENT  Based on the objective data described below, the patient presents with impaired balance, impaired activity tolerance (4L/min), generalized weakness, decreased endurance, and overall decline from baseline following admission for chest pain and now s/p CABG x3. Received in the chair, perseverating on pain. Receptive to education on move in the tube but required cues throughout. Participated in short distance gait training but required HHA x1 and reassurance throughout-- gait extremely shuffled and with intermittent LOB. Gait quality likely related to his moderate baseline neuropathy. Anticipate RW needs next session to improve gait and increase tolerance. Recommend HHPT pending progress. Patient is not verbalizing and is not demonstrating understanding of mindful-based movements (\"move in the tube\") principles of keeping UEs proximal to ribcage to prevent lateral pull on the sternum during load-bearing activities with visual, verbal, and manual cues required for compliance. Current Level of Function Impacting Discharge (mobility/balance): min-mod Ax2    Functional Outcome Measure: The patient scored 7/24 on the [facility] outcome measure which is indicative of increased need for therapy at d/c. Other factors to consider for discharge: lives with wife, neuropathy      Patient will benefit from skilled therapy intervention to address the above noted impairments. PLAN :  Recommendations and Planned Interventions: bed mobility training, transfer training, gait training, therapeutic exercises, neuromuscular re-education, edema management/control, patient and family training/education, and therapeutic activities      Frequency/Duration: Patient will be followed by physical therapy:  daily to address goals. Recommendation for discharge: (in order for the patient to meet his/her long term goals)  Physical therapy at least 2 days/week in the home     This discharge recommendation:  Has been made in collaboration with the attending provider and/or case management    IF patient discharges home will need the following DME: to be determined (TBD)         SUBJECTIVE:   Patient stated I think I should sit down now.     OBJECTIVE DATA SUMMARY:   Patient mobilized on continuous portable monitor/telemetry.   HISTORY:    Past Medical History:   Diagnosis Date    Arthritis     BACK    BPH (benign prostatic hyperplasia) 2013    CAD (coronary artery disease)     Cancer (HCC) 2015    SKIN - HEAD AND FACE    Cancer ([facility] [address] 01/2019    PROSTATE    Chronic pain     BACK    Chronic pelvic pain in male     sensitive to touch on the left side down to the left side of the penis    Collagenous colitis 2011    Diabetes mellitus type 2, controlled, with complications ([facility] [address] 5101    Diabetic neuropathy ([facility] [address] 2006    Diverticulosis     ED (erectile dysfunction)     Dr. Margarita Correa    GERD (gastroesophageal reflux disease) 1990    Gout     1 episode    Hypertension 1970    Ill-defined condition     BILATERAL PERIPHERAL NEUROPATHY BOTH LEGS    Low serum testosterone level     Dr. Adams Flank    Osteopenia     Dr. Adams Flank    Psoriasis     Sleep apnea     USES CPAP    Sun-damaged skin      Past Surgical History:   Procedure Laterality Date    COLONOSCOPY N/A 9/21/2021    COLONOSCOPY   :- performed by Kemi Vargas MD at [address] [place] [address]  8/24/2013         ENDOSCOPY, COLON, DIAGNOSTIC      HX COLONOSCOPY      HX HEENT  2017    WISDOM TEETH X4    HX HERNIA REPAIR      x2 bilateral inguinal    HX ORTHOPAEDIC  08/2018    L4-5, L5-S1 DISKECTOMY    HX OTHER SURGICAL  2015,2017    MOHS    HX PROSTATE SURGERY  04/02/2019    removed    NCV SENSORY OR MIXED  8/24/2013         MA UNLISTED PROCEDURE CARDIAC SURGERY  12/24/2019    1 stent       Personal factors and/or comorbidities impacting plan of care: PMH    Home Situation  [facility] Environment[facility] [facility]  # Steps to Enter: 5  Rails to Enter: Yes  One/Two Story Residence: One story  Living Alone: No  Support Systems: Spouse/Significant Other  Patient Expects to be Discharged to[de-identified] Home with home health  Current DME Used/Available at Home: Cane, straight, Shower chair, Grab bars (built in bench)  Tub or Shower Type: Shower    EXAMINATION/PRESENTATION/DECISION MAKING:     Critical Behavior:  Neurologic State: Alert, Confused  Orientation Level: Oriented to person, Oriented to place, Oriented to situation, Disoriented to time  Cognition: Decreased attention/concentration, Follows commands     Hearing: Auditory  Auditory Impairment: None  Range Of Motion:  AROM: Generally decreased, functional  Strength:    Strength: Generally decreased, functional  Tone & Sensation:   Tone: Normal  Sensation: Impaired (baseline neuropathy)  Coordination:  Coordination: Generally decreased, functional  Functional Mobility:  Bed Mobility:  Sit to Supine: Moderate assistance;Assist x2  Transfers:  Sit to Stand: Minimum assistance;Assist x2  Stand to Sit: Minimum assistance  Balance:   Sitting: Intact  Standing: Impaired; With support  Standing - Static: Fair  Standing - Dynamic : Poor  Ambulation/Gait Training:  Distance (ft): 20 Feet (ft)  Assistive Device: Gait belt (HHA x1)  Ambulation - Level of Assistance: Minimal assistance; Moderate assistance;Assist x2 (+ 3rd for lines)  Gait Abnormalities: Shuffling gait; Path deviations  Base of Support: Widened  Speed/Joana: Shuffled; Slow  Step Length: Right shortened;Left shortened      Cardiac diagnosis intervention:  Patient instructed and educated on mindful movement principles based on Move in The Tube concept to include maintaining bilateral elbows close to rib cage when performing any load-bearing activity such as getting in/out of bed, pushing up from a chair, opening a door, or lifting a box. Patient was given a handout with diagrams of each correct/incorrect method of performing each of the above tasks. Functional Measure:  [facility] AM-PAC®      Basic Mobility Inpatient Short Form (6-Clicks) Version 2  How much HELP from another person do you currently need. .. (If the patient hasn't done an activity recently, how much help from another person do you think they would need if they tried?) Total A Lot A Little None   1. Turning from your back to your side while in a flat bed without using bedrails? [x]  1 []  2 []  3  []  4   2. Moving from lying on your back to sitting on the side of a flat bed without using bedrails? [x]  1 []  2 []  3  []  4   3. Moving to and from a bed to a chair (including a wheelchair)? [x]  1 []  2 []  3  []  4   4. Standing up from a chair using your arms (e.g. wheelchair or bedside chair)? []  1 [x]  2 []  3  []  4   5. Walking in hospital room? [x]  1 []  2 []  3  []  4   6. Climbing 3-5 steps with a railing? [x]  1 []  2 []  3  []  4     Raw Score: 7/24                            Cutoff score ?171,2,3 had higher odds of discharging home with home health or need of SNF/IPR. [address] Janet Freed [employer]. Validity of the AM-PAC 6-Clicks Inpatient Daily Activity and Basic Mobility Short Forms. Physical Therapy Mar 2014, [phone]) [phone]; DOI: 10.2522/ptj.63980381  2. Radha Cruz. Association of AM-PAC \"6-Clicks\" Basic Mobility and Daily Activity Scores With Discharge Destination. Phys Ther. 2021 Apr 4;101(4):ifbc052. doi: 10.1093/ptj/rqgk697. PMID: 60262083. V Ernesto Tripp, Consuelo STRINGER, Mingo Martinez, Caleb K, Sudha S. Activity Measure for Post-Acute Care \"6-Clicks\" Basic Mobility Scores Predict Discharge Destination After Acute Care Hospitalization in Select Patient Groups: A Retrospective, Observational Study. Arch Rehabil Res Clin Transl. 2022 Jul 16;4(3):369825. doi: 10.1016/j.arrct. 8227.468450. PMID: 17863342; PMCID: [id number]. 4. Donnie Calderón, Grayson W, Talita P. AM-PAC Short Forms Manual 4.0. Revised 2/2020.          Physical Therapy Evaluation Charge Determination   History Examination Presentation Decision-Making   HIGH Complexity :3+ comorbidities / personal factors will impact the outcome/ POC  HIGH Complexity : 4+ Standardized tests and measures addressing body structure, function, activity limitation and / or participation in recreation  LOW Complexity : Stable, uncomplicated  Other outcome measures [facility] 7/24  HIGH       Based on the above components, the patient evaluation is determined to be of the following complexity level: LOW     Pain Rating:  Just received pain medication 30 min prior to session    Activity Tolerance:   Fair, desaturates with exertion and requires oxygen, requires frequent rest breaks, and observed SOB with activity    After treatment patient left in no apparent distress:   Supine in bed, Heels elevated for pressure relief, and Call bell within reach    COMMUNICATION/EDUCATION:   The patients plan of care was discussed with: Occupational therapist and Registered nurse. Fall prevention education was provided and the patient/caregiver indicated understanding., Patient/family have participated as able in goal setting and plan of care. , and Patient/family agree to work toward stated goals and plan of care.     Thank you for this referral.  Keith Martinez, PT, DPT   Time Calculation: 21 mins

## 2023-04-04 NOTE — PROGRESS NOTES
Cardiac Surgery Care Coordinator-  Met with Anup Deras. Reviewed plan of care and discussed goals for the day. Aunp Deras has a good understanding of his plan for the day. Reinforced sternal precautions and encouraged continued use of the incentive spirometer. Anup Deras can pull 1000ml with good effort. Discussed possible discharge date and encouraged Anup Deras to verbalize. Will continue to follow for educational and emotional needs. 1045- Met with Mr and Mrs Manju Santos, reviewed plan of care and encourage them to verbalize. Medicated pt for pain and discussed gabapentin dosage with NP. Will continue to follow.

## 2023-04-04 NOTE — PROGRESS NOTES
[facility] ICU Progress Note    Admit Date: 3/31/2023  POD:  1 Day Post-Op    Procedure:    4/3/23 with Dr. Lugo Seen:   CABG x 3 with CPB. (LIMA to LAD, SVG to OM 2, SVG to PDA )  Donor sites: [facility] and Right Saphenous vein via [address]; Brendan and EpiAortic U/S by Dr. Felipe Fitzgerald. Hosptial Course:   4/3/23: OR with [place], CABG x 3, transferred to ICU post-operatively on amiodarone, precedex, insulin, and grady. 1L albumin given, extubated at 1557. Initially paced post-op, but intrinsic rate started competing, decreased generator. 23 POD 1: Nephrocheck elevated overnight, 2.26. Lactic acid normal 1.2, BP borderline. Given 500mL LR bolus over 2 hours. Repeat labs at 12pm.    Subjective:   Pt seen with Dr. Rosalina Miller, up in chair, 6L NC, SR in the 80s, restless. Patient slightly confused but rating pain highly and received several doses PO oxycodone overnight. Objective:   Vitals:  Blood pressure 116/77, pulse 93, temperature 100 °F (37.8 °C), resp. rate 16, height 5' 10\" (1.778 m), weight 215 lb 2.7 oz (97.6 kg), SpO2 (!) 89 %.   Temp (24hrs), Av.6 °F (37 °C), Min:97.3 °F (36.3 °C), Max:100 °F (37.8 °C)  HR: 64-91  BP: /53-72  CVP: 5-13  CI: 1.8 - 2.7    Hemodynamics:   CO: CO (l/min): 5.5 l/min   CI: CI (l/min/m2): 2.5 l/min/m2   CVP: CVP (mmHg): 10 mmHg (23 0700)   SVR: SVR (dyne*sec)/cm5: 785 (dyne*sec)/cm5 (23 8132)   PAP Systolic: PAP Systolic: 36 (35/37/76 4608)   PAP Diastolic: PAP Diastolic: 19 (52/88/18 8809)   PVR:     SV02: SVO2 (%): 61 % (23 0500)   SCV02:      EKG/Rhythm:  SR 90s      Intra-op BRENDAN:  EF: 60%, LV normal size and function; RV normal function, hypertrophy noted; 1+ MR, 1+ TR; no RWMA  Diastolic Dsyfunction S > D  Post op: No significant changes    Extubation Date / Time: 4/3/23 at 1557    CT Output: 590mL total (300mL overnight)    Oxygen Therapy: 6L NC  Oxygen Therapy  O2 Sat (%): (!) 89 % (23 0700)  Pulse via Oximetry: 87 beats per minute (23 2018)  O2 Device: Nasal cannula (04/04/23 0400)  Skin Assessment: Clean, dry, & intact (04/04/23 0400)  Skin Protection for O2 Device: No (04/04/23 0400)  O2 Flow Rate (L/min): 6 l/min (04/04/23 0400)  FIO2 (%): 60 % (04/03/23 1540)    CXR:4/4 on left, 4/3 on right, per my read poor inspiratory effort with atelectasis    CXR Results  (Last 48 hours)                 04/04/23 0416  XR CHEST PORT Final result    Impression:  No significant change. Narrative:  INDICATION:  postop heart       EXAM: CXR Portable. FINDINGS: Portable chest shows extubation and NG tube removal with otherwise   stable support lines since yesterday. There is no apparent pneumothorax. Lungs   show no acute findings. Heart size is stable. There is no overt pulmonary edema. 04/03/23 1315  XR CHEST PORT Final result    Impression:  Satisfactory postoperative appearance. Narrative:  EXAM: Portable CXR. 1256 hours         INDICATION: postop heart       FINDINGS:   ET tube is satisfactory. Franklin-Tommy catheter tip is in the main pulmonary artery. Chest drains are present. There is no pneumothorax, pulmonary edema,   cardiomediastinal widening or significant pleural effusion. Admission Weight: Last Weight   Weight: 226 lb 13.7 oz (102.9 kg) Weight: 215 lb 2.7 oz (97.6 kg)     Intake / Output / Drain:  Current Shift: No intake/output data recorded. Last 24 hrs.:   Intake/Output Summary (Last 24 hours) at 4/4/2023 0715  Last data filed at 4/4/2023 0700  Gross per 24 hour   Intake 2338.89 ml   Output 1815 ml   Net 523.89 ml   UO: 1225  CT: 590    EXAM:  General:  Up in chair, restless, fidgeting, mild confusion      Lungs:   Diminished bases bilaterally   Incision:  No erythema, drainage or swelling. Heart:  Regular rate and rhythm, S1, S2 normal, no murmur, click, rub or gallop. Abdomen:   Soft, non-tender. Bowel sounds hypoactive; denies nausea, BM pre-op   Extremities:  No edema. PPP.     Neurologic:  Gross motor and sensory apparatus intact, no focal deficits noted     Labs:   Recent Labs     04/04/23  0624 04/04/23  0413 04/04/23  0408 04/03/23  1308 04/03/23  1246   WBC  --   --  8.6  --  11.3*   HGB  --   --  11.8*   < > 12.5   HCT  --   --  35.8*   < > 37.8   PLT  --   --  176  --  190   NA  --   --  141   < > 140   K  --   --  4.1   < > 3.9   BUN  --   --  23*   < > 17   CREA  --   --  0.97   < > 1.08   GLU  --   --  98   < > 151*   GLUCPOC 105   < >  --    < >  --    INR  --   --   --   --  1.1    < > = values in this interval not displayed.         Assessment:     Active Problems:    Allergic reaction, initial encounter (3/31/2023)      Tachycardia (3/31/2023)      NSTEMI (non-ST elevated myocardial infarction) ([facility] [address] (3/31/2023)      S/P CABG x 3 (4/3/2023)         Plan/Recommendations/Medical Decision Making:   CAD and NSTEMI s/p CABG: Hx Stent to OM 2020; Presented +NSTEMI  - Continue ASA and atorvastatin 40mg  - No bb this AM, BP borderline; evaluate for addition this afternoon  - Continue chest tubes to suction; wires to back up  - Will need plavix, evaluate in AM     Hypertension: PTA Toprol XL 25 and Losartan 25mg  - Discussed above, anticipate metoprolol this afternoon     HLD: PTA atorvastatin 40mg  - Continue atorvastatin 40mg daily     Sleep Apnea: Uses BiPAP at home  - Wife brought in home machine, please utilize     Hx Smoker: only 5-10 years, quit in 1980, ABG baseline normal  - PFTs not able to be completed pre-op  - No current intervention    Acute Post-Operative Respiratory Insufficiency with Hypoxia:   - Chest xray with poor inspiratory effort, most likely secondary to pain control  - Wean oxygen for SpO2 > 92%  - Encourage pulmonary hygiene with IS, OOB, and ambulation  - Chest xray daily    Concern for DAVID: Nephrocheck elevated at 2.26 this AM, borderline hourly UO, BP slightly low  - 500mL LR bolus given this AM  - Repeat BMP, nephrocheck, and lactic acid at 12pm    Diabetes Mellitus, Type II: A1c 7.4%; PTA Metformin 500mg (2 tabs BID), Trulicity 2.4BP every 7 days, Farxiga 10mg daily, Lispro 40 units TID with meals, Lantus 40 units nightly  - Diabetes Management consulted for assistance inpatient  - Continue glucostabilizer, transition off drip per protocol POD 2      Acute Post-Operative Pain Management:  - High pain requirements overnight, continuously rating pain 10/10  - Continue scheduled tylenol; add scheduled gabapentin as discussed and lidocaine patches  - DC PRN PO oxycodone, add PO PRN dilaudid 2 and 4mg options  - 1mg IV dilaudid for breakthrough  - Add scheduled robaxin  - No IV toradol at this time due to elevated Nephrocheck result    Peripheral Neuropathy: PTA Gabapentin 600mg BID; PRN Aleve PO  - Gabapentin started at reduced dose of 300mg TID given additional narcotics being given     Chronic Back Pain s/p Diskectomy: PRN Aleve PTA  - Monitor inpatient     GERD: PTA pantroprazole 40mg daily  - Continue famotidine inpatient     Assessment: Continue ICU level of care. Fluid resuscitation this AM, recheck labs as discussed. PT/OT. Optimize pain management as discussed above.      Signed By: Brittany Cole NP

## 2023-04-04 NOTE — PROGRESS NOTES
Problem: Self Care Deficits Care Plan (Adult)  Goal: *Acute Goals and Plan of Care (Insert Text)  Description: FUNCTIONAL STATUS PRIOR TO ADMISSION: Patient was independent and active without use of DME. Pt reports intermittent use of SPC with neuropathy flare-ups. HOME SUPPORT: The patient lived with his wife but did not require assist.    Occupational Therapy Goals  Initiated 4/4/2023  1. Patient will perform ADLs standing 5 mins without fatigue or LOB with supervision/set-up within 7 day(s). 2.  Patient will perform lower body ADLs with supervision/set-up within 7 day(s). 3.  Patient will perform gathering ADL items high and low 2/2 with supervision/set-up within 7 day(s). 4.  Patient will perform toilet transfers with supervision/set-up within 7 day(s). 5.  Patient will perform all aspects of toileting with supervision/set-up within 7 day(s). 6.  Patient will participate in cardiac/sternal upper extremity therapeutic exercise/activities to increase independence with ADLs with supervision/set-up for 5 minutes within 7 day(s). Outcome: Not Met     OCCUPATIONAL THERAPY EVALUATION    Patient: Sherine Page (64 y.o. male)  Date: 4/4/2023  Primary Diagnosis: NSTEMI (non-ST elevated myocardial infarction) ([facility] [address] [I21.4]  Allergic reaction, initial encounter [T78.40XA]  Tachycardia [R00.0]  Procedure(s) (LRB):  CABG x 3 with CPB. Donor sites: Barbie Chant and Right Saphenous vein via [address]; Brendan and EpiAortic U/S by Dr. Dianne Henry. (N/A) 1 Day Post-Op   Precautions:   Fall (move in the tube)    ASSESSMENT  Based on the objective data described below, the patient presents with impaired balance, activity tolerance, cardiopulmonary endurance, and post-operative pain s/p admission for NSTEMI now POD1 CABGx3. Pt received seated in bedside chair, A&Ox4, though reporting 8/10 pain near sternal incision. Overall, pt required min to mod A x1-2 for functional transfers and bed mobility completed today.  Pt unable to tolerate further functional activity/ADL tasks d/t pain and fatigue. Additionally, pt with limited attention to tasks during session requiring frequent cueing. Pt left semi-supine, positioned for comfort, and in care of RN. Hopeful for goos progress with acute OT and recommend d/c home with [address] [place] [address] services. Patient is not verbalizing and is not demonstrating understanding of mindful-based movements (\"move in the tube\") principles of keeping UEs proximal to ribcage to prevent lateral pull on the sternum during load-bearing activities with visual, verbal, manual, and tactile cues required for compliance. Current Level of Function Impacting Discharge (ADLs/self-care): total A distal LB dressing, min to mod A x2 functional transfers and bed mobility in prep for ADLs     Functional Outcome Measure: The patient scored 15/24 on the [facility] Daily Activity Shortform outcome measure which is indicative of deficits in self care that may indicate need for rehab services at d/c. Other factors to consider for discharge: PLOF, O2 needs      Patient will benefit from skilled therapy intervention to address the above noted impairments. PLAN :  Recommendations and Planned Interventions: self care training, functional mobility training, therapeutic exercise, balance training, therapeutic activities, endurance activities, patient education, and home safety training    Frequency/Duration: Patient will be followed by occupational therapy 5 times a week to address goals.     Recommendation for discharge: (in order for the patient to meet his/her long term goals)  Occupational therapy at least 2 days/week in the home AND ensure assist and/or supervision for safety with OOB mobility/ADL/IADL tasks    This discharge recommendation:  Has been made in collaboration with the attending provider and/or case management    IF patient discharges home will need the following DME: TBD       SUBJECTIVE:   Patient stated No one told me that I had a triple bypass.     OBJECTIVE DATA SUMMARY:   HISTORY:   Past Medical History:   Diagnosis Date    Arthritis     BACK    BPH (benign prostatic hyperplasia) 2013    CAD (coronary artery disease)     Cancer ([facility] [address] 2015    SKIN - HEAD AND FACE    Cancer ([facility] [address] 01/2019    PROSTATE    Chronic pain     BACK    Chronic pelvic pain in male     sensitive to touch on the left side down to the left side of the penis    Collagenous colitis 2011    Diabetes mellitus type 2, controlled, with complications ([facility] [address] 2130    Diabetic neuropathy ([facility] [address] 2006    Diverticulosis     ED (erectile dysfunction)     Dr. Darylene Provencal    GERD (gastroesophageal reflux disease) 1990    Gout     1 episode    Hypertension 1970    Ill-defined condition     BILATERAL PERIPHERAL NEUROPATHY BOTH LEGS    Low serum testosterone level     Dr. Iesha Morales    Osteopenia     Dr. Iesha Morales    Psoriasis     Sleep apnea     USES CPAP    Sun-damaged skin      Past Surgical History:   Procedure Laterality Date    COLONOSCOPY N/A 9/21/2021    COLONOSCOPY   :- performed by Ashlee Fowler MD at [address] [place] [address] ENDOSCOPY    EMG TWO EXTREMITIES LOWER  8/24/2013         ENDOSCOPY, COLON, DIAGNOSTIC      HX COLONOSCOPY      HX HEENT  2017    WISDOM TEETH X4    HX HERNIA REPAIR      x2 bilateral inguinal    HX ORTHOPAEDIC  08/2018    L4-5, L5-S1 DISKECTOMY    HX OTHER SURGICAL  2015,2017    MOHS    HX PROSTATE SURGERY  04/02/2019    removed    NCV SENSORY OR MIXED  8/24/2013         MI UNLISTED PROCEDURE CARDIAC SURGERY  12/24/2019    1 stent       Expanded or extensive additional review of patient history:     Home Situation  Home Environment: Private residence ([facility])  # Steps to Enter: 5  Rails to Enter: Yes  One/Two Story Residence: One story  Living Alone: No  Support Systems: Spouse/Significant Other  Patient Expects to be Discharged to[de-identified] Home with home health  Current DME Used/Available at Home: Cane, straight, Shower chair, Grab bars (built in bench)  Tub or Shower Type: Shower    Hand dominance: Right    EXAMINATION OF PERFORMANCE DEFICITS:  Cognitive/Behavioral Status:  Neurologic State: Alert  Orientation Level: Oriented X4  Cognition: Follows commands;Decreased attention/concentration  Perception: Appears intact  Perseveration: No perseveration noted  Safety/Judgement: Awareness of environment    Skin: visually intact     Edema: none noted     Hearing: Auditory  Auditory Impairment: None    Vision/Perceptual:                           Acuity: Within Defined Limits         Range of Motion:    AROM: Generally decreased, functional                         Strength:    Strength: Generally decreased, functional                Coordination:  Coordination: Generally decreased, functional            Tone & Sensation:    Tone: Normal  Sensation: Impaired (baseline neuropathy)                      Balance:  Sitting: Intact  Standing: Impaired; With support  Standing - Static: Fair  Standing - Dynamic : Poor    Functional Mobility and Transfers for ADLs:  Bed Mobility:  Sit to Supine: Moderate assistance;Assist x2    Transfers:  Sit to Stand: Minimum assistance;Assist x2  Stand to Sit: Minimum assistance    ADL Assessment:  Feeding: Independent    Oral Facial Hygiene/Grooming: Minimum assistance; Moderate assistance (inferred, standing)    Bathing: Moderate assistance (inferred, for transfer, seated)    Type of Bath: Chlorhexidine (CHG)    Upper Body Dressing: Moderate assistance (inferred, seated)    Lower Body Dressing: Total assistance (seated)    Toileting: Moderate assistance (inferred, for transfer and balance with hygiene)                ADL Intervention and task modifications:  Feeding  Feeding Assistance: Independent  Container Management: Independent  Food to Mouth: Independent  Drink to Mouth: Independent              Lower Body Dressing Assistance  Socks:  Total assistance (dependent)         Cognitive Retraining  Safety/Judgement: Awareness of environment    Patient instructed and educated on mindful movement principles based on Move in The Tube concept to include maintaining bilateral elbows close to rib cage when performing any load-bearing activity such as getting in/out of bed, pushing up from a chair, opening a door, or lifting a box. Patient was given a handout with diagrams of each correct/incorrect method of performing each of the above tasks. Patient instructed on the ability to utilize upper extremities outside the tube when doing any non-load bearing activity such as washing hair/body, brushing teeth, retrieving clothing items, or scratching your back. Patient encouraged to also perform upper extremity exercises \"outside of the tube\" to prevent scar tissue formation around sternal incision site. Patient instructed in detail about activities to heed with caution, allowing pain to be the guide. These activities include but are not limited to: mowing the lawn, riding a bike, walking a dog, lifting a child, workshop hobbies, golfing, sexual activity, vacuuming, fishing, scrubbing the floors, and moving furniture. Patient was given the [address] [employer] handout to describe each of these activities in detail. Functional Measure:  Kate Carl AM-PAC®      Daily Activity Inpatient Short Form (6-Clicks) Version 2  How much HELP from another person do you currently need. .. (If the patient hasn't done an activity recently, how much help from another person do you think they would need if they tried?) Total A Lot A Little None   1. Putting on and taking off regular lower body clothing? [x]   1 []   2 []   3 []   4   2. Bathing (including washing, rinsing, drying)? []   1 [x]   2 []   3 []   4   3. Toileting, which includes using toilet, bedpan, or urinal? []   1 [x]   2 []   3 []   4   4. Putting on and taking off regular upper body clothing? []   1 [x]   2 []   3 []   4   5. Taking care of personal grooming such as brushing teeth?  []   1 []   2 [x]   3 [] 4   6.  Eating meals? []   1 []   2 []   3 [x]   4     Raw Score: 14/24                            Cutoff score ?191,2,3 had higher odds of discharging home with home health or need of SNF/IPR    1. Heladio Escamilla. Validity of the AM-PAC 6-Clicks Inpatient Daily Activity and Basic Mobility Short Forms. Physical Therapy Mar 2014, [phone]; DOI: 10.2522/ptj.60012490  2. Venkat Stoddard. Association of AM-PAC \"6-Clicks\" Basic Mobility and Daily Activity Scores With Discharge Destination. Phys Ther. 2021 4;101(4):fatu764. doi: 10.1093/ptj/esdf419. PMID: 41112411. V Ernesto Tripp, Consuelo STRINGER, Sherin Barrow, Caleb K, Sudha S. Activity Measure for Post-Acute Care \"6-Clicks\" Basic Mobility Scores Predict Discharge Destination After Acute Care Hospitalization in Select Patient Groups: A Retrospective, Observational Study. Arch Rehabil Res Clin Transl. 2022 16;4(3):281299. doi: 10.1016/j.arrct. 5299.153605. PMID: 76302996; PMCID: [id number]. 4. Ayanna Dee, Grayson W, Talita RAND. AM-PAC Short Forms Manual 4.0. Revised 2020. Occupational Therapy Evaluation Charge Determination   History Examination Decision-Making   LOW Complexity : Brief history review  LOW Complexity : 1-3 performance deficits relating to physical, cognitive , or psychosocial skils that result in activity limitations and / or participation restrictions  MEDIUM Complexity : Patient may present with comorbidities that affect occupational performnce.  Miniml to moderate modification of tasks or assistance (eg, physical or verbal ) with assesment(s) is necessary to enable patient to complete evaluation       Based on the above components, the patient evaluation is determined to be of the following complexity level: LOW   Pain Ratin/10    Activity Tolerance:   Fair    After treatment patient left in no apparent distress: Supine in bed, Call bell within reach, and Side rails x 3    COMMUNICATION/EDUCATION:   The patients plan of care was discussed with: Physical therapist and Registered nurse. Patient/family have participated as able in goal setting and plan of care. and Patient/family agree to work toward stated goals and plan of care. This patients plan of care is appropriate for delegation to [facility].     Thank you for this referral.  Peggi Lombard, OTD, OTR/L  Time Calculation: 20 mins

## 2023-04-04 NOTE — PROGRESS NOTES
Physician Progress Note      Reagan Villarreal  [facility] #:                  [id number]  :                       1950  ADMIT DATE:       3/31/2023 2:17 AM  DISCH DATE:  RESPONDING  PROVIDER #:        Ken FLORES PA-C          QUERY TEXT:    Patient admitted with CAD with elevated troponins. Documentation reflects NSTEMI in H/P. If possible, please document in the progress notes and discharge summary if NSTEMI was: The medical record reflects the following:  Risk Factors: elevated troponins  Clinical Indicators:  H/P  Non-STEMI  -Troponin rising, repeat pending  -Has received a loading dose of aspirin. Continue with aspirin 81 mg daily, Lipitor and Toprol. 3/31/23 02:33  Troponin-High Sensitivity: 106 (H)    3/31/23 05:20  Troponin-High Sensitivity: 483 (HH)    3/31/23 12:16  Troponin-High Sensitivity: 1,093 ([place])    3/31 cardiology  1. Elevated troponin: 106-483. Trending up. EKG NSR, q waves inferiorly (also noted q waves on prior EKG lead II, AVG from . He had one brief episode of mild chest pain PTA but No current chest pain. HS troponin elevation could be due to demand. Repeat HS troponin now. Treatment: troponin monitoring, cardiology consult    Thank you,  Jignesh Hinojosa RN, CCDS, [facility]  Certified Clinical Documentation   [phone]  you can also reach me by perfect serve. Options provided:  -- NSTEMI confirmed after study  -- NSTEMI ruled out after study  -- Type 2 MI confirmed POA  -- Other - I will add my own diagnosis  -- Disagree - Not applicable / Not valid  -- Disagree - Clinically unable to determine / Unknown  -- Refer to Clinical Documentation Reviewer    PROVIDER RESPONSE TEXT:    NSTEMI confirmed after study.     Query created by: Benton Davenport on 4/3/2023 11:57 AM      Electronically signed by:  Josef Alberts PA-C 2023 10:21 AM

## 2023-04-04 NOTE — PROGRESS NOTES
Critical Care will follow peripherally at this time.      Emanuel Aguayo DO  Staff Intensivist  [facility]  4/4/2023

## 2023-04-04 NOTE — CARDIO/PULMONARY
Cardiac Rehab: CABG education folder at bedside. Met with Jessica Chavez to begin cardiac surgery post discharge instructions and to discuss participation in the Cardiac Rehab Program.     Educated using teach back method. Reviewed the use of bear for sternal support, daily weight and temperature monitoring,  and use of incentive spirometer. Jessica Chavez was able to demonstrate proper use of incentive spirometer, achieving 500 ml. Discussed Cardiac Rehab Program format, benefits, and encouraged participation. A referral was placed for OP program and contact information for OP CR is on his AVS.General questions answered. Jessica Chavez verbalized understanding.         Kaitlynn Rai RN

## 2023-04-05 ENCOUNTER — APPOINTMENT (OUTPATIENT)
Dept: GENERAL RADIOLOGY | Age: 73
End: 2023-04-05
Attending: PHYSICIAN ASSISTANT
Payer: MEDICARE

## 2023-04-05 LAB
ADMINISTERED INITIALS, ADMINIT: NORMAL
ALBUMIN SERPL-MCNC: 3.1 G/DL (ref 3.5–5)
ALBUMIN/GLOB SERPL: 1 (ref 1.1–2.2)
ALP SERPL-CCNC: 44 U/L (ref 45–117)
ALT SERPL-CCNC: 39 U/L (ref 12–78)
ANION GAP SERPL CALC-SCNC: 5 MMOL/L (ref 5–15)
AST SERPL-CCNC: 33 U/L (ref 15–37)
BILIRUB SERPL-MCNC: 0.8 MG/DL (ref 0.2–1)
BUN SERPL-MCNC: 17 MG/DL (ref 6–20)
BUN/CREAT SERPL: 25 (ref 12–20)
CALCIUM SERPL-MCNC: 8.8 MG/DL (ref 8.5–10.1)
CARB RATIO, CHOR: 15
CARBOHYDRATE EATEN, CHO: 36 G
CHLORIDE SERPL-SCNC: 104 MMOL/L (ref 97–108)
CO2 SERPL-SCNC: 23 MMOL/L (ref 21–32)
CREAT SERPL-MCNC: 0.68 MG/DL (ref 0.7–1.3)
D50 ADMINISTERED, D50ADM: 0 ML
D50 ORDER, D50ORD: 0 ML
ERYTHROCYTE [DISTWIDTH] IN BLOOD BY AUTOMATED COUNT: 14 % (ref 11.5–14.5)
GLOBULIN SER CALC-MCNC: 3.2 G/DL (ref 2–4)
GLUCOSE BLD STRIP.AUTO-MCNC: 103 MG/DL (ref 65–117)
GLUCOSE BLD STRIP.AUTO-MCNC: 105 MG/DL (ref 65–117)
GLUCOSE BLD STRIP.AUTO-MCNC: 106 MG/DL (ref 65–117)
GLUCOSE BLD STRIP.AUTO-MCNC: 106 MG/DL (ref 65–117)
GLUCOSE BLD STRIP.AUTO-MCNC: 111 MG/DL (ref 65–117)
GLUCOSE BLD STRIP.AUTO-MCNC: 153 MG/DL (ref 65–117)
GLUCOSE BLD STRIP.AUTO-MCNC: 183 MG/DL (ref 65–117)
GLUCOSE BLD STRIP.AUTO-MCNC: 186 MG/DL (ref 65–117)
GLUCOSE BLD STRIP.AUTO-MCNC: 202 MG/DL (ref 65–117)
GLUCOSE BLD STRIP.AUTO-MCNC: 260 MG/DL (ref 65–117)
GLUCOSE BLD STRIP.AUTO-MCNC: 88 MG/DL (ref 65–117)
GLUCOSE BLD STRIP.AUTO-MCNC: 97 MG/DL (ref 65–117)
GLUCOSE SERPL-MCNC: 114 MG/DL (ref 65–100)
GLUCOSE, GLC: 103 MG/DL
GLUCOSE, GLC: 105 MG/DL
GLUCOSE, GLC: 106 MG/DL
GLUCOSE, GLC: 106 MG/DL
GLUCOSE, GLC: 111 MG/DL
GLUCOSE, GLC: 153 MG/DL
GLUCOSE, GLC: 183 MG/DL
GLUCOSE, GLC: 186 MG/DL
GLUCOSE, GLC: 202 MG/DL
GLUCOSE, GLC: 88 MG/DL
GLUCOSE, GLC: 97 MG/DL
HCT VFR BLD AUTO: 36.8 % (ref 36.6–50.3)
HGB BLD-MCNC: 12.6 G/DL (ref 12.1–17)
HIGH TARGET, HITG: 130 MG/DL
INSULIN ADMINSTERED, INSADM: 1 UNITS/HOUR
INSULIN ADMINSTERED, INSADM: 1.6 UNITS/HOUR
INSULIN ADMINSTERED, INSADM: 1.7 UNITS/HOUR
INSULIN ADMINSTERED, INSADM: 1.8 UNITS/HOUR
INSULIN ADMINSTERED, INSADM: 1.8 UNITS/HOUR
INSULIN ADMINSTERED, INSADM: 2 UNITS/HOUR
INSULIN ADMINSTERED, INSADM: 5.2 UNITS/HOUR
INSULIN ADMINSTERED, INSADM: 5.8 UNITS/HOUR
INSULIN ADMINSTERED, INSADM: 8.1 UNITS/HOUR
INSULIN BOLUS ADMINISTERED, INSBOLADM: 6 UNITS/HOUR
INSULIN BOLUS ORDERED, INSBOLORD: 2.4 UNITS/HOUR
INSULIN ORDER, INSORD: 1 UNITS/HOUR
INSULIN ORDER, INSORD: 1.6 UNITS/HOUR
INSULIN ORDER, INSORD: 1.7 UNITS/HOUR
INSULIN ORDER, INSORD: 1.8 UNITS/HOUR
INSULIN ORDER, INSORD: 1.8 UNITS/HOUR
INSULIN ORDER, INSORD: 2 UNITS/HOUR
INSULIN ORDER, INSORD: 5.2 UNITS/HOUR
INSULIN ORDER, INSORD: 5.8 UNITS/HOUR
INSULIN ORDER, INSORD: 8.1 UNITS/HOUR
LOW TARGET, LOT: 95 MG/DL
MAGNESIUM SERPL-MCNC: 2.2 MG/DL (ref 1.6–2.4)
MCH RBC QN AUTO: 32.1 PG (ref 26–34)
MCHC RBC AUTO-ENTMCNC: 34.2 G/DL (ref 30–36.5)
MCV RBC AUTO: 93.9 FL (ref 80–99)
MINUTES UNTIL NEXT BG, NBG: 60 MIN
MULTIPLIER, MUL: 0.04
MULTIPLIER, MUL: 0.05
MULTIPLIER, MUL: 0.06
MULTIPLIER, MUL: 0.07
NRBC # BLD: 0 K/UL (ref 0–0.01)
NRBC BLD-RTO: 0 PER 100 WBC
ORDER INITIALS, ORDINIT: NORMAL
PLATELET # BLD AUTO: 199 K/UL (ref 150–400)
PMV BLD AUTO: 9.7 FL (ref 8.9–12.9)
POTASSIUM SERPL-SCNC: 3.9 MMOL/L (ref 3.5–5.1)
PROT SERPL-MCNC: 6.3 G/DL (ref 6.4–8.2)
RBC # BLD AUTO: 3.92 M/UL (ref 4.1–5.7)
SERVICE CMNT-IMP: ABNORMAL
SERVICE CMNT-IMP: NORMAL
SODIUM SERPL-SCNC: 132 MMOL/L (ref 136–145)
WBC # BLD AUTO: 11.6 K/UL (ref 4.1–11.1)

## 2023-04-05 PROCEDURE — 82962 GLUCOSE BLOOD TEST: CPT

## 2023-04-05 PROCEDURE — 99232 SBSQ HOSP IP/OBS MODERATE 35: CPT | Performed by: CLINICAL NURSE SPECIALIST

## 2023-04-05 PROCEDURE — 71045 X-RAY EXAM CHEST 1 VIEW: CPT

## 2023-04-05 PROCEDURE — 74011000250 HC RX REV CODE- 250: Performed by: PHYSICIAN ASSISTANT

## 2023-04-05 PROCEDURE — 97530 THERAPEUTIC ACTIVITIES: CPT

## 2023-04-05 PROCEDURE — 74011250637 HC RX REV CODE- 250/637: Performed by: NURSE PRACTITIONER

## 2023-04-05 PROCEDURE — 36415 COLL VENOUS BLD VENIPUNCTURE: CPT

## 2023-04-05 PROCEDURE — 74011250637 HC RX REV CODE- 250/637: Performed by: PHYSICIAN ASSISTANT

## 2023-04-05 PROCEDURE — 65660000001 HC RM ICU INTERMED STEPDOWN

## 2023-04-05 PROCEDURE — 74011000250 HC RX REV CODE- 250: Performed by: NURSE PRACTITIONER

## 2023-04-05 PROCEDURE — 74011636637 HC RX REV CODE- 636/637: Performed by: CLINICAL NURSE SPECIALIST

## 2023-04-05 PROCEDURE — 80053 COMPREHEN METABOLIC PANEL: CPT

## 2023-04-05 PROCEDURE — 83735 ASSAY OF MAGNESIUM: CPT

## 2023-04-05 PROCEDURE — 77010033678 HC OXYGEN DAILY

## 2023-04-05 PROCEDURE — 85027 COMPLETE CBC AUTOMATED: CPT

## 2023-04-05 PROCEDURE — 94660 CPAP INITIATION&MGMT: CPT

## 2023-04-05 PROCEDURE — 97116 GAIT TRAINING THERAPY: CPT

## 2023-04-05 RX ORDER — LANOLIN ALCOHOL/MO/W.PET/CERES
1 CREAM (GRAM) TOPICAL
Status: ACTIVE
Start: 2023-04-06

## 2023-04-05 RX ORDER — GABAPENTIN 300 MG/1
600 CAPSULE ORAL 2 TIMES DAILY
Status: DISPENSED
Start: 2023-04-05

## 2023-04-05 RX ORDER — SODIUM CHLORIDE 0.9 % (FLUSH) 0.9 %
5-40 SYRINGE (ML) INJECTION EVERY 8 HOURS
Status: DISPENSED
Start: 2023-04-05

## 2023-04-05 RX ORDER — SODIUM CHLORIDE 0.9 % (FLUSH) 0.9 %
5-40 SYRINGE (ML) INJECTION AS NEEDED
Status: DISPENSED
Start: 2023-04-05

## 2023-04-05 RX ORDER — LIDOCAINE 4 G/100G
1 PATCH TOPICAL EVERY 24 HOURS
Status: DISCONTINUED
Start: 2023-04-05 | End: 2023-04-05 | Stop reason: SDUPTHER

## 2023-04-05 RX ORDER — CLOPIDOGREL BISULFATE 75 MG/1
75 TABLET ORAL DAILY
Status: DISPENSED
Start: 2023-04-05

## 2023-04-05 RX ORDER — INSULIN LISPRO 100 [IU]/ML
INJECTION, SOLUTION INTRAVENOUS; SUBCUTANEOUS
Status: ACTIVE
Start: 2023-04-05 | End: 2023-04-05

## 2023-04-05 RX ORDER — METHOCARBAMOL 500 MG/1
500 TABLET, FILM COATED ORAL
Status: ACTIVE
Start: 2023-04-05

## 2023-04-05 RX ORDER — INSULIN GLARGINE 100 [IU]/ML
0.4 INJECTION, SOLUTION SUBCUTANEOUS
Status: DISPENSED
Start: 2023-04-05

## 2023-04-05 RX ORDER — INSULIN LISPRO 100 [IU]/ML
10 INJECTION, SOLUTION INTRAVENOUS; SUBCUTANEOUS
Status: DISPENSED
Start: 2023-04-05

## 2023-04-05 RX ORDER — QUETIAPINE FUMARATE 25 MG/1
50 TABLET, FILM COATED ORAL ONCE
Status: COMPLETED
Start: 2023-04-05 | End: 2023-04-05

## 2023-04-05 RX ORDER — BACITRACIN 500 UNIT/G
PACKET (EA) TOPICAL
Status: DISPENSED
Start: 2023-04-05 | End: 2023-04-05

## 2023-04-05 RX ORDER — TRAMADOL HYDROCHLORIDE 50 MG/1
50 TABLET ORAL
Status: DISPENSED
Start: 2023-04-05

## 2023-04-05 RX ADMIN — MUPIROCIN: 20 OINTMENT TOPICAL at 17:39

## 2023-04-05 RX ADMIN — FAMOTIDINE 20 MG: 20 TABLET, FILM COATED ORAL at 08:19

## 2023-04-05 RX ADMIN — GABAPENTIN 600 MG: 300 CAPSULE ORAL at 17:37

## 2023-04-05 RX ADMIN — ATORVASTATIN CALCIUM 80 MG: 40 TABLET, FILM COATED ORAL at 22:00

## 2023-04-05 RX ADMIN — METHOCARBAMOL 500 MG: 500 TABLET ORAL at 08:20

## 2023-04-05 RX ADMIN — GABAPENTIN 300 MG: 300 CAPSULE ORAL at 08:19

## 2023-04-05 RX ADMIN — DOCUSATE SODIUM 50MG AND SENNOSIDES 8.6MG 1 TABLET: 8.6; 5 TABLET, FILM COATED ORAL at 08:20

## 2023-04-05 RX ADMIN — Medication 10 UNITS: at 17:37

## 2023-04-05 RX ADMIN — QUETIAPINE FUMARATE 50 MG: 25 TABLET ORAL at 17:39

## 2023-04-05 RX ADMIN — METHOCARBAMOL 500 MG: 500 TABLET ORAL at 14:28

## 2023-04-05 RX ADMIN — MAGNESIUM OXIDE 400 MG (241.3 MG MAGNESIUM) TABLET 400 MG: TABLET at 08:20

## 2023-04-05 RX ADMIN — TRAMADOL HYDROCHLORIDE 50 MG: 50 TABLET ORAL at 14:28

## 2023-04-05 RX ADMIN — MUPIROCIN: 20 OINTMENT TOPICAL at 08:19

## 2023-04-05 RX ADMIN — ASPIRIN 81 MG CHEWABLE TABLET 81 MG: 81 TABLET CHEWABLE at 08:20

## 2023-04-05 RX ADMIN — Medication 6 UNITS: at 09:41

## 2023-04-05 RX ADMIN — DOCUSATE SODIUM 50MG AND SENNOSIDES 8.6MG 1 TABLET: 8.6; 5 TABLET, FILM COATED ORAL at 17:37

## 2023-04-05 RX ADMIN — HYDROMORPHONE HYDROCHLORIDE 4 MG: 2 TABLET ORAL at 07:28

## 2023-04-05 RX ADMIN — CLOPIDOGREL BISULFATE 75 MG: 75 TABLET ORAL at 08:20

## 2023-04-05 RX ADMIN — AMIODARONE HYDROCHLORIDE 400 MG: 200 TABLET ORAL at 08:19

## 2023-04-05 RX ADMIN — SODIUM CHLORIDE, PRESERVATIVE FREE 10 ML: 5 INJECTION INTRAVENOUS at 07:07

## 2023-04-05 RX ADMIN — INSULIN GLARGINE 40 UNITS: 100 INJECTION, SOLUTION SUBCUTANEOUS at 14:29

## 2023-04-05 RX ADMIN — METOPROLOL TARTRATE 50 MG: 50 TABLET, FILM COATED ORAL at 08:20

## 2023-04-05 RX ADMIN — CHLORHEXIDINE GLUCONATE 10 ML: 1.2 RINSE ORAL at 08:20

## 2023-04-05 RX ADMIN — POLYETHYLENE GLYCOL 3350 17 G: 17 POWDER, FOR SOLUTION ORAL at 08:19

## 2023-04-05 NOTE — PROGRESS NOTES
Problem: Mobility Impaired (Adult and Pediatric)  Goal: *Acute Goals and Plan of Care (Insert Text)  Description: FUNCTIONAL STATUS PRIOR TO ADMISSION: Patient was independent and active without use of DME. Has baseline neuropathy that does affect his gait quality. Cane use PRN when neuropathy is painful. On high doses of gabapentin for neuropathy pain at home. HOME SUPPORT PRIOR TO ADMISSION: The patient lived with his wife. Physical Therapy Goals  Initiated 4/4/2023  1. Patient will move from supine to sit and sit to supine , scoot up and down, and roll side to side in bed with modified independence within 5 days. 2.  Patient will perform sit to/from stand with modified independence within 5 days. 3.  Patient will ambulate 150 feet with least restrictive assistive device and modified independence within 5 days. 4.  Patient will ascend/descend 5 stairs with right handrail(s) with modified independence within 5 days. 5.  Patient will perform cardiac exercises per protocol with independence within 5 days. 6.  Patient will verbally recall and functionally demonstrate mindful-based movements (\"move in the tube\") principles without cues within 5 days. Outcome: Progressing Towards Goal   PHYSICAL THERAPY TREATMENT  Patient: Charmayne Chestnut (38 y.o. male)  Date: 4/5/2023  Diagnosis: NSTEMI (non-ST elevated myocardial infarction) ([facility] [address] [I21.4]  Allergic reaction, initial encounter [T78.40XA]  Tachycardia [R00.0] <principal problem not specified>  Procedure(s) (LRB):  CABG x 3 with CPB. Donor sites: Lerry Kicks and Right Saphenous vein via [address]; Brendan and EpiAortic U/S by Dr. Nino Mello. (N/A) 2 Days Post-Op  Precautions: Fall (move in the tube)  Chart, physical therapy assessment, plan of care and goals were reviewed. ASSESSMENT  Patient continues with skilled PT services and is progressing towards goals.  Patient received sitting in the chair, noted him to be sweating but BP stable and patient denied other symptoms. Utilized RW for gait training this date-- still with extremely shuffled steps and required max assist for RW management and mod A to steady patient. He was pushing RW without moving feet at times and with decreased initiation of steps on R compared to left. Still on 4L/min but stable on supplemental O2. Highly recommend IPR at d/c-- anticipate slow progress with ambulation due to history of neuropathy and current mentation. Most concerning today was his mentation (re: decreased attention to task, poor immediate recall of move in the tube education, and not oriented to month). Neuro exam benign but appeared worsening delirium (?). Much less talkative compared to yesterday. Also, highly recommend using shoes for next ambulation trial to potentially assist with foot clearance in setting of neuropathy. Patient is not verbalizing and is not demonstrating understanding of mindful-based movements (\"move in the tube\") principles of keeping UEs proximal to ribcage to prevent lateral pull on the sternum during load-bearing activities with visual, verbal, and manual cues required for compliance. Current Level of Function Impacting Discharge (mobility/balance): mod-max Ax1 ambulation with RW, poor immediate recall of move in the tube education    Other factors to consider for discharge: far from baseline         PLAN :  Patient continues to benefit from skilled intervention to address the above impairments. Continue treatment per established plan of care. to address goals.     Recommendation for discharge: (in order for the patient to meet his/her long term goals)  Therapy 3 hours per day 5-7 days per week    This discharge recommendation:  Has been made in collaboration with the attending provider and/or case management    IF patient discharges home will need the following DME: to be determined (TBD)       SUBJECTIVE:   Patient stated August. re: month    OBJECTIVE DATA SUMMARY:   Patient mobilized on continuous portable monitor/telemetry. Critical Behavior:  Neurologic State: Alert, Confused  Orientation Level: Oriented to person, Oriented to place, Oriented to situation, Disoriented to time (unable to state current month)  Cognition: Decreased attention/concentration, Follows commands  Safety/Judgement: Awareness of environment  Functional Mobility Training:  Bed Mobility:  Sit to Supine: Maximum assistance;Assist x2  Transfers:  Sit to Stand: Minimum assistance;Assist x2  Stand to Sit: Minimum assistance  Balance:  Sitting: Intact  Standing: Impaired; With support  Standing - Static: Fair  Standing - Dynamic : Poor; Fair  Ambulation/Gait Training:  Distance (ft): 30 Feet (ft)  Assistive Device: Gait belt;Walker, rolling (4L/min)  Ambulation - Level of Assistance: Moderate assistance;Assist x2  Gait Abnormalities: Shuffling gait (flexed posture, poor RW management)  Base of Support: Widened  Speed/Joana: Shuffled; Slow  Step Length: Right shortened;Left shortened (R significantly shorter than L)    Cardiac diagnosis intervention:  Patient instructed and educated on mindful movement principles based on Move in The Tube concept to include maintaining bilateral elbows close to rib cage when performing any load-bearing activity such as getting in/out of bed, pushing up from a chair, opening a door, or lifting a box. Patient was given a handout with diagrams of each correct/incorrect method of performing each of the above tasks. Pain Ratin/10     Activity Tolerance:   Fair    After treatment patient left in no apparent distress:   Supine in bed, Heels elevated for pressure relief, and Call bell within reach    COMMUNICATION/COLLABORATION:   The patients plan of care was discussed with: Occupational therapist and Registered nurse.      Ha Koenig, PT, DPT   Time Calculation: 28 mins

## 2023-04-05 NOTE — PROGRESS NOTES
1100: Report received from [place], [address] [place] [address]. Patient care assumed. CT, MAC and neri catheter removed. 1700: Pt. Becoming increasingly confused. Julieth Bonner NP at bedside. Orders for seroquel. 2000: Bedside and Verbal shift change report given to Debbie Mehta RN (oncoming nurse) by Christie Garnica RN (offgoing nurse). Report included the following information SBAR, Kardex, OR Summary, Intake/Output, MAR, Recent Results, Cardiac Rhythm NSR, Sinus Tach, and Alarm Parameters .

## 2023-04-05 NOTE — PROGRESS NOTES
Cardiac Surgery Care Coordinator- Met with Alisa Ramirez, reviewed plan of care and encouraged him to verbalize. He remains alert and oriented to place and situation but he is unable to recall the month or date. Reinforced sternal precautions and encouraged continued use of the incentive spirometer. Reviewed goals for the day and emphasized the importance of increased activity to meet discharge goals. Spoke to his  wife and daughter, they stated that he can be a little \"off\" at home but find his behavior a little different here. Will continue to follow for educational and emotional needs.

## 2023-04-05 NOTE — PROGRESS NOTES
[facility] ICU Progress Note    Admit Date: 3/31/2023  POD:  1 Day Post-Op    Procedure:    4/3/23 with Dr. Kristofer Molina:   CABG x 3 with CPB. (LIMA to LAD, SVG to OM 2, SVG to PDA )  Donor sites: [facility] and Right Saphenous vein via [address]; Brendan and EpiAortic U/S by Dr. Nino Mello. Hosptial Course:   4/3/23: OR with [place], CABG x 3, transferred to ICU post-operatively on amiodarone, precedex, insulin, and grady. 1L albumin given, extubated at 1557. Initially paced post-op, but intrinsic rate started competing, decreased generator. 23 POD 1: Nephrocheck elevated overnight, 2.26. Lactic acid normal 1.2, BP borderline. Given 500mL LR bolus over 2 hours. Repeat labs at 12pm.      Subjective:   Pt seen with Dr. Bertin Og. Patient up in chair. Pain more manageable this morning. About 600ml on IS. In NSR 90s. Objective:   Vitals:  Blood pressure 138/74, pulse 98, temperature 98.3 °F (36.8 °C), resp. rate 27, height 5' 10\" (1.778 m), weight 222 lb 7.1 oz (100.9 kg), SpO2 93 %. Temp (24hrs), Av.5 °F (36.9 °C), Min:97.9 °F (36.6 °C), Max:99.1 °F (37.3 °C)    EKG/Rhythm:  SR 90s    CT Output: 350mL total (60mL overnight)    Oxygen Therapy: 4L NC    CXR:    CXR Results  (Last 48 hours)                 23 0435  XR CHEST PORT Final result    Impression:      Bibasilar atelectasis. Probable small right pleural effusion. Narrative:  EXAM:  XR CHEST PORT       INDICATION: Postop heart       COMPARISON: 2023       TECHNIQUE: Semiupright portable chest AP view       FINDINGS: Cardiac monitoring leads and median sternotomy changes. Cardiomegaly. The pulmonary vasculature is within normal limits. Bibasilar atelectasis. Probable small right pleural effusion. The visualized   bones and upper abdomen are age-appropriate. 23 0416  XR CHEST PORT Final result    Impression:  No significant change. Narrative:  INDICATION:  postop heart       EXAM: CXR Portable.        FINDINGS: Portable chest shows extubation and NG tube removal with otherwise   stable support lines since yesterday. There is no apparent pneumothorax. Lungs   show no acute findings. Heart size is stable. There is no overt pulmonary edema. Admission Weight: Last Weight   Weight: 226 lb 13.7 oz (102.9 kg) Weight: 222 lb 7.1 oz (100.9 kg)     Intake / Output / Drain:  Current Shift: 04/05 0701 - 04/05 1900  In: 260.6 [P.O.:240; I.V.:20.6]  Out: 120 [Urine:100; Drains:20]  Last 24 hrs.:   Intake/Output Summary (Last 24 hours) at 4/5/2023 1433  Last data filed at 4/5/2023 0800  Gross per 24 hour   Intake 878 ml   Output 1260 ml   Net -382 ml   UO: 1225  CT: 590    EXAM:  General:  Up in chair, restless, fidgeting, mild confusion      Lungs:   Diminished bases bilaterally   Incision:  No erythema, drainage or swelling. Heart:  Regular rate and rhythm, S1, S2 normal, no murmur, click, rub or gallop. Abdomen:   Soft, non-tender. Bowel sounds hypoactive; denies nausea, BM pre-op   Extremities:  No edema. PPP. Neurologic:  Gross motor and sensory apparatus intact, no focal deficits noted     Labs:   Recent Labs     04/05/23  1404 04/05/23  0445 04/05/23  0438 04/03/23  1308 04/03/23  1246   WBC  --   --  11.6*   < > 11.3*   HGB  --   --  12.6   < > 12.5   HCT  --   --  36.8   < > 37.8   PLT  --   --  199   < > 190   NA  --   --  132*   < > 140   K  --   --  3.9   < > 3.9   BUN  --   --  17   < > 17   CREA  --   --  0.68*   < > 1.08   GLU  --   --  114*   < > 151*   GLUCPOC 153*   < >  --    < >  --    INR  --   --   --   --  1.1    < > = values in this interval not displayed.         Assessment:     Active Problems:    Allergic reaction, initial encounter (3/31/2023)      Tachycardia (3/31/2023)      NSTEMI (non-ST elevated myocardial infarction) ([facility] [address] (3/31/2023)      S/P CABG x 3 (4/3/2023)         Plan/Recommendations/Medical Decision Making:   CAD and NSTEMI s/p CABG: Hx Stent to OM 2020; Presented +NSTEMI  - Continue ASA and atorvastatin 80mg  - On Metoprolol 50mg BID  - Begin plavix     Hypertension: PTA Toprol XL 25 and Losartan 25mg  - On metoprolol 50mg BID, will eval for increasing later today     HLD: PTA atorvastatin 40mg  - Continue atorvastatin 40mg daily     Sleep Apnea: Uses BiPAP at home  - Wife brought in home machine, please utilize     Hx Smoker: only 5-10 years, quit in [facility], ABG baseline normal  - PFTs not able to be completed pre-op  - No current intervention    Acute Post-Operative Respiratory Insufficiency with Hypoxia:   - Chest xray with poor inspiratory effort, most likely secondary to pain control  - Wean oxygen for SpO2 > 92%  - Encourage pulmonary hygiene with IS, OOB, and ambulation  - Chest xray daily    Concern for DAVID: Nephrocheck elevated at 1.53 yesterday, UOP adequate, hypertensive. Creatinine normal, 0.63. Diabetes Mellitus, Type II: A1c 7.4%; PTA Metformin 500mg (2 tabs BID), Trulicity 2.9SW every 7 days, Farxiga 10mg daily, Lispro 40 units TID with meals, Lantus 40 units nightly  - Diabetes Management consulted for assistance inpatient  - Continue glucostabilizer, transition off drip per protocol today as long as patient eating 50% of meal     Acute Post-Operative Pain Management:  - Continue scheduled tylenol; increase gabapentin, add lidocaine patches  - DC PRN PO oxycodone/dilaudid, begin tramadol 50mg q 4 hours prn  - 1mg IV dilaudid for breakthrough  - Change to PRN robaxin    Peripheral Neuropathy: PTA Gabapentin 600mg BID; PRN Aleve PO  - Increase gabapentin to 600mg BID. Chronic Back Pain s/p Diskectomy: PRN Aleve PTA  - Monitor inpatient     GERD: PTA pantroprazole 40mg daily  - Continue famotidine inpatient     Confusion post op:  -Limit narcotics (DC dilaudid)  -Attempt to transfer to CVSU, so patient may have less disturbance at night to sleep and start a more normal sleep/wake cycle.   Lights on and patient up out of bed as much as possible during the day.      Assessment: Remove neri, art line and central line. Patient needs to ambulate and work with PT/OT to improve respiratory status and pain. Potential DC chest tubes later this morning. Transfer to stepdown.       Signed By: RORY Licea

## 2023-04-05 NOTE — PROGRESS NOTES
Problem: Self Care Deficits Care Plan (Adult)  Goal: *Acute Goals and Plan of Care (Insert Text)  Description: FUNCTIONAL STATUS PRIOR TO ADMISSION: Patient was independent and active without use of DME. Pt reports intermittent use of SPC with neuropathy flare-ups. HOME SUPPORT: The patient lived with his wife but did not require assist.    Occupational Therapy Goals  Initiated 4/4/2023  1. Patient will perform ADLs standing 5 mins without fatigue or LOB with supervision/set-up within 7 day(s). 2.  Patient will perform lower body ADLs with supervision/set-up within 7 day(s). 3.  Patient will perform gathering ADL items high and low 2/2 with supervision/set-up within 7 day(s). 4.  Patient will perform toilet transfers with supervision/set-up within 7 day(s). 5.  Patient will perform all aspects of toileting with supervision/set-up within 7 day(s). 6.  Patient will participate in cardiac/sternal upper extremity therapeutic exercise/activities to increase independence with ADLs with supervision/set-up for 5 minutes within 7 day(s). Outcome: Progressing Towards Goal     OCCUPATIONAL THERAPY TREATMENT  Patient: Charmaine Dunn (19 y.o. male)  Date: 4/5/2023  Diagnosis: NSTEMI (non-ST elevated myocardial infarction) ([facility] [address] [I21.4]  Allergic reaction, initial encounter [T78.40XA]  Tachycardia [R00.0] <principal problem not specified>  Procedure(s) (LRB):  CABG x 3 with CPB. Donor sites: [place] and Right Saphenous vein via [address]; Brendan and EpiAortic U/S by Dr. Valentino Safe. (N/A) 2 Days Post-Op  Precautions: Fall (move in the tube)  Chart, occupational therapy assessment, plan of care, and goals were reviewed. ASSESSMENT  Patient continues with skilled OT services and is progressing towards goals.   Pt's performance of ADL/IADL tasks continues to be limited at this time by impaired balance, activity tolerance, significant baseline neuropathy, and cognition (attention, orientation, insight, safety awareness). Pt received seated in bedside chair and agreeable to participation in therapy session. Observed pt to be diaphoretic, and initial BP soft though MAP > 70, pt denying dizziness/lightheadedness. Overall, pt with decreased command following/attention this session and requiring max multimodal cues for implementation of \"move in the tube\" principles as well as to follow basic commands. No carryover knowledge of education noted, even when prompted for immediate recall. Will plan to progress towards completion of further ADLs as pt is able to tolerate. At this time, recommend pt d/c to IPR as he is well below his functional baseline of independent and is requiring assistance of two skilled therapists to complete basic functional transfers in prep for ADLs. Patient is not verbalizing and is not demonstrating understanding of mindful-based movements (\"move in the tube\") principles of keeping UEs proximal to ribcage to prevent lateral pull on the sternum during load-bearing activities with max visual, verbal, manual, and tactile cues required for compliance. Current Level of Function Impacting Discharge (ADLs): min A x2 sit<> prep for ADLs, max A x2 bed mobility, total A LB dressing     Other factors to consider for discharge: PLOF, ongoing confusion/impaired cognition/delirium          PLAN :  Patient continues to benefit from skilled intervention to address the above impairments. Continue treatment per established plan of care to address goals.       Recommendation for discharge: (in order for the patient to meet his/her long term goals)  Therapy 3 hours per day 5-7 days per week    This discharge recommendation:  Has been made in collaboration with the attending provider and/or case management    IF patient discharges home will need the following DME: TBD       SUBJECTIVE:   Patient stated It's August.    OBJECTIVE DATA SUMMARY:   Cognitive/Behavioral Status:  Neurologic State: Alert;Confused  Orientation Level: Oriented to person;Oriented to place;Oriented to situation;Disoriented to time (oriented to year, not month - stated \"august\")  Cognition: Decreased attention/concentration; Follows commands  Perception: Appears intact  Perseveration: No perseveration noted  Safety/Judgement: Decreased awareness of need for safety;Decreased awareness of need for assistance    Functional Mobility and Transfers for ADLs:  Bed Mobility:  Sit to Supine: Maximum assistance;Assist x2    Transfers:  Sit to Stand: Minimum assistance;Assist x2          Balance:  Sitting: Intact  Standing: Impaired; With support  Standing - Static: Fair  Standing - Dynamic : Poor; Fair    ADL Intervention:           Cognitive Retraining  Safety/Judgement: Decreased awareness of need for safety;Decreased awareness of need for assistance    Patient instructed and educated on mindful movement principles based on Move in The Tube concept to include maintaining bilateral elbows close to rib cage when performing any load-bearing activity such as getting in/out of bed, pushing up from a chair, opening a door, or lifting a box. Patient was given a handout with diagrams of each correct/incorrect method of performing each of the above tasks. Patient instructed on the ability to utilize upper extremities outside the tube when doing any non-load bearing activity such as washing hair/body, brushing teeth, retrieving clothing items, or scratching your back. Patient encouraged to also perform upper extremity exercises \"outside of the tube\" to prevent scar tissue formation around sternal incision site. Patient instructed in detail about activities to heed with caution, allowing pain to be the guide. These activities include but are not limited to: mowing the lawn, riding a bike, walking a dog, lifting a child, workshop hobbies, golfing, sexual activity, vacuuming, fishing, scrubbing the floors, and moving furniture.  Patient was given the [employer] [facility] [employer] [place] handout to describe each of these activities in detail. Patient instructed no asymmetrical reaching over head to ensure B UEs when shoulders >90* i.e. reaching in cabinets and dressing. Instruction on upper body dressing techniques of over head, then arms through to decrease pain and unilateral shoulder flexion >90*. Instruction on the benefits of utilizing B UEs during functional tasks i.e. opening the fridge, stepping into the tub. Pain:  Pt reporting 7/10 pain at sternal incision though also reporting he was not in any pain prior to activity     Activity Tolerance:   Fair    After treatment patient left in no apparent distress:   Supine in bed, Call bell within reach, and Side rails x 3    COMMUNICATION/COLLABORATION:   The patients plan of care was discussed with: Physical therapist and Registered nurse.      MAGDALENO Morrow, OTR/L  Time Calculation: 26 mins

## 2023-04-05 NOTE — PROGRESS NOTES
0800 Bedside shift change report given to [address] (oncoming nurse) by Angelika Aguayo RN (offgoing nurse). Report included the following information SBAR, Kardex, ED Summary, OR Summary, Procedure Summary, Intake/Output, MAR, Accordion, Recent Results, and Cardiac Rhythm NSR . Cardiac Surgery rounding- Transfer orders received. Orders given to discontinue MAC, neri, and arterial line. Patient remains disoriented to time and decreased attentiveness as noted during yesterdays assessment. Changes made to PRN pain meds by RORY Kline. 1000 PT/OT working with patient. 1100 Bedside shift change report given to [address] [place] [address] (oncoming nurse) by Davin Morocho RN (offgoing nurse). Report included the following information SBAR, Kardex, ED Summary, OR Summary, Procedure Summary, Intake/Output, MAR, Accordion, Recent Results, and Cardiac Rhythm NSR .

## 2023-04-05 NOTE — DIABETES MGMT
[facility]  DIABETES MANAGEMENT CONSULT    Consulted by  Gloria Phillips MD   for advanced nursing evaluation and care for inpatient blood glucose management. Evaluation and Action Plan   Jacey Olea is a 74year old gentleman, with Type 2 Diabetes on high dose basal/bolus insulin along with metformin, trulicity and farxiga, who is admitted with an NSTEMI. His most recent A1C was 7.4% but on review of his CGM, he does have significant glucose variability mostly surrounding mealtime. He underwent a cardiac cath which revealed severe multivessel CAD now s/p CABG x3. Pre-operatively, due to concerns for an allergic reaction, 125mg methylprednisolone was given in the ED followed by 20mg prednisone x2 days. Moderate dose basal insulin was started, but this alone was not sufficient to override BG pattern prior to surgery. He did transition to an insulin gtt following CABG and he should transition off this afternoon. I do suspect a significant rise in insulin needs when PO advances. He will need customized insulin doses for glycemic control. Due to pre-op steroids along with great appetite followed by fair post-op meal consumption, we will start with a weight based insulin strategy and titrate to effect. BG target transitioning off the insulin gtt will be 140-180mg/dl. Management Rationale Action Plan   Continue insulin gtt with 1:6 insulin to CHO ratio. If he eats more than 50% of meals, can transition off today. He will need customized insulin doses. Basal: 0.4 units/kg/day: 40 units Lantus HS.   If fasting BG over 180mg/dl, please increase dose by 20%    Bolus: Start with 10 units Humalog/meal (home dose was 40 units with meals but with significant glucose variability/hypoglycemia)  -Hold if patient is NPO or consumes less than 50% of carbohydrates on meal tray  -Advance by 4 units daily for persistent pre-prandial hyperglycemia    Correction: Custom sensitivity [facility]       Diabetes Discharge Plan   Medication:  Continue Metformin 500mg tabs: 2 tabs BID  Continue Trulicity 4.0IE every 7 days  Continue Farxiga 10mg daily  Continue Lantus 40 units daily  Lispro dose TBD. Suspect he will need a dose reduction based on PTA hypoglycemia following meals. Referral  [x]        Outpatient diabetes education: Order placed   Additional orders:  Patient to resume CGM therapy. Will notify his endocrinologist regarding more than 2-3 episodes of hypoglycemia or persistent hyperglycemia. FUV with endocrinologist per routine. Initial Presentation   William Caldwell is a 74 y.o. male who presented to [place] ED 3/31/23 with a 1 day c/o SOB, chest pain and hives. In the ED, he was given Aspirin 325 mg x 1, Benadryl 50 mg IV x1, famotidine 20 mg IV x1, Solu-Medrol 125 mg IV x1.  IV heparin drip for non-STEMI and transferred to [facility] for cardiology consultation   LAB: , Trop 106 (repeat 483), BUN 23, Creat 1.36, GFR 55  CXR: No acute process    ED EKG interpretation:  Rhythm: sinus tachycardia; and regular . Rate (approx.): 125; Axis: Right superior axis deviation; P wave: normal; QRS interval: normal ; ST/T wave: non-specific changes; in  Lead: Diffusely; Other findings: abnormal ekg.     HX:   Past Medical History:   Diagnosis Date    Arthritis     BACK    BPH (benign prostatic hyperplasia) 2013    CAD (coronary artery disease)     Cancer ([address] 2015    SKIN - HEAD AND FACE    Cancer ([address] 01/2019    PROSTATE    Chronic pain     BACK    Chronic pelvic pain in male     sensitive to touch on the left side down to the left side of the penis    Collagenous colitis 2011    Diabetes mellitus type 2, controlled, with complications ([address] 1250    Diabetic neuropathy ([address] 2006    Diverticulosis     ED (erectile dysfunction)     Dr. Vj Ceballos    GERD (gastroesophageal reflux disease) 1990    Gout     1 episode    Hypertension 1970    Ill-defined condition     BILATERAL PERIPHERAL NEUROPATHY BOTH LEGS    Low serum testosterone level     Dr. Ramses Luis    Osteopenia     Dr. Ramses Luis    Psoriasis     Sleep apnea     USES CPAP    Sun-damaged skin         INITIAL DX:   NSTEMI (non-ST elevated myocardial infarction) ([facility]) [I21.4]  Allergic reaction, initial encounter [T78.40XA]  Tachycardia [R00.0]     Current Treatment     TX: Transfer to [facility]: ASA, Heparin, serial troponin, cardiology consultation        Hospital Course   Clinical progress has been uncomplicated. 3/31: Admission. Cardiac cath with severe multivessel CAD.  4/3: CABG x3  Diabetes History   Type 2 Diabetes: Diagnosed in 2006  Family History positive for Diabetes: Father , Paternal Grandmother   Ambulatory BG management provided by: Izaiah Gibbs MD Endocrinologist at [facility] Last visit 2/22/23    Diabetes-related Medical History  Neurological complications  Impotence and Peripheral neuropathy  Microvascular disease  Nephropathy  Macrovascular disease  CAD  Other associated conditions     FELICITY, Skin Cancer, Prostate Cancer, Osteoporosis     Diabetes Medication History  Key Antihyperglycemic Medications               metFORMIN ER (GLUCOPHAGE XR) 500 mg tablet TAKE 2 TABLETS BY MOUTH TWICE DAILY FOR 90 DAYS    insulin lispro (HUMALOG KWIKPEN INSULIN SC) by SubCUTAneous route. Up to 40 units SQ 3 times daily with meals. States he usually injects 40 units 3 times daily with meals. TRULICITY 1.5 IP/9.1 mL sub-q pen 1.5 mg by SubCUTAneous route every seven (7) days. FARXIGA 10 mg tab tablet 1 tablet by mouth daily    insulin glargine (LANTUS,BASAGLAR) 100 unit/mL (3 mL) inpn 40 Units by SubCUTAneous route nightly. As directed. Basaglar switched to [place] at same dose    Diabetes self-management practices:   Eating pattern  [x] Breakfast  Eggs, English muffin, sausage and coffee  [x] Lunch   Soup, salad  [x] Dinner   Likes to go out to eat dinner.   May order salmon/steak with a baked potato  [x] Bedtime  Chips, something sweet  [x] Snacks   Likes tortilla chips   [x] Beverages  Diet Mt Dew, Water  [x] Dentition status Normal   Physical activity pattern   Sedentary   Monitoring pattern   Wearing Libre2 CGM   90day review:  Glucose below 70: 9% of the time   70-99: 9%   100-180: 61%   181-240: 25%   Over 240 0%  Taking medications pattern  [x] Inconsistent administration: May skip a lunch dose  [x] Affordable  Reducing risks  [] Influenza:   Immunization History   Administered Date(s) Administered    Influenza High Dose Vaccine PF 09/20/2019    Influenza Vaccine 10/01/2014, 10/03/2015, 10/01/2016, 10/17/2017     [] Pneumonia:   Immunization History   Administered Date(s) Administered    (RETIRED) Pneumococcal Vaccine (Unspecified Type) 12/01/2007    Pneumococcal Conjugate (PCV-13) 07/24/2015    Pneumococcal Polysaccharide (PPSV-23) 08/15/2014    Pneumococcal Vaccine (Unspecified Type) 12/01/2007     [] Hepatitis:   Immunization History   Administered Date(s) Administered    Hep A Vaccine 10/11/2019    Hep A Vaccine (Adult) 10/11/2019     Social determinants of health impacting diabetes self-management practices   Concerned that you need to know more about how to stay healthy with diabetes  Overall evaluation:    [x] Not achieving A1c target with drug therapy & self-care practices    , Has 2 Adult Daughters  Works as a    Former smoker (quit 1980)  Subjective   I am fine I think     Objective   Physical exam  General Obese white male in no acute distress/ill-appearing. Conversant and cooperative but confused  Neuro  Awake, oriented to place. Confused   Vital Signs Visit Vitals  /74 (BP 1 Location: Right upper arm, BP Patient Position: At rest)   Pulse 98   Temp 98.3 °F (36.8 °C)   Resp 27   Ht 5' 10\" (1.778 m)   Wt 100.9 kg (222 lb 7.1 oz)   SpO2 93%   BMI 31.92 kg/m²     Skin  Warm and dry. Acanthosis noted along neckline.  No lipohypertrophy or lipoatrophy noted at injection sites. Sternal surgical incision, chest tube  Heart   Regular rate and rhythm. No murmurs, rubs or gallops  Lungs  Clear to auscultation without rales or rhonchi  Extremities No foot wounds      Laboratory  Recent Labs     04/05/23  0438 04/04/23  1203 04/04/23  0408 04/03/23  1629 04/03/23  1246   * 172* 98 113* 151*   AGAP 5 5 5 5 3*   WBC 11.6*  --  8.6  --  11.3*   CREA 0.68* 1.01 0.97 0.93 1.08   AST 33  --  85* 127* 107*   ALT 39  --  61 79* 64         Factors impacting BG management  Factor Dose Comments   Nutrition:  Standard meals   Regular diet  60g CHO/meal    Drugs:  Steroids     Methylprednisolone 125mg x1 in ED   Impairs insulin action   Pain Chronic back pain    Other:   Kidney function GFR 55      Blood glucose pattern    Significant diabetes-related events over the past 24-72 hours  A1C 7.4%  Pre-op: Lantus 30 units daily, correctional insulin.   BG over goal.  Prednisone 20mg daily for unspecified allergic reaction 4/2-4/3  Insulin gtt:  4/3: 37.6 units post-op  4/4: 48.9 units  4/5: 14.9 units since MN      Assessment and Nursing Intervention   Nursing Diagnosis Risk for unstable blood glucose pattern   Nursing Intervention Domain 5250 Decision-making Support   Nursing Interventions Examined current inpatient diabetes/blood glucose control   Explored factors facilitating and impeding inpatient management  Explored corrective strategies with patient and responsible inpatient provider   Informed patient of rational for insulin strategy while hospitalized     Nursing Diagnosis 47655 Ineffective Health Management   Nursing Intervention Domain 5250 Decision-making Support   Nursing Interventions Identified diabetes self-management practices impeding diabetes control  Discussed diabetes survival skills related to  Healthy Plate eating plan; given handouts  Role of physical activity in improving insulin sensitivity and action  Procedure for blood glucose monitoring & options for low-cost products  Medications plan at discharge     Billing Code(s)   59402    Before making these care recommendations, I personally reviewed the hospitalization record, including notes, laboratory & diagnostic data and current medications, and examined the patient at the bedside (circumstances permitting) before determining care. More than fifty (50) percent of the time was spent in patient counseling and/or care coordination.   Total minutes: 28    ARNULFO Saha  Diabetes Clinical Nurse Specialist  [facility]  Access via [facility]

## 2023-04-05 NOTE — PROGRESS NOTES
Spiritual Care Assessment/Progress Note  [facility]      NAME: Yonis Quiñones      MRN: [id number]  AGE: 74 y.o.  SEX: male  Roman Catholic Affiliation: Episcopal   Language: English     4/5/2023     Total Time (in minutes): 5     Spiritual Assessment begun in [facility] 4 CV INTNSV CARE through conversation with:         [x]Patient        [] Family    [] Friend(s)        Reason for Consult: [facility]     Spiritual beliefs: (Please include comment if needed)     [x] Identifies with a amina tradition:  Episcopal       [] Supported by a amina community:            [] Claims no spiritual orientation:           [] Seeking spiritual identity:                [] Adheres to an individual form of spirituality:           [] Not able to assess:                           Identified resources for coping:      [x] Prayer                               [x] Music                  [] Guided Imagery     [x] Family/friends                 [] Pet visits     [] Devotional reading                         [] Unknown     [] Other:                                               Interventions offered during this visit: (See comments for more details)    Patient Interventions: Affirmation of amina, Communion (Episcopal), Initial/Spiritual assessment, Critical care, Prayer (actual), Prayer (assurance of)           Plan of Care:     [x] Support spiritual and/or cultural needs    [] Support AMD and/or advance care planning process      [] Support grieving process   [] Coordinate Rites and/or Rituals    [] Coordination with community clergy   [] No spiritual needs identified at this time   [] Detailed Plan of Care below (See Comments)  [] Make referral to Music Therapy  [] Make referral to Pet Therapy     [] Make referral to Addiction services  [] Make referral to [facility]  [] Make referral to Spiritual Care Partner  [] No future visits requested        [] Contact Spiritual Care for further referrals     Comments: Mr. Franco Burch was in bed.  He just had surgery. He declined communion today. Prayer offered and let him know about the schedule for Holy Week here at [facility].       DARIAN Rome, RN, ACSW, LCSW   Page:  [phone])

## 2023-04-06 ENCOUNTER — APPOINTMENT (OUTPATIENT)
Dept: GENERAL RADIOLOGY | Age: 73
End: 2023-04-06
Attending: PHYSICIAN ASSISTANT
Payer: MEDICARE

## 2023-04-06 PROCEDURE — 51798 US URINE CAPACITY MEASURE: CPT

## 2023-04-06 PROCEDURE — 74011250637 HC RX REV CODE- 250/637: Performed by: PHYSICIAN ASSISTANT

## 2023-04-06 PROCEDURE — 94660 CPAP INITIATION&MGMT: CPT

## 2023-04-06 PROCEDURE — 97530 THERAPEUTIC ACTIVITIES: CPT

## 2023-04-06 PROCEDURE — 65660000001 HC RM ICU INTERMED STEPDOWN

## 2023-04-06 PROCEDURE — 74011000250 HC RX REV CODE- 250: Performed by: PHYSICIAN ASSISTANT

## 2023-04-06 PROCEDURE — 71046 X-RAY EXAM CHEST 2 VIEWS: CPT

## 2023-04-06 PROCEDURE — 74011636637 HC RX REV CODE- 636/637: Performed by: PHYSICIAN ASSISTANT

## 2023-04-06 PROCEDURE — 74011250636 HC RX REV CODE- 250/636: Performed by: NURSE PRACTITIONER

## 2023-04-06 PROCEDURE — 77030019905 HC CATH URETH INTMIT MDII -A

## 2023-04-06 PROCEDURE — 74011000250 HC RX REV CODE- 250: Performed by: NURSE PRACTITIONER

## 2023-04-06 PROCEDURE — 74011636637 HC RX REV CODE- 636/637: Performed by: CLINICAL NURSE SPECIALIST

## 2023-04-06 PROCEDURE — 93005 ELECTROCARDIOGRAM TRACING: CPT

## 2023-04-06 PROCEDURE — 74011250637 HC RX REV CODE- 250/637: Performed by: NURSE PRACTITIONER

## 2023-04-06 PROCEDURE — 97535 SELF CARE MNGMENT TRAINING: CPT

## 2023-04-06 PROCEDURE — 97116 GAIT TRAINING THERAPY: CPT

## 2023-04-06 RX ADMIN — GABAPENTIN 600 MG: 300 CAPSULE ORAL at 18:00

## 2023-04-06 RX ADMIN — FERROUS SULFATE TAB 325 MG (65 MG ELEMENTAL FE) 325 MG: 325 (65 FE) TAB at 08:58

## 2023-04-06 RX ADMIN — INSULIN GLARGINE 40 UNITS: 100 INJECTION, SOLUTION SUBCUTANEOUS at 18:01

## 2023-04-06 RX ADMIN — POLYETHYLENE GLYCOL 3350 17 G: 17 POWDER, FOR SOLUTION ORAL at 08:58

## 2023-04-06 RX ADMIN — DOCUSATE SODIUM 50MG AND SENNOSIDES 8.6MG 1 TABLET: 8.6; 5 TABLET, FILM COATED ORAL at 18:00

## 2023-04-06 RX ADMIN — FAMOTIDINE 20 MG: 20 TABLET, FILM COATED ORAL at 21:48

## 2023-04-06 RX ADMIN — DOCUSATE SODIUM 50MG AND SENNOSIDES 8.6MG 1 TABLET: 8.6; 5 TABLET, FILM COATED ORAL at 08:58

## 2023-04-06 RX ADMIN — CLOPIDOGREL BISULFATE 75 MG: 75 TABLET ORAL at 08:58

## 2023-04-06 RX ADMIN — Medication 10 UNITS: at 08:57

## 2023-04-06 RX ADMIN — AMIODARONE HYDROCHLORIDE 400 MG: 200 TABLET ORAL at 08:58

## 2023-04-06 RX ADMIN — FAMOTIDINE 20 MG: 20 TABLET, FILM COATED ORAL at 08:58

## 2023-04-06 RX ADMIN — METOPROLOL TARTRATE 75 MG: 25 TABLET, FILM COATED ORAL at 21:47

## 2023-04-06 RX ADMIN — MUPIROCIN: 20 OINTMENT TOPICAL at 09:10

## 2023-04-06 RX ADMIN — GABAPENTIN 600 MG: 300 CAPSULE ORAL at 08:58

## 2023-04-06 RX ADMIN — ASPIRIN 81 MG CHEWABLE TABLET 81 MG: 81 TABLET CHEWABLE at 08:58

## 2023-04-06 RX ADMIN — AMIODARONE HYDROCHLORIDE 400 MG: 200 TABLET ORAL at 21:47

## 2023-04-06 RX ADMIN — MUPIROCIN: 20 OINTMENT TOPICAL at 18:06

## 2023-04-06 RX ADMIN — ATORVASTATIN CALCIUM 80 MG: 40 TABLET, FILM COATED ORAL at 21:47

## 2023-04-06 RX ADMIN — METOPROLOL TARTRATE 50 MG: 50 TABLET, FILM COATED ORAL at 00:07

## 2023-04-06 RX ADMIN — SODIUM CHLORIDE, PRESERVATIVE FREE 10 ML: 5 INJECTION INTRAVENOUS at 00:08

## 2023-04-06 RX ADMIN — FAMOTIDINE 20 MG: 20 TABLET, FILM COATED ORAL at 00:07

## 2023-04-06 RX ADMIN — Medication 10 UNITS: at 18:01

## 2023-04-06 RX ADMIN — FUROSEMIDE 20 MG: 10 INJECTION, SOLUTION INTRAMUSCULAR; INTRAVENOUS at 11:05

## 2023-04-06 RX ADMIN — Medication 4 UNITS: at 12:17

## 2023-04-06 RX ADMIN — METOPROLOL TARTRATE 75 MG: 25 TABLET, FILM COATED ORAL at 08:58

## 2023-04-06 RX ADMIN — AMIODARONE HYDROCHLORIDE 400 MG: 200 TABLET ORAL at 00:07

## 2023-04-06 RX ADMIN — Medication 10 UNITS: at 12:17

## 2023-04-06 RX ADMIN — MAGNESIUM SULFATE HEPTAHYDRATE 2 G: 40 INJECTION, SOLUTION INTRAVENOUS at 11:05

## 2023-04-06 RX ADMIN — LIDOCAINE HYDROCHLORIDE: 20 JELLY TOPICAL at 16:25

## 2023-04-06 RX ADMIN — SODIUM CHLORIDE, PRESERVATIVE FREE 10 ML: 5 INJECTION INTRAVENOUS at 21:56

## 2023-04-06 NOTE — PROGRESS NOTES
Problem: Self Care Deficits Care Plan (Adult)  Goal: *Acute Goals and Plan of Care (Insert Text)  Description: FUNCTIONAL STATUS PRIOR TO ADMISSION: Patient was independent and active without use of DME. Pt reports intermittent use of SPC with neuropathy flare-ups. HOME SUPPORT: The patient lived with his wife but did not require assist.    Occupational Therapy Goals  Initiated 4/4/2023  1. Patient will perform ADLs standing 5 mins without fatigue or LOB with supervision/set-up within 7 day(s). 2.  Patient will perform lower body ADLs with supervision/set-up within 7 day(s). 3.  Patient will perform gathering ADL items high and low 2/2 with supervision/set-up within 7 day(s). 4.  Patient will perform toilet transfers with supervision/set-up within 7 day(s). 5.  Patient will perform all aspects of toileting with supervision/set-up within 7 day(s). 6.  Patient will participate in cardiac/sternal upper extremity therapeutic exercise/activities to increase independence with ADLs with supervision/set-up for 5 minutes within 7 day(s). Outcome: Progressing Towards Goal   OCCUPATIONAL THERAPY TREATMENT  Patient: Annita Ventura (64 y.o. male)  Date: 4/6/2023  Diagnosis: NSTEMI (non-ST elevated myocardial infarction) ([facility] [I21.4]  Allergic reaction, initial encounter [T78.40XA]  Tachycardia [R00.0] <principal problem not specified>  Procedure(s) (LRB):  CABG x 3 with CPB. Donor sites: Kody Mask and Right Saphenous vein via [address]; Brendan and EpiAortic U/S by Dr. Esperanza Sharma. (N/A) 3 Days Post-Op  Precautions: Fall (move in the tube)  Chart, occupational therapy assessment, plan of care, and goals were reviewed. ASSESSMENT  Patient continues with skilled OT services and is progressing towards goals.   Pt's performance of ADL/IADL tasks continues to be limited at this time by impaired sitting and standing balance, activity tolerance, cardiopulmonary endurance, and cognition (poor attention, waxing/waning orientation, insight, safety awareness). Pt received seated in bedside chair and amenable to participation in therapy session. Cognition marginally improved today, pt more oriented and with some carryover knowledge of \"move in the tube\", though continues to demonstrate poor attention to task thus impacting his safety awareness and insight into post-operative condition. Educated pt on seated UB/LB dressing technique and pt completed with min to mod A with use of AE for LB reach. Pt left seated in bedside chair with all needs met. RN and MD notified of session/pt status. Continue to recommend d/c to rehab to maximize functional independence and performance of ADL/IADL tasks. Patient is verbalizing and is not demonstrating understanding of mindful-based movements (\"move in the tube\") principles of keeping UEs proximal to ribcage to prevent lateral pull on the sternum during load-bearing activities with visual, verbal, manual, and tactile cues required for compliance. Current Level of Function Impacting Discharge (ADLs): mod A sit<> prep for ADLs, mod A LB dressing, min A UB dressing     Other factors to consider for discharge: PLOF, cognition/delirium         PLAN :  Patient continues to benefit from skilled intervention to address the above impairments. Continue treatment per established plan of care to address goals. Recommendation for discharge: (in order for the patient to meet his/her long term goals)  Therapy 3 hours per day 5-7 days per week    This discharge recommendation:  Has been made in collaboration with the attending provider and/or case management    IF patient discharges home will need the following DME: TBD       SUBJECTIVE:   Patient stated You know I usually use furniture to help my balance at home, my house is set up so I can do that.     OBJECTIVE DATA SUMMARY:   Cognitive/Behavioral Status:  Neurologic State: Alert;Confused  Orientation Level: Oriented to person;Oriented to place; Disoriented to time;Disoriented to situation  Cognition: Follows commands;Decreased attention/concentration  Perception: Appears intact  Perseveration: No perseveration noted  Safety/Judgement: Decreased awareness of environment;Decreased awareness of need for assistance;Decreased awareness of need for safety;Decreased insight into deficits    Functional Mobility and Transfers for ADLs:  Bed Mobility:       Transfers:  Sit to Stand: Moderate assistance          Balance:  Sitting: Impaired  Sitting - Static: Good (unsupported)  Sitting - Dynamic: Fair (occasional)  Standing: Impaired; With support  Standing - Static: Poor    ADL Intervention:              Upper Body Dressing Assistance  Shirt simulation with hospital gown: Minimum  assistance  Cues: Verbal cues provided;Visual cues provided; Tactile cues provided;Physical assistance    Lower Body Dressing Assistance  Socks: Moderate assistance  Slip on Shoes with Back: Moderate assistance  Leg Crossed Method Used: Yes  Position Performed: Seated in chair  Cues: Verbal cues provided;Visual cues provided; Tactile cues provided;Physical assistance  Adaptive Equipment Used: Long handled shoe horn    Toileting  Toileting Assistance: Set-up (when seated, after episode of incontinence)  Bladder Hygiene: Set-up  Clothing Management: Supervision    Cognitive Retraining  Safety/Judgement: Decreased awareness of environment;Decreased awareness of need for assistance;Decreased awareness of need for safety;Decreased insight into deficits    Patient instructed and educated on mindful movement principles based on Move in The Tube concept to include maintaining bilateral elbows close to rib cage when performing any load-bearing activity such as getting in/out of bed, pushing up from a chair, opening a door, or lifting a box. Patient was given a handout with diagrams of each correct/incorrect method of performing each of the above tasks.    Patient instructed on the ability to utilize upper extremities outside the tube when doing any non-load bearing activity such as washing hair/body, brushing teeth, retrieving clothing items, or scratching your back. Patient encouraged to also perform upper extremity exercises \"outside of the tube\" to prevent scar tissue formation around sternal incision site. Patient instructed in detail about activities to heed with caution, allowing pain to be the guide. These activities include but are not limited to: mowing the lawn, riding a bike, walking a dog, lifting a child, workshop hobbies, golfing, sexual activity, vacuuming, fishing, scrubbing the floors, and moving furniture. Patient was given the [address] in the [place] handout to describe each of these activities in detail. Patient instructed no asymmetrical reaching over head to ensure B UEs when shoulders >90* i.e. reaching in cabinets and dressing. Instruction on upper body dressing techniques of over head, then arms through to decrease pain and unilateral shoulder flexion >90*. Instruction on the benefits of utilizing B UEs during functional tasks i.e. opening the fridge, stepping into the tub. Instruction if continued pain at home with shoulder IR for BM hygiene can use wet wipes and toilet tongs PRN. Avoid valsalva maneuvers. May have to adjust home setup to increase ease with items closer to waist height to prevent deep bending and the automatic  of asymmetrical UE WB/pushing for stabilization during bending. Benefit to don clothing tailor sitting and don all clothing while sitting prior to standing. Patient demonstrated lower body dressing with Moderate Assistance. Instruction and indicated understanding on the benefits of loose clothing throughout to accommodate for post surgical swelling, decreased ROM and increased pain. Instruction and indicated understanding the technique of pull over shirt versus front open clothing.    Increase activity tolerance for home, work, and sexual intercourse by pacing self with increasing the arm exercises, sitting duration, frequency OOB, walking, standing, and ADLs. Instructed and indicated understanding of s/s of too much activity, how to respond to s/s safely. Pain:  Pt reporting mild pain in chest     Activity Tolerance:   Fair    After treatment patient left in no apparent distress:   Sitting in chair and Call bell within reach    COMMUNICATION/COLLABORATION:   The patients plan of care was discussed with: Physical therapist and Registered nurse.      Antionette Hamman, OTD, OTR/L  Time Calculation: 24 mins

## 2023-04-06 NOTE — PROGRESS NOTES
\[facility] [place] [facility]\"" ICU Progress Note    Admit Date: 3/31/2023  POD:  3 Day Post-Op    Procedure:    4/3/23 with Dr. Navarro Neville:   CABG x 3 with CPB. (LIMA to LAD, SVG to OM 2, SVG to PDA )  Donor sites: Anne Craft and Right Saphenous vein via [address]; Brendan and EpiAortic U/S by Dr. Nahomi Chaney. Hosptial Course:   4/3/23: OR with [place], CABG x 3, transferred to ICU post-operatively on amiodarone, precedex, insulin, and grady. 1L albumin given, extubated at 1557. Initially paced post-op, but intrinsic rate started competing, decreased generator. 23 POD 1: Nephrocheck elevated overnight, 2.26. Lactic acid normal 1.2, BP borderline. Given 500mL LR bolus over 2 hours. Repeat labs at 12pm.   23 POD 2: Hypertension yesterday, metoprolol added and increased. PRN hydralazine required for -170s. Start plavix today. DC chest tubes. Some confusion this AM, decrease narcotics. Tx to stepdown placed. 23 POD 3: Increased confusion last night, seroquel 50mg given once. Better this morning. Increase metoprolol to 75mg and add 20mg IV lasix. Tx orders in place. Subjective:   Pt seen with Dr. Emi Pastor, up in chair, afebrile, 4L NC. SR 90 to low 100s. Mentation improved this morning, oriented x 4 but occasionally makes off hand comments. No focal deficits. Objective:   Vitals:  Blood pressure 127/75, pulse 95, temperature 97.9 °F (36.6 °C), resp. rate 20, height 5' 10\" (1.778 m), weight 221 lb 1.9 oz (100.3 kg), SpO2 95 %.   Temp (24hrs), Av.3 °F (36.8 °C), Min:97.9 °F (36.6 °C), Max:98.7 °F (37.1 °C)  HR:   BP: 115-172/72-93    EKG/Rhythm:  SR 90-100s      Intra-op BRENDAN:  EF: 60%, LV normal size and function; RV normal function, hypertrophy noted; 1+ MR, 1+ TR; no RWMA  Diastolic Dsyfunction S > D  Post op: No significant changes     Extubation Date / Time: 4/3/23 at 1557    CT Output: 350mL total (60mL overnight)    Oxygen Therapy: 4L NC    CXR:    CXR Results  (Last 48 hours)                 23 0809  XR CHEST PA LAT Final result    Impression:  1. Bilateral pleural effusions and mild bibasilar atelectasis persists with some   improved aeration in the lower lobes. 2. No pneumothorax       Narrative:  EXAM: XR CHEST PA LAT       INDICATION: Postop heart surgery       COMPARISON: 4/5/2023       TECHNIQUE: PA and lateral chest views       FINDINGS: There is mild cardiomegaly in this patient status post median   sternotomy. Lung volumes are low. Mild bibasilar atelectasis medially in each   lower lobe is noted and has improved. No pulmonary edema. There are small   bilateral subpulmonic pleural effusions. No pneumothorax. 04/05/23 0435  XR CHEST PORT Final result    Impression:      Bibasilar atelectasis. Probable small right pleural effusion. Narrative:  EXAM:  XR CHEST PORT       INDICATION: Postop heart       COMPARISON: 4/4/2023       TECHNIQUE: Semiupright portable chest AP view       FINDINGS: Cardiac monitoring leads and median sternotomy changes. Cardiomegaly. The pulmonary vasculature is within normal limits. Bibasilar atelectasis. Probable small right pleural effusion. The visualized   bones and upper abdomen are age-appropriate. Admission Weight: Last Weight   Weight: 226 lb 13.7 oz (102.9 kg) Weight: 221 lb 1.9 oz (100.3 kg)     Intake / Output / Drain:  Current Shift: No intake/output data recorded. Last 24 hrs.:   Intake/Output Summary (Last 24 hours) at 4/6/2023 0756  Last data filed at 4/6/2023 0600  Gross per 24 hour   Intake 608.78 ml   Output 1270 ml   Net -661.22 ml   UO: 1250    EXAM:  General:  Up in chair, NAD      Lungs:   Diminished bases bilaterally   Incision:  No erythema, drainage or swelling. Heart:  Regular rate and rhythm, S1, S2 normal, no murmur, click, rub or gallop. Abdomen:   Soft, non-tender. Bowel sounds hypoactive; denies nausea, BM pre-op   Extremities:  Mild upper extremity edema. PPP.     Neurologic:  Gross motor and sensory apparatus intact, no focal deficits noted.  Oriented x 4 but seems to struggle with general questions regarding how he is doing, pain management     Labs:   Recent Labs     04/06/23  0320 04/05/23  1513   WBC 10.9  --    HGB 12.0*  --    HCT 35.7*  --      --    *  --    K 4.2  --    BUN 18  --    CREA 0.92  --    *  --    GLUCPOC  --  183*   INR 1.1  --         Assessment:     Active Problems:    Allergic reaction, initial encounter (3/31/2023)      Tachycardia (3/31/2023)      NSTEMI (non-ST elevated myocardial infarction) ([facility] [address] (3/31/2023)      S/P CABG x 3 (4/3/2023)         Plan/Recommendations/Medical Decision Making:   CAD and NSTEMI s/p CABG: Hx Stent to OM 2020; Presented +NSTEMI  - Continue ASA, statin, metoprolol  - Continue Plavix (started 4/5) due to recent NSTEMI  - Continue post-op prophylactic amiodarone, taper per normal at discharge  - CT out 4/5; wires taped to chest     Hypertension: PTA Toprol XL 25 and Losartan 25mg  - Continued tachycardia, HR 90-100s, increase metoprolol to 75mg BID  - No losartan at this time     HLD: PTA atorvastatin 40mg  - Continue atorvastatin, increased to 80mg inpatient     Sleep Apnea: Uses BiPAP at home  - Wife brought in home machine, please utilize     Hx Smoker: only 5-10 years, quit in 1980, ABG baseline normal  - PFTs not able to be completed pre-op  - No current intervention    Acute Post-Operative Respiratory Insufficiency with Hypoxia:   - Chest xray with bilateral effusions and continued atelectasis  - 20mg IV lasix today, goal -500 to -1L  - Wean oxygen for SpO2 > 92%  - Encourage pulmonary hygiene with IS, OOB, and ambulation  - Chest xray daily    Concern for DAVID: (Resolved) Nephrocheck elevated postoperatively with low UO initially  - UO stable, Cr 0.92 (increased from 0.68)  - 20mg IV lasix today for pulmonary edema and effusions  - BMP daily    Diabetes Mellitus, Type II: A1c 7.4%; PTA Metformin 500mg (2 tabs BID), Trulicity 4.5TK every 7 days, Farxiga 10mg daily, Lispro 40 units TID with meals, Lantus 40 units nightly  - Diabetes Management consulted for assistance inpatient  - SSI per Diabetes Management recommendations     Acute Post-Operative Pain Management:  - Continue scheduled tylenol and lidocaine patches  - DC PRN PO oxycodone/dilaudid, begin tramadol 50mg q 4 hours prn  - Change to PRN robaxin    Peripheral Neuropathy: PTA Gabapentin 600mg BID; PRN Aleve PO  - Continue gabapentin 600mg BID. Chronic Back Pain s/p Diskectomy: PRN Aleve PTA  - Monitor inpatient     GERD: PTA pantroprazole 40mg daily  - Continue famotidine inpatient     Confusion post op:  -Limit narcotics (DC dilaudid)  -Attempt to transfer to [facility], so patient may have less disturbance at night to sleep and start a more normal sleep/wake cycle. Lights on and patient up out of bed as much as possible during the day. Debilitation: PT/OT have seen inpatient and are currently recommending inpatient rehab. Case Management aware. Will work on placement. Assessment: Continue intermediate level of care. Start diuresis today due to ongoing oxygen requirements and bilateral pleural effusions. PT/OT.      Signed By: Karla Kelly NP

## 2023-04-06 NOTE — DIABETES MGMT
[facility]  DIABETES MANAGEMENT CONSULT    Consulted by  Yordan Hendrickson MD   for advanced nursing evaluation and care for inpatient blood glucose management. Evaluation and Action Plan   Yue Peacock is a 74year old gentleman, with Type 2 Diabetes on high dose basal/bolus insulin along with metformin, trulicity and farxiga, who is admitted with an NSTEMI. His most recent A1C was 7.4% but on review of his CGM, he does have significant glucose variability mostly surrounding mealtime. He underwent a cardiac cath which revealed severe multivessel CAD now s/p CABG x3. Pre-operatively, due to concerns for an allergic reaction, 125mg methylprednisolone was given in the ED followed by 20mg prednisone x2 days. Moderate dose basal insulin was started, but this alone was not sufficient to override BG pattern prior to surgery. He did transition to an insulin gtt following CABG and he should transition off this afternoon. I do suspect a significant rise in insulin needs when PO advances. He will need customized insulin doses for glycemic control. Due to pre-op steroids along with great appetite followed by fair post-op meal consumption, we will start with a weight based insulin strategy and titrate to effect. BG target trwill be 140-180mg/dl. BG trends since transitioning off insulin infusion with fasting , and a pre lunch . Given rise in prandial BG would consider increasing bolus per recs below. If pre dinner BG >180, advance bolus per recs below. Management Rationale Action Plan   Continue insulin gtt with 1:6 insulin to CHO ratio. If he eats more than 50% of meals, can transition off today. He will need customized insulin doses. Basal: 0.4 units/kg/day: 40 units Lantus HS.   If fasting BG over 180mg/dl, please increase dose by 20%    Bolus: Start with 10 units Humalog/meal (home dose was 40 units with meals but with significant glucose variability/hypoglycemia)  -Hold if patient is NPO or consumes less than 50% of carbohydrates on meal tray  -Advance by 4 units daily for persistent pre-prandial hyperglycemia    Correction: Custom sensitivity ACHS       Diabetes Discharge Plan   Medication:  Continue Metformin 500mg tabs: 2 tabs BID  Continue Trulicity 6.5OV every 7 days  Continue Farxiga 10mg daily  Continue Lantus 40 units daily  Lispro dose TBD. Suspect he will need a dose reduction based on PTA hypoglycemia following meals. Referral  [x]        Outpatient diabetes education: Order placed   Additional orders:  Patient to resume CGM therapy. Will notify his endocrinologist regarding more than 2-3 episodes of hypoglycemia or persistent hyperglycemia. FUV with endocrinologist per routine. Initial Presentation   Cristi Durham is a 74 y.o. male who presented to [facility] ED 3/31/23 with a 1 day c/o SOB, chest pain and hives. In the ED, he was given Aspirin 325 mg x 1, Benadryl 50 mg IV x1, famotidine 20 mg IV x1, Solu-Medrol 125 mg IV x1.  IV heparin drip for non-STEMI and transferred to [facility] for cardiology consultation   LAB: , Trop 106 (repeat 483), BUN 23, Creat 1.36, GFR 55  CXR: No acute process    ED EKG interpretation:  Rhythm: sinus tachycardia; and regular . Rate (approx.): 125; Axis: Right superior axis deviation; P wave: normal; QRS interval: normal ; ST/T wave: non-specific changes; in  Lead: Diffusely; Other findings: abnormal ekg.     HX:   Past Medical History:   Diagnosis Date    Arthritis     BACK    BPH (benign prostatic hyperplasia) 2013    CAD (coronary artery disease)     Cancer ([address] 2015    SKIN - HEAD AND FACE    Cancer ([address] 01/2019    PROSTATE    Chronic pain     BACK    Chronic pelvic pain in male     sensitive to touch on the left side down to the left side of the penis    Collagenous colitis 2011    Diabetes mellitus type 2, controlled, with complications ([address] 9428    Diabetic neuropathy ([address] 2006    Diverticulosis     ED (erectile dysfunction)     Dr. Amy Hayden    GERD (gastroesophageal reflux disease) 1990    Gout     1 episode    Hypertension 1970    Ill-defined condition     BILATERAL PERIPHERAL NEUROPATHY BOTH LEGS    Low serum testosterone level     Dr. Neena Dean    Osteopenia     Dr. Neena Dean    Psoriasis     Sleep apnea     USES CPAP    Sun-damaged skin         INITIAL DX:   NSTEMI (non-ST elevated myocardial infarction) ([facility] [address] [I21.4]  Allergic reaction, initial encounter [T78.40XA]  Tachycardia [R00.0]     Current Treatment     TX: Transfer to [facility]: ASA, Heparin, serial troponin, cardiology consultation        Hospital Course   Clinical progress has been uncomplicated. 3/31: Admission. Cardiac cath with severe multivessel CAD.  4/3: CABG x3  Diabetes History   Type 2 Diabetes: Diagnosed in 2006  Family History positive for Diabetes: Father , Paternal Grandmother   Ambulatory BG management provided by: Yanick Marquez MD Endocrinologist [facility] Last visit 2/22/23    Diabetes-related Medical History  Neurological complications  Impotence and Peripheral neuropathy  Microvascular disease  Nephropathy  Macrovascular disease  CAD  Other associated conditions     FELICITY, Skin Cancer, Prostate Cancer, Osteoporosis     Diabetes Medication History  Key Antihyperglycemic Medications               metFORMIN ER (GLUCOPHAGE XR) 500 mg tablet TAKE 2 TABLETS BY MOUTH TWICE DAILY FOR 90 DAYS    insulin lispro (HUMALOG KWIKPEN INSULIN SC) by SubCUTAneous route. Up to 40 units SQ 3 times daily with meals. States he usually injects 40 units 3 times daily with meals. TRULICITY 1.5 MD/0.0 mL sub-q pen 1.5 mg by SubCUTAneous route every seven (7) days. FARXIGA 10 mg tab tablet 1 tablet by mouth daily    insulin glargine (LANTUS,BASAGLAR) 100 unit/mL (3 mL) inpn 40 Units by SubCUTAneous route nightly. As directed.          Basaglar switched to [place] at same dose    Diabetes self-management practices:   Eating pattern  [x] Breakfast  Eggs, English muffin, sausage and coffee  [x] Lunch   Soup, salad  [x] Dinner   Likes to go out to eat dinner. May order salmon/steak with a baked potato  [x] Bedtime  Chips, something sweet  [x] Snacks   Likes tortilla chips   [x] Beverages  Diet Mt Dew, Water  [x] Dentition status Normal   Physical activity pattern   Sedentary   Monitoring pattern   Wearing Libre2 CGM   90day review:  Glucose below 70: 9% of the time   70-99: 9%   100-180: 61%   181-240: 25%   Over 240 0%  Taking medications pattern  [x] Inconsistent administration: May skip a lunch dose  [x] Affordable  Reducing risks  [] Influenza:   Immunization History   Administered Date(s) Administered    Influenza High Dose Vaccine PF 09/20/2019    Influenza Vaccine 10/01/2014, 10/03/2015, 10/01/2016, 10/17/2017     [] Pneumonia:   Immunization History   Administered Date(s) Administered    (RETIRED) Pneumococcal Vaccine (Unspecified Type) 12/01/2007    Pneumococcal Conjugate (PCV-13) 07/24/2015    Pneumococcal Polysaccharide (PPSV-23) 08/15/2014    Pneumococcal Vaccine (Unspecified Type) 12/01/2007     [] Hepatitis:   Immunization History   Administered Date(s) Administered    Hep A Vaccine 10/11/2019    Hep A Vaccine (Adult) 10/11/2019     Social determinants of health impacting diabetes self-management practices   Concerned that you need to know more about how to stay healthy with diabetes  Overall evaluation:    [x] Not achieving A1c target with drug therapy & self-care practices    , Has 2 Adult Daughters  Works as a    Former smoker (quit 1980)  Subjective   I am fine I think     Objective   Physical exam  General Obese white male in no acute distress/ill-appearing. Conversant and cooperative but confused  Neuro  Awake, oriented to place.   Confused   Vital Signs Visit Vitals  /70 (BP 1 Location: Right upper arm, BP Patient Position: At rest)   Pulse 90 Temp 97.6 °F (36.4 °C)   Resp 30   Ht 5' 10\" (1.778 m)   Wt 100.3 kg (221 lb 1.9 oz)   SpO2 94%   BMI 31.73 kg/m²     Skin  Warm and dry. Acanthosis noted along neckline. No lipohypertrophy or lipoatrophy noted at injection sites. Sternal surgical incision, chest tube  Heart   Regular rate and rhythm. No murmurs, rubs or gallops  Lungs  Clear to auscultation without rales or rhonchi  Extremities No foot wounds      Laboratory  Recent Labs     04/06/23  0320 04/05/23  0438 04/04/23  1203 04/04/23  0408 04/03/23  1629   * 114* 172* 98 113*   AGAP 5 5 5 5 5   WBC 10.9 11.6*  --  8.6  --    CREA 0.92 0.68* 1.01 0.97 0.93   AST  --  33  --  85* 127*   ALT  --  39  --  61 79*         Factors impacting BG management  Factor Dose Comments   Nutrition:  Standard meals   Regular diet  60g CHO/meal    Drugs:  Steroids     Methylprednisolone 125mg x1 in ED   Impairs insulin action   Pain Chronic back pain    Other:   Kidney function GFR 55      Blood glucose pattern    Significant diabetes-related events over the past 24-72 hours  A1C 7.4%  Pre-op: Lantus 30 units daily, correctional insulin.   BG over goal.  Prednisone 20mg daily for unspecified allergic reaction 4/2-4/3  Transitioned off insulin infusion 4/2  Basal: Lantus 40 units daily  Bolus: 10 units TID Humalog  Correction: custom correction sensitivity      Assessment and Nursing Intervention   Nursing Diagnosis Risk for unstable blood glucose pattern   Nursing Intervention Domain 5250 Decision-making Support   Nursing Interventions Examined current inpatient diabetes/blood glucose control   Explored factors facilitating and impeding inpatient management  Explored corrective strategies with patient and responsible inpatient provider   Informed patient of rational for insulin strategy while hospitalized     Nursing Diagnosis 65166 Ineffective Health Management   Nursing Intervention Domain 5250 Decision-making Support   Nursing Interventions Identified diabetes self-management practices impeding diabetes control  Discussed diabetes survival skills related to  Healthy Plate eating plan; given handouts  Role of physical activity in improving insulin sensitivity and action  Procedure for blood glucose monitoring & options for low-cost products  Medications plan at discharge     Billing Code(s)   No charge    Before making these care recommendations, I personally reviewed the hospitalization record, including notes, laboratory & diagnostic data and current medications, and examined the patient at the bedside (circumstances permitting) before determining care. More than fifty (50) percent of the time was spent in patient counseling and/or care coordination.   Total minutes: <25    ARNULFO Ford  Diabetes Clinical Nurse Specialist  [facility]  Access via [address] [place] [address]

## 2023-04-06 NOTE — PROGRESS NOTES
Problem: Mobility Impaired (Adult and Pediatric)  Goal: *Acute Goals and Plan of Care (Insert Text)  Description: FUNCTIONAL STATUS PRIOR TO ADMISSION: Patient was independent and active without use of DME. Has baseline neuropathy that does affect his gait quality. Cane use PRN when neuropathy is painful. On high doses of gabapentin for neuropathy pain at home. HOME SUPPORT PRIOR TO ADMISSION: The patient lived with his wife. Physical Therapy Goals  Initiated 4/4/2023  1. Patient will move from supine to sit and sit to supine , scoot up and down, and roll side to side in bed with modified independence within 5 days. 2.  Patient will perform sit to/from stand with modified independence within 5 days. 3.  Patient will ambulate 150 feet with least restrictive assistive device and modified independence within 5 days. 4.  Patient will ascend/descend 5 stairs with right handrail(s) with modified independence within 5 days. 5.  Patient will perform cardiac exercises per protocol with independence within 5 days. 6.  Patient will verbally recall and functionally demonstrate mindful-based movements (\"move in the tube\") principles without cues within 5 days. Outcome: Progressing Towards Goal   PHYSICAL THERAPY TREATMENT  Patient: Sol Fournier (36 y.o. male)  Date: 4/6/2023  Diagnosis: NSTEMI (non-ST elevated myocardial infarction) ([facility] [address] [I21.4]  Allergic reaction, initial encounter [T78.40XA]  Tachycardia [R00.0] <principal problem not specified>  Procedure(s) (LRB):  CABG x 3 with CPB. Donor sites: Loreli Resides and Right Saphenous vein via [address]; Brendan and EpiAortic U/S by Dr. Jhonny Ndiaye. (N/A) 3 Days Post-Op  Precautions: Fall (move in the tube)  Chart, physical therapy assessment, plan of care and goals were reviewed. ASSESSMENT  Patient continues with skilled PT services and is progressing towards goals. Patient received in chair, agreeable to therapy. Stable on room air throughout.  Able to abide by move in the tube today with cues-- improved immediate recall. Primofit had leaked and patient sitting on soiled linen but able to utilize urinal with assist in standing-- needs brief for ambulation (states he wears depends at home). Ambulation greatly improved with use of his shoes today, but still with intermittent mild knee buckling and poor toe clearance in setting of neuropathy. Better management of RW noted. Continue to recommend rehab at d/c due to increased need managing ADLs, fluctuating assist need for transfers, and fall risk that remains despite improvement today. Patient is not verbalizing and is not demonstrating understanding of mindful-based movements (\"move in the tube\") principles of keeping UEs proximal to ribcage to prevent lateral pull on the sternum during load-bearing activities with visual, verbal, and manual cues required for compliance. Current Level of Function Impacting Discharge (mobility/balance): min A    Other factors to consider for discharge: room air, baseline neuropathy, furniture surfs at home-- now move in the tube          PLAN :  Patient continues to benefit from skilled intervention to address the above impairments. Continue treatment per established plan of care. to address goals. Recommendation for discharge: (in order for the patient to meet his/her long term goals)  Therapy 3 hours per day 5-7 days per week    This discharge recommendation:  Has been made in collaboration with the attending provider and/or case management    IF patient discharges home will need the following DME: none       SUBJECTIVE:   Patient stated I need to pee but it won't come.     OBJECTIVE DATA SUMMARY:   Patient mobilized on continuous portable monitor/telemetry.   Critical Behavior:  Neurologic State: Alert, Confused  Orientation Level: Oriented to person, Oriented to place, Disoriented to time, Disoriented to situation  Cognition: Follows commands, Decreased attention/concentration  Safety/Judgement: Decreased awareness of environment, Decreased awareness of need for assistance, Decreased awareness of need for safety, Decreased insight into deficits  Functional Mobility Training:  Transfers:  Sit to Stand: Minimum assistance  Stand to Sit: Minimum assistance  Balance:  Sitting: Impaired; Without support  Sitting - Static: Good (unsupported)  Sitting - Dynamic: Fair (occasional)  Standing: Impaired; With support  Standing - Static: Fair  Standing - Dynamic : Fair  Ambulation/Gait Training:  Distance (ft): 40 Feet (ft) (x2)  Assistive Device: Gait belt;Walker, rolling  Ambulation - Level of Assistance: Minimal assistance;Assist x1  Gait Abnormalities: Shuffling gait; Foot drop;Decreased step clearance (mild foot drop-- very poor step clearance)  Base of Support: Widened  Speed/Joana: Pace decreased (<100 feet/min)  Step Length: Right shortened;Left shortened    Cardiac diagnosis intervention:  Patient instructed and educated on mindful movement principles based on Move in The Tube concept to include maintaining bilateral elbows close to rib cage when performing any load-bearing activity such as getting in/out of bed, pushing up from a chair, opening a door, or lifting a box. Patient was given a handout with diagrams of each correct/incorrect method of performing each of the above tasks. Pain Rating:  Did not complain pain    Activity Tolerance:   Good, SpO2 stable on RA, and requires rest breaks    After treatment patient left in no apparent distress:   Sitting in chair and Call bell within reach    COMMUNICATION/COLLABORATION:   The patients plan of care was discussed with: Occupational therapist, Registered nurse, and Case management.      Minh Fairchild PT, DPT   Time Calculation: 28 mins

## 2023-04-06 NOTE — PROGRESS NOTES
Cardiac Surgery Care Coordinator- Met with Charissa Salmon, he remains slightly confused, no family at the bedside. Briefly discussed inpt rehab as his discharge recommendation. Will continue to follow for educational and emotional needs.

## 2023-04-06 NOTE — CONSULTS
Urology Consult    Patient: Radha Grove MRN: [id number]  SSN: [id number]    YOB: 1950  Age: 74 y.o. Sex: male          Date of Consultation:  April 6, 2023  Requesting Physician: Lindsey Cuevas MD  Reason for Consultation:  urinary retention , difficult neri          History of Present Illness:  Radha Grove is a 74 y.o. male admitted 3/31/2023 to the hospital for NSTEMI (non-ST elevated myocardial infarction) ([facility] [address] [I21.4]  Allergic reaction, initial encounter [T78.40XA]  Tachycardia [R00.0]. He is currently status post CABG day 3 and developed urinary retention of greater than 600 mL. Staff was unable to place Neri catheter x2 attempts. Urology has been consulted for retention and Neri placement. Patient is known to [facility] he is followed by Dr. Chastity Serrano for history of prostate cancer with subsequent prostatectomy. Please see office notes for reference. .  Patient seen in bed with bruising noted to the right groin area. Patient denies any suprapubic pressure or discomfort. Discussed reason for visit and need for bladder decompression. Patient denies history of urinary retention denies nocturia or urinary frequency. He is currently not on any alpha-blocker. .  Patient draped in the usual fashion 6 mL of 2% lidocaine was injected into the urethra followed by placement of 16 [place] coudé which easily passed. Immediate return of clear yellow urine. Discussed with patient need for outpatient follow-up and void trial with eventual removal of Neri catheter. Nurse reports patient may go to rehab. Discussed with the patient if he is discharged to rehab facility a void trial can be done fair with subsequent follow-up with [facility]    Office note 3/2023  Kranthi Gutiérrez is a 74year old male who presents today for \"groin rash \". He returns for follow-up. Patient was last seen 1 month ago. History of prostate cancer.   Has biochemical recurrence had a prostatectomy back in 2019. PSA 0.290. Diagnosed in 2018 pretreatment PSA was 5.8  4+3 adenocarcinoma negative margins. Final path was pT3b. He is on Trimix. He is here for evaluation of a groin rash. also has some questions about erections    PAST MEDICAL HISTORY:    Allergies: PENICILLIN (Critical)    Medications: clotrimazole-betamethasone 1-0.05% cream (clotrimazole-betamethasone) Apply 1 a small amount to affected area twice a day   * Triple Mix-Papaverine/Regitine/Prostaglandin Administer as directed as directed as directed Mix 1 vial Papaverine 10ml, 30 mg/cc - 10 mg Regitime - 1 amp Prostaglandin 500 mcg. Disp. #10 Tuberculin syringes   Disp #10  27g.  1/2 inch syringes [BMN]  tadalafil 20 mg tablet (tadalafil) TAKE ONE TABLET BY MOUTH EVERY 36 HOURS AS NEEDED FOR ACTIVITY  Humalog KwikPen Insulin 100 unit/mL insulin pen (insulin lispro) ADMINISTER UP TO 40 UNITS UNDER THE SKIN THREE TIMES DAILY WITH MEALS AS DIRECTED  * LOTRISONE 1-0.05 % EXTERNAL CREAM Apply twice a day   * VITAMIN D TABLET 72644 unit   metoprolol tartrate 25 mg tablet (metoprolol tartrate) once a day   metformin 1,000 mg tablet (metformin) twice a day   atorvastatin 20 mg tablet (atorvastatin) 1 once a day   * CALCIUM 600 + MINERALS TABLET 33 once a day   * ASPIRIN 81 MG ORAL TABLET once a day   Trulicity 1.5 JF/7.0 mL pen injector (dulaglutide) Inject once a week   * CORMAX SCALP APPLICATION 8.01 % EXTERNAL SOLUTION as needed   gabapentin 300 mg capsule (gabapentin) 4 once a day   pantoprazole 20 mg tablet,delayed release (DR/EC) (pantoprazole) twice a day   Farxiga 10 mg tablet (dapagliflozin) once a day   halobetasol propionate 0.05% ointment (halobetasol propionate) as needed   * CLOTRIMAZOLE-BETAMETHASONE CRM 15GM Apply twice a day   cyanocobalamin (vitamin B-12) 1,000 mcg capsule (cyanocobalamin (vitamin b-12)) once a day   furosemide 20 mg tablet (furosemide)   Basaglar KwikPen U-100 Insulin 100 unit/mL (3 mL) insulin pen (insulin glargine) INJECT UP TO 50 UNITS DAILY  sildenafil (pulm.hypertension) 20 mg tablet (sildenafil (pulm.hypertension)) Take 1-5 tablet by mouth as needed one hour prior to sexual activity    Problems: INCONTINENCE (ICD-788.32) (IYK07-Z10.3)  GROSS HEMATURIA 599.71 (ICD-599.71) (CRC11-E81.0)  Candidal balanitis (ICD-112.2) (EIA10-B27.42)  Personal history of prostate CA V10.46 (ICD-V10.46) (WTI54-D78.79)  Diabetes mellitus 250.00 (ICD-250.00) (QHX37-I21.9)  PROSTATE ADENOCARCINOMA (ICD-185) (ZBH41-P26)  Slowing, urinary stream (ICD-788.62) (FFF62-D03.12)  Elevated PSA (ICD-790.93) (QTL29-C13.20)  597.80 Urethritis (ICD-597.80) (EII98-V99.1)  607.2 Penile Abscess, cellulitis (ICD-607.2) (GNS98-R99.18)  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 DENIES: Pacemaker/Defibrillator, Lung Disease, Bowel Problems, Stroke/Seizure, Kidney Problems, Bleeding Problems, HIV, or Hepatitis. Surgeries: Prostate Biopsy,Prostatectomy,Heart Stent Procedure, andHernia Repair. Family History: DENIES: Prostate cancer, Kidney cancer, Kidney disease, Kidney stones. Social History: Retired - . . Smoking status: former smoker. Drinks alcohol monthly or less. System Review: Admits to: Leg Swelling, Involuntary Urine Loss, and Difficulty Walking. DENIES: Unexplained Weight Loss, Dry Eyes, Dry Mouth, Shortness of Breath, Constipation, Lower Extremity Weakness, Dry Skin, Psychiatric Problems, Impaired Sex Drive, Easy Bleeding, Rash.      EXAMINATION: Appearance: well-developed NAD Scrotum: without lesions Testes: bilaterally non-tender, no masses Epididymes: bilaterally no masses palpated, non-tender Penis:  Uncircumcised no plaques; no lesions erythema of glans and foreskin Meatus: patent       URINALYSIS from Voided specimen  Urine Dip: pH: 7.0, Bld: Neg, LE: Neg, Nit: Neg, Prot: Neg, Ket: Neg, Gluc: 2000+  Urine Micro not done    Prescription(s) Today: clotrimazole-betamethasone 1-0.05% cream (clotrimazole-betamethasone) Apply 1 a small amount to affected area twice a day   #45 gram x 0,  03/01/2023, Armani Manzo RN, SIGNED    IMPRESSION:    1. CANDIDAL BALANITIS (BUE20-Z23.42) - New    2. 607.84 ERECTILE DYSFUNCTION - ORGANIC (ZWE85-F72.9) - Deteriorated    PLAN: we disussed his exam - appears to have balanitis   we will treat with clotirmazole betamethasone   if he has recurrent bouts could consider circ  we discussed his ED i think he should try the trimix injections alternatively could trial muse  also could conisder IPP - difficult timing since he may be headed for radiation therapy as well risk of infection - penile shortening.      cc: Evens Shetty MD  [employer]  E&M Service        Electronically signed by Suzie Faustin MD on 03/01/2023 at 9:01 PM    ________________________________________________________________________      Past Medical History:   Diagnosis Date    Arthritis     BACK    BPH (benign prostatic hyperplasia) 2013    CAD (coronary artery disease)     Cancer ([facility] [address] 2015    SKIN - HEAD AND FACE    Cancer ([facility] [address] 01/2019    PROSTATE    Chronic pain     BACK    Chronic pelvic pain in male     sensitive to touch on the left side down to the left side of the penis    Collagenous colitis 2011    Diabetes mellitus type 2, controlled, with complications ([address] 1836    Diabetic neuropathy ([facility] [address] 2006    Diverticulosis     ED (erectile dysfunction)     Dr. Walt Serra    GERD (gastroesophageal reflux disease) 1990    Gout     1 episode    Hypertension 1970    Ill-defined condition     BILATERAL PERIPHERAL NEUROPATHY BOTH LEGS    Low serum testosterone level     Dr. Alex Tavares    Osteopenia     Dr. Alex Tavares    Psoriasis     Sleep apnea     USES CPAP    Sun-damaged skin Past Surgical History:   Procedure Laterality Date    COLONOSCOPY N/A 9/21/2021    COLONOSCOPY   :- performed by Alejandra Flor MD at [facility] ENDOSCOPY    EMG TWO EXTREMITIES LOWER  8/24/2013         ENDOSCOPY, COLON, DIAGNOSTIC      HX COLONOSCOPY      HX HEENT  2017    WISDOM TEETH X4    HX HERNIA REPAIR      x2 bilateral inguinal    HX ORTHOPAEDIC  08/2018    L4-5, L5-S1 DISKECTOMY    HX OTHER SURGICAL  2015,2017    MOHS    HX PROSTATE SURGERY  04/02/2019    removed    NCV SENSORY OR MIXED  8/24/2013         [place] UNLISTED PROCEDURE CARDIAC SURGERY  12/24/2019    1 stent       Allergies   Allergen Reactions    Pcn [Penicillins] Other (comments)     Infancy was told he had a rash        Prior to Admission medications    Medication Sig Start Date End Date Taking? Authorizing Provider   metFORMIN ER (GLUCOPHAGE XR) 500 mg tablet TAKE 2 TABLETS BY MOUTH TWICE DAILY FOR 90 DAYS 9/27/21   Provider, Historical   pantoprazole (PROTONIX) 40 mg tablet Take 40 mg by mouth daily. Provider, Historical   gabapentin (NEURONTIN) 300 mg capsule Take 600 mg by mouth two (2) times a day. Provider, Historical   CALCIUM-VITAMIN D3 PO Take 600 mg by mouth daily. Provider, Historical   ASPIRIN PO Take 81 mg by mouth daily. Provider, Historical   insulin lispro (HUMALOG KWIKPEN INSULIN SC) by SubCUTAneous route. Up to 40 units SQ 3 times daily with meals. States he usually injects 40 units 3 times daily with meals. Provider, Historical   naproxen sodium (ALEVE PO) Take  by mouth as needed. Provider, Historical   losartan (COZAAR) 25 mg tablet TAKE 1 TABLET BY MOUTH DAILY 7/21/21   Sisi Gonzalez MD   furosemide (LASIX) 20 mg tablet TAKE 1 TABLET BY MOUTH DAILY  Patient taking differently: 10 mg. 7/21/21   Sisi Gonzalez MD   atorvastatin (LIPITOR) 40 mg tablet TAKE 1 TABLET BY MOUTH EVERY DAY AT BEDTIME 12/21/20   Sisi Gonzalez MD   halobetasol (ULTRAVATE) 0.05 % topical cream as needed.  6/21/18   Provider, Historical nitroglycerin (NITROSTAT) 0.4 mg SL tablet 1 Tab by SubLINGual route every five (5) minutes as needed for Chest Pain. Up to 3 doses. Patient not taking: Reported on 2022   Tejas Arias MD   TRULICITY 1.5 BD/9.0 mL sub-q pen 1.5 mg by SubCUTAneous route every seven (7) days. 19   Provider, Historical   ergocalciferol (ERGOCALCIFEROL) 50,000 unit capsule TAKE ONE CAPSULE BY MOUTH EVERY WEEK 19   Provider, Historical   FARXIGA 10 mg tab tablet 1 tablet by mouth daily 19   Provider, Historical   cyanocobalamin 1,000 mcg tablet Take 1,000 mcg by mouth daily. Provider, Historical   tadalafiL (CIALIS) 20 mg tablet Take 20 mg by mouth as needed for Erectile Dysfunction. Provider, Historical   insulin glargine (LANTUS,BASAGLAR) 100 unit/mL (3 mL) inpn 40 Units by SubCUTAneous route nightly. As directed. Provider, Historical   metoprolol succinate (TOPROL-XL) 25 mg XL tablet Take 25 mg by mouth daily. 17   Provider, Historical       Social History     Tobacco Use    Smoking status: Former     Packs/day: 1.00     Years: 12.00     Pack years: 12.00     Types: Cigarettes     Quit date: 1980     Years since quittin.9    Smokeless tobacco: Never   Substance Use Topics    Alcohol use: Yes     Alcohol/week: 0.0 standard drinks     Comment: rarely        Family History   Problem Relation Age of Onset    Diabetes Father     Heart Disease Father 62        at least 2 MI's    Stroke Father         x2    Cancer Mother         Bladder    Parkinson's Disease Mother     Hypertension Mother     Hypertension Sister     Diabetes Paternal Grandmother     Diabetes Other         cousins    Anesth Problems Neg Hx         ROS:  Admission ROS from 3/31/2023 was reviewed and changes (other than per HPI) include: none.     Physical Exam:    Vital Signs:  Temp (24hrs), Av.2 °F (36.8 °C), Min:97.6 °F (36.4 °C), Max:98.7 °F (37.1 °C)   Blood pressure 115/70, pulse 90, temperature 97.6 °F (36.4 °C), resp. rate 30, height 5' 10\" (1.778 m), weight 100.3 kg (221 lb 1.9 oz), SpO2 94 %.   I&O's:  04/06 0701 - 04/06 1900  In: 240 [P.O.:240]  Out: 250 [Urine:250]    Appearance: well-developed NAD   Neck: supple   Respiratory Effort: breathing easily   Lower Extremity: no edema   Abdomen/Flank: soft non-tender without masses; no CVA tenderness   Liver/Spleen: no organomegaly   Hernia: no ventral hernia   Anus/Perineum: deferred  Rectal: deferred   Hemoccult: not indicated   Scrotum: without lesions   Testes: bilaterally non-tender, no masses   Epididymes: bilaterally no masses palpated, non-tender   Penis: no plaques; no lesions   Meatus: patent   Prostate: surgically absent  SV's: non-palpable   Lymph: no adenopathy   Skin Inspection: warm and dry , bruising to R groin   Mood/Affect: normal      Lab Review:     CBC   Recent Labs     04/06/23  0320 04/05/23  0438 04/04/23  0408   WBC 10.9 11.6* 8.6   HGB 12.0* 12.6 11.8*   HCT 35.7* 36.8 35.8*    199 176     CMP   Recent Labs     04/06/23  0320 04/05/23  0438 04/04/23  1203 04/04/23  0408 04/03/23  1629   * 132* 137 141 142   K 4.2 3.9 3.8 4.1 4.5    104 107 112* 113*   CO2 25 23 25 24 24   * 114* 172* 98 113*   BUN 18 17 23* 23* 18   CREA 0.92 0.68* 1.01 0.97 0.93   CA 8.6 8.8 8.6 8.5 8.6   MG 2.1 2.2  --  2.4 2.5*   ALB  --  3.1*  --  3.5 3.8   TBILI  --  0.8  --  0.5 0.4   ALT  --  39  --  61 79*       Other   Recent Labs     04/06/23  0320   INR 1.1       UA:   Lab Results   Component Value Date/Time    Color YELLOW/STRAW 03/31/2023 11:13 PM    Appearance CLEAR 03/31/2023 11:13 PM    Specific gravity >1.030 03/31/2023 11:13 PM    Specific gravity 1.025 04/03/2019 11:41 PM    pH (UA) 6.0 03/31/2023 11:13 PM    Protein Negative 03/31/2023 11:13 PM    Glucose 250 (A) 03/31/2023 11:13 PM    Ketone 15 (A) 03/31/2023 11:13 PM    Bilirubin Negative 03/31/2023 11:13 PM    Urobilinogen 0.2 03/31/2023 11:13 PM    Nitrites Negative 03/31/2023 11:13 PM    Leukocyte Esterase Negative 03/31/2023 11:13 PM    Epithelial cells FEW 03/31/2023 11:13 PM    Bacteria Negative 03/31/2023 11:13 PM    WBC 0-4 03/31/2023 11:13 PM    RBC 0-5 03/31/2023 11:13 PM       Cultures:  NA    Imaging:  NA    Assessment:     Active Problems:    Allergic reaction, initial encounter (3/31/2023)      Tachycardia (3/31/2023)      NSTEMI (non-ST elevated myocardial infarction) ([facility] [address] (3/31/2023)      S/P CABG x 3 (4/3/2023)          Plan:     1. Post -op retention - neri placed   - will add Tamsulosin plz monitor BP  - will need neri for 5-7 days can have VT in rehab or FU with VU   - will make OP FU in case pt DC home     D/w Dr Jolan Boxer     Signed By: Christopher Cade.  Amanda Anthony NP  - April 6, 2023

## 2023-04-06 NOTE — PROGRESS NOTES
0700- bedside report received. Patient in chair without complaints. He is alert and oriented, however will say confusing things, \"he has low blood pressure\", \"do you know him\". Sanju Vela MD and Isaura López NP at bedside for rounds. Overnight update given; no orders received. 0800- patient off the floor for PA/lat x-ray. 0945- OT at bedside. 1030- PT at bedside. 1110- EKG completed. 1345- bladder scanned for 641 ml. Will straight cath per protocol. 1430- updated SHILO Zaldivar on unsuccessful attempts at placing a catheter. Orders for urology consult. [id number]- urology NP at bedside to place catheter. 1945- TRANSFER - OUT REPORT:    Verbal report given to FLOYD Tilley(name) on Lucero Loud  being transferred to [facility](unit) for routine progression of care       Report consisted of patients Situation, Background, Assessment and   Recommendations(SBAR). Information from the following report(s) SBAR, Kardex, Recent Results, and Cardiac Rhythm SR 90's  was reviewed with the receiving nurse.     Lines:   Peripheral IV 03/31/23 Right Antecubital (Active)   Site Assessment Clean, dry, & intact 04/06/23 0700   Phlebitis Assessment 0 04/06/23 0700   Infiltration Assessment 0 04/06/23 0700   Dressing Status Clean, dry, & intact 04/06/23 0700   Dressing Type Transparent;Tape 04/06/23 0700   Hub Color/Line Status Pink;Capped;Flushed 04/06/23 0700   Action Taken Open ports on tubing capped 04/06/23 0700   Alcohol Cap Used Yes 04/06/23 0700       Peripheral IV 04/03/23 Left;Proximal Arm (Active)   Site Assessment Clean, dry, & intact 04/06/23 0700   Phlebitis Assessment 0 04/06/23 0700   Infiltration Assessment 0 04/06/23 0700   Dressing Status Clean, dry, & intact 04/06/23 0700   Dressing Type Transparent;Tape 04/06/23 0700   Hub Color/Line Status Pink;Capped;Flushed 04/06/23 0700   Action Taken Open ports on tubing capped 04/06/23 0700   Alcohol Cap Used Yes 04/06/23 0700        Opportunity for questions and clarification was provided.       Patient transported with:   Monitor  Registered Nurse

## 2023-04-07 ENCOUNTER — APPOINTMENT (OUTPATIENT)
Dept: GENERAL RADIOLOGY | Age: 73
End: 2023-04-07
Attending: NURSE PRACTITIONER
Payer: MEDICARE

## 2023-04-07 RX ADMIN — SODIUM CHLORIDE, PRESERVATIVE FREE 10 ML: 5 INJECTION INTRAVENOUS at 14:00

## 2023-04-07 RX ADMIN — MUPIROCIN: 20 OINTMENT TOPICAL at 10:00

## 2023-04-07 RX ADMIN — SODIUM CHLORIDE, PRESERVATIVE FREE 10 ML: 5 INJECTION INTRAVENOUS at 07:35

## 2023-04-07 RX ADMIN — FAMOTIDINE 20 MG: 20 TABLET, FILM COATED ORAL at 09:40

## 2023-04-07 RX ADMIN — ASPIRIN 81 MG CHEWABLE TABLET 81 MG: 81 TABLET CHEWABLE at 09:40

## 2023-04-07 RX ADMIN — AMIODARONE HYDROCHLORIDE 400 MG: 200 TABLET ORAL at 09:42

## 2023-04-07 RX ADMIN — TRAMADOL HYDROCHLORIDE 50 MG: 50 TABLET ORAL at 08:36

## 2023-04-07 RX ADMIN — Medication 10 UNITS: at 13:40

## 2023-04-07 RX ADMIN — POLYETHYLENE GLYCOL 3350 17 G: 17 POWDER, FOR SOLUTION ORAL at 09:40

## 2023-04-07 RX ADMIN — Medication 8 UNITS: at 13:40

## 2023-04-07 RX ADMIN — GABAPENTIN 600 MG: 300 CAPSULE ORAL at 09:40

## 2023-04-07 RX ADMIN — FERROUS SULFATE TAB 325 MG (65 MG ELEMENTAL FE) 325 MG: 325 (65 FE) TAB at 09:40

## 2023-04-07 RX ADMIN — DOCUSATE SODIUM 50MG AND SENNOSIDES 8.6MG 1 TABLET: 8.6; 5 TABLET, FILM COATED ORAL at 09:40

## 2023-04-07 RX ADMIN — CLOPIDOGREL BISULFATE 75 MG: 75 TABLET ORAL at 09:40

## 2023-04-07 NOTE — PROGRESS NOTES
1930: Bedside shift change report given to Jarek RN (oncoming nurse) by Светлана Olivo RN (offgoing nurse). Report included the following information SBAR and Kardex. Problem: Falls - Risk of  Goal: *Absence of Falls  Description: Document [place] Fall Risk and appropriate interventions in the flowsheet. Outcome: Progressing Towards Goal  Note: Fall Risk Interventions:     Problem: Pressure Injury - Risk of  Goal: *Prevention of pressure injury  Description: Document [place] Scale and appropriate interventions in the flowsheet.   Outcome: Progressing Towards Goal  Note: Pressure Injury Interventions:  Sensory Interventions: Assess changes in LOC, Assess need for specialty bed, Keep linens dry and wrinkle-free, Minimize linen layers    Moisture Interventions: Absorbent underpads, Apply protective barrier, creams and emollients, Check for incontinence Q2 hours and as needed    Activity Interventions: Increase time out of bed, Pressure redistribution bed/mattress(bed type), PT/OT evaluation    Mobility Interventions: HOB 30 degrees or less, Float heels, PT/OT evaluation    Nutrition Interventions: Document food/fluid/supplement intake, Offer support with meals,snacks and hydration    Friction and Shear Interventions: Apply protective barrier, creams and emollients, HOB 30 degrees or less

## 2023-04-17 ENCOUNTER — TELEPHONE (OUTPATIENT)
Dept: CARDIOLOGY CLINIC | Age: 73
End: 2023-04-17

## 2023-04-17 NOTE — TELEPHONE ENCOUNTER
Aj Parks (wife) called and stated that the pt is recovering from a triple bypass surgery that was done on April 3rd, wife would like to know if pt needs a follow up with Dr. Lucia Gone # [phone]

## 2023-04-17 NOTE — TELEPHONE ENCOUNTER
Spoke to patient's wife. Name noted on permission to release information. Identifiers x 2. States to be discharged from [facility] and receive home health x 2 weeks. Has follow up with Dr. Emily Berger in May/2023 and will discuss cardiac rehab. Scheduled follow up with Dr. Clint Melo.  She will call with further questions/concerns.  To inform MD.     Future Appointments   Date Time Provider Eric Hicks   5/8/2023  1:15 PM Renee Dan MD [facility] BS AMB   6/26/2023  9:40 AM MD RK Barba BS AMB   8/24/2023 10:00 AM Jesus Cornelius MD [facility] BS AMB

## 2023-04-19 ENCOUNTER — TELEPHONE (OUTPATIENT)
Dept: CARDIOLOGY CLINIC | Age: 73
End: 2023-04-19

## 2023-04-19 RX ORDER — ATORVASTATIN CALCIUM 80 MG/1
80 TABLET, FILM COATED ORAL
Qty: 30 TABLET | Refills: 3 | Status: SHIPPED | OUTPATIENT
Start: 2023-04-19

## 2023-04-19 NOTE — TELEPHONE ENCOUNTER
I called Mr. Sushant Mccarthy and his wife called with a few questions following his discharge from [facility]. I confirmed he should be taking atorvastatin 80 mg. I confirmed that it was ok that [facility] discontinued his Lasix and to call us if he had weight gain, increased SOB, and/or increased LE edema. He also reported he  been experiencing \"snapping\" his chest. He says he will just look or move to the right and his chest will click. He denies fever, incisional drainage, and incisional openings. I told him we would schedule him to come into the office to be evaluated.

## 2023-04-20 ENCOUNTER — OFFICE VISIT (OUTPATIENT)
Dept: CARDIOLOGY CLINIC | Age: 73
End: 2023-04-20

## 2023-04-20 ENCOUNTER — HOSPITAL ENCOUNTER (OUTPATIENT)
Dept: CT IMAGING | Age: 73
Discharge: HOME | End: 2023-04-20
Attending: NURSE PRACTITIONER
Payer: MEDICARE

## 2023-04-20 VITALS
HEIGHT: 70 IN | TEMPERATURE: 97.4 F | WEIGHT: 212 LBS | DIASTOLIC BLOOD PRESSURE: 66 MMHG | OXYGEN SATURATION: 100 % | HEART RATE: 67 BPM | BODY MASS INDEX: 30.35 KG/M2 | SYSTOLIC BLOOD PRESSURE: 102 MMHG | RESPIRATION RATE: 14 BRPM

## 2023-04-20 DIAGNOSIS — R07.89 STERNUM PAIN: Primary | ICD-10-CM

## 2023-04-20 DIAGNOSIS — R07.89 STERNUM PAIN: ICD-10-CM

## 2023-04-20 PROCEDURE — 71250 CT THORAX DX C-: CPT

## 2023-04-20 RX ORDER — EMPAGLIFLOZIN 10 MG/1
10 TABLET, FILM COATED ORAL DAILY
COMMUNITY
Start: 2023-04-17

## 2023-04-20 NOTE — PROGRESS NOTES
Patient: Cassi Barriga   Age: 74 y.o. Patient Care Team:  Delonte Guajardo MD as PCP - General (Internal Medicine Physician)  Delonte Guajardo MD as PCP - [facility] Provider  Radhika Ramirez MD (Endocrinology Physician)  Tamiko Saha MD (Urology)  Saud Dill MD (Ophthalmology)  Anand Beltre MD (Cardiovascular Disease Physician)  Ashleigh Ross MD (Cardiothoracic Surgery)  Latricia Bruce MD (Cardiovascular Disease Physician)    Diagnosis: The encounter diagnosis was Sternum pain. Problem List:   Patient Active Problem List   Diagnosis Code    Benign essential hypertension I10    Diabetes mellitus type 2, controlled ([facility] [address] E11.9    GERD without esophagitis K21.9    Hyperlipidemia with target LDL less than 100 E78.5    Peripheral neuropathy (HCC) G62.9    Osteopenia M85.80    Gout attack M10.9    Functional abdominal pain syndrome R10.9    Collagenous colitis K52.831    Erectile dysfunction N52.9    BPH (benign prostatic hyperplasia) N40.0    Incomplete RBBB I45.10    History of colonoscopy Z98.890    Severe obstructive sleep apnea G47.33    HNP (herniated nucleus pulposus) [id number]    Prostate cancer ([facility] [address] C61    Type 2 diabetes mellitus with diabetic neuropathy (HCC) E11.40    Posterior subcapsular polar senile cataract H25.049    Inflammation of eyelid H01.9    Disorder of pituitary gland (HCC) E23.7    Megaloblastic anemia due to vitamin B12 deficiency D53.1    Osteochondropathy M93.90    Postural kyphosis, cervicothoracic region M40.03    Recurrent falls R29.6    Sleep-wake schedule disorder G47.20    Allergic reaction, initial encounter T78.40XA    Tachycardia R00.0    NSTEMI (non-ST elevated myocardial infarction) ([facility] [address] I21.4    S/P CABG x 3 Z95.1        Date of Surgery: 4/3/23     Surgery: CABG x 3 with CPB. (LIMA to LAD, SVG to OM 2, SVG to PDA )  Donor sites: [facility] and Right Saphenous vein via [email or web address]; Brendan and EpiAortic U/S by Dr. Deniz Lizarraga.     Date of Discharge: 4/7/23 to [facility]    HPI:  Pt is here for post op follow up, seen with Dr. Nita Turk, Patient was recently discharged from [facility] and contact office with c/o sternal clicking. Patient scheduled to come if to day to evaluate sternum. Patient unsure of medications and dosages, verified by contacting pharmacy. Patient denies fever, chills, dyspnea, some chest pain at sternal site, describes as mild, worsened when palpated for click. Patient is unable to describe any activities that elicited the click. He states that it \"just happens\"Relieved once palpation was stopped. Prineo dressing in place. Current Medications:   Current Outpatient Medications   Medication Sig Dispense Refill    Jardiance 10 mg tablet Take 1 Tablet by mouth daily. atorvastatin (LIPITOR) 80 mg tablet Take 1 Tablet by mouth nightly. 30 Tablet 3    insulin lispro (HUMALOG) 100 unit/mL injection PTA, taking 40 units with meals, DM saw inpatient, recommended decrease to 10 units and adjust up as needed 1 Each 0    amiodarone (CORDARONE) 200 mg tablet Take 2 Tablets by mouth every twelve (12) hours for 14 days, THEN 2 Tablets daily for 14 days. 84 Tablet 0    clopidogreL (PLAVIX) 75 mg tab Take 1 Tablet by mouth daily. 30 Tablet 2    ferrous sulfate 325 mg (65 mg iron) tablet Take 1 Tablet by mouth daily (with breakfast). 30 Tablet 0    metoprolol tartrate (LOPRESSOR) 100 mg IR tablet Take 1 Tablet by mouth every twelve (12) hours. 60 Tablet 2    metFORMIN ER (GLUCOPHAGE XR) 500 mg tablet TAKE 2 TABLETS BY MOUTH TWICE DAILY FOR 90 DAYS      pantoprazole (PROTONIX) 40 mg tablet Take 1 Tablet by mouth daily. gabapentin (NEURONTIN) 300 mg capsule Take 2 Capsules by mouth two (2) times a day. CALCIUM-VITAMIN D3 PO Take 600 mg by mouth daily. ASPIRIN PO Take 81 mg by mouth daily. halobetasol (ULTRAVATE) 0.05 % topical cream as needed.       TRULICITY 1.5 EO/2.3 mL sub-q pen 0.5 mL by SubCUTAneous route every seven (7) days.      ergocalciferol (ERGOCALCIFEROL) 50,000 unit capsule TAKE ONE CAPSULE BY MOUTH EVERY WEEK  2    cyanocobalamin 1,000 mcg tablet Take 1 Tablet by mouth daily. insulin glargine (LANTUS,BASAGLAR) 100 unit/mL (3 mL) inpn 40 Units by SubCUTAneous route nightly. As directed. losartan (COZAAR) 25 mg tablet Take 0.5 Tablets by mouth daily. (Patient not taking: Reported on 4/20/2023) 90 Tablet 1    methocarbamoL (ROBAXIN) 500 mg tablet Take 1 Tablet by mouth four (4) times daily as needed for Muscle Spasm(s). (Patient not taking: Reported on 4/20/2023) 30 Tablet 0    polyethylene glycol (MIRALAX) 17 gram packet Take 1 Packet by mouth daily. (Patient not taking: Reported on 4/20/2023) 14 Packet 0    potassium chloride SR (K-TAB) 20 mEq tablet Take 1 Tablet by mouth daily. Take while on furosemide  Indications: prevention of low potassium in the blood (Patient not taking: Reported on 4/20/2023) 5 Tablet 0    QUEtiapine (SEROquel) 50 mg tablet Take 1 Tablet by mouth nightly as needed (Insomnia/Agitation). (Patient not taking: Reported on 4/20/2023) 30 Tablet 0    senna-docusate (PERICOLACE) 8.6-50 mg per tablet Take 1 Tablet by mouth two (2) times a day. (Patient not taking: Reported on 4/20/2023) 14 Tablet 0    tamsulosin (FLOMAX) 0.4 mg capsule Take 1 Capsule by mouth nightly. Indications: William Rudder retention (Patient not taking: Reported on 4/20/2023) 30 Capsule 3    FARXIGA 10 mg tab tablet 1 tablet by mouth daily (Patient not taking: Reported on 4/20/2023)         Vitals: Blood pressure 102/66, pulse 67, temperature 97.4 °F (36.3 °C), resp. rate 14, height 5' 10\" (1.778 m), weight 212 lb (96.2 kg), SpO2 100 %. Allergies: is allergic to pcn [penicillins].     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 Will see back at scheduled follow up appointment   Diabetes Ha1c 7.4 3/31/23  Continue current regimen  Management per PCP  CAD s/p CABG x 3 on 4/3/23, on asa, plavix, BB and statin. Tolerating current regimen  No complaints of myalgias with increase in statin dose.   Amiodarone to continue for 2 more weeks, then stop  Continue on potasium replacement  Not on diuretic, will stop potassium    Hypertension  Metoprolol 100 mg BID

## 2023-04-20 NOTE — PROGRESS NOTES
Identified pt with two pt identifiers(name and ). Reviewed record in preparation for visit and have obtained necessary documentation. All patient medications has been reviewed. Patient and wife were unable to verify several medication dosages due to possible changes while at [address] [place] [address]. Chief Complaint   Patient presents with    Post OP Follow Up     follow up    Post-Op Problem     Wound check and clicking feeling around wound       Health Maintenance Due   Topic    Foot Exam Q1     COVID-19 Vaccine (3 - Booster for Moderna series)    Diabetic Alb to Cr ratio (uACR) test     Eye Exam Retinal or Dilated        Vitals:    23 1305   BP: 102/66   Pulse: 67   Resp: 14   Temp: 97.4 °F (36.3 °C)   SpO2: 100%   Weight: 212 lb (96.2 kg)   Height: 5' 10\" (1.778 m)   PainSc:   0 - No pain       4. Have you been to the ER, urgent care clinic since your last visit? Hospitalized since your last visit? No    5. Have you seen or consulted any other health care providers outside of the [address] [place] [address] since your last visit? Include any pap smears or colon screening. [facility]      Patient is accompanied by wife I have received verbal consent from Deanna Brooks to discuss any/all medical information while they are present in the room.

## 2023-05-02 RX ORDER — AMIODARONE HYDROCHLORIDE 200 MG/1
TABLET ORAL
COMMUNITY
Start: 2023-04-07 | End: 2023-05-05

## 2023-05-02 RX ORDER — CLOPIDOGREL BISULFATE 75 MG/1
75 TABLET ORAL DAILY
COMMUNITY
Start: 2023-04-08 | End: 2023-05-08 | Stop reason: SDUPTHER

## 2023-05-02 RX ORDER — POLYETHYLENE GLYCOL 3350 17 G/17G
17 POWDER, FOR SOLUTION ORAL DAILY
COMMUNITY
Start: 2023-04-08 | End: 2023-06-26

## 2023-05-02 RX ORDER — POTASSIUM CHLORIDE 20 MEQ/1
20 TABLET, EXTENDED RELEASE ORAL DAILY
COMMUNITY
Start: 2023-04-07 | End: 2023-06-26

## 2023-05-02 RX ORDER — METHOCARBAMOL 500 MG/1
500 TABLET, FILM COATED ORAL 4 TIMES DAILY PRN
COMMUNITY
Start: 2023-04-07 | End: 2023-06-26

## 2023-05-02 RX ORDER — INSULIN LISPRO 100 [IU]/ML
30 INJECTION, SOLUTION INTRAVENOUS; SUBCUTANEOUS
COMMUNITY
Start: 2023-04-07

## 2023-05-02 RX ORDER — QUETIAPINE FUMARATE 50 MG/1
50 TABLET, FILM COATED ORAL
COMMUNITY
Start: 2023-04-07 | End: 2023-06-26

## 2023-05-02 RX ORDER — FERROUS SULFATE 325(65) MG
325 TABLET ORAL
COMMUNITY
Start: 2023-04-08 | End: 2023-06-26

## 2023-05-02 RX ORDER — AMOXICILLIN 250 MG
1 CAPSULE ORAL 2 TIMES DAILY
COMMUNITY
Start: 2023-04-07 | End: 2023-06-26

## 2023-05-02 RX ORDER — TAMSULOSIN HYDROCHLORIDE 0.4 MG/1
0.4 CAPSULE ORAL
COMMUNITY
Start: 2023-04-07 | End: 2023-06-26

## 2023-05-08 ENCOUNTER — OFFICE VISIT (OUTPATIENT)
Age: 73
End: 2023-05-08

## 2023-05-08 VITALS
RESPIRATION RATE: 14 BRPM | HEART RATE: 62 BPM | DIASTOLIC BLOOD PRESSURE: 74 MMHG | HEIGHT: 70 IN | TEMPERATURE: 97.9 F | BODY MASS INDEX: 30.14 KG/M2 | WEIGHT: 210.5 LBS | SYSTOLIC BLOOD PRESSURE: 115 MMHG | OXYGEN SATURATION: 96 %

## 2023-05-08 DIAGNOSIS — Z95.1 S/P CABG X 3: Primary | ICD-10-CM

## 2023-05-08 PROCEDURE — 99024 POSTOP FOLLOW-UP VISIT: CPT | Performed by: PHYSICIAN ASSISTANT

## 2023-05-08 RX ORDER — CLOPIDOGREL BISULFATE 75 MG/1
75 TABLET ORAL DAILY
Qty: 30 TABLET | Refills: 3 | Status: SHIPPED | OUTPATIENT
Start: 2023-05-08

## 2023-05-08 RX ORDER — LOSARTAN POTASSIUM 25 MG/1
25 TABLET ORAL DAILY
Qty: 30 TABLET | Refills: 3 | Status: SHIPPED | OUTPATIENT
Start: 2023-05-08

## 2023-05-08 RX ORDER — AMIODARONE HYDROCHLORIDE 200 MG/1
200 TABLET ORAL DAILY
COMMUNITY

## 2023-05-08 NOTE — PROGRESS NOTES
Patient: Alyssa Castro   Age: 74 y.o. Patient Care Team:  Tish Wilde MD as PCP - Darcie Gallego MD as PCP - Empaneled Provider  Hong Spann RN as Ambulatory Care Manager    Diagnosis: There were no encounter diagnoses. Problem List:   Patient Active Problem List   Diagnosis    History of colonoscopy    GERD without esophagitis    Collagenous colitis    Functional abdominal pain syndrome    Prostate cancer ([address]    Osteopenia    Gout attack    BPH (benign prostatic hyperplasia)    Peripheral neuropathy    Diabetes mellitus type 2, controlled ([address]    Posterior subcapsular polar senile cataract    Inflammation of eyelid    Incomplete RBBB    Benign essential hypertension    Severe obstructive sleep apnea    Type 2 diabetes mellitus with diabetic neuropathy (HCC)    Erectile dysfunction    Hyperlipidemia with target LDL less than 100    Disorder of pituitary gland (HCC)    Megaloblastic anemia due to vitamin B12 deficiency    Osteochondropathy    Postural kyphosis, cervicothoracic region    Recurrent falls    Sleep-wake schedule disorder    Allergic reaction, initial encounter    Tachycardia    NSTEMI (non-ST elevated myocardial infarction) ([facility] [address]    S/P CABG x 3        Date of Surgery: 4/3/23    Surgery: CABG X 3    HPI: Patient is here for 1  month follow up. No specific complaints. Feels like he is getting better weekly.      Current Medications:   Current Outpatient Medications   Medication Sig Dispense Refill    clopidogrel (PLAVIX) 75 MG tablet Take 1 tablet by mouth daily      ferrous sulfate (IRON 325) 325 (65 Fe) MG tablet Take 1 tablet by mouth daily (with breakfast)      insulin lispro (HUMALOG) 100 UNIT/ML SOLN injection vial PTA, taking 40 units with meals, DM saw inpatient, recommended decrease to 10 units and adjust up as needed      methocarbamol (ROBAXIN) 500 MG tablet Take 1 tablet by mouth 4 times daily as needed      polyethylene glycol (GLYCOLAX) 17 GM/SCOOP powder

## 2023-05-08 NOTE — PROGRESS NOTES
Identified patient with two patient identifiers (Name and ). Reviewed record in preparation for visit and have documented the patient's chart, where appropriate. All patient's medications have been reviewed and updated, if applicable. Anticoagulants verified: Plavix and ASA    Chief Complaint   Patient presents with    Post-Op Check     1 month       Health Maintenance Due   Topic    Prostate Specific Antigen (PSA) Screening or Monitoring     Diabetic foot exam     COVID-19 Vaccine (3 - Booster for Moderna series)    Diabetic retinal exam     Diabetic Alb to Cr ratio (uACR) test        /74 (Site: Right Upper Arm, Position: Sitting, Cuff Size: Large Adult)   Pulse 62   Temp 97.9 °F (36.6 °C)   Resp 14   Ht 5' 10\" (1.778 m)   Wt 210 lb 8 oz (95.5 kg)   SpO2 96%   BMI 30.20 kg/m²      Patient is accompanied by wife and 2 daughters. I have received verbal consent from Jose Calvin to discuss any/all medical information while they are present in the room.

## 2023-06-26 ENCOUNTER — OFFICE VISIT (OUTPATIENT)
Age: 73
End: 2023-06-26
Payer: MEDICARE

## 2023-06-26 VITALS
OXYGEN SATURATION: 97 % | WEIGHT: 216 LBS | DIASTOLIC BLOOD PRESSURE: 58 MMHG | HEIGHT: 70 IN | HEART RATE: 77 BPM | BODY MASS INDEX: 30.92 KG/M2 | SYSTOLIC BLOOD PRESSURE: 108 MMHG

## 2023-06-26 DIAGNOSIS — Z87.891 HISTORY OF TOBACCO USE: ICD-10-CM

## 2023-06-26 DIAGNOSIS — E78.2 MIXED HYPERLIPIDEMIA: ICD-10-CM

## 2023-06-26 DIAGNOSIS — I25.810 CORONARY ARTERY DISEASE INVOLVING CORONARY BYPASS GRAFT OF NATIVE HEART WITHOUT ANGINA PECTORIS: Primary | ICD-10-CM

## 2023-06-26 DIAGNOSIS — I10 BENIGN ESSENTIAL HTN: ICD-10-CM

## 2023-06-26 PROCEDURE — 99214 OFFICE O/P EST MOD 30 MIN: CPT | Performed by: INTERNAL MEDICINE

## 2023-06-26 ASSESSMENT — PATIENT HEALTH QUESTIONNAIRE - PHQ9
SUM OF ALL RESPONSES TO PHQ9 QUESTIONS 1 & 2: 0
2. FEELING DOWN, DEPRESSED OR HOPELESS: 0
1. LITTLE INTEREST OR PLEASURE IN DOING THINGS: 0
SUM OF ALL RESPONSES TO PHQ QUESTIONS 1-9: 0

## 2023-06-27 ENCOUNTER — HOSPITAL ENCOUNTER (OUTPATIENT)
Facility: HOSPITAL | Age: 73
Setting detail: RECURRING SERIES
Discharge: HOME | End: 2023-06-30
Payer: MEDICARE

## 2023-06-27 ENCOUNTER — TELEPHONE (OUTPATIENT)
Age: 73
End: 2023-06-27

## 2023-06-27 VITALS — HEIGHT: 70 IN | WEIGHT: 216 LBS | HEART RATE: 65 BPM | RESPIRATION RATE: 16 BRPM | BODY MASS INDEX: 30.92 KG/M2

## 2023-06-27 PROCEDURE — 93797 PHYS/QHP OP CAR RHAB WO ECG: CPT

## 2023-06-27 PROCEDURE — 93798 PHYS/QHP OP CAR RHAB W/ECG: CPT

## 2023-06-27 ASSESSMENT — PATIENT HEALTH QUESTIONNAIRE - PHQ9
9. THOUGHTS THAT YOU WOULD BE BETTER OFF DEAD, OR OF HURTING YOURSELF: 0
1. LITTLE INTEREST OR PLEASURE IN DOING THINGS: 0
10. IF YOU CHECKED OFF ANY PROBLEMS, HOW DIFFICULT HAVE THESE PROBLEMS MADE IT FOR YOU TO DO YOUR WORK, TAKE CARE OF THINGS AT HOME, OR GET ALONG WITH OTHER PEOPLE: 0
SUM OF ALL RESPONSES TO PHQ9 QUESTIONS 1 & 2: 0
3. TROUBLE FALLING OR STAYING ASLEEP: 1
8. MOVING OR SPEAKING SO SLOWLY THAT OTHER PEOPLE COULD HAVE NOTICED. OR THE OPPOSITE, BEING SO FIGETY OR RESTLESS THAT YOU HAVE BEEN MOVING AROUND A LOT MORE THAN USUAL: 0
6. FEELING BAD ABOUT YOURSELF - OR THAT YOU ARE A FAILURE OR HAVE LET YOURSELF OR YOUR FAMILY DOWN: 0
SUM OF ALL RESPONSES TO PHQ QUESTIONS 1-9: 1
7. TROUBLE CONCENTRATING ON THINGS, SUCH AS READING THE NEWSPAPER OR WATCHING TELEVISION: 0
SUM OF ALL RESPONSES TO PHQ QUESTIONS 1-9: 1
2. FEELING DOWN, DEPRESSED OR HOPELESS: 0
SUM OF ALL RESPONSES TO PHQ QUESTIONS 1-9: 1
SUM OF ALL RESPONSES TO PHQ QUESTIONS 1-9: 1
4. FEELING TIRED OR HAVING LITTLE ENERGY: 0
5. POOR APPETITE OR OVEREATING: 0

## 2023-06-27 ASSESSMENT — EXERCISE STRESS TEST
PEAK_RPE: 12
PEAK_METS: 1.8
PEAK_BP: 140/76
PEAK_HR: 65
PEAK_BP: 140/76

## 2023-06-27 ASSESSMENT — EJECTION FRACTION
EF_VALUE: 55
EF_VALUE: 55

## 2023-06-27 ASSESSMENT — LIFESTYLE VARIABLES
SMOKELESS_TOBACCO: NO
ALCOHOL_USE: SPECIAL
ALCOHOL_TYPE: WINE
ALCOHOL_AMOUNT: 2

## 2023-06-28 ENCOUNTER — HOSPITAL ENCOUNTER (OUTPATIENT)
Facility: HOSPITAL | Age: 73
Setting detail: RECURRING SERIES
Discharge: HOME | End: 2023-07-01
Payer: MEDICARE

## 2023-06-28 VITALS — WEIGHT: 220 LBS | BODY MASS INDEX: 31.57 KG/M2

## 2023-06-28 PROCEDURE — 93798 PHYS/QHP OP CAR RHAB W/ECG: CPT

## 2023-06-28 PROCEDURE — 93797 PHYS/QHP OP CAR RHAB WO ECG: CPT

## 2023-06-28 ASSESSMENT — EXERCISE STRESS TEST
PEAK_RPE: 12
PEAK_METS: 1.8

## 2023-06-30 ENCOUNTER — HOSPITAL ENCOUNTER (OUTPATIENT)
Facility: HOSPITAL | Age: 73
Setting detail: RECURRING SERIES
End: 2023-06-30
Payer: MEDICARE

## 2023-06-30 VITALS — BODY MASS INDEX: 31.57 KG/M2 | WEIGHT: 220 LBS

## 2023-06-30 PROCEDURE — 93798 PHYS/QHP OP CAR RHAB W/ECG: CPT

## 2023-06-30 ASSESSMENT — EXERCISE STRESS TEST
PEAK_RPE: 12
PEAK_METS: 1.7

## 2023-07-03 ENCOUNTER — HOSPITAL ENCOUNTER (OUTPATIENT)
Facility: HOSPITAL | Age: 73
Setting detail: RECURRING SERIES
Discharge: HOME | End: 2023-07-06
Payer: MEDICARE

## 2023-07-03 VITALS — WEIGHT: 220.2 LBS | BODY MASS INDEX: 31.6 KG/M2

## 2023-07-03 PROCEDURE — 93797 PHYS/QHP OP CAR RHAB WO ECG: CPT

## 2023-07-03 PROCEDURE — 93798 PHYS/QHP OP CAR RHAB W/ECG: CPT

## 2023-07-03 ASSESSMENT — EXERCISE STRESS TEST
PEAK_METS: 1.7
PEAK_RPE: 12

## 2023-07-03 NOTE — CARDIO/PULMONARY
Cardiac Rehab Nutrition Assessment - 1:1 Evaluation           NAME: Meryle Forbes : 1950 AGE: 74 y.o. GENDER: male  CARDIAC REHAB ADMITTING DIAGNOSIS: Presence of aortocoronary bypass graft [Z95.1]    PROBLEM LIST:  Patient Active Problem List   Diagnosis    History of colonoscopy    GERD without esophagitis    Collagenous colitis    Functional abdominal pain syndrome    Prostate cancer ([address])    Osteopenia    Gout attack    BPH (benign prostatic hyperplasia)    Peripheral neuropathy    Diabetes mellitus type 2, controlled ([address])    Posterior subcapsular polar senile cataract    Inflammation of eyelid    Incomplete RBBB    Benign essential hypertension    Severe obstructive sleep apnea    Type 2 diabetes mellitus with diabetic neuropathy (HCC)    Erectile dysfunction    Hyperlipidemia with target LDL less than 100    Disorder of pituitary gland (HCC)    Megaloblastic anemia due to vitamin B12 deficiency    Osteochondropathy    Postural kyphosis, cervicothoracic region    Recurrent falls    Sleep-wake schedule disorder    Allergic reaction, initial encounter    Tachycardia    NSTEMI (non-ST elevated myocardial infarction) ([address])    S/P CABG x 3         LABS:   Lab Results   Component Value Date/Time    HBA1C 7.6 2021 12:00 AM     Lab Results   Component Value Date    LABA1C 7.4 (H) 2023       Lab Results   Component Value Date/Time    CHOL 121 2023 02:45 AM    HDL 34 2023 02:45 AM         MEDICATIONS/SUPPLEMENTS:   Scheduled Meds:  Continuous Infusions:  PRN Meds:. Prior to Admission medications    Medication Sig Start Date End Date Taking?  Authorizing Provider   metoprolol tartrate (LOPRESSOR) 25 MG tablet Take 1 tablet by mouth 2 times daily    Historical Provider, MD   empagliflozin (JARDIANCE) 10 MG tablet Take 1 tablet by mouth daily    Historical Provider, MD   clopidogrel (PLAVIX) 75 MG tablet Take 1 tablet by mouth daily 23   Alvaro Daniels PA-C   losartan (COZAAR)

## 2023-07-10 ENCOUNTER — HOSPITAL ENCOUNTER (OUTPATIENT)
Facility: HOSPITAL | Age: 73
Setting detail: RECURRING SERIES
Discharge: HOME | End: 2023-07-13
Payer: MEDICARE

## 2023-07-10 VITALS — BODY MASS INDEX: 31.45 KG/M2 | WEIGHT: 219.2 LBS

## 2023-07-10 PROCEDURE — 93798 PHYS/QHP OP CAR RHAB W/ECG: CPT

## 2023-07-10 ASSESSMENT — EXERCISE STRESS TEST
PEAK_METS: 1.7
PEAK_RPE: 12

## 2023-07-12 ENCOUNTER — HOSPITAL ENCOUNTER (OUTPATIENT)
Facility: HOSPITAL | Age: 73
Setting detail: RECURRING SERIES
Discharge: HOME | End: 2023-07-15
Payer: MEDICARE

## 2023-07-12 VITALS — BODY MASS INDEX: 31.57 KG/M2 | WEIGHT: 220 LBS

## 2023-07-12 PROCEDURE — 93798 PHYS/QHP OP CAR RHAB W/ECG: CPT

## 2023-07-12 PROCEDURE — 93797 PHYS/QHP OP CAR RHAB WO ECG: CPT

## 2023-07-12 ASSESSMENT — EXERCISE STRESS TEST
PEAK_METS: 1.7
PEAK_RPE: 12

## 2023-07-14 ENCOUNTER — HOSPITAL ENCOUNTER (OUTPATIENT)
Facility: HOSPITAL | Age: 73
Setting detail: RECURRING SERIES
Discharge: HOME | End: 2023-07-17
Payer: MEDICARE

## 2023-07-14 VITALS — WEIGHT: 220 LBS | BODY MASS INDEX: 31.57 KG/M2

## 2023-07-14 PROCEDURE — 93798 PHYS/QHP OP CAR RHAB W/ECG: CPT

## 2023-07-14 ASSESSMENT — EXERCISE STRESS TEST
PEAK_RPE: 12
PEAK_METS: 1.7

## 2023-07-17 ENCOUNTER — HOSPITAL ENCOUNTER (OUTPATIENT)
Facility: HOSPITAL | Age: 73
Setting detail: RECURRING SERIES
Discharge: HOME | End: 2023-07-20
Payer: MEDICARE

## 2023-07-17 VITALS — BODY MASS INDEX: 31.42 KG/M2 | WEIGHT: 219 LBS

## 2023-07-17 PROCEDURE — 93798 PHYS/QHP OP CAR RHAB W/ECG: CPT

## 2023-07-17 ASSESSMENT — LIFESTYLE VARIABLES
ALCOHOL_AMOUNT: 2
ALCOHOL_USE: SPECIAL
SMOKELESS_TOBACCO: NO
ALCOHOL_TYPE: WINE

## 2023-07-17 ASSESSMENT — EXERCISE STRESS TEST
PEAK_METS: 1.7
PEAK_RPE: 12

## 2023-07-17 ASSESSMENT — EJECTION FRACTION: EF_VALUE: 55

## 2023-07-19 ENCOUNTER — HOSPITAL ENCOUNTER (OUTPATIENT)
Facility: HOSPITAL | Age: 73
Setting detail: RECURRING SERIES
Discharge: HOME | End: 2023-07-22
Payer: MEDICARE

## 2023-07-19 VITALS — BODY MASS INDEX: 31.57 KG/M2 | WEIGHT: 220 LBS

## 2023-07-19 PROCEDURE — 93798 PHYS/QHP OP CAR RHAB W/ECG: CPT

## 2023-07-19 PROCEDURE — 93797 PHYS/QHP OP CAR RHAB WO ECG: CPT

## 2023-07-19 ASSESSMENT — EXERCISE STRESS TEST
PEAK_METS: 1.7
PEAK_RPE: 12

## 2023-07-21 ENCOUNTER — HOSPITAL ENCOUNTER (OUTPATIENT)
Facility: HOSPITAL | Age: 73
Setting detail: RECURRING SERIES
Discharge: HOME | End: 2023-07-24
Payer: MEDICARE

## 2023-07-21 VITALS — WEIGHT: 221 LBS | BODY MASS INDEX: 31.71 KG/M2

## 2023-07-21 PROCEDURE — 93798 PHYS/QHP OP CAR RHAB W/ECG: CPT

## 2023-07-21 ASSESSMENT — EXERCISE STRESS TEST
PEAK_RPE: 12
PEAK_METS: 2.1

## 2023-08-07 ENCOUNTER — HOSPITAL ENCOUNTER (OUTPATIENT)
Facility: HOSPITAL | Age: 73
Setting detail: RECURRING SERIES
Discharge: HOME | End: 2023-08-10
Payer: MEDICARE

## 2023-08-07 VITALS — BODY MASS INDEX: 32 KG/M2 | WEIGHT: 223 LBS

## 2023-08-07 PROCEDURE — 93798 PHYS/QHP OP CAR RHAB W/ECG: CPT

## 2023-08-07 ASSESSMENT — EXERCISE STRESS TEST
PEAK_RPE: 12
PEAK_METS: 2.1

## 2023-08-09 ENCOUNTER — HOSPITAL ENCOUNTER (OUTPATIENT)
Facility: HOSPITAL | Age: 73
Setting detail: RECURRING SERIES
Discharge: HOME | End: 2023-08-12
Payer: MEDICARE

## 2023-08-09 VITALS — BODY MASS INDEX: 32.14 KG/M2 | WEIGHT: 224 LBS

## 2023-08-09 PROCEDURE — 93797 PHYS/QHP OP CAR RHAB WO ECG: CPT

## 2023-08-09 PROCEDURE — 93798 PHYS/QHP OP CAR RHAB W/ECG: CPT

## 2023-08-09 ASSESSMENT — EXERCISE STRESS TEST
PEAK_RPE: 12
PEAK_METS: 2.1

## 2023-08-10 ASSESSMENT — LIFESTYLE VARIABLES
ALCOHOL_TYPE: WINE
ALCOHOL_AMOUNT: 2
ALCOHOL_USE: SPECIAL
SMOKELESS_TOBACCO: NO

## 2023-08-10 ASSESSMENT — EJECTION FRACTION: EF_VALUE: 55

## 2023-08-11 ENCOUNTER — HOSPITAL ENCOUNTER (OUTPATIENT)
Facility: HOSPITAL | Age: 73
Setting detail: RECURRING SERIES
Discharge: HOME | End: 2023-08-14
Payer: MEDICARE

## 2023-08-11 VITALS — WEIGHT: 219 LBS | BODY MASS INDEX: 31.42 KG/M2

## 2023-08-11 PROCEDURE — 93798 PHYS/QHP OP CAR RHAB W/ECG: CPT

## 2023-08-11 ASSESSMENT — EXERCISE STRESS TEST
PEAK_METS: 2.2
PEAK_RPE: 12

## 2023-08-14 ENCOUNTER — HOSPITAL ENCOUNTER (OUTPATIENT)
Facility: HOSPITAL | Age: 73
Setting detail: RECURRING SERIES
Discharge: HOME | End: 2023-08-17
Payer: MEDICARE

## 2023-08-14 VITALS — WEIGHT: 221 LBS | BODY MASS INDEX: 31.71 KG/M2

## 2023-08-14 PROCEDURE — 93798 PHYS/QHP OP CAR RHAB W/ECG: CPT

## 2023-08-14 ASSESSMENT — EXERCISE STRESS TEST
PEAK_METS: 2.2
PEAK_RPE: 12

## 2023-08-16 ENCOUNTER — HOSPITAL ENCOUNTER (OUTPATIENT)
Facility: HOSPITAL | Age: 73
Setting detail: RECURRING SERIES
Discharge: HOME | End: 2023-08-19
Payer: MEDICARE

## 2023-08-16 VITALS — WEIGHT: 224 LBS | BODY MASS INDEX: 32.14 KG/M2

## 2023-08-16 PROCEDURE — 93798 PHYS/QHP OP CAR RHAB W/ECG: CPT

## 2023-08-16 PROCEDURE — 93797 PHYS/QHP OP CAR RHAB WO ECG: CPT

## 2023-08-16 ASSESSMENT — EXERCISE STRESS TEST
PEAK_METS: 2.2
PEAK_RPE: 12

## 2023-08-18 ENCOUNTER — APPOINTMENT (OUTPATIENT)
Facility: HOSPITAL | Age: 73
End: 2023-08-18
Payer: MEDICARE

## 2023-08-21 ENCOUNTER — HOSPITAL ENCOUNTER (OUTPATIENT)
Facility: HOSPITAL | Age: 73
Setting detail: RECURRING SERIES
Discharge: HOME | End: 2023-08-24
Payer: MEDICARE

## 2023-08-21 VITALS — BODY MASS INDEX: 32.28 KG/M2 | WEIGHT: 225 LBS

## 2023-08-21 PROCEDURE — 93798 PHYS/QHP OP CAR RHAB W/ECG: CPT

## 2023-08-21 ASSESSMENT — EXERCISE STRESS TEST
PEAK_METS: 2.2
PEAK_RPE: 12

## 2023-08-23 ENCOUNTER — HOSPITAL ENCOUNTER (OUTPATIENT)
Facility: HOSPITAL | Age: 73
Setting detail: RECURRING SERIES
Discharge: HOME | End: 2023-08-26
Payer: MEDICARE

## 2023-08-23 VITALS — WEIGHT: 225 LBS | BODY MASS INDEX: 32.28 KG/M2

## 2023-08-23 PROCEDURE — 93798 PHYS/QHP OP CAR RHAB W/ECG: CPT

## 2023-08-23 PROCEDURE — 9900000112 HC DAILY CARDIAC MAINTENANCE (RETAIL)

## 2023-08-23 PROCEDURE — 93797 PHYS/QHP OP CAR RHAB WO ECG: CPT

## 2023-08-23 ASSESSMENT — EXERCISE STRESS TEST
PEAK_METS: 2.2
PEAK_RPE: 12

## 2023-08-24 ENCOUNTER — TELEPHONE (OUTPATIENT)
Age: 73
End: 2023-08-24

## 2023-08-25 ENCOUNTER — HOSPITAL ENCOUNTER (OUTPATIENT)
Facility: HOSPITAL | Age: 73
Setting detail: RECURRING SERIES
Discharge: HOME | End: 2023-08-28
Payer: MEDICARE

## 2023-08-25 ENCOUNTER — OFFICE VISIT (OUTPATIENT)
Age: 73
End: 2023-08-25
Payer: MEDICARE

## 2023-08-25 VITALS
HEIGHT: 70 IN | DIASTOLIC BLOOD PRESSURE: 61 MMHG | WEIGHT: 225 LBS | OXYGEN SATURATION: 96 % | SYSTOLIC BLOOD PRESSURE: 118 MMHG | HEART RATE: 96 BPM | BODY MASS INDEX: 32.21 KG/M2 | RESPIRATION RATE: 16 BRPM | TEMPERATURE: 97.8 F

## 2023-08-25 VITALS — WEIGHT: 224 LBS | BODY MASS INDEX: 32.14 KG/M2

## 2023-08-25 DIAGNOSIS — E11.40 TYPE 2 DIABETES MELLITUS WITH DIABETIC NEUROPATHY, WITH LONG-TERM CURRENT USE OF INSULIN (HCC): ICD-10-CM

## 2023-08-25 DIAGNOSIS — Z00.00 MEDICARE ANNUAL WELLNESS VISIT, SUBSEQUENT: Primary | ICD-10-CM

## 2023-08-25 DIAGNOSIS — Z95.1 S/P CABG X 3: ICD-10-CM

## 2023-08-25 DIAGNOSIS — C61 MALIGNANT NEOPLASM OF PROSTATE (HCC): ICD-10-CM

## 2023-08-25 DIAGNOSIS — I10 PRIMARY HYPERTENSION: ICD-10-CM

## 2023-08-25 DIAGNOSIS — Z79.4 TYPE 2 DIABETES MELLITUS WITH DIABETIC NEUROPATHY, WITH LONG-TERM CURRENT USE OF INSULIN (HCC): ICD-10-CM

## 2023-08-25 DIAGNOSIS — E78.00 PURE HYPERCHOLESTEROLEMIA: ICD-10-CM

## 2023-08-25 PROBLEM — E23.7 DISORDER OF PITUITARY GLAND (HCC): Status: RESOLVED | Noted: 2022-12-07 | Resolved: 2023-08-25

## 2023-08-25 PROCEDURE — 1123F ACP DISCUSS/DSCN MKR DOCD: CPT | Performed by: INTERNAL MEDICINE

## 2023-08-25 PROCEDURE — 3017F COLORECTAL CA SCREEN DOC REV: CPT | Performed by: INTERNAL MEDICINE

## 2023-08-25 PROCEDURE — G0439 PPPS, SUBSEQ VISIT: HCPCS | Performed by: INTERNAL MEDICINE

## 2023-08-25 PROCEDURE — 3074F SYST BP LT 130 MM HG: CPT | Performed by: INTERNAL MEDICINE

## 2023-08-25 PROCEDURE — 3051F HG A1C>EQUAL 7.0%<8.0%: CPT | Performed by: INTERNAL MEDICINE

## 2023-08-25 PROCEDURE — 93798 PHYS/QHP OP CAR RHAB W/ECG: CPT

## 2023-08-25 PROCEDURE — 3078F DIAST BP <80 MM HG: CPT | Performed by: INTERNAL MEDICINE

## 2023-08-25 RX ORDER — ATORVASTATIN CALCIUM 80 MG/1
80 TABLET, FILM COATED ORAL DAILY
COMMUNITY

## 2023-08-25 ASSESSMENT — PATIENT HEALTH QUESTIONNAIRE - PHQ9
SUM OF ALL RESPONSES TO PHQ QUESTIONS 1-9: 0
SUM OF ALL RESPONSES TO PHQ QUESTIONS 1-9: 0
2. FEELING DOWN, DEPRESSED OR HOPELESS: 0
SUM OF ALL RESPONSES TO PHQ QUESTIONS 1-9: 0
1. LITTLE INTEREST OR PLEASURE IN DOING THINGS: 0
SUM OF ALL RESPONSES TO PHQ9 QUESTIONS 1 & 2: 0
SUM OF ALL RESPONSES TO PHQ QUESTIONS 1-9: 0

## 2023-08-25 ASSESSMENT — EXERCISE STRESS TEST
PEAK_RPE: 12
PEAK_METS: 2.2

## 2023-08-25 ASSESSMENT — LIFESTYLE VARIABLES
HOW OFTEN DO YOU HAVE A DRINK CONTAINING ALCOHOL: 2-4 TIMES A MONTH
HOW MANY STANDARD DRINKS CONTAINING ALCOHOL DO YOU HAVE ON A TYPICAL DAY: 1 OR 2

## 2023-08-25 NOTE — PATIENT INSTRUCTIONS
is possible to meet your calcium requirement with diet alone, but a vitamin D supplement is usually necessary to meet this goal.  When exposed to the sun, use a sunscreen that protects against both UVA and UVB radiation with an SPF of 30 or greater. Reapply every 2 to 3 hours or after sweating, drying off with a towel, or swimming. Always wear a seat belt when traveling in a car. Always wear a helmet when riding a bicycle or motorcycle.

## 2023-09-07 ENCOUNTER — TRANSCRIBE ORDERS (OUTPATIENT)
Facility: HOSPITAL | Age: 73
End: 2023-09-07

## 2023-09-07 DIAGNOSIS — U07.1 COVID-19: Primary | ICD-10-CM

## 2023-09-07 DIAGNOSIS — S42.202A CLOSED FRACTURE OF PROXIMAL END OF LEFT HUMERUS, UNSPECIFIED FRACTURE MORPHOLOGY, INITIAL ENCOUNTER: Primary | ICD-10-CM

## 2023-09-11 RX ORDER — CLOPIDOGREL BISULFATE 75 MG/1
75 TABLET ORAL DAILY
Qty: 30 TABLET | Refills: 3 | OUTPATIENT
Start: 2023-09-11

## 2023-09-12 ENCOUNTER — HOSPITAL ENCOUNTER (OUTPATIENT)
Facility: HOSPITAL | Age: 73
Discharge: HOME | End: 2023-09-15
Attending: ORTHOPAEDIC SURGERY
Payer: MEDICARE

## 2023-09-12 DIAGNOSIS — S42.202A CLOSED FRACTURE OF PROXIMAL END OF LEFT HUMERUS, UNSPECIFIED FRACTURE MORPHOLOGY, INITIAL ENCOUNTER: ICD-10-CM

## 2023-09-12 PROCEDURE — 73200 CT UPPER EXTREMITY W/O DYE: CPT

## 2023-09-15 LAB
HBA1C MFR BLD HPLC: 7.8 %
LDL CHOLESTEROL, EXTERNAL: 39
MICROALBUMIN/CREATININE RATIO, EXTERNAL: 10

## 2023-09-18 ENCOUNTER — TELEPHONE (OUTPATIENT)
Age: 73
End: 2023-09-18

## 2023-09-18 RX ORDER — CLOPIDOGREL BISULFATE 75 MG/1
75 TABLET ORAL DAILY
Qty: 90 TABLET | Refills: 3 | Status: SHIPPED | OUTPATIENT
Start: 2023-09-18

## 2023-09-18 NOTE — TELEPHONE ENCOUNTER
Pt's wife is calling to see if her  needs to continue taking clopidogrel 75 mg and if so he needs a refill. Pt has not taken this medicine since last Thursday 9/14/23 Pharmacy is confirmed.     [phone] patient

## 2023-09-18 NOTE — TELEPHONE ENCOUNTER
Telephone call made to patient. Two patient identifiers verified. Let him know a refill of his Plavix was sent to his pharmacy. Requested Prescriptions     Signed Prescriptions Disp Refills    clopidogrel (PLAVIX) 75 MG tablet 90 tablet 3     Sig: Take 1 tablet by mouth daily     Authorizing Provider: Kenyon Mckeon     Ordering User:  Paramjit Bennett per Dr. Vimal Garcia.     Future Appointments   Date Time Provider [address]   10/31/2023 10:40 AM MD BARNEY Allen AMB

## 2023-09-25 PROBLEM — M81.0 OSTEOPOROSIS: Status: ACTIVE | Noted: 2023-09-25

## 2023-10-03 ENCOUNTER — HOSPITAL ENCOUNTER (OUTPATIENT)
Facility: HOSPITAL | Age: 73
Setting detail: INFUSION SERIES
End: 2023-10-03
Payer: MEDICARE

## 2023-10-03 VITALS
DIASTOLIC BLOOD PRESSURE: 63 MMHG | RESPIRATION RATE: 18 BRPM | BODY MASS INDEX: 29.41 KG/M2 | TEMPERATURE: 97.3 F | SYSTOLIC BLOOD PRESSURE: 108 MMHG | HEART RATE: 77 BPM | WEIGHT: 205 LBS

## 2023-10-03 DIAGNOSIS — M81.0 OSTEOPOROSIS, UNSPECIFIED OSTEOPOROSIS TYPE, UNSPECIFIED PATHOLOGICAL FRACTURE PRESENCE: Primary | ICD-10-CM

## 2023-10-03 PROCEDURE — 2580000003 HC RX 258: Performed by: INTERNAL MEDICINE

## 2023-10-03 PROCEDURE — 96374 THER/PROPH/DIAG INJ IV PUSH: CPT

## 2023-10-03 PROCEDURE — 6360000002 HC RX W HCPCS: Performed by: INTERNAL MEDICINE

## 2023-10-03 RX ORDER — SODIUM CHLORIDE 9 MG/ML
5-250 INJECTION, SOLUTION INTRAVENOUS PRN
OUTPATIENT
Start: 2024-09-29

## 2023-10-03 RX ORDER — ZOLEDRONIC ACID 5 MG/100ML
5 INJECTION, SOLUTION INTRAVENOUS ONCE
OUTPATIENT
Start: 2024-09-29 | End: 2024-09-29

## 2023-10-03 RX ORDER — ZOLEDRONIC ACID 5 MG/100ML
5 INJECTION, SOLUTION INTRAVENOUS ONCE
Status: COMPLETED | OUTPATIENT
Start: 2023-10-03 | End: 2023-10-03

## 2023-10-03 RX ORDER — SODIUM CHLORIDE 9 MG/ML
5-250 INJECTION, SOLUTION INTRAVENOUS PRN
Status: DISCONTINUED | OUTPATIENT
Start: 2023-10-03 | End: 2023-10-04 | Stop reason: HOSPADM

## 2023-10-03 RX ADMIN — SODIUM CHLORIDE 30 ML/HR: 9 INJECTION, SOLUTION INTRAVENOUS at 11:03

## 2023-10-03 RX ADMIN — ZOLEDRONIC ACID 5 MG: 5 INJECTION, SOLUTION INTRAVENOUS at 11:10

## 2023-10-03 ASSESSMENT — PAIN DESCRIPTION - LOCATION: LOCATION: ARM

## 2023-10-03 ASSESSMENT — PAIN DESCRIPTION - DESCRIPTORS: DESCRIPTORS: ACHING;DISCOMFORT;HEAVINESS

## 2023-10-03 ASSESSMENT — PAIN SCALES - GENERAL: PAINLEVEL_OUTOF10: 6

## 2023-10-03 ASSESSMENT — PAIN DESCRIPTION - ORIENTATION: ORIENTATION: LEFT

## 2023-10-03 ASSESSMENT — PAIN DESCRIPTION - PAIN TYPE: TYPE: ACUTE PAIN

## 2023-10-05 ENCOUNTER — APPOINTMENT (OUTPATIENT)
Facility: HOSPITAL | Age: 73
End: 2023-10-05
Payer: MEDICARE

## 2023-10-05 ENCOUNTER — HOSPITAL ENCOUNTER (OUTPATIENT)
Facility: HOSPITAL | Age: 73
Setting detail: OBSERVATION
Discharge: HOME | End: 2023-10-06
Attending: STUDENT IN AN ORGANIZED HEALTH CARE EDUCATION/TRAINING PROGRAM | Admitting: HOSPITALIST
Payer: MEDICARE

## 2023-10-05 DIAGNOSIS — R50.9 FEVER, UNSPECIFIED FEVER CAUSE: ICD-10-CM

## 2023-10-05 DIAGNOSIS — R41.0 DISORIENTATION: Primary | ICD-10-CM

## 2023-10-05 PROBLEM — G93.40 ACUTE ENCEPHALOPATHY: Status: ACTIVE | Noted: 2023-10-05

## 2023-10-05 PROBLEM — E11.65 TYPE 2 DIABETES MELLITUS WITH HYPERGLYCEMIA, WITH LONG-TERM CURRENT USE OF INSULIN (HCC): Status: ACTIVE | Noted: 2023-10-05

## 2023-10-05 PROBLEM — Z79.4 TYPE 2 DIABETES MELLITUS WITH HYPERGLYCEMIA, WITH LONG-TERM CURRENT USE OF INSULIN (HCC): Status: ACTIVE | Noted: 2023-10-05

## 2023-10-05 LAB
ALBUMIN SERPL-MCNC: 3.7 G/DL (ref 3.5–5)
ALBUMIN/GLOB SERPL: 0.8 (ref 1.1–2.2)
ALP SERPL-CCNC: 119 U/L (ref 45–117)
ALT SERPL-CCNC: 36 U/L (ref 12–78)
AMPHET UR QL SCN: NEGATIVE
ANION GAP SERPL CALC-SCNC: 9 MMOL/L (ref 5–15)
APPEARANCE UR: CLEAR
AST SERPL-CCNC: 33 U/L (ref 15–37)
B-OH-BUTYR SERPL-SCNC: 0.89 MMOL/L
BACTERIA URNS QL MICRO: NEGATIVE /HPF
BARBITURATES UR QL SCN: NEGATIVE
BASOPHILS # BLD: 0 K/UL (ref 0–0.1)
BASOPHILS NFR BLD: 0 % (ref 0–1)
BENZODIAZ UR QL: NEGATIVE
BILIRUB SERPL-MCNC: 0.5 MG/DL (ref 0.2–1)
BILIRUB UR QL: NEGATIVE
BUN SERPL-MCNC: 20 MG/DL (ref 6–20)
BUN/CREAT SERPL: 19 (ref 12–20)
CALCIUM SERPL-MCNC: 9.7 MG/DL (ref 8.5–10.1)
CANNABINOIDS UR QL SCN: NEGATIVE
CHLORIDE SERPL-SCNC: 100 MMOL/L (ref 97–108)
CO2 SERPL-SCNC: 23 MMOL/L (ref 21–32)
COCAINE UR QL SCN: NEGATIVE
COLOR UR: ABNORMAL
COMMENT:: NORMAL
CREAT SERPL-MCNC: 1.08 MG/DL (ref 0.7–1.3)
DIFFERENTIAL METHOD BLD: ABNORMAL
EKG ATRIAL RATE: 110 BPM
EKG DIAGNOSIS: NORMAL
EKG P AXIS: 34 DEGREES
EKG P-R INTERVAL: 156 MS
EKG Q-T INTERVAL: 348 MS
EKG QRS DURATION: 102 MS
EKG QTC CALCULATION (BAZETT): 470 MS
EKG R AXIS: -40 DEGREES
EKG T AXIS: 63 DEGREES
EKG VENTRICULAR RATE: 110 BPM
EOSINOPHIL # BLD: 0 K/UL (ref 0–0.4)
EOSINOPHIL NFR BLD: 0 % (ref 0–7)
EPITH CASTS URNS QL MICRO: ABNORMAL /LPF
ERYTHROCYTE [DISTWIDTH] IN BLOOD BY AUTOMATED COUNT: 16 % (ref 11.5–14.5)
EST. AVERAGE GLUCOSE BLD GHB EST-MCNC: 148 MG/DL
ETHANOL SERPL-MCNC: <10 MG/DL (ref 0–0.08)
FLUAV RNA SPEC QL NAA+PROBE: NOT DETECTED
FLUBV RNA SPEC QL NAA+PROBE: NOT DETECTED
FOLATE SERPL-MCNC: 8.9 NG/ML (ref 5–21)
GLOBULIN SER CALC-MCNC: 4.7 G/DL (ref 2–4)
GLUCOSE BLD STRIP.AUTO-MCNC: 181 MG/DL (ref 65–117)
GLUCOSE BLD STRIP.AUTO-MCNC: 181 MG/DL (ref 65–117)
GLUCOSE BLD STRIP.AUTO-MCNC: 183 MG/DL (ref 65–117)
GLUCOSE SERPL-MCNC: 143 MG/DL (ref 65–100)
GLUCOSE UR STRIP.AUTO-MCNC: >1000 MG/DL
HBA1C MFR BLD: 6.8 % (ref 4–5.6)
HCT VFR BLD AUTO: 41.4 % (ref 36.6–50.3)
HGB BLD-MCNC: 13.8 G/DL (ref 12.1–17)
HGB UR QL STRIP: NEGATIVE
HYALINE CASTS URNS QL MICRO: ABNORMAL /LPF (ref 0–5)
IMM GRANULOCYTES # BLD AUTO: 0.1 K/UL (ref 0–0.04)
IMM GRANULOCYTES NFR BLD AUTO: 1 % (ref 0–0.5)
KETONES UR QL STRIP.AUTO: 40 MG/DL
LACTATE SERPL-SCNC: 1.1 MMOL/L (ref 0.4–2)
LACTATE SERPL-SCNC: 2 MMOL/L (ref 0.4–2)
LACTATE SERPL-SCNC: 2.4 MMOL/L (ref 0.4–2)
LEUKOCYTE ESTERASE UR QL STRIP.AUTO: NEGATIVE
LYMPHOCYTES # BLD: 0.6 K/UL (ref 0.8–3.5)
LYMPHOCYTES NFR BLD: 8 % (ref 12–49)
Lab: ABNORMAL
MCH RBC QN AUTO: 29.5 PG (ref 26–34)
MCHC RBC AUTO-ENTMCNC: 33.3 G/DL (ref 30–36.5)
MCV RBC AUTO: 88.5 FL (ref 80–99)
METHADONE UR QL: NEGATIVE
MONOCYTES # BLD: 0.5 K/UL (ref 0–1)
MONOCYTES NFR BLD: 6 % (ref 5–13)
NEUTS SEG # BLD: 6.6 K/UL (ref 1.8–8)
NEUTS SEG NFR BLD: 85 % (ref 32–75)
NITRITE UR QL STRIP.AUTO: NEGATIVE
NRBC # BLD: 0 K/UL (ref 0–0.01)
NRBC BLD-RTO: 0 PER 100 WBC
OPIATES UR QL: POSITIVE
PCP UR QL: NEGATIVE
PH UR STRIP: 7 (ref 5–8)
PLATELET # BLD AUTO: 219 K/UL (ref 150–400)
PMV BLD AUTO: 10.2 FL (ref 8.9–12.9)
POTASSIUM SERPL-SCNC: 4.1 MMOL/L (ref 3.5–5.1)
PROT SERPL-MCNC: 8.4 G/DL (ref 6.4–8.2)
PROT UR STRIP-MCNC: NEGATIVE MG/DL
RBC # BLD AUTO: 4.68 M/UL (ref 4.1–5.7)
RBC #/AREA URNS HPF: ABNORMAL /HPF (ref 0–5)
RBC MORPH BLD: ABNORMAL
SARS-COV-2 RNA RESP QL NAA+PROBE: NOT DETECTED
SERVICE CMNT-IMP: ABNORMAL
SODIUM SERPL-SCNC: 132 MMOL/L (ref 136–145)
SP GR UR REFRACTOMETRY: 1.01 (ref 1–1.03)
SPECIMEN HOLD: NORMAL
URINE CULTURE IF INDICATED: ABNORMAL
UROBILINOGEN UR QL STRIP.AUTO: 0.2 EU/DL (ref 0.2–1)
VIT B12 SERPL-MCNC: 631 PG/ML (ref 193–986)
WBC # BLD AUTO: 7.8 K/UL (ref 4.1–11.1)
WBC URNS QL MICRO: ABNORMAL /HPF (ref 0–4)

## 2023-10-05 PROCEDURE — 87636 SARSCOV2 & INF A&B AMP PRB: CPT

## 2023-10-05 PROCEDURE — 96361 HYDRATE IV INFUSION ADD-ON: CPT

## 2023-10-05 PROCEDURE — 2580000003 HC RX 258: Performed by: PHYSICIAN ASSISTANT

## 2023-10-05 PROCEDURE — 6360000002 HC RX W HCPCS: Performed by: PHYSICIAN ASSISTANT

## 2023-10-05 PROCEDURE — 82962 GLUCOSE BLOOD TEST: CPT

## 2023-10-05 PROCEDURE — 81001 URINALYSIS AUTO W/SCOPE: CPT

## 2023-10-05 PROCEDURE — 80307 DRUG TEST PRSMV CHEM ANLYZR: CPT

## 2023-10-05 PROCEDURE — 93010 ELECTROCARDIOGRAM REPORT: CPT | Performed by: INTERNAL MEDICINE

## 2023-10-05 PROCEDURE — 36415 COLL VENOUS BLD VENIPUNCTURE: CPT

## 2023-10-05 PROCEDURE — 99221 1ST HOSP IP/OBS SF/LOW 40: CPT

## 2023-10-05 PROCEDURE — 71045 X-RAY EXAM CHEST 1 VIEW: CPT

## 2023-10-05 PROCEDURE — 82010 KETONE BODYS QUAN: CPT

## 2023-10-05 PROCEDURE — 96372 THER/PROPH/DIAG INJ SC/IM: CPT

## 2023-10-05 PROCEDURE — 87040 BLOOD CULTURE FOR BACTERIA: CPT

## 2023-10-05 PROCEDURE — 82077 ASSAY SPEC XCP UR&BREATH IA: CPT

## 2023-10-05 PROCEDURE — 2580000003 HC RX 258: Performed by: STUDENT IN AN ORGANIZED HEALTH CARE EDUCATION/TRAINING PROGRAM

## 2023-10-05 PROCEDURE — 96365 THER/PROPH/DIAG IV INF INIT: CPT

## 2023-10-05 PROCEDURE — 85025 COMPLETE CBC W/AUTO DIFF WBC: CPT

## 2023-10-05 PROCEDURE — 99285 EMERGENCY DEPT VISIT HI MDM: CPT

## 2023-10-05 PROCEDURE — 82607 VITAMIN B-12: CPT

## 2023-10-05 PROCEDURE — 6360000002 HC RX W HCPCS: Performed by: STUDENT IN AN ORGANIZED HEALTH CARE EDUCATION/TRAINING PROGRAM

## 2023-10-05 PROCEDURE — 6370000000 HC RX 637 (ALT 250 FOR IP): Performed by: PHYSICIAN ASSISTANT

## 2023-10-05 PROCEDURE — G0378 HOSPITAL OBSERVATION PER HR: HCPCS

## 2023-10-05 PROCEDURE — 82746 ASSAY OF FOLIC ACID SERUM: CPT

## 2023-10-05 PROCEDURE — 70450 CT HEAD/BRAIN W/O DYE: CPT

## 2023-10-05 PROCEDURE — 80053 COMPREHEN METABOLIC PANEL: CPT

## 2023-10-05 PROCEDURE — 83605 ASSAY OF LACTIC ACID: CPT

## 2023-10-05 PROCEDURE — 83036 HEMOGLOBIN GLYCOSYLATED A1C: CPT

## 2023-10-05 PROCEDURE — 6370000000 HC RX 637 (ALT 250 FOR IP): Performed by: STUDENT IN AN ORGANIZED HEALTH CARE EDUCATION/TRAINING PROGRAM

## 2023-10-05 PROCEDURE — 93005 ELECTROCARDIOGRAM TRACING: CPT | Performed by: STUDENT IN AN ORGANIZED HEALTH CARE EDUCATION/TRAINING PROGRAM

## 2023-10-05 RX ORDER — LOSARTAN POTASSIUM 25 MG/1
12.5 TABLET ORAL DAILY
Status: DISCONTINUED | OUTPATIENT
Start: 2023-10-05 | End: 2023-10-06 | Stop reason: HOSPADM

## 2023-10-05 RX ORDER — METOPROLOL SUCCINATE 25 MG/1
25 TABLET, EXTENDED RELEASE ORAL DAILY
Status: DISCONTINUED | OUTPATIENT
Start: 2023-10-05 | End: 2023-10-05 | Stop reason: ALTCHOICE

## 2023-10-05 RX ORDER — INSULIN LISPRO 100 [IU]/ML
0.08 INJECTION, SOLUTION INTRAVENOUS; SUBCUTANEOUS
Status: DISCONTINUED | OUTPATIENT
Start: 2023-10-05 | End: 2023-10-06 | Stop reason: HOSPADM

## 2023-10-05 RX ORDER — ASPIRIN 81 MG/1
81 TABLET ORAL DAILY
Status: DISCONTINUED | OUTPATIENT
Start: 2023-10-05 | End: 2023-10-06 | Stop reason: HOSPADM

## 2023-10-05 RX ORDER — HYDROCODONE BITARTRATE AND ACETAMINOPHEN 5; 325 MG/1; MG/1
1 TABLET ORAL EVERY 4 HOURS PRN
Status: DISCONTINUED | OUTPATIENT
Start: 2023-10-05 | End: 2023-10-06 | Stop reason: HOSPADM

## 2023-10-05 RX ORDER — DEXTROSE MONOHYDRATE 100 MG/ML
INJECTION, SOLUTION INTRAVENOUS CONTINUOUS PRN
Status: DISCONTINUED | OUTPATIENT
Start: 2023-10-05 | End: 2023-10-06 | Stop reason: HOSPADM

## 2023-10-05 RX ORDER — 0.9 % SODIUM CHLORIDE 0.9 %
1000 INTRAVENOUS SOLUTION INTRAVENOUS ONCE
Status: COMPLETED | OUTPATIENT
Start: 2023-10-05 | End: 2023-10-05

## 2023-10-05 RX ORDER — SODIUM CHLORIDE 0.9 % (FLUSH) 0.9 %
5-40 SYRINGE (ML) INJECTION PRN
Status: DISCONTINUED | OUTPATIENT
Start: 2023-10-05 | End: 2023-10-06 | Stop reason: HOSPADM

## 2023-10-05 RX ORDER — PANTOPRAZOLE SODIUM 40 MG/1
40 TABLET, DELAYED RELEASE ORAL DAILY
Status: DISCONTINUED | OUTPATIENT
Start: 2023-10-05 | End: 2023-10-06 | Stop reason: HOSPADM

## 2023-10-05 RX ORDER — ACETAMINOPHEN 650 MG/1
650 SUPPOSITORY RECTAL EVERY 6 HOURS PRN
Status: DISCONTINUED | OUTPATIENT
Start: 2023-10-05 | End: 2023-10-06 | Stop reason: HOSPADM

## 2023-10-05 RX ORDER — INSULIN GLARGINE 100 [IU]/ML
30 INJECTION, SOLUTION SUBCUTANEOUS NIGHTLY
Status: DISCONTINUED | OUTPATIENT
Start: 2023-10-05 | End: 2023-10-06 | Stop reason: HOSPADM

## 2023-10-05 RX ORDER — ONDANSETRON 4 MG/1
4 TABLET, ORALLY DISINTEGRATING ORAL EVERY 8 HOURS PRN
Status: DISCONTINUED | OUTPATIENT
Start: 2023-10-05 | End: 2023-10-06 | Stop reason: HOSPADM

## 2023-10-05 RX ORDER — GABAPENTIN 300 MG/1
600 CAPSULE ORAL 2 TIMES DAILY
Status: DISCONTINUED | OUTPATIENT
Start: 2023-10-06 | End: 2023-10-05

## 2023-10-05 RX ORDER — INSULIN LISPRO 100 [IU]/ML
0-4 INJECTION, SOLUTION INTRAVENOUS; SUBCUTANEOUS NIGHTLY
Status: DISCONTINUED | OUTPATIENT
Start: 2023-10-05 | End: 2023-10-06 | Stop reason: HOSPADM

## 2023-10-05 RX ORDER — LEVOFLOXACIN 5 MG/ML
750 INJECTION, SOLUTION INTRAVENOUS DAILY
Status: DISCONTINUED | OUTPATIENT
Start: 2023-10-05 | End: 2023-10-06

## 2023-10-05 RX ORDER — POLYETHYLENE GLYCOL 3350 17 G/17G
17 POWDER, FOR SOLUTION ORAL DAILY PRN
Status: DISCONTINUED | OUTPATIENT
Start: 2023-10-05 | End: 2023-10-06 | Stop reason: HOSPADM

## 2023-10-05 RX ORDER — ENOXAPARIN SODIUM 100 MG/ML
40 INJECTION SUBCUTANEOUS DAILY
Status: DISCONTINUED | OUTPATIENT
Start: 2023-10-05 | End: 2023-10-06 | Stop reason: HOSPADM

## 2023-10-05 RX ORDER — ACETAMINOPHEN 500 MG
1000 TABLET ORAL
Status: COMPLETED | OUTPATIENT
Start: 2023-10-05 | End: 2023-10-05

## 2023-10-05 RX ORDER — CLOPIDOGREL BISULFATE 75 MG/1
75 TABLET ORAL DAILY
Status: DISCONTINUED | OUTPATIENT
Start: 2023-10-05 | End: 2023-10-06 | Stop reason: HOSPADM

## 2023-10-05 RX ORDER — SODIUM CHLORIDE 0.9 % (FLUSH) 0.9 %
5-40 SYRINGE (ML) INJECTION EVERY 12 HOURS SCHEDULED
Status: DISCONTINUED | OUTPATIENT
Start: 2023-10-05 | End: 2023-10-06 | Stop reason: HOSPADM

## 2023-10-05 RX ORDER — ACETAMINOPHEN 325 MG/1
650 TABLET ORAL EVERY 6 HOURS PRN
Status: DISCONTINUED | OUTPATIENT
Start: 2023-10-05 | End: 2023-10-06 | Stop reason: HOSPADM

## 2023-10-05 RX ORDER — LOSARTAN POTASSIUM 25 MG/1
12.5 TABLET ORAL DAILY
COMMUNITY

## 2023-10-05 RX ORDER — INSULIN DEGLUDEC INJECTION 100 U/ML
40 INJECTION, SOLUTION SUBCUTANEOUS
COMMUNITY

## 2023-10-05 RX ORDER — SODIUM CHLORIDE 9 MG/ML
INJECTION, SOLUTION INTRAVENOUS PRN
Status: DISCONTINUED | OUTPATIENT
Start: 2023-10-05 | End: 2023-10-06 | Stop reason: HOSPADM

## 2023-10-05 RX ORDER — GABAPENTIN 300 MG/1
600 CAPSULE ORAL 2 TIMES DAILY
Status: DISCONTINUED | OUTPATIENT
Start: 2023-10-06 | End: 2023-10-06 | Stop reason: HOSPADM

## 2023-10-05 RX ORDER — INSULIN LISPRO 100 [IU]/ML
0-8 INJECTION, SOLUTION INTRAVENOUS; SUBCUTANEOUS
Status: DISCONTINUED | OUTPATIENT
Start: 2023-10-05 | End: 2023-10-06 | Stop reason: HOSPADM

## 2023-10-05 RX ORDER — ATORVASTATIN CALCIUM 40 MG/1
80 TABLET, FILM COATED ORAL DAILY
Status: DISCONTINUED | OUTPATIENT
Start: 2023-10-05 | End: 2023-10-06 | Stop reason: HOSPADM

## 2023-10-05 RX ORDER — SODIUM CHLORIDE, SODIUM LACTATE, POTASSIUM CHLORIDE, CALCIUM CHLORIDE 600; 310; 30; 20 MG/100ML; MG/100ML; MG/100ML; MG/100ML
INJECTION, SOLUTION INTRAVENOUS CONTINUOUS
Status: DISCONTINUED | OUTPATIENT
Start: 2023-10-05 | End: 2023-10-06 | Stop reason: HOSPADM

## 2023-10-05 RX ORDER — ONDANSETRON 2 MG/ML
4 INJECTION INTRAMUSCULAR; INTRAVENOUS EVERY 6 HOURS PRN
Status: DISCONTINUED | OUTPATIENT
Start: 2023-10-05 | End: 2023-10-06 | Stop reason: HOSPADM

## 2023-10-05 RX ORDER — GABAPENTIN 300 MG/1
900 CAPSULE ORAL 2 TIMES DAILY
Status: DISCONTINUED | OUTPATIENT
Start: 2023-10-05 | End: 2023-10-05

## 2023-10-05 RX ORDER — HYDROCODONE BITARTRATE AND ACETAMINOPHEN 5; 325 MG/1; MG/1
2 TABLET ORAL EVERY 4 HOURS PRN
Status: DISCONTINUED | OUTPATIENT
Start: 2023-10-05 | End: 2023-10-06 | Stop reason: HOSPADM

## 2023-10-05 RX ADMIN — METOPROLOL TARTRATE 25 MG: 25 TABLET, FILM COATED ORAL at 21:55

## 2023-10-05 RX ADMIN — HYDROCODONE BITARTRATE AND ACETAMINOPHEN 2 TABLET: 5; 325 TABLET ORAL at 12:26

## 2023-10-05 RX ADMIN — LOSARTAN POTASSIUM 12.5 MG: 25 TABLET, FILM COATED ORAL at 11:06

## 2023-10-05 RX ADMIN — Medication 10 ML: at 11:10

## 2023-10-05 RX ADMIN — HYDROCODONE BITARTRATE AND ACETAMINOPHEN 2 TABLET: 5; 325 TABLET ORAL at 21:54

## 2023-10-05 RX ADMIN — CLOPIDOGREL BISULFATE 75 MG: 75 TABLET ORAL at 11:06

## 2023-10-05 RX ADMIN — SODIUM CHLORIDE, POTASSIUM CHLORIDE, SODIUM LACTATE AND CALCIUM CHLORIDE: 600; 310; 30; 20 INJECTION, SOLUTION INTRAVENOUS at 11:08

## 2023-10-05 RX ADMIN — INSULIN GLARGINE 30 UNITS: 100 INJECTION, SOLUTION SUBCUTANEOUS at 22:36

## 2023-10-05 RX ADMIN — SODIUM CHLORIDE 1000 ML: 9 INJECTION, SOLUTION INTRAVENOUS at 02:39

## 2023-10-05 RX ADMIN — ASPIRIN 81 MG: 81 TABLET, COATED ORAL at 11:06

## 2023-10-05 RX ADMIN — LEVOFLOXACIN 750 MG: 5 INJECTION, SOLUTION INTRAVENOUS at 08:21

## 2023-10-05 RX ADMIN — ACETAMINOPHEN 1000 MG: 500 TABLET ORAL at 07:02

## 2023-10-05 RX ADMIN — PANTOPRAZOLE SODIUM 40 MG: 40 TABLET, DELAYED RELEASE ORAL at 11:06

## 2023-10-05 RX ADMIN — ATORVASTATIN CALCIUM 80 MG: 40 TABLET, FILM COATED ORAL at 11:06

## 2023-10-05 RX ADMIN — SODIUM CHLORIDE, POTASSIUM CHLORIDE, SODIUM LACTATE AND CALCIUM CHLORIDE: 600; 310; 30; 20 INJECTION, SOLUTION INTRAVENOUS at 23:05

## 2023-10-05 RX ADMIN — ENOXAPARIN SODIUM 40 MG: 100 INJECTION SUBCUTANEOUS at 11:10

## 2023-10-05 ASSESSMENT — PAIN DESCRIPTION - LOCATION
LOCATION: ARM
LOCATION: SHOULDER
LOCATION: ARM

## 2023-10-05 ASSESSMENT — PAIN DESCRIPTION - ORIENTATION
ORIENTATION: LEFT
ORIENTATION: LEFT

## 2023-10-05 ASSESSMENT — PAIN DESCRIPTION - DESCRIPTORS
DESCRIPTORS: ACHING

## 2023-10-05 ASSESSMENT — LIFESTYLE VARIABLES
HOW OFTEN DO YOU HAVE A DRINK CONTAINING ALCOHOL: NEVER
HOW MANY STANDARD DRINKS CONTAINING ALCOHOL DO YOU HAVE ON A TYPICAL DAY: PATIENT DOES NOT DRINK

## 2023-10-05 ASSESSMENT — PAIN SCALES - GENERAL
PAINLEVEL_OUTOF10: 4
PAINLEVEL_OUTOF10: 10
PAINLEVEL_OUTOF10: 0
PAINLEVEL_OUTOF10: 4
PAINLEVEL_OUTOF10: 7

## 2023-10-05 ASSESSMENT — PAIN - FUNCTIONAL ASSESSMENT: PAIN_FUNCTIONAL_ASSESSMENT: NONE - DENIES PAIN

## 2023-10-05 NOTE — ED NOTES
Bedside and Verbal shift change report given to Amadeo (oncoming nurse) by Brunilda (offgoing nurse). Report included the following information Nurse Handoff Report, Index, ED Encounter Summary, ED SBAR, Intake/Output, MAR, Recent Results, Med Rec Status, and Cardiac Rhythm Sinus Rhythm .        [place], [place]  10/05/23 6775

## 2023-10-05 NOTE — CONSULTS
[address] [facility]  DIABETES MANAGEMENT CONSULT    Consulted by Provider for advanced nursing evaluation and care for inpatient blood glucose management. Evaluation and Action Plan   Ct Motta is a 74 y.o. male with history of f left shoulder fracture, BPH, CAD, T2DM, GERD, gout, HTN and osteopenia, admitted with weakness and confusion. He went to receive reclast infusion on Tuesday, and wife feels patient may have been dehydrated after this, causing this episode. CT head negative. Lactic acid elevated and patient with low grade fever. Cultures sent. Shoulder fracture happened while on vacation a few weeks ago and being worked up by ortho. Denies issues with hypoglycemia (which rarely happens to him per patient and wife). Admission  and A1c 6.8%. His diabetes is managed by endocrinologists, Dr. Duc Anderson and Dr. Andreea Menchaca (whom they just saw recently). Admission . Will get him started on basal/bolus/correctional insulin regimen appropriate for him. Management Rationale Action Plan   Medication   Basal needs Using 0.3 units/kg/D based on weight  Lantus 30 units nightly   Nutritional needs  Humalog 7 units with meals   Corrective insulin Using medium dose sensitivity based on weight        Diabetes Discharge Plan   Medication: PTA meds: Metformin, Tresiba, Trulicity, Jardiance (advised to stay hydrated with SGLT2)  Would advise a lower dose of Humalog with his meals, such as 12-15 units     Referral  []        Outpatient diabetes education   Additional orders  Continue to check BG with CGM  Follow up with endocrinologist            Initial Presentation   Ct Motta is a 74 y.o. male admitted 10/5/23 after his wife found him slumped on the couch with weakness and confusion.   LAB:, A1c 6.8%, Na 132, lactic acid 2.4, Alk phos 119, urine positive for opiates, positive urine ketones but AG 9 and CO2 normal; T 100.7  CXR:no acute process  CT head:No acute and Nursing Intervention   Nursing Diagnosis Risk for unstable blood glucose pattern   Nursing Intervention Domain 5250 Decision-making Support   Nursing Interventions Examined current inpatient diabetes/blood glucose control   Explored factors facilitating and impeding inpatient management  Explored corrective strategies with patient and responsible inpatient provider   Informed patient of rational for insulin strategy while hospitalized     Nursing Diagnosis 71593 Ineffective Health Management   Nursing Intervention Domain 5250 Decision-making Support   Nursing Interventions Identified diabetes self-management practices impeding diabetes control  Discussed diabetes survival skills related to  Healthy Plate eating plan; given handouts  Role of physical activity in improving insulin sensitivity and action  Procedure for blood glucose monitoring & options for low-cost products  Medications plan at discharge     Billing Code(s)   [] 94370 IP subsequent hospital care - 50 minutes   [] [phone] subsequent hospital care - 35 minutes   [] [phone] IP subsequent hospital care - 25 minutes   [x] 73478 IP initial hospital care - 40 minutes     Before making these care recommendations, I personally reviewed the hospitalization record, including notes, laboratory & diagnostic data and current medications, and examined the patient at the bedside (circumstances permitting) before determining care. More than fifty (50) percent of the time was spent in patient counseling and/or care coordination.   Total minutes: 36    GAIL ACEVES - CNS  Diabetes Clinical Nurse Specialist  [facility]  Access via [employer]

## 2023-10-05 NOTE — ED NOTES
Bedside and Verbal shift change report given to Carmina Santos RN (oncoming nurse) by Alfredito Laboy RN (offgoing nurse). Report included the following information Nurse Handoff Report, ED Encounter Summary, ED SBAR, Adult Overview, Intake/Output, MAR, and Recent Results.        Paulo Campa RN  10/05/23 4391

## 2023-10-05 NOTE — ED TRIAGE NOTES
Pt arrives via ems reports weakness over the past week that has worsened tonight. Family stated pt was on the couch and they were unable to lift him. When ems arrived pt was febrile and tachycardic coed sepsis call on arrival. Wife states that pt received reclast infusion for osteoporosis yesterday and were instructed that pt may have flu like symptoms.

## 2023-10-05 NOTE — H&P
History and Physical    Date of Service:  10/5/2023  Primary Care Provider: Renny Chan MD  Source of information: Patient, Spouse, Chart Review    Chief Complaint: Fever and Altered Mental Status      History of Presenting Illness:   Kendall Shelton is a 74 y.o. male with a PMHx of left shoulder fracture, BPH, CAD, T2DM, GERD, gout, HTN and osteopenia. He was in his usual state of health until yesterday evening. His spouse notes that she found him slumped on the couch confused and globally weak. She also reports that the patient recently underwent reclast infusion at [facility] for osteopenia on Tuesday 10/3/2023. In the ED patient's mental status is improved and he is at his baseline AAOx3. His spouse believes that following the reclast infusion the patient did not drink enough fluids which contributed to his confusion. She notes similar symptoms when the patient became dehydrated following a viral illness. Patient endorses left shoulder pain,and global weakness. He denies any chest pain, shortness of breath, N/V/D, focal or generalized neurologic symptoms or dysuria. VS notable for fever of 100.7    ED workup:  Lactic acid 2.4  Na: 132  UA: glucosuria and ketonuria  BCX sent, started on levofloxacin       REVIEW OF SYSTEMS:  A comprehensive review of systems was negative except for that written in the History of Present Illness.      Past Medical History:   Diagnosis Date    Arthritis     BACK    BPH (benign prostatic hyperplasia) 2013    CAD (coronary artery disease)     Cancer ([facility]) 01/2019    PROSTATE    Cancer ([facility]) 2015    SKIN - HEAD AND FACE    Chronic pain     BACK    Chronic pelvic pain in male     sensitive to touch on the left side down to the left side of the penis    Collagenous colitis 2011    Diabetes mellitus type 2, controlled, with complications ([facility]) 0058    Diabetic neuropathy ([facility]) 2006    Diverticulosis     ED (erectile dysfunction)     Dr. Arnulfo George    GERD clinical/nonclinical/ nursing providers based on care coordination needs. Assessment:   Given the patient's current clinical presentation, there is a high level of concern for decompensation if discharged from the emergency department. Complex decision making was performed, which includes reviewing the patient's available past medical records, laboratory results, and imaging studies. Principal Problem:    Acute encephalopathy  Resolved Problems:    * No resolved hospital problems. *      Plan:   Acute encephalopathy  Lactic acidosis  - Admit to med/surg  - Differential includes infection vs dehydration vs side effect of reclast infusion  - Ct head negative for acute process  - CXR: no acute process  - Check B12 and folate  - BCX sent  - Flu/Covid swab sent  - ABX: on levofloxacin    Acute Hyponatremia  - likely from hypovolemia  - Start LR @ 125  - Recheck BMP     T2DM  - Patient noted to have glucosuria and ketonuria on UA  - Recheck A1C  - Check beta hydroxybutyrate  - Reordered home basal insulin 30 u glargine QHS  - SSI, accuchecks  - Will place consult to DM management    L shoulder fracture  - PRN norco  - continue sling  - Follow up with orthopedics outpatient  - PT consult    CAD  HTN  HLD  - Continue asa, plavix  - Home metoprolol, lisinopril, statin    GERD  - PPI    DIET: ADULT DIET; Regular   ISOLATION PRECAUTIONS: No active isolations  CODE STATUS: [unfilled]   DVT PROPHYLAXIS: Lovenox  FUNCTIONAL STATUS PRIOR TO HOSPITALIZATION: Ambulates with device  ANTICIPATED DISCHARGE: 24 hours  ANTICIPATED DISPOSITION: Home  EMERGENCY CONTACT/SURROGATE DECISION MAKESriram Jeffers (Spouse)   [phone] (Home Phone)    CRITICAL CARE WAS PERFORMED FOR THIS ENCOUNTER: NO      Signed By: Shanelle Shah PA-C     October 5, 2023         Please note that this dictation may have been completed with Lalo Mercado, the computer voice recognition software.   Quite often unanticipated grammatical, syntax, homophones, and

## 2023-10-05 NOTE — ED PROVIDER NOTES
[facility] EMERGENCY DEP  EMERGENCY DEPARTMENT ENCOUNTER      Pt Name: Goldy Tang  MRN: [id number]  [address] [facility] [place] 1950  Date of evaluation: 10/5/2023  Provider: Tamica Mcfarland MD    CHIEF COMPLAINT       Chief Complaint   Patient presents with    Fever    Altered Mental Status         HISTORY OF PRESENT ILLNESS   (Location/Symptom, Timing/Onset, Context/Setting, Quality, Duration, Modifying Factors, Severity)  Note limiting factors. Patient is a 66-year-old male present emergency department with weakness, altered mental status, disorientation per family. Family states that the notes that patient was on the couch and he was extremely weak when they went to try to get him up he was unable to get up due to weakness they called 911 EMS brings him in for further evaluation. Patient was found to be slightly febrile on arrival patient is in no acute distress wife who is bedside states that he appears to be in a fog and not acting himself. Review of External Medical Records:     Nursing Notes were reviewed. REVIEW OF SYSTEMS    (2-9 systems for level 4, 10 or more for level 5)     Review of Systems    Except as noted above the remainder of the review of systems was reviewed and negative.        PAST MEDICAL HISTORY     Past Medical History:   Diagnosis Date    Arthritis     BACK    BPH (benign prostatic hyperplasia) 2013    CAD (coronary artery disease)     Cancer ([address]) 01/2019    PROSTATE    Cancer ([address]) 2015    SKIN - HEAD AND FACE    Chronic pain     BACK    Chronic pelvic pain in male     sensitive to touch on the left side down to the left side of the penis    Collagenous colitis 2011    Diabetes mellitus type 2, controlled, with complications ([address]) 9669    Diabetic neuropathy ([address]) 2006    Diverticulosis     ED (erectile dysfunction)     Dr. Jadiel Steel    GERD (gastroesophageal reflux disease) 1990    Gout     1 episode    Hypertension 1970    Ill-defined condition     BILATERAL PERIPHERAL

## 2023-10-06 VITALS
RESPIRATION RATE: 16 BRPM | HEIGHT: 70 IN | OXYGEN SATURATION: 95 % | DIASTOLIC BLOOD PRESSURE: 75 MMHG | BODY MASS INDEX: 29.35 KG/M2 | SYSTOLIC BLOOD PRESSURE: 124 MMHG | WEIGHT: 205 LBS | TEMPERATURE: 98.6 F | HEART RATE: 74 BPM

## 2023-10-06 LAB
ANION GAP SERPL CALC-SCNC: 5 MMOL/L (ref 5–15)
BUN SERPL-MCNC: 12 MG/DL (ref 6–20)
BUN/CREAT SERPL: 13 (ref 12–20)
CALCIUM SERPL-MCNC: 8.8 MG/DL (ref 8.5–10.1)
CHLORIDE SERPL-SCNC: 106 MMOL/L (ref 97–108)
CO2 SERPL-SCNC: 25 MMOL/L (ref 21–32)
CREAT SERPL-MCNC: 0.93 MG/DL (ref 0.7–1.3)
GLUCOSE BLD STRIP.AUTO-MCNC: 105 MG/DL (ref 65–117)
GLUCOSE BLD STRIP.AUTO-MCNC: 154 MG/DL (ref 65–117)
GLUCOSE SERPL-MCNC: 140 MG/DL (ref 65–100)
POTASSIUM SERPL-SCNC: 3.7 MMOL/L (ref 3.5–5.1)
SERVICE CMNT-IMP: ABNORMAL
SERVICE CMNT-IMP: NORMAL
SODIUM SERPL-SCNC: 136 MMOL/L (ref 136–145)

## 2023-10-06 PROCEDURE — 6360000002 HC RX W HCPCS: Performed by: PHYSICIAN ASSISTANT

## 2023-10-06 PROCEDURE — 96372 THER/PROPH/DIAG INJ SC/IM: CPT

## 2023-10-06 PROCEDURE — 36415 COLL VENOUS BLD VENIPUNCTURE: CPT

## 2023-10-06 PROCEDURE — 94760 N-INVAS EAR/PLS OXIMETRY 1: CPT

## 2023-10-06 PROCEDURE — 97116 GAIT TRAINING THERAPY: CPT

## 2023-10-06 PROCEDURE — 80048 BASIC METABOLIC PNL TOTAL CA: CPT

## 2023-10-06 PROCEDURE — 6370000000 HC RX 637 (ALT 250 FOR IP)

## 2023-10-06 PROCEDURE — G0378 HOSPITAL OBSERVATION PER HR: HCPCS

## 2023-10-06 PROCEDURE — 2580000003 HC RX 258: Performed by: PHYSICIAN ASSISTANT

## 2023-10-06 PROCEDURE — 6360000002 HC RX W HCPCS: Performed by: STUDENT IN AN ORGANIZED HEALTH CARE EDUCATION/TRAINING PROGRAM

## 2023-10-06 PROCEDURE — 97161 PT EVAL LOW COMPLEX 20 MIN: CPT

## 2023-10-06 PROCEDURE — 6370000000 HC RX 637 (ALT 250 FOR IP): Performed by: PHYSICIAN ASSISTANT

## 2023-10-06 PROCEDURE — 96361 HYDRATE IV INFUSION ADD-ON: CPT

## 2023-10-06 PROCEDURE — 96366 THER/PROPH/DIAG IV INF ADDON: CPT

## 2023-10-06 PROCEDURE — 82962 GLUCOSE BLOOD TEST: CPT

## 2023-10-06 RX ADMIN — LEVOFLOXACIN 750 MG: 5 INJECTION, SOLUTION INTRAVENOUS at 08:39

## 2023-10-06 RX ADMIN — ENOXAPARIN SODIUM 40 MG: 100 INJECTION SUBCUTANEOUS at 08:38

## 2023-10-06 RX ADMIN — METOPROLOL TARTRATE 25 MG: 25 TABLET, FILM COATED ORAL at 07:08

## 2023-10-06 RX ADMIN — Medication 10 ML: at 08:40

## 2023-10-06 RX ADMIN — GABAPENTIN 600 MG: 300 CAPSULE ORAL at 08:38

## 2023-10-06 RX ADMIN — LOSARTAN POTASSIUM 12.5 MG: 25 TABLET, FILM COATED ORAL at 08:38

## 2023-10-06 RX ADMIN — PANTOPRAZOLE SODIUM 40 MG: 40 TABLET, DELAYED RELEASE ORAL at 08:38

## 2023-10-06 RX ADMIN — ATORVASTATIN CALCIUM 80 MG: 40 TABLET, FILM COATED ORAL at 08:38

## 2023-10-06 RX ADMIN — CLOPIDOGREL BISULFATE 75 MG: 75 TABLET ORAL at 08:38

## 2023-10-06 RX ADMIN — ASPIRIN 81 MG: 81 TABLET, COATED ORAL at 08:38

## 2023-10-06 RX ADMIN — GABAPENTIN 600 MG: 300 CAPSULE ORAL at 00:25

## 2023-10-06 NOTE — PLAN OF CARE
Problem: Safety - Adult  Goal: Free from fall injury  Outcome: Progressing  Flowsheets (Taken 10/5/2023 2246)  Free From Fall Injury: Instruct family/caregiver on patient safety     Problem: Pain  Goal: Verbalizes/displays adequate comfort level or baseline comfort level  Outcome: Progressing

## 2023-10-06 NOTE — DISCHARGE SUMMARY
Discharge Summary       PATIENT ID: Kyaw Bryson  MRN: [id number]   YOB: 1950    DATE OF ADMISSION: 10/5/2023 12:34 AM    DATE OF DISCHARGE: 10/6/2023   PRIMARY CARE PROVIDER: Gil Lundborg, MD     ATTENDING PHYSICIAN: Latricia Rosales MD  DISCHARGING PROVIDER: Krish Cook PA-C    To contact this individual call [phone] and ask the  to page. If unavailable ask to be transferred the Adult Hospitalist Department. CONSULTATIONS: IP CONSULT TO DIABETES MANAGEMENT    PROCEDURES/SURGERIES: * No surgery found *     ADMITTING [facility] [address] COURSE:   Kyaw Bryson is a 74 y.o. male with a PMHx of left shoulder fracture, BPH, CAD, T2DM, GERD, gout, HTN and osteopenia. He was in his usual state of health until yesterday evening. His spouse notes that she found him slumped on the couch confused and globally weak. She also reports that the patient recently underwent reclast infusion at [facility] for osteopenia on Tuesday 10/3/2023. In the ED patient's mental status is improved and he is at his baseline AAOx3. His spouse believes that following the reclast infusion the patient did not drink enough fluids which contributed to his confusion. She notes similar symptoms when the patient became dehydrated following a viral illness. Patient endorses left shoulder pain,and global weakness. He denies any chest pain, shortness of breath, N/V/D, focal or generalized neurologic symptoms or dysuria.      VS notable for fever of 100.7     ED workup:  Lactic acid 2.4  Na: 132  UA: glucosuria and ketonuria  BCX sent, started on levofloxacin        DISCHARGE DIAGNOSES / PLAN:      Acute encephalopathy  Lactic acidosis  - Admit to med/surg  - Differential includes infection vs dehydration vs side effect of reclast infusion  - Ct head negative for acute process  - CXR: no acute process  - Check B12 and folate  - BCX sent  - Flu/Covid swab sent  - ABX: received 1x dose levofloxacin  - Most likely cause of hospitalization was dehydration as well as side effects of reclast infusion. Highly encourage PO fluid intake while on SGLT2 inhibitor and especially during future reclast infusions      Acute Hyponatremia  - likely from hypovolemia  - Start LR @ 125  - Recheck BMP      T2DM  - Patient noted to have glucosuria and ketonuria on UA, these are likely secondary to SGLT-2 inhibitor   - DM management saw, appreciate assistance  - Resume home meds at discharge, follow up with endocrinologist    L shoulder fracture  - PRN norco, will not prescribe at MI as patient has previous script for pain control  - continue sling  - Follow up with orthopedics outpatient  - PT consult     CAD  HTN  HLD  - Continue asa, plavix  - Home metoprolol, lisinopril, statin     GERD  - PPI       PENDING TEST RESULTS:   At the time of discharge the following test results are still pending: None    FOLLOW UP APPOINTMENTS:    Follow-up Information    None           ADDITIONAL CARE RECOMMENDATIONS: Follow up with PCP, Orthopedics and Endocrinologist as scheduled    DIET: diabetic diet    ACTIVITY: activity as tolerated    WOUND CARE: Sling LUE    EQUIPMENT needed: N/A      DISCHARGE MEDICATIONS:     Medication List        CHANGE how you take these medications      losartan 25 MG tablet  Commonly known as: COZAAR  What changed: Another medication with the same name was removed. Continue taking this medication, and follow the directions you see here.             CONTINUE taking these medications      ASPIRIN PO     atorvastatin 80 MG tablet  Commonly known as: LIPITOR     CALCIUM-VITAMIN D3 PO     clopidogrel 75 MG tablet  Commonly known as: PLAVIX  Take 1 tablet by mouth daily     cyanocobalamin 1000 MCG tablet     empagliflozin 10 MG tablet  Commonly known as: JARDIANCE     ergocalciferol 1.25 MG (64518 UT) capsule  Commonly known as: ERGOCALCIFEROL     gabapentin 300 MG capsule  Commonly known as: NEURONTIN     halobetasol 0.05 %

## 2023-10-06 NOTE — DISCHARGE INSTRUCTIONS
Discharge Instructions       PATIENT ID: Kyaw Bryson  MRN: [id number]   YOB: 1950    DATE OF ADMISSION: 10/5/2023   DATE OF DISCHARGE: 10/6/2023    PRIMARY CARE PROVIDER: Gil Lundborg     ATTENDING PHYSICIAN: Latricia Rosales MD   DISCHARGING PROVIDER: Krish Cook PA-C    To contact this individual call [phone] and ask the  to page. If unavailable ask to be transferred the Adult Hospitalist Department. DISCHARGE DIAGNOSES   Kyaw Bryson is a 74 y.o. male with a PMHx of left shoulder fracture, BPH, CAD, T2DM, GERD, gout, HTN and osteopenia. He was in his usual state of health until yesterday evening. His spouse notes that she found him slumped on the couch confused and globally weak. She also reports that the patient recently underwent reclast infusion at [facility] for osteopenia on Tuesday 10/3/2023. In the ED patient's mental status is improved and he is at his baseline AAOx3. His spouse believes that following the reclast infusion the patient did not drink enough fluids which contributed to his confusion. She notes similar symptoms when the patient became dehydrated following a viral illness. Patient endorses left shoulder pain,and global weakness. He denies any chest pain, shortness of breath, N/V/D, focal or generalized neurologic symptoms or dysuria. VS notable for fever of 100.7     ED workup:  Lactic acid 2.4  Na: 132  UA: glucosuria and ketonuria  BCX sent, started on levofloxacin           DISCHARGE DIAGNOSES / PLAN:       Acute encephalopathy  Lactic acidosis  - Admit to med/surg  - Differential includes infection vs dehydration vs side effect of reclast infusion  - Ct head negative for acute process  - CXR: no acute process  - Check B12 and folate  - BCX sent  - Flu/Covid swab sent  - ABX: received 1x dose levofloxacin  - Most likely cause of hospitalization was dehydration as well as side effects of reclast infusion.  Highly encourage PO fluid intake while on SGLT2 inhibitor and especially during future reclast infusions      Acute Hyponatremia  - likely from hypovolemia  - Start LR @ 125  - Recheck BMP      T2DM  - Patient noted to have glucosuria and ketonuria on UA, these are likely secondary to SGLT-2 inhibitor   - DM management saw, appreciate assistance  - Resume home meds at discharge, follow up with endocrinologist     L shoulder fracture  - PRN norco, will not prescribe at DC as patient has previous script for pain control  - continue sling  - Follow up with orthopedics outpatient  - PT consult     CAD  HTN  HLD  - Continue asa, plavix  - Home metoprolol, lisinopril, statin     GERD  - PPI       CONSULTATIONS: IP CONSULT TO DIABETES MANAGEMENT    PROCEDURES/SURGERIES: * No surgery found *    PENDING TEST RESULTS:   At the time of discharge the following test results are still pending: none    FOLLOW UP APPOINTMENTS:   [unfilled]     ADDITIONAL CARE RECOMMENDATIONS: Follow up with PCP, Orthopedics and Endocrinologist as scheduled     DIET: diabetic diet     ACTIVITY: activity as tolerated     WOUND CARE: Sling LUE     EQUIPMENT needed: N/A      DISCHARGE MEDICATIONS:   See Medication Reconciliation Form    It is important that you take the medication exactly as they are prescribed. Keep your medication in the bottles provided by the pharmacist and keep a list of the medication names, dosages, and times to be taken in your wallet. Do not take other medications without consulting your doctor. NOTIFY YOUR PHYSICIAN FOR ANY OF THE FOLLOWING:   Fever over 101 degrees for 24 hours. Chest pain, shortness of breath, fever, chills, nausea, vomiting, diarrhea, change in mentation, falling, weakness, bleeding. Severe pain or pain not relieved by medications. Or, any other signs or symptoms that you may have questions about.       DISPOSITION:   x Home With:   OT  PT  HH  RN       SNF/Inpatient Rehab/LTAC    Independent/assisted living

## 2023-10-08 LAB
BACTERIA SPEC CULT: NORMAL
BACTERIA SPEC CULT: NORMAL
SERVICE CMNT-IMP: NORMAL
SERVICE CMNT-IMP: NORMAL

## 2023-10-31 ENCOUNTER — OFFICE VISIT (OUTPATIENT)
Age: 73
End: 2023-10-31
Payer: MEDICARE

## 2023-10-31 VITALS
WEIGHT: 211.6 LBS | OXYGEN SATURATION: 97 % | HEART RATE: 80 BPM | BODY MASS INDEX: 30.29 KG/M2 | SYSTOLIC BLOOD PRESSURE: 100 MMHG | DIASTOLIC BLOOD PRESSURE: 80 MMHG | HEIGHT: 70 IN

## 2023-10-31 DIAGNOSIS — I10 BENIGN ESSENTIAL HTN: ICD-10-CM

## 2023-10-31 DIAGNOSIS — Z87.891 HISTORY OF TOBACCO USE: ICD-10-CM

## 2023-10-31 DIAGNOSIS — E78.2 MIXED HYPERLIPIDEMIA: ICD-10-CM

## 2023-10-31 DIAGNOSIS — I25.810 CORONARY ARTERY DISEASE INVOLVING CORONARY BYPASS GRAFT OF NATIVE HEART WITHOUT ANGINA PECTORIS: Primary | ICD-10-CM

## 2023-10-31 PROCEDURE — 99214 OFFICE O/P EST MOD 30 MIN: CPT | Performed by: INTERNAL MEDICINE

## 2023-10-31 RX ORDER — HYDROMORPHONE HYDROCHLORIDE 2 MG/1
TABLET ORAL
COMMUNITY
Start: 2023-10-13

## 2023-10-31 RX ORDER — NITROGLYCERIN 0.4 MG/1
0.4 TABLET SUBLINGUAL EVERY 5 MIN PRN
Qty: 25 TABLET | Refills: 5 | Status: SHIPPED | OUTPATIENT
Start: 2023-10-31

## 2023-10-31 RX ORDER — TADALAFIL 5 MG/1
TABLET ORAL
COMMUNITY
Start: 2014-12-16

## 2023-10-31 RX ORDER — HYDROCODONE BITARTRATE AND ACETAMINOPHEN 7.5; 325 MG/1; MG/1
TABLET ORAL
COMMUNITY
Start: 2023-10-19

## 2023-10-31 ASSESSMENT — PATIENT HEALTH QUESTIONNAIRE - PHQ9
SUM OF ALL RESPONSES TO PHQ9 QUESTIONS 1 & 2: 0
SUM OF ALL RESPONSES TO PHQ QUESTIONS 1-9: 0
2. FEELING DOWN, DEPRESSED OR HOPELESS: 0
SUM OF ALL RESPONSES TO PHQ QUESTIONS 1-9: 0
1. LITTLE INTEREST OR PLEASURE IN DOING THINGS: 0
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
## 2023-11-06 ENCOUNTER — TELEPHONE (OUTPATIENT)
Facility: HOSPITAL | Age: 73
End: 2023-11-06

## 2023-11-06 NOTE — TELEPHONE ENCOUNTER
Logan Adames is a patient at [facility]. His participation has been on hold since 8/25/23 due to a shoulder injury. We have made several (5) follow up phone calls to this patient over the past 2 months. Today I left a vm indicating he would need to call us this week if he intends to resume his participation at cardiac rehab. We have encouraged him to return several times and hope to hear back from him this week. He was informed on the vm that we would need to discharge him from the cardiac rehab program if he does not choose to contact us by Friday, 11/10/23.   Preethi Moreau RN

## 2023-11-13 ENCOUNTER — HOSPITAL ENCOUNTER (OUTPATIENT)
Facility: HOSPITAL | Age: 73
Setting detail: RECURRING SERIES
Discharge: HOME | End: 2023-11-16
Payer: MEDICARE

## 2023-11-13 VITALS — BODY MASS INDEX: 30.56 KG/M2 | WEIGHT: 213 LBS

## 2023-11-13 PROCEDURE — 93798 PHYS/QHP OP CAR RHAB W/ECG: CPT

## 2023-11-13 ASSESSMENT — EJECTION FRACTION: EF_VALUE: 55

## 2023-11-13 ASSESSMENT — EXERCISE STRESS TEST
PEAK_RPE: 12
PEAK_METS: 2.2

## 2023-11-13 ASSESSMENT — LIFESTYLE VARIABLES
SMOKELESS_TOBACCO: NO
ALCOHOL_TYPE: WINE
ALCOHOL_AMOUNT: 2
ALCOHOL_USE: SPECIAL

## 2023-11-15 ENCOUNTER — HOSPITAL ENCOUNTER (OUTPATIENT)
Facility: HOSPITAL | Age: 73
Setting detail: RECURRING SERIES
Discharge: HOME | End: 2023-11-18
Payer: MEDICARE

## 2023-11-15 VITALS — WEIGHT: 211 LBS | BODY MASS INDEX: 30.28 KG/M2

## 2023-11-15 PROCEDURE — 93798 PHYS/QHP OP CAR RHAB W/ECG: CPT

## 2023-11-15 PROCEDURE — 93797 PHYS/QHP OP CAR RHAB WO ECG: CPT

## 2023-11-15 ASSESSMENT — EXERCISE STRESS TEST
PEAK_RPE: 12
PEAK_METS: 2.2

## 2023-11-17 ENCOUNTER — HOSPITAL ENCOUNTER (OUTPATIENT)
Facility: HOSPITAL | Age: 73
Setting detail: RECURRING SERIES
Discharge: HOME | End: 2023-11-20
Payer: MEDICARE

## 2023-11-17 VITALS — BODY MASS INDEX: 30.42 KG/M2 | WEIGHT: 212 LBS

## 2023-11-17 PROCEDURE — 93798 PHYS/QHP OP CAR RHAB W/ECG: CPT

## 2023-11-17 ASSESSMENT — EXERCISE STRESS TEST
PEAK_RPE: 12
PEAK_METS: 2.2

## 2023-11-20 ENCOUNTER — APPOINTMENT (OUTPATIENT)
Facility: HOSPITAL | Age: 73
End: 2023-11-20
Payer: MEDICARE

## 2023-11-27 ENCOUNTER — HOSPITAL ENCOUNTER (OUTPATIENT)
Facility: HOSPITAL | Age: 73
Setting detail: RECURRING SERIES
Discharge: HOME | End: 2023-11-30
Payer: MEDICARE

## 2023-11-27 ENCOUNTER — HOSPITAL ENCOUNTER (OUTPATIENT)
Facility: HOSPITAL | Age: 73
Discharge: HOME | End: 2023-11-30

## 2023-11-27 VITALS
WEIGHT: 210 LBS | DIASTOLIC BLOOD PRESSURE: 80 MMHG | HEIGHT: 70 IN | SYSTOLIC BLOOD PRESSURE: 112 MMHG | BODY MASS INDEX: 30.06 KG/M2

## 2023-11-27 VITALS — BODY MASS INDEX: 30.13 KG/M2 | WEIGHT: 210 LBS

## 2023-11-27 DIAGNOSIS — C61 PROSTATE CANCER (HCC): Primary | ICD-10-CM

## 2023-11-27 PROCEDURE — 93798 PHYS/QHP OP CAR RHAB W/ECG: CPT

## 2023-11-27 ASSESSMENT — PAIN SCALES - GENERAL: PAINLEVEL_OUTOF10: 2

## 2023-11-27 ASSESSMENT — PAIN DESCRIPTION - LOCATION: LOCATION: ARM

## 2023-11-27 ASSESSMENT — PAIN DESCRIPTION - ORIENTATION: ORIENTATION: LEFT

## 2023-11-27 ASSESSMENT — EXERCISE STRESS TEST
PEAK_METS: 2.2
PEAK_RPE: 12

## 2023-11-27 NOTE — PROGRESS NOTES
[facility] Work Encounter    Reason for Assessment:      [x] Initial Assessment [] Social Work Referral [] Social Work Follow-up []Initiated   [] Other:    Sources of Information:    [x]Patient  [x]Family  [employer]  [x]Medical Record    Mental Status:    [x]Alert  []Lethargic  []Unresponsive   [] Unable to assess   Oriented to:  [x]Person  [x]Place  [x]Time  [x]Situation      Relationship Status:  []Single  [x]  []Significant Other/Life Partner  []  []  []    Living Circumstances:  []Lives Alone  [x]Family/Significant Other in Household  []Roommates  []Children in the Home  []Paid Caregivers  []Assisted Living Facility/Group [address] [place] [address]  []Homeless  []Incarcerated  []Environmental/Care Concerns  []Other:    Employment Status:  []Employed Full-time []Employed Part-time []Homemaker  [x] Retired [] Short-Term Disability [] Long-Term Disability  [] Unemployed   [] [address] [facility]   [] SSDI  [] Self-Employment    Barriers to Learning:    []Language  []Developmental  []Cognitive  []Altered Mental Status  []Visual/Hearing Impairment  []Unable to Read/Write  []Motivational  [] Challenges Understanding Medical Jargon [x]No Barriers Identified      Financial/Legal Concerns:    []Uninsured  []Limited Income/Resources  []Non-Citizen  []Food Insecurity [x]No Concerns Identified   []Other:    Buddhist/Spiritual/Existential:  Does patient have any spiritual or Baptist beliefs? [] Yes [] No  Is the patient involved in a spiritual, horacio or Baptist community?  [] Yes [] No  Patient expressing spiritual/existential angst? [] Yes [] No  Notes:    Support System:    Identified Support Person/Group:  [x]Strong  [x]Fair  []Limited    Coping with Illness:   [x]  Coping Well  [] Challenges Coping with Serious Illness [] Situational Depression [] Situational Anxiety [] Anticipatory Grief  [] Recent Loss [] Caregiver [place]            Narrative: Met with patient and his

## 2023-11-27 NOTE — PROGRESS NOTES
[facility] [place] [facility]    Radiation Oncology Consultation  Encounter Date: 11/27/23    Vanessa Gerardo  [id number]  1950     Diagnosis   1. C61: initally cT1cN0, iPSA 5.88, GG4 (+5/12) prostate cancer s/p RP (4/2/19) with PSMA negative BCR    AJCC Staging has been reviewed. History of Present Illness   Mr. Val Williamson is a 74 y.o. male seen in consultation at the request of Dr. Tania Waggoner to assess the role of radiation for his above diagnosis. his oncologic history is detailed below:  12/17/2018: Diagnosed with a cT1cN0, iPSA 5.88, GG4 (+5/12) prostate cancer  04/02/2019: He underwent radical prostatectomy with PLND with Dr. Scot Ferrer with pathology showing a 44g gland with GS 4=3=7 disease involving 20% of the prostate. +EPE, SVI, PNI, no LVSI. Negative margins. 0/3 involved left pelvic nodes,  0/5 involved right pelvic nodes, staged pT3bN0 cM0  05/14/2019: PSA <0.1 (first post-op)  03/10/2020: PSA 0.035 (first detectable)  06/16/2020: PSA 0.140  09/22/2020: PSA 0.194  05/25/2021: PSA 0.188  09/28/2021: PSA 0.183  12/14/2021: PSA 0.241  06/30/2022: PSA 0.211  02/14/2023: PSA 0.290  06/15/2023: PSA 0.260  10/17/2023: PSA 0.424  10/20/2023: PSMA PET was negative for focal PyL binding in the prostatectomy bed, pelvic nodes, bones, or viscera    Symptomatically, he has ongoing urinary symptoms as detailed below, has ongoing incontinence with urgency/dribbling, requires depends. He does have a significant cardiac history, had an MI earlier this year and required a CABG. From a performance status standpoint, he is able to complete his ADLs, does ambulate with a cane due to neuropathy, had a fall recently for which he broke his shoulder, taking narcotic pain medication as needed. Mr. Val Williamson denies a history of inflammatory bowel disease, scleroderma or other collagen vascular diseases, pacemaker/AICD, or history of prior irradiation.  Last colonoscopy was 2020 or

## 2023-11-29 ENCOUNTER — HOSPITAL ENCOUNTER (OUTPATIENT)
Facility: HOSPITAL | Age: 73
Setting detail: RECURRING SERIES
Discharge: HOME | End: 2023-12-02
Payer: MEDICARE

## 2023-11-29 VITALS — BODY MASS INDEX: 30.28 KG/M2 | WEIGHT: 211 LBS

## 2023-11-29 PROCEDURE — 93798 PHYS/QHP OP CAR RHAB W/ECG: CPT

## 2023-11-29 ASSESSMENT — PATIENT HEALTH QUESTIONNAIRE - PHQ9
2. FEELING DOWN, DEPRESSED OR HOPELESS: 0
7. TROUBLE CONCENTRATING ON THINGS, SUCH AS READING THE NEWSPAPER OR WATCHING TELEVISION: 0
SUM OF ALL RESPONSES TO PHQ QUESTIONS 1-9: 2
1. LITTLE INTEREST OR PLEASURE IN DOING THINGS: 0
SUM OF ALL RESPONSES TO PHQ QUESTIONS 1-9: 2
10. IF YOU CHECKED OFF ANY PROBLEMS, HOW DIFFICULT HAVE THESE PROBLEMS MADE IT FOR YOU TO DO YOUR WORK, TAKE CARE OF THINGS AT HOME, OR GET ALONG WITH OTHER PEOPLE: 0
4. FEELING TIRED OR HAVING LITTLE ENERGY: 1
3. TROUBLE FALLING OR STAYING ASLEEP: 1
6. FEELING BAD ABOUT YOURSELF - OR THAT YOU ARE A FAILURE OR HAVE LET YOURSELF OR YOUR FAMILY DOWN: 0
5. POOR APPETITE OR OVEREATING: 0
9. THOUGHTS THAT YOU WOULD BE BETTER OFF DEAD, OR OF HURTING YOURSELF: 0
SUM OF ALL RESPONSES TO PHQ9 QUESTIONS 1 & 2: 0
8. MOVING OR SPEAKING SO SLOWLY THAT OTHER PEOPLE COULD HAVE NOTICED. OR THE OPPOSITE, BEING SO FIGETY OR RESTLESS THAT YOU HAVE BEEN MOVING AROUND A LOT MORE THAN USUAL: 0

## 2023-11-29 ASSESSMENT — EXERCISE STRESS TEST
PEAK_RPE: 12
PEAK_METS: 2.2

## 2023-12-01 ENCOUNTER — HOSPITAL ENCOUNTER (OUTPATIENT)
Facility: HOSPITAL | Age: 73
Setting detail: RECURRING SERIES
Discharge: HOME | End: 2023-12-04
Payer: MEDICARE

## 2023-12-01 VITALS — BODY MASS INDEX: 30.99 KG/M2 | WEIGHT: 216 LBS

## 2023-12-01 PROCEDURE — 93798 PHYS/QHP OP CAR RHAB W/ECG: CPT

## 2023-12-01 ASSESSMENT — EXERCISE STRESS TEST
PEAK_RPE: 12
PEAK_METS: 2.2

## 2023-12-04 ENCOUNTER — HOSPITAL ENCOUNTER (OUTPATIENT)
Facility: HOSPITAL | Age: 73
Setting detail: RECURRING SERIES
Discharge: HOME | End: 2023-12-07
Payer: MEDICARE

## 2023-12-04 VITALS — WEIGHT: 212 LBS | BODY MASS INDEX: 30.42 KG/M2

## 2023-12-04 PROCEDURE — 93798 PHYS/QHP OP CAR RHAB W/ECG: CPT

## 2023-12-04 ASSESSMENT — LIFESTYLE VARIABLES
ALCOHOL_TYPE: WINE
SMOKELESS_TOBACCO: NO
ALCOHOL_AMOUNT: 2
ALCOHOL_USE: SPECIAL

## 2023-12-04 ASSESSMENT — EJECTION FRACTION: EF_VALUE: 55

## 2023-12-04 ASSESSMENT — EXERCISE STRESS TEST
PEAK_RPE: 12
PEAK_METS: 2.2

## 2023-12-05 ENCOUNTER — HOSPITAL ENCOUNTER (OUTPATIENT)
Facility: HOSPITAL | Age: 73
Discharge: HOME | End: 2023-12-08
Attending: STUDENT IN AN ORGANIZED HEALTH CARE EDUCATION/TRAINING PROGRAM

## 2023-12-06 ENCOUNTER — HOSPITAL ENCOUNTER (OUTPATIENT)
Facility: HOSPITAL | Age: 73
Setting detail: RECURRING SERIES
Discharge: HOME | End: 2023-12-09
Payer: MEDICARE

## 2023-12-06 VITALS — WEIGHT: 220 LBS | BODY MASS INDEX: 31.57 KG/M2

## 2023-12-06 PROCEDURE — 93798 PHYS/QHP OP CAR RHAB W/ECG: CPT

## 2023-12-06 PROCEDURE — 93797 PHYS/QHP OP CAR RHAB WO ECG: CPT

## 2023-12-06 ASSESSMENT — LIFESTYLE VARIABLES
ALCOHOL_TYPE: WINE
ALCOHOL_USE: SPECIAL
ALCOHOL_AMOUNT: 2
SMOKELESS_TOBACCO: NO

## 2023-12-06 ASSESSMENT — EXERCISE STRESS TEST
PEAK_METS: 2.6
PEAK_BP: 98/62
PEAK_RPE: 13
PEAK_BP: 98/62
PEAK_HR: 94

## 2023-12-06 ASSESSMENT — EJECTION FRACTION: EF_VALUE: 55

## 2023-12-06 NOTE — CARDIO/PULMONARY
DISCHARGE SUMMARY NOTE  2023      NAME: Meghna Montalvo : 1950 AGE: 74 y.o. GENDER: male    CARDIAC REHAB ADMITTING DIAGNOSIS: Presence of aortocoronary bypass graft [Z95.1]    REFERRING PHYSICIAN: Hermelinda Lainez MD    MEDICAL HX:  Past Medical History:   Diagnosis Date    Arthritis     BACK    BPH (benign prostatic hyperplasia)     CAD (coronary artery disease)     Cancer ([address]) 2019    PROSTATE    Cancer ([address])     SKIN - HEAD AND FACE    Chronic pain     BACK    Chronic pelvic pain in male     sensitive to touch on the left side down to the left side of the penis    Collagenous colitis     Diabetes mellitus type 2, controlled, with complications ([address]) 6430    Diabetic neuropathy ([address])     Diverticulosis     ED (erectile dysfunction)     Dr. Berenice John    GERD (gastroesophageal reflux disease)     Gout     1 episode    Hyperlipidemia     Hypertension 1970    Ill-defined condition     BILATERAL PERIPHERAL NEUROPATHY BOTH LEGS    Low serum testosterone level     Dr. Betsy Laureano    Osteopenia     Dr. Betsy Laureano    Psoriasis     Sleep apnea     USES CPAP    Sun-damaged skin        LABS:     Lab Results   Component Value Date/Time    HBA1C 7.6 2021 12:00 AM     Lab Results   Component Value Date/Time    CHOL 121 2023 02:45 AM    HDL 34 2023 02:45 AM         ANTHROPOMETRICS:      Ht Readings from Last 1 Encounters:   23 1.778 m (5' 10\")      Wt Readings from Last 1 Encounters:   23 99.8 kg (220 lb)        WAIST: 40         PROGRAM SUMMARY:    Meghna Montalvo completed 36 sessions in phase II of the Cardiac Rehab program. Patient has a total of 502 steps on the biostep with an average mancini and rpm of, 60 and 80 for a final MET level of 2.6. Meghna Montalvo plans to continue exercising at home and/or a gym and has set revised goals that include 30 minutes of moderate-intensity aerobic activity 3-5 days weekly.       MET level increased from 1.8 at

## 2023-12-08 ENCOUNTER — HOSPITAL ENCOUNTER (OUTPATIENT)
Facility: HOSPITAL | Age: 73
Discharge: HOME | End: 2023-12-11

## 2023-12-08 ENCOUNTER — APPOINTMENT (OUTPATIENT)
Facility: HOSPITAL | Age: 73
End: 2023-12-08
Payer: MEDICARE

## 2023-12-08 VITALS — WEIGHT: 218 LBS | BODY MASS INDEX: 31.28 KG/M2

## 2023-12-08 PROCEDURE — 9900000112 HC DAILY CARDIAC MAINTENANCE (RETAIL)

## 2023-12-08 PROCEDURE — 93798 PHYS/QHP OP CAR RHAB W/ECG: CPT

## 2023-12-08 ASSESSMENT — EXERCISE STRESS TEST
PEAK_METS: 2.6
PEAK_RPE: 13

## 2023-12-13 ENCOUNTER — HOSPITAL ENCOUNTER (OUTPATIENT)
Facility: HOSPITAL | Age: 73
Discharge: HOME | End: 2023-12-16

## 2023-12-13 VITALS — BODY MASS INDEX: 31.42 KG/M2 | WEIGHT: 219 LBS

## 2023-12-13 PROCEDURE — 9900000112 HC DAILY CARDIAC MAINTENANCE (RETAIL)

## 2023-12-13 ASSESSMENT — EXERCISE STRESS TEST
PEAK_METS: 2.6
PEAK_RPE: 13

## 2023-12-29 ENCOUNTER — HOSPITAL ENCOUNTER (OUTPATIENT)
Facility: HOSPITAL | Age: 73
Discharge: HOME | End: 2024-01-01

## 2023-12-29 VITALS — BODY MASS INDEX: 32.43 KG/M2 | WEIGHT: 226 LBS

## 2023-12-29 PROCEDURE — 93798 PHYS/QHP OP CAR RHAB W/ECG: CPT

## 2023-12-29 ASSESSMENT — EXERCISE STRESS TEST
PEAK_METS: 2.6
PEAK_RPE: 13

## 2024-01-15 ENCOUNTER — HOSPITAL ENCOUNTER (OUTPATIENT)
Facility: HOSPITAL | Age: 74
Discharge: HOME | End: 2024-01-18

## 2024-01-15 VITALS — BODY MASS INDEX: 32.28 KG/M2 | WEIGHT: 225 LBS

## 2024-01-15 PROCEDURE — 9900000112 HC DAILY CARDIAC MAINTENANCE (RETAIL)

## 2024-01-15 ASSESSMENT — EXERCISE STRESS TEST
PEAK_RPE: 13
PEAK_METS: 2.6
PEAK_METS: 2.6

## 2024-01-19 ENCOUNTER — HOSPITAL ENCOUNTER (OUTPATIENT)
Facility: HOSPITAL | Age: 74
Discharge: HOME | End: 2024-01-22

## 2024-01-19 VITALS — WEIGHT: 222 LBS | BODY MASS INDEX: 31.85 KG/M2

## 2024-01-19 PROCEDURE — 9900000112 HC DAILY CARDIAC MAINTENANCE (RETAIL)

## 2024-01-19 ASSESSMENT — EXERCISE STRESS TEST
PEAK_RPE: 13
PEAK_METS: 2.6

## 2024-01-22 ENCOUNTER — HOSPITAL ENCOUNTER (OUTPATIENT)
Facility: HOSPITAL | Age: 74
Discharge: HOME | End: 2024-01-25
Attending: STUDENT IN AN ORGANIZED HEALTH CARE EDUCATION/TRAINING PROGRAM

## 2024-01-22 LAB
RAD ONC ARIA COURSE FIRST TREATMENT DATE: NORMAL
RAD ONC ARIA COURSE ID: NORMAL
RAD ONC ARIA COURSE INTENT: NORMAL
RAD ONC ARIA COURSE LAST TREATMENT DATE: NORMAL
RAD ONC ARIA COURSE SESSION NUMBER: 1
RAD ONC ARIA COURSE TREATMENT ELAPSED DAYS: 0
RAD ONC ARIA PLAN FRACTIONS TREATED TO DATE: 1
RAD ONC ARIA PLAN ID: NORMAL
RAD ONC ARIA PLAN PRESCRIBED DOSE PER FRACTION: 2.5 GY
RAD ONC ARIA PLAN PRIMARY REFERENCE POINT: NORMAL
RAD ONC ARIA PLAN TOTAL FRACTIONS PRESCRIBED: 25
RAD ONC ARIA PLAN TOTAL PRESCRIBED DOSE: 6250 CGY
RAD ONC ARIA REFERENCE POINT DOSAGE GIVEN TO DATE: 1.8 GY
RAD ONC ARIA REFERENCE POINT DOSAGE GIVEN TO DATE: 2.5 GY
RAD ONC ARIA REFERENCE POINT DOSAGE GIVEN TO DATE: 2.58 GY
RAD ONC ARIA REFERENCE POINT ID: NORMAL
RAD ONC ARIA REFERENCE POINT SESSION DOSAGE GIVEN: 1.8 GY
RAD ONC ARIA REFERENCE POINT SESSION DOSAGE GIVEN: 2.5 GY
RAD ONC ARIA REFERENCE POINT SESSION DOSAGE GIVEN: 2.58 GY

## 2024-01-23 ENCOUNTER — HOSPITAL ENCOUNTER (OUTPATIENT)
Facility: HOSPITAL | Age: 74
Discharge: HOME | End: 2024-01-26
Attending: STUDENT IN AN ORGANIZED HEALTH CARE EDUCATION/TRAINING PROGRAM

## 2024-01-23 LAB
RAD ONC ARIA COURSE FIRST TREATMENT DATE: NORMAL
RAD ONC ARIA COURSE ID: NORMAL
RAD ONC ARIA COURSE INTENT: NORMAL
RAD ONC ARIA COURSE LAST TREATMENT DATE: NORMAL
RAD ONC ARIA COURSE SESSION NUMBER: 2
RAD ONC ARIA COURSE TREATMENT ELAPSED DAYS: 1
RAD ONC ARIA PLAN FRACTIONS TREATED TO DATE: 2
RAD ONC ARIA PLAN ID: NORMAL
RAD ONC ARIA PLAN PRESCRIBED DOSE PER FRACTION: 2.5 GY
RAD ONC ARIA PLAN PRIMARY REFERENCE POINT: NORMAL
RAD ONC ARIA PLAN TOTAL FRACTIONS PRESCRIBED: 25
RAD ONC ARIA PLAN TOTAL PRESCRIBED DOSE: 6250 CGY
RAD ONC ARIA REFERENCE POINT DOSAGE GIVEN TO DATE: 3.6 GY
RAD ONC ARIA REFERENCE POINT DOSAGE GIVEN TO DATE: 5 GY
RAD ONC ARIA REFERENCE POINT DOSAGE GIVEN TO DATE: 5.17 GY
RAD ONC ARIA REFERENCE POINT ID: NORMAL
RAD ONC ARIA REFERENCE POINT SESSION DOSAGE GIVEN: 1.8 GY
RAD ONC ARIA REFERENCE POINT SESSION DOSAGE GIVEN: 2.5 GY
RAD ONC ARIA REFERENCE POINT SESSION DOSAGE GIVEN: 2.58 GY

## 2024-01-24 ENCOUNTER — HOSPITAL ENCOUNTER (OUTPATIENT)
Facility: HOSPITAL | Age: 74
Discharge: HOME | End: 2024-01-27
Attending: STUDENT IN AN ORGANIZED HEALTH CARE EDUCATION/TRAINING PROGRAM

## 2024-01-24 LAB
RAD ONC ARIA COURSE FIRST TREATMENT DATE: NORMAL
RAD ONC ARIA COURSE ID: NORMAL
RAD ONC ARIA COURSE INTENT: NORMAL
RAD ONC ARIA COURSE LAST TREATMENT DATE: NORMAL
RAD ONC ARIA COURSE SESSION NUMBER: 3
RAD ONC ARIA COURSE TREATMENT ELAPSED DAYS: 2
RAD ONC ARIA PLAN FRACTIONS TREATED TO DATE: 3
RAD ONC ARIA PLAN ID: NORMAL
RAD ONC ARIA PLAN PRESCRIBED DOSE PER FRACTION: 2.5 GY
RAD ONC ARIA PLAN PRIMARY REFERENCE POINT: NORMAL
RAD ONC ARIA PLAN TOTAL FRACTIONS PRESCRIBED: 25
RAD ONC ARIA PLAN TOTAL PRESCRIBED DOSE: 6250 CGY
RAD ONC ARIA REFERENCE POINT DOSAGE GIVEN TO DATE: 5.4 GY
RAD ONC ARIA REFERENCE POINT DOSAGE GIVEN TO DATE: 7.5 GY
RAD ONC ARIA REFERENCE POINT DOSAGE GIVEN TO DATE: 7.75 GY
RAD ONC ARIA REFERENCE POINT ID: NORMAL
RAD ONC ARIA REFERENCE POINT SESSION DOSAGE GIVEN: 1.8 GY
RAD ONC ARIA REFERENCE POINT SESSION DOSAGE GIVEN: 2.5 GY
RAD ONC ARIA REFERENCE POINT SESSION DOSAGE GIVEN: 2.58 GY

## 2024-01-25 ENCOUNTER — HOSPITAL ENCOUNTER (OUTPATIENT)
Facility: HOSPITAL | Age: 74
Discharge: HOME | End: 2024-01-28
Attending: STUDENT IN AN ORGANIZED HEALTH CARE EDUCATION/TRAINING PROGRAM

## 2024-01-25 LAB
RAD ONC ARIA COURSE FIRST TREATMENT DATE: NORMAL
RAD ONC ARIA COURSE ID: NORMAL
RAD ONC ARIA COURSE INTENT: NORMAL
RAD ONC ARIA COURSE LAST TREATMENT DATE: NORMAL
RAD ONC ARIA COURSE SESSION NUMBER: 4
RAD ONC ARIA COURSE TREATMENT ELAPSED DAYS: 3
RAD ONC ARIA PLAN FRACTIONS TREATED TO DATE: 4
RAD ONC ARIA PLAN ID: NORMAL
RAD ONC ARIA PLAN PRESCRIBED DOSE PER FRACTION: 2.5 GY
RAD ONC ARIA PLAN PRIMARY REFERENCE POINT: NORMAL
RAD ONC ARIA PLAN TOTAL FRACTIONS PRESCRIBED: 25
RAD ONC ARIA PLAN TOTAL PRESCRIBED DOSE: 6250 CGY
RAD ONC ARIA REFERENCE POINT DOSAGE GIVEN TO DATE: 10 GY
RAD ONC ARIA REFERENCE POINT DOSAGE GIVEN TO DATE: 10.34 GY
RAD ONC ARIA REFERENCE POINT DOSAGE GIVEN TO DATE: 7.2 GY
RAD ONC ARIA REFERENCE POINT ID: NORMAL
RAD ONC ARIA REFERENCE POINT SESSION DOSAGE GIVEN: 1.8 GY
RAD ONC ARIA REFERENCE POINT SESSION DOSAGE GIVEN: 2.5 GY
RAD ONC ARIA REFERENCE POINT SESSION DOSAGE GIVEN: 2.58 GY

## 2024-01-26 ENCOUNTER — HOSPITAL ENCOUNTER (OUTPATIENT)
Facility: HOSPITAL | Age: 74
Discharge: HOME | End: 2024-01-29
Attending: STUDENT IN AN ORGANIZED HEALTH CARE EDUCATION/TRAINING PROGRAM

## 2024-01-26 LAB
RAD ONC ARIA COURSE FIRST TREATMENT DATE: NORMAL
RAD ONC ARIA COURSE ID: NORMAL
RAD ONC ARIA COURSE INTENT: NORMAL
RAD ONC ARIA COURSE LAST TREATMENT DATE: NORMAL
RAD ONC ARIA COURSE SESSION NUMBER: 5
RAD ONC ARIA COURSE TREATMENT ELAPSED DAYS: 4
RAD ONC ARIA PLAN FRACTIONS TREATED TO DATE: 5
RAD ONC ARIA PLAN ID: NORMAL
RAD ONC ARIA PLAN PRESCRIBED DOSE PER FRACTION: 2.5 GY
RAD ONC ARIA PLAN PRIMARY REFERENCE POINT: NORMAL
RAD ONC ARIA PLAN TOTAL FRACTIONS PRESCRIBED: 25
RAD ONC ARIA PLAN TOTAL PRESCRIBED DOSE: 6250 CGY
RAD ONC ARIA REFERENCE POINT DOSAGE GIVEN TO DATE: 12.5 GY
RAD ONC ARIA REFERENCE POINT DOSAGE GIVEN TO DATE: 12.92 GY
RAD ONC ARIA REFERENCE POINT DOSAGE GIVEN TO DATE: 9 GY
RAD ONC ARIA REFERENCE POINT ID: NORMAL
RAD ONC ARIA REFERENCE POINT SESSION DOSAGE GIVEN: 1.8 GY
RAD ONC ARIA REFERENCE POINT SESSION DOSAGE GIVEN: 2.5 GY
RAD ONC ARIA REFERENCE POINT SESSION DOSAGE GIVEN: 2.58 GY

## 2024-01-26 ASSESSMENT — PAIN SCALES - GENERAL: PAINLEVEL_OUTOF10: 0

## 2024-01-26 ASSESSMENT — PATIENT HEALTH QUESTIONNAIRE - PHQ9
1. LITTLE INTEREST OR PLEASURE IN DOING THINGS: 0
SUM OF ALL RESPONSES TO PHQ QUESTIONS 1-9: 0
SUM OF ALL RESPONSES TO PHQ9 QUESTIONS 1 & 2: 0
SUM OF ALL RESPONSES TO PHQ QUESTIONS 1-9: 0
2. FEELING DOWN, DEPRESSED OR HOPELESS: 0

## 2024-01-26 NOTE — PROGRESS NOTES
[facility] [place]  [facility]    Radiation Oncology Weekly Progress Note  Encounter Date: 01/26/24     Willam Dutton  [id number]  1950     Diagnosis   Biochemical recurrence: initally cT1cN0, iPSA 5.88, GG4 (+5/12) prostate cancer s/p RP (4/2/19) with PSMA negative rPSA 0.424     6month Lupron 1/18/24    AJCC Staging has been reviewed.    Interval History   Mr. Dutton is a 73 y.o. male seen today for his weekly on-treatment evaluation    1/26/2024: Doing well thus far.  Has noticed a slight increase in urinary frequency.  No dysuria.  No changes to bowel habits or any other GI complaints.  Reviewed possible side effects.    Review of Systems:  A complete review of systems was obtained and negative except as described above.    Treatment Details:     Treatment Site Dose/Fx (cGy) #Fx Current Dose (cGy) Total Planned Dose (cGy) Start Date Most Recent Treatment Date   Prostate bed and pelvic LNs 250  988 10 1087 PB  900 LNs   6250 PB  4500 LNs 1/22/24 01/26/24     Concurrent Therapy: Concurrent ADT:      Allergies and Medications     Allergies   Allergen Reactions    Penicillins Other (See Comments)     Infancy was told he had a rash       Current Outpatient Medications   Medication Instructions    ASPIRIN PO 81 mg, Oral, DAILY    atorvastatin (LIPITOR) 80 mg, Oral, EVERY EVENING    Calcium Carbonate-Vitamin D (CALCIUM-VITAMIN D3 PO) 600 mg, Oral, 2 times daily    clopidogrel (PLAVIX) 75 mg, Oral, DAILY    cyanocobalamin 1,000 mcg, Oral, DAILY    empagliflozin (JARDIANCE) 10 mg, Oral, DAILY    ergocalciferol (ERGOCALCIFEROL) 1.25 MG (75544 UT) capsule Take 1 capsule by mouth once a week On Saturdays    gabapentin (NEURONTIN) 600 mg, Oral, 2 TIMES DAILY    halobetasol (ULTRAVATE) 0.05 % cream PRN    HYDROcodone-acetaminophen (NORCO) 7.5-325 MG per tablet TAKE 1 TABLET BY MOUTH EVERY 6 HOURS FOR UP TO 7 DAYS AS NEEDED FOR MODERATE PAIN    HYDROmorphone (DILAUDID) 2 MG tablet No dose, route,

## 2024-01-29 ENCOUNTER — HOSPITAL ENCOUNTER (OUTPATIENT)
Facility: HOSPITAL | Age: 74
Discharge: HOME | End: 2024-02-01
Attending: STUDENT IN AN ORGANIZED HEALTH CARE EDUCATION/TRAINING PROGRAM

## 2024-01-29 LAB
RAD ONC ARIA COURSE FIRST TREATMENT DATE: NORMAL
RAD ONC ARIA COURSE ID: NORMAL
RAD ONC ARIA COURSE INTENT: NORMAL
RAD ONC ARIA COURSE LAST TREATMENT DATE: NORMAL
RAD ONC ARIA COURSE SESSION NUMBER: 6
RAD ONC ARIA COURSE TREATMENT ELAPSED DAYS: 7
RAD ONC ARIA PLAN FRACTIONS TREATED TO DATE: 6
RAD ONC ARIA PLAN ID: NORMAL
RAD ONC ARIA PLAN PRESCRIBED DOSE PER FRACTION: 2.5 GY
RAD ONC ARIA PLAN PRIMARY REFERENCE POINT: NORMAL
RAD ONC ARIA PLAN TOTAL FRACTIONS PRESCRIBED: 25
RAD ONC ARIA PLAN TOTAL PRESCRIBED DOSE: 6250 CGY
RAD ONC ARIA REFERENCE POINT DOSAGE GIVEN TO DATE: 10.8 GY
RAD ONC ARIA REFERENCE POINT DOSAGE GIVEN TO DATE: 15 GY
RAD ONC ARIA REFERENCE POINT DOSAGE GIVEN TO DATE: 15.5 GY
RAD ONC ARIA REFERENCE POINT ID: NORMAL
RAD ONC ARIA REFERENCE POINT SESSION DOSAGE GIVEN: 1.8 GY
RAD ONC ARIA REFERENCE POINT SESSION DOSAGE GIVEN: 2.5 GY
RAD ONC ARIA REFERENCE POINT SESSION DOSAGE GIVEN: 2.58 GY

## 2024-01-30 ENCOUNTER — HOSPITAL ENCOUNTER (OUTPATIENT)
Facility: HOSPITAL | Age: 74
Discharge: HOME | End: 2024-02-02
Attending: STUDENT IN AN ORGANIZED HEALTH CARE EDUCATION/TRAINING PROGRAM

## 2024-01-30 LAB
RAD ONC ARIA COURSE FIRST TREATMENT DATE: NORMAL
RAD ONC ARIA COURSE ID: NORMAL
RAD ONC ARIA COURSE INTENT: NORMAL
RAD ONC ARIA COURSE LAST TREATMENT DATE: NORMAL
RAD ONC ARIA COURSE SESSION NUMBER: 7
RAD ONC ARIA COURSE TREATMENT ELAPSED DAYS: 8
RAD ONC ARIA PLAN FRACTIONS TREATED TO DATE: 7
RAD ONC ARIA PLAN ID: NORMAL
RAD ONC ARIA PLAN PRESCRIBED DOSE PER FRACTION: 2.5 GY
RAD ONC ARIA PLAN PRIMARY REFERENCE POINT: NORMAL
RAD ONC ARIA PLAN TOTAL FRACTIONS PRESCRIBED: 25
RAD ONC ARIA PLAN TOTAL PRESCRIBED DOSE: 6250 CGY
RAD ONC ARIA REFERENCE POINT DOSAGE GIVEN TO DATE: 12.6 GY
RAD ONC ARIA REFERENCE POINT DOSAGE GIVEN TO DATE: 17.5 GY
RAD ONC ARIA REFERENCE POINT DOSAGE GIVEN TO DATE: 18.09 GY
RAD ONC ARIA REFERENCE POINT ID: NORMAL
RAD ONC ARIA REFERENCE POINT SESSION DOSAGE GIVEN: 1.8 GY
RAD ONC ARIA REFERENCE POINT SESSION DOSAGE GIVEN: 2.5 GY
RAD ONC ARIA REFERENCE POINT SESSION DOSAGE GIVEN: 2.58 GY

## 2024-01-31 ENCOUNTER — HOSPITAL ENCOUNTER (OUTPATIENT)
Facility: HOSPITAL | Age: 74
Discharge: HOME | End: 2024-02-03
Attending: STUDENT IN AN ORGANIZED HEALTH CARE EDUCATION/TRAINING PROGRAM

## 2024-01-31 LAB
RAD ONC ARIA COURSE FIRST TREATMENT DATE: NORMAL
RAD ONC ARIA COURSE ID: NORMAL
RAD ONC ARIA COURSE INTENT: NORMAL
RAD ONC ARIA COURSE LAST TREATMENT DATE: NORMAL
RAD ONC ARIA COURSE SESSION NUMBER: 8
RAD ONC ARIA COURSE TREATMENT ELAPSED DAYS: 9
RAD ONC ARIA PLAN FRACTIONS TREATED TO DATE: 8
RAD ONC ARIA PLAN ID: NORMAL
RAD ONC ARIA PLAN PRESCRIBED DOSE PER FRACTION: 2.5 GY
RAD ONC ARIA PLAN PRIMARY REFERENCE POINT: NORMAL
RAD ONC ARIA PLAN TOTAL FRACTIONS PRESCRIBED: 25
RAD ONC ARIA PLAN TOTAL PRESCRIBED DOSE: 6250 CGY
RAD ONC ARIA REFERENCE POINT DOSAGE GIVEN TO DATE: 14.4 GY
RAD ONC ARIA REFERENCE POINT DOSAGE GIVEN TO DATE: 20 GY
RAD ONC ARIA REFERENCE POINT DOSAGE GIVEN TO DATE: 20.67 GY
RAD ONC ARIA REFERENCE POINT ID: NORMAL
RAD ONC ARIA REFERENCE POINT SESSION DOSAGE GIVEN: 1.8 GY
RAD ONC ARIA REFERENCE POINT SESSION DOSAGE GIVEN: 2.5 GY
RAD ONC ARIA REFERENCE POINT SESSION DOSAGE GIVEN: 2.58 GY

## 2024-02-01 ENCOUNTER — HOSPITAL ENCOUNTER (OUTPATIENT)
Facility: HOSPITAL | Age: 74
Discharge: HOME | End: 2024-02-04
Attending: STUDENT IN AN ORGANIZED HEALTH CARE EDUCATION/TRAINING PROGRAM

## 2024-02-01 LAB
RAD ONC ARIA COURSE FIRST TREATMENT DATE: NORMAL
RAD ONC ARIA COURSE ID: NORMAL
RAD ONC ARIA COURSE INTENT: NORMAL
RAD ONC ARIA COURSE LAST TREATMENT DATE: NORMAL
RAD ONC ARIA COURSE SESSION NUMBER: 9
RAD ONC ARIA COURSE TREATMENT ELAPSED DAYS: 10
RAD ONC ARIA PLAN FRACTIONS TREATED TO DATE: 9
RAD ONC ARIA PLAN ID: NORMAL
RAD ONC ARIA PLAN PRESCRIBED DOSE PER FRACTION: 2.5 GY
RAD ONC ARIA PLAN PRIMARY REFERENCE POINT: NORMAL
RAD ONC ARIA PLAN TOTAL FRACTIONS PRESCRIBED: 25
RAD ONC ARIA PLAN TOTAL PRESCRIBED DOSE: 6250 CGY
RAD ONC ARIA REFERENCE POINT DOSAGE GIVEN TO DATE: 16.2 GY
RAD ONC ARIA REFERENCE POINT DOSAGE GIVEN TO DATE: 22.5 GY
RAD ONC ARIA REFERENCE POINT DOSAGE GIVEN TO DATE: 23.26 GY
RAD ONC ARIA REFERENCE POINT ID: NORMAL
RAD ONC ARIA REFERENCE POINT SESSION DOSAGE GIVEN: 1.8 GY
RAD ONC ARIA REFERENCE POINT SESSION DOSAGE GIVEN: 2.5 GY
RAD ONC ARIA REFERENCE POINT SESSION DOSAGE GIVEN: 2.58 GY

## 2024-02-02 LAB — HBA1C MFR BLD HPLC: 8.1 %

## 2024-02-05 ENCOUNTER — HOSPITAL ENCOUNTER (OUTPATIENT)
Facility: HOSPITAL | Age: 74
Discharge: HOME | End: 2024-02-08
Attending: STUDENT IN AN ORGANIZED HEALTH CARE EDUCATION/TRAINING PROGRAM

## 2024-02-05 LAB
RAD ONC ARIA COURSE FIRST TREATMENT DATE: NORMAL
RAD ONC ARIA COURSE ID: NORMAL
RAD ONC ARIA COURSE INTENT: NORMAL
RAD ONC ARIA COURSE LAST TREATMENT DATE: NORMAL
RAD ONC ARIA COURSE SESSION NUMBER: 10
RAD ONC ARIA COURSE TREATMENT ELAPSED DAYS: 14
RAD ONC ARIA PLAN FRACTIONS TREATED TO DATE: 10
RAD ONC ARIA PLAN ID: NORMAL
RAD ONC ARIA PLAN PRESCRIBED DOSE PER FRACTION: 2.5 GY
RAD ONC ARIA PLAN PRIMARY REFERENCE POINT: NORMAL
RAD ONC ARIA PLAN TOTAL FRACTIONS PRESCRIBED: 25
RAD ONC ARIA PLAN TOTAL PRESCRIBED DOSE: 6250 CGY
RAD ONC ARIA REFERENCE POINT DOSAGE GIVEN TO DATE: 18 GY
RAD ONC ARIA REFERENCE POINT DOSAGE GIVEN TO DATE: 25 GY
RAD ONC ARIA REFERENCE POINT DOSAGE GIVEN TO DATE: 25.84 GY
RAD ONC ARIA REFERENCE POINT ID: NORMAL
RAD ONC ARIA REFERENCE POINT SESSION DOSAGE GIVEN: 1.8 GY
RAD ONC ARIA REFERENCE POINT SESSION DOSAGE GIVEN: 2.5 GY
RAD ONC ARIA REFERENCE POINT SESSION DOSAGE GIVEN: 2.58 GY

## 2024-02-05 ASSESSMENT — PAIN SCALES - GENERAL: PAINLEVEL_OUTOF10: 0

## 2024-02-06 ENCOUNTER — HOSPITAL ENCOUNTER (OUTPATIENT)
Facility: HOSPITAL | Age: 74
Discharge: HOME | End: 2024-02-09
Attending: STUDENT IN AN ORGANIZED HEALTH CARE EDUCATION/TRAINING PROGRAM

## 2024-02-06 LAB
RAD ONC ARIA COURSE FIRST TREATMENT DATE: NORMAL
RAD ONC ARIA COURSE ID: NORMAL
RAD ONC ARIA COURSE INTENT: NORMAL
RAD ONC ARIA COURSE LAST TREATMENT DATE: NORMAL
RAD ONC ARIA COURSE SESSION NUMBER: 11
RAD ONC ARIA COURSE TREATMENT ELAPSED DAYS: 15
RAD ONC ARIA PLAN FRACTIONS TREATED TO DATE: 11
RAD ONC ARIA PLAN ID: NORMAL
RAD ONC ARIA PLAN PRESCRIBED DOSE PER FRACTION: 2.5 GY
RAD ONC ARIA PLAN PRIMARY REFERENCE POINT: NORMAL
RAD ONC ARIA PLAN TOTAL FRACTIONS PRESCRIBED: 25
RAD ONC ARIA PLAN TOTAL PRESCRIBED DOSE: 6250 CGY
RAD ONC ARIA REFERENCE POINT DOSAGE GIVEN TO DATE: 19.8 GY
RAD ONC ARIA REFERENCE POINT DOSAGE GIVEN TO DATE: 27.5 GY
RAD ONC ARIA REFERENCE POINT DOSAGE GIVEN TO DATE: 28.42 GY
RAD ONC ARIA REFERENCE POINT ID: NORMAL
RAD ONC ARIA REFERENCE POINT SESSION DOSAGE GIVEN: 1.8 GY
RAD ONC ARIA REFERENCE POINT SESSION DOSAGE GIVEN: 2.5 GY
RAD ONC ARIA REFERENCE POINT SESSION DOSAGE GIVEN: 2.58 GY

## 2024-02-07 ENCOUNTER — HOSPITAL ENCOUNTER (OUTPATIENT)
Facility: HOSPITAL | Age: 74
Discharge: HOME | End: 2024-02-10
Attending: STUDENT IN AN ORGANIZED HEALTH CARE EDUCATION/TRAINING PROGRAM

## 2024-02-07 LAB
RAD ONC ARIA COURSE FIRST TREATMENT DATE: NORMAL
RAD ONC ARIA COURSE ID: NORMAL
RAD ONC ARIA COURSE INTENT: NORMAL
RAD ONC ARIA COURSE LAST TREATMENT DATE: NORMAL
RAD ONC ARIA COURSE SESSION NUMBER: 12
RAD ONC ARIA COURSE TREATMENT ELAPSED DAYS: 16
RAD ONC ARIA PLAN FRACTIONS TREATED TO DATE: 12
RAD ONC ARIA PLAN ID: NORMAL
RAD ONC ARIA PLAN PRESCRIBED DOSE PER FRACTION: 2.5 GY
RAD ONC ARIA PLAN PRIMARY REFERENCE POINT: NORMAL
RAD ONC ARIA PLAN TOTAL FRACTIONS PRESCRIBED: 25
RAD ONC ARIA PLAN TOTAL PRESCRIBED DOSE: 6250 CGY
RAD ONC ARIA REFERENCE POINT DOSAGE GIVEN TO DATE: 21.6 GY
RAD ONC ARIA REFERENCE POINT DOSAGE GIVEN TO DATE: 30 GY
RAD ONC ARIA REFERENCE POINT DOSAGE GIVEN TO DATE: 31.01 GY
RAD ONC ARIA REFERENCE POINT ID: NORMAL
RAD ONC ARIA REFERENCE POINT SESSION DOSAGE GIVEN: 1.8 GY
RAD ONC ARIA REFERENCE POINT SESSION DOSAGE GIVEN: 2.5 GY
RAD ONC ARIA REFERENCE POINT SESSION DOSAGE GIVEN: 2.58 GY

## 2024-02-08 ENCOUNTER — HOSPITAL ENCOUNTER (OUTPATIENT)
Facility: HOSPITAL | Age: 74
Discharge: HOME | End: 2024-02-11
Attending: STUDENT IN AN ORGANIZED HEALTH CARE EDUCATION/TRAINING PROGRAM

## 2024-02-08 LAB
RAD ONC ARIA COURSE FIRST TREATMENT DATE: NORMAL
RAD ONC ARIA COURSE ID: NORMAL
RAD ONC ARIA COURSE INTENT: NORMAL
RAD ONC ARIA COURSE LAST TREATMENT DATE: NORMAL
RAD ONC ARIA COURSE SESSION NUMBER: 13
RAD ONC ARIA COURSE TREATMENT ELAPSED DAYS: 17
RAD ONC ARIA PLAN FRACTIONS TREATED TO DATE: 13
RAD ONC ARIA PLAN ID: NORMAL
RAD ONC ARIA PLAN PRESCRIBED DOSE PER FRACTION: 2.5 GY
RAD ONC ARIA PLAN PRIMARY REFERENCE POINT: NORMAL
RAD ONC ARIA PLAN TOTAL FRACTIONS PRESCRIBED: 25
RAD ONC ARIA PLAN TOTAL PRESCRIBED DOSE: 6250 CGY
RAD ONC ARIA REFERENCE POINT DOSAGE GIVEN TO DATE: 23.4 GY
RAD ONC ARIA REFERENCE POINT DOSAGE GIVEN TO DATE: 32.5 GY
RAD ONC ARIA REFERENCE POINT DOSAGE GIVEN TO DATE: 33.59 GY
RAD ONC ARIA REFERENCE POINT ID: NORMAL
RAD ONC ARIA REFERENCE POINT SESSION DOSAGE GIVEN: 1.8 GY
RAD ONC ARIA REFERENCE POINT SESSION DOSAGE GIVEN: 2.5 GY
RAD ONC ARIA REFERENCE POINT SESSION DOSAGE GIVEN: 2.58 GY

## 2024-02-09 ENCOUNTER — HOSPITAL ENCOUNTER (OUTPATIENT)
Facility: HOSPITAL | Age: 74
Discharge: HOME | End: 2024-02-12
Attending: STUDENT IN AN ORGANIZED HEALTH CARE EDUCATION/TRAINING PROGRAM

## 2024-02-09 DIAGNOSIS — C61 PROSTATE CANCER (HCC): Primary | ICD-10-CM

## 2024-02-09 LAB
RAD ONC ARIA COURSE FIRST TREATMENT DATE: NORMAL
RAD ONC ARIA COURSE ID: NORMAL
RAD ONC ARIA COURSE INTENT: NORMAL
RAD ONC ARIA COURSE LAST TREATMENT DATE: NORMAL
RAD ONC ARIA COURSE SESSION NUMBER: 14
RAD ONC ARIA COURSE TREATMENT ELAPSED DAYS: 18
RAD ONC ARIA PLAN FRACTIONS TREATED TO DATE: 14
RAD ONC ARIA PLAN ID: NORMAL
RAD ONC ARIA PLAN PRESCRIBED DOSE PER FRACTION: 2.5 GY
RAD ONC ARIA PLAN PRIMARY REFERENCE POINT: NORMAL
RAD ONC ARIA PLAN TOTAL FRACTIONS PRESCRIBED: 25
RAD ONC ARIA PLAN TOTAL PRESCRIBED DOSE: 6250 CGY
RAD ONC ARIA REFERENCE POINT DOSAGE GIVEN TO DATE: 25.2 GY
RAD ONC ARIA REFERENCE POINT DOSAGE GIVEN TO DATE: 35 GY
RAD ONC ARIA REFERENCE POINT DOSAGE GIVEN TO DATE: 36.17 GY
RAD ONC ARIA REFERENCE POINT ID: NORMAL
RAD ONC ARIA REFERENCE POINT SESSION DOSAGE GIVEN: 1.8 GY
RAD ONC ARIA REFERENCE POINT SESSION DOSAGE GIVEN: 2.5 GY
RAD ONC ARIA REFERENCE POINT SESSION DOSAGE GIVEN: 2.58 GY

## 2024-02-09 NOTE — PROGRESS NOTES
[facility] [place]  [facility]    Radiation Oncology Weekly Progress Note  Encounter Date: 02/09/24     Willam Dutton  [id number]  1950     Diagnosis   C61: initally cT1cN0, iPSA 5.88, GG4 (+5/12) prostate cancer s/p RP (4/2/19) with PSMA negative BCR, rPSA 0.424    AJCC Staging has been reviewed.  Oncologic History   12/17/2018: Diagnosed with a cT1cN0, iPSA 5.88, GG4 (+5/12) prostate cancer  04/02/2019: He underwent radical prostatectomy with PLND with Dr. Natan Morrissey with pathology showing a 44g gland with GS 4=3=7 disease involving 20% of the prostate. +EPE, SVI, PNI, no LVSI. Negative margins. 0/3 involved left pelvic nodes,  0/5 involved right pelvic nodes, staged pT3bN0 cM0  05/14/2019: PSA <0.1 (first post-op)  03/10/2020: PSA 0.035 (first detectable)  06/16/2020: PSA 0.140  09/22/2020: PSA 0.194  05/25/2021: PSA 0.188  09/28/2021: PSA 0.183  12/14/2021: PSA 0.241  06/30/2022: PSA 0.211  02/14/2023: PSA 0.290  06/15/2023: PSA 0.260  10/17/2023: PSA 0.424  10/20/2023: PSMA PET was negative for focal PyL binding in the prostatectomy bed, pelvic nodes, bones, or viscera  12/18/2023: Decipher 0.71 (high risk)    He was recommended a course of radiation directed at the prostate bed and pelvic LN with 6mo ADT (began 01/18/24)    Interval History   Mr. Dutton is a 73 y.o. male seen today for his weekly on-treatment evaluation    01/26/2024: Doing well thus far.  Has noticed a slight increase in urinary frequency.  No dysuria.  No changes to bowel habits or any other GI complaints.  Reviewed possible side effects.  02/05/2024: doing well without any new GI, , or other complaints.  02/09/2024: at fraction 14, doing well today overall, no issues with urination. He does have looser stools but no diarrhea.     Review of Systems:  A complete review of systems was obtained and negative except as described above.    Treatment Details:     Treatment Site Dose/Fx (cGy) #Fx Current Dose (cGy)

## 2024-02-12 ENCOUNTER — HOSPITAL ENCOUNTER (OUTPATIENT)
Facility: HOSPITAL | Age: 74
Discharge: HOME | End: 2024-02-15
Attending: STUDENT IN AN ORGANIZED HEALTH CARE EDUCATION/TRAINING PROGRAM

## 2024-02-12 LAB
RAD ONC ARIA COURSE FIRST TREATMENT DATE: NORMAL
RAD ONC ARIA COURSE ID: NORMAL
RAD ONC ARIA COURSE INTENT: NORMAL
RAD ONC ARIA COURSE LAST TREATMENT DATE: NORMAL
RAD ONC ARIA COURSE SESSION NUMBER: 15
RAD ONC ARIA COURSE TREATMENT ELAPSED DAYS: 21
RAD ONC ARIA PLAN FRACTIONS TREATED TO DATE: 15
RAD ONC ARIA PLAN ID: NORMAL
RAD ONC ARIA PLAN PRESCRIBED DOSE PER FRACTION: 2.5 GY
RAD ONC ARIA PLAN PRIMARY REFERENCE POINT: NORMAL
RAD ONC ARIA PLAN TOTAL FRACTIONS PRESCRIBED: 25
RAD ONC ARIA REFERENCE POINT DOSAGE GIVEN TO DATE: 27 GY
RAD ONC ARIA REFERENCE POINT DOSAGE GIVEN TO DATE: 37.5 GY
RAD ONC ARIA REFERENCE POINT DOSAGE GIVEN TO DATE: 38.76 GY
RAD ONC ARIA REFERENCE POINT ID: NORMAL
RAD ONC ARIA REFERENCE POINT SESSION DOSAGE GIVEN: 1.8 GY
RAD ONC ARIA REFERENCE POINT SESSION DOSAGE GIVEN: 2.5 GY
RAD ONC ARIA REFERENCE POINT SESSION DOSAGE GIVEN: 2.58 GY

## 2024-02-13 ENCOUNTER — HOSPITAL ENCOUNTER (OUTPATIENT)
Facility: HOSPITAL | Age: 74
Discharge: HOME | End: 2024-02-16
Attending: STUDENT IN AN ORGANIZED HEALTH CARE EDUCATION/TRAINING PROGRAM

## 2024-02-13 LAB
RAD ONC ARIA COURSE FIRST TREATMENT DATE: NORMAL
RAD ONC ARIA COURSE ID: NORMAL
RAD ONC ARIA COURSE INTENT: NORMAL
RAD ONC ARIA COURSE LAST TREATMENT DATE: NORMAL
RAD ONC ARIA COURSE SESSION NUMBER: 16
RAD ONC ARIA COURSE TREATMENT ELAPSED DAYS: 22
RAD ONC ARIA PLAN FRACTIONS TREATED TO DATE: 16
RAD ONC ARIA PLAN ID: NORMAL
RAD ONC ARIA PLAN PRESCRIBED DOSE PER FRACTION: 2.5 GY
RAD ONC ARIA PLAN PRIMARY REFERENCE POINT: NORMAL
RAD ONC ARIA PLAN TOTAL FRACTIONS PRESCRIBED: 25
RAD ONC ARIA PLAN TOTAL PRESCRIBED DOSE: 6250 CGY
RAD ONC ARIA REFERENCE POINT DOSAGE GIVEN TO DATE: 28.8 GY
RAD ONC ARIA REFERENCE POINT DOSAGE GIVEN TO DATE: 40 GY
RAD ONC ARIA REFERENCE POINT DOSAGE GIVEN TO DATE: 41.34 GY
RAD ONC ARIA REFERENCE POINT ID: NORMAL
RAD ONC ARIA REFERENCE POINT SESSION DOSAGE GIVEN: 1.8 GY
RAD ONC ARIA REFERENCE POINT SESSION DOSAGE GIVEN: 2.5 GY
RAD ONC ARIA REFERENCE POINT SESSION DOSAGE GIVEN: 2.58 GY

## 2024-02-14 ENCOUNTER — HOSPITAL ENCOUNTER (OUTPATIENT)
Facility: HOSPITAL | Age: 74
Discharge: HOME | End: 2024-02-17
Attending: STUDENT IN AN ORGANIZED HEALTH CARE EDUCATION/TRAINING PROGRAM

## 2024-02-14 ENCOUNTER — OFFICE VISIT (OUTPATIENT)
Age: 74
End: 2024-02-14
Payer: MEDICARE

## 2024-02-14 VITALS
OXYGEN SATURATION: 94 % | DIASTOLIC BLOOD PRESSURE: 69 MMHG | HEIGHT: 70 IN | SYSTOLIC BLOOD PRESSURE: 114 MMHG | TEMPERATURE: 97.1 F | WEIGHT: 222.2 LBS | RESPIRATION RATE: 18 BRPM | HEART RATE: 75 BPM | BODY MASS INDEX: 31.81 KG/M2

## 2024-02-14 DIAGNOSIS — G62.9 PERIPHERAL POLYNEUROPATHY: ICD-10-CM

## 2024-02-14 DIAGNOSIS — G47.33 SEVERE OBSTRUCTIVE SLEEP APNEA: ICD-10-CM

## 2024-02-14 DIAGNOSIS — C61 PROSTATE CANCER (HCC): Primary | ICD-10-CM

## 2024-02-14 DIAGNOSIS — I25.10 CORONARY ARTERY DISEASE INVOLVING NATIVE CORONARY ARTERY OF NATIVE HEART WITHOUT ANGINA PECTORIS: ICD-10-CM

## 2024-02-14 DIAGNOSIS — M81.0 OSTEOPOROSIS, UNSPECIFIED OSTEOPOROSIS TYPE, UNSPECIFIED PATHOLOGICAL FRACTURE PRESENCE: ICD-10-CM

## 2024-02-14 DIAGNOSIS — E11.40 CONTROLLED TYPE 2 DIABETES MELLITUS WITH DIABETIC NEUROPATHY, WITH LONG-TERM CURRENT USE OF INSULIN (HCC): ICD-10-CM

## 2024-02-14 DIAGNOSIS — Z79.4 CONTROLLED TYPE 2 DIABETES MELLITUS WITH DIABETIC NEUROPATHY, WITH LONG-TERM CURRENT USE OF INSULIN (HCC): ICD-10-CM

## 2024-02-14 PROBLEM — E11.65 TYPE 2 DIABETES MELLITUS WITH HYPERGLYCEMIA, WITH LONG-TERM CURRENT USE OF INSULIN (HCC): Status: RESOLVED | Noted: 2023-10-05 | Resolved: 2024-02-14

## 2024-02-14 PROBLEM — I21.4 NSTEMI (NON-ST ELEVATED MYOCARDIAL INFARCTION) (HCC): Status: RESOLVED | Noted: 2023-03-31 | Resolved: 2024-02-14

## 2024-02-14 PROBLEM — M93.90 OSTEOCHONDROPATHY: Status: RESOLVED | Noted: 2022-12-07 | Resolved: 2024-02-14

## 2024-02-14 PROBLEM — G93.40 ACUTE ENCEPHALOPATHY: Status: RESOLVED | Noted: 2023-10-05 | Resolved: 2024-02-14

## 2024-02-14 PROBLEM — R41.0 DISORIENTATION: Status: RESOLVED | Noted: 2023-10-05 | Resolved: 2024-02-14

## 2024-02-14 LAB
RAD ONC ARIA COURSE FIRST TREATMENT DATE: NORMAL
RAD ONC ARIA COURSE ID: NORMAL
RAD ONC ARIA COURSE INTENT: NORMAL
RAD ONC ARIA COURSE LAST TREATMENT DATE: NORMAL
RAD ONC ARIA COURSE SESSION NUMBER: 17
RAD ONC ARIA COURSE TREATMENT ELAPSED DAYS: 23
RAD ONC ARIA PLAN FRACTIONS TREATED TO DATE: 17
RAD ONC ARIA PLAN ID: NORMAL
RAD ONC ARIA PLAN PRESCRIBED DOSE PER FRACTION: 2.5 GY
RAD ONC ARIA PLAN PRIMARY REFERENCE POINT: NORMAL
RAD ONC ARIA PLAN TOTAL FRACTIONS PRESCRIBED: 25
RAD ONC ARIA PLAN TOTAL PRESCRIBED DOSE: 6250 CGY
RAD ONC ARIA REFERENCE POINT DOSAGE GIVEN TO DATE: 30.6 GY
RAD ONC ARIA REFERENCE POINT DOSAGE GIVEN TO DATE: 42.5 GY
RAD ONC ARIA REFERENCE POINT DOSAGE GIVEN TO DATE: 43.93 GY
RAD ONC ARIA REFERENCE POINT ID: NORMAL
RAD ONC ARIA REFERENCE POINT SESSION DOSAGE GIVEN: 1.8 GY
RAD ONC ARIA REFERENCE POINT SESSION DOSAGE GIVEN: 2.5 GY
RAD ONC ARIA REFERENCE POINT SESSION DOSAGE GIVEN: 2.58 GY

## 2024-02-14 PROCEDURE — 2022F DILAT RTA XM EVC RTNOPTHY: CPT | Performed by: INTERNAL MEDICINE

## 2024-02-14 PROCEDURE — 3046F HEMOGLOBIN A1C LEVEL >9.0%: CPT | Performed by: INTERNAL MEDICINE

## 2024-02-14 PROCEDURE — G8484 FLU IMMUNIZE NO ADMIN: HCPCS | Performed by: INTERNAL MEDICINE

## 2024-02-14 PROCEDURE — 3078F DIAST BP <80 MM HG: CPT | Performed by: INTERNAL MEDICINE

## 2024-02-14 PROCEDURE — 99214 OFFICE O/P EST MOD 30 MIN: CPT | Performed by: INTERNAL MEDICINE

## 2024-02-14 PROCEDURE — 3017F COLORECTAL CA SCREEN DOC REV: CPT | Performed by: INTERNAL MEDICINE

## 2024-02-14 PROCEDURE — 3074F SYST BP LT 130 MM HG: CPT | Performed by: INTERNAL MEDICINE

## 2024-02-14 PROCEDURE — G8427 DOCREV CUR MEDS BY ELIG CLIN: HCPCS | Performed by: INTERNAL MEDICINE

## 2024-02-14 PROCEDURE — 1036F TOBACCO NON-USER: CPT | Performed by: INTERNAL MEDICINE

## 2024-02-14 PROCEDURE — 1123F ACP DISCUSS/DSCN MKR DOCD: CPT | Performed by: INTERNAL MEDICINE

## 2024-02-14 PROCEDURE — G8417 CALC BMI ABV UP PARAM F/U: HCPCS | Performed by: INTERNAL MEDICINE

## 2024-02-14 RX ORDER — CLOTRIMAZOLE AND BETAMETHASONE DIPROPIONATE 10; .64 MG/G; MG/G
CREAM TOPICAL
COMMUNITY
Start: 2024-01-18

## 2024-02-14 RX ORDER — TIRZEPATIDE 5 MG/.5ML
INJECTION, SOLUTION SUBCUTANEOUS
COMMUNITY

## 2024-02-14 NOTE — ASSESSMENT & PLAN NOTE
Cards Dr Arciniega  status post bypass (with LIMA to LAD, SVG to OM2, SVG to PDA) in 03/2023.    Stable on medical management

## 2024-02-14 NOTE — PROGRESS NOTES
Chief Complaint   Patient presents with    New Patient    Establish Care    Diabetes     Blood pressure 114/69, pulse 75, temperature 97.1 °F (36.2 °C), temperature source Temporal, resp. rate 18, height 1.781 m (5' 10.1\"), weight 100.8 kg (222 lb 3.2 oz), SpO2 94 %.

## 2024-02-14 NOTE — ASSESSMENT & PLAN NOTE
Dr DERRICK Morrissey urology  Dr Galindo rad    Dx 2018 s/p prostatectomy and has biochemical recurrence more recently and undergoing radiation 2024

## 2024-02-14 NOTE — PROGRESS NOTES
2/14/2024    Willam Dutton 1950 is a 73 y.o. year old male new patient,   here for evaluation of the following chief complaint(s):  Chief Complaint   Patient presents with    New Patient    Establish Care    Diabetes           ASSESSMENT/PLAN:  Below is the assessment and plan developed based on review of pertinent history, physical exam, labs, studies, and medications.    1. Prostate cancer (HCC)  Assessment & Plan:  Dr DERRICK Morrissey urology  Dr Galindo rad    Dx 2018 s/p prostatectomy and has biochemical recurrence more recently and undergoing radiation 2024  2. Coronary artery disease involving native coronary artery of native heart without angina pectoris  Assessment & Plan:  Cards Dr Arciniega  status post bypass (with LIMA to LAD, SVG to OM2, SVG to PDA) in 03/2023.    Stable on medical management  3. Severe obstructive sleep apnea  Assessment & Plan:  Dx 10/20/2016  [facility] Dr Bower managing  4. Osteoporosis, unspecified osteoporosis type, unspecified pathological fracture presence  Assessment & Plan:  Has been getting reclast per endo  5. Peripheral polyneuropathy  Assessment & Plan:  On gabapentin per endo  6. Controlled type 2 diabetes mellitus with diabetic neuropathy, with long-term current use of insulin ([facility])  Assessment & Plan:  Endo Dr Howard  Generally under control, uses FSL2         Return in about 6 months (around 8/14/2024) for annual medicare wellness exam, or sooner as needed.       SUBJECTIVE/OBJECTIVE:  New patient to me, here to establish care  Visit spent in discussion of past and current medical conditions, with statuses as documented in Assessment and Plan section      Review of Systems   Constitutional:  Negative for chills and fever.   Respiratory:  Negative for cough and shortness of breath.    Cardiovascular:  Negative for chest pain, palpitations and leg swelling.   Gastrointestinal:  Negative for abdominal pain.   Musculoskeletal:  Positive for arthralgias (wrists) and back pain (off

## 2024-02-15 ENCOUNTER — HOSPITAL ENCOUNTER (OUTPATIENT)
Facility: HOSPITAL | Age: 74
Discharge: HOME | End: 2024-02-18
Attending: STUDENT IN AN ORGANIZED HEALTH CARE EDUCATION/TRAINING PROGRAM

## 2024-02-15 LAB
RAD ONC ARIA COURSE FIRST TREATMENT DATE: NORMAL
RAD ONC ARIA COURSE ID: NORMAL
RAD ONC ARIA COURSE INTENT: NORMAL
RAD ONC ARIA COURSE LAST TREATMENT DATE: NORMAL
RAD ONC ARIA COURSE SESSION NUMBER: 18
RAD ONC ARIA COURSE TREATMENT ELAPSED DAYS: 24
RAD ONC ARIA PLAN FRACTIONS TREATED TO DATE: 18
RAD ONC ARIA PLAN ID: NORMAL
RAD ONC ARIA PLAN PRESCRIBED DOSE PER FRACTION: 2.5 GY
RAD ONC ARIA PLAN PRIMARY REFERENCE POINT: NORMAL
RAD ONC ARIA PLAN TOTAL FRACTIONS PRESCRIBED: 25
RAD ONC ARIA PLAN TOTAL PRESCRIBED DOSE: 6250 CGY
RAD ONC ARIA REFERENCE POINT DOSAGE GIVEN TO DATE: 32.4 GY
RAD ONC ARIA REFERENCE POINT DOSAGE GIVEN TO DATE: 45 GY
RAD ONC ARIA REFERENCE POINT DOSAGE GIVEN TO DATE: 46.51 GY
RAD ONC ARIA REFERENCE POINT ID: NORMAL
RAD ONC ARIA REFERENCE POINT SESSION DOSAGE GIVEN: 1.8 GY
RAD ONC ARIA REFERENCE POINT SESSION DOSAGE GIVEN: 2.5 GY
RAD ONC ARIA REFERENCE POINT SESSION DOSAGE GIVEN: 2.58 GY

## 2024-02-16 ENCOUNTER — HOSPITAL ENCOUNTER (OUTPATIENT)
Facility: HOSPITAL | Age: 74
Discharge: HOME | End: 2024-02-19
Attending: STUDENT IN AN ORGANIZED HEALTH CARE EDUCATION/TRAINING PROGRAM

## 2024-02-16 LAB
RAD ONC ARIA COURSE FIRST TREATMENT DATE: NORMAL
RAD ONC ARIA COURSE ID: NORMAL
RAD ONC ARIA COURSE INTENT: NORMAL
RAD ONC ARIA COURSE LAST TREATMENT DATE: NORMAL
RAD ONC ARIA COURSE SESSION NUMBER: 19
RAD ONC ARIA COURSE TREATMENT ELAPSED DAYS: 25
RAD ONC ARIA PLAN FRACTIONS TREATED TO DATE: 19
RAD ONC ARIA PLAN ID: NORMAL
RAD ONC ARIA PLAN PRESCRIBED DOSE PER FRACTION: 2.5 GY
RAD ONC ARIA PLAN PRIMARY REFERENCE POINT: NORMAL
RAD ONC ARIA PLAN TOTAL FRACTIONS PRESCRIBED: 25
RAD ONC ARIA PLAN TOTAL PRESCRIBED DOSE: 6250 CGY
RAD ONC ARIA REFERENCE POINT DOSAGE GIVEN TO DATE: 34.2 GY
RAD ONC ARIA REFERENCE POINT DOSAGE GIVEN TO DATE: 47.5 GY
RAD ONC ARIA REFERENCE POINT DOSAGE GIVEN TO DATE: 49.09 GY
RAD ONC ARIA REFERENCE POINT ID: NORMAL
RAD ONC ARIA REFERENCE POINT SESSION DOSAGE GIVEN: 1.8 GY
RAD ONC ARIA REFERENCE POINT SESSION DOSAGE GIVEN: 2.5 GY
RAD ONC ARIA REFERENCE POINT SESSION DOSAGE GIVEN: 2.58 GY

## 2024-02-16 ASSESSMENT — PAIN SCALES - GENERAL: PAINLEVEL_OUTOF10: 0

## 2024-02-16 NOTE — ON TREATMENT VISIT
[facility] [place]   [facility]    Radiation Oncology Weekly Progress Note  Encounter Date: 02/16/24    Willam Dutton  [id number]  1950     Diagnosis    initally cT1cN0, iPSA 5.88, GG4 (+5/12) prostate cancer s/p RP (4/2/19) with PSMA negative BCR, rPSA 0.424    AJCC Staging has been reviewed.    Interval History   Mr. Dutton is a 73 y.o. male seen today for his weekly on-treatment evaluation    01/26/2024: Doing well thus far.  Has noticed a slight increase in urinary frequency.  No dysuria.  No changes to bowel habits or any other GI complaints.  Reviewed possible side effects.  02/05/2024: doing well without any new GI, , or other complaints.  02/09/2024: at fraction 14, doing well today overall, no issues with urination. He does have looser stools but no diarrhea.   2/16/2024: having loose stools 1-2x every afternoon, will start imodium.  Urination increased during day but stable at night with nocturia of x2    Treatment Details:   Treatment Site:  Treatment Technique:    Treatment Site Dose/Fx (cGy) #Fx Current Dose (cGy) Total Planned Dose (cGy) Start Date End Date   Prostate bed & nodes 250 19/25 [id number] 1/22/2024 02/16/24     Concurrent Therapy: Concurrent ADT:  Response to treatment: N/A    Allergies and Medications     Allergies   Allergen Reactions    Penicillins Other (See Comments)     Infancy was told he had a rash       Current Outpatient Medications   Medication Instructions    ASPIRIN PO 81 mg, Oral, DAILY    atorvastatin (LIPITOR) 80 mg, Oral, EVERY EVENING    Calcium Carbonate-Vitamin D (CALCIUM-VITAMIN D3 PO) 600 mg, Oral, 2 times daily    clopidogrel (PLAVIX) 75 mg, Oral, DAILY    clotrimazole-betamethasone (LOTRISONE) 1-0.05 % cream APPLY SMALL AMOUNT TOPICALLY TO THE AFFECTED AREA TWICE DAILY    cyanocobalamin 1,000 mcg, Oral, DAILY    empagliflozin (JARDIANCE) 10 mg, Oral, DAILY    ergocalciferol (ERGOCALCIFEROL) 1.25 MG (51192 UT) capsule Take 1 capsule by

## 2024-02-19 ENCOUNTER — HOSPITAL ENCOUNTER (OUTPATIENT)
Facility: HOSPITAL | Age: 74
Discharge: HOME | End: 2024-02-22
Attending: STUDENT IN AN ORGANIZED HEALTH CARE EDUCATION/TRAINING PROGRAM

## 2024-02-19 LAB
RAD ONC ARIA COURSE FIRST TREATMENT DATE: NORMAL
RAD ONC ARIA COURSE ID: NORMAL
RAD ONC ARIA COURSE INTENT: NORMAL
RAD ONC ARIA COURSE LAST TREATMENT DATE: NORMAL
RAD ONC ARIA COURSE SESSION NUMBER: 20
RAD ONC ARIA COURSE TREATMENT ELAPSED DAYS: 28
RAD ONC ARIA PLAN FRACTIONS TREATED TO DATE: 20
RAD ONC ARIA PLAN ID: NORMAL
RAD ONC ARIA PLAN PRESCRIBED DOSE PER FRACTION: 2.5 GY
RAD ONC ARIA PLAN PRIMARY REFERENCE POINT: NORMAL
RAD ONC ARIA PLAN TOTAL FRACTIONS PRESCRIBED: 25
RAD ONC ARIA PLAN TOTAL PRESCRIBED DOSE: 6250 CGY
RAD ONC ARIA REFERENCE POINT DOSAGE GIVEN TO DATE: 36 GY
RAD ONC ARIA REFERENCE POINT DOSAGE GIVEN TO DATE: 50 GY
RAD ONC ARIA REFERENCE POINT DOSAGE GIVEN TO DATE: 51.68 GY
RAD ONC ARIA REFERENCE POINT ID: NORMAL
RAD ONC ARIA REFERENCE POINT SESSION DOSAGE GIVEN: 1.8 GY
RAD ONC ARIA REFERENCE POINT SESSION DOSAGE GIVEN: 2.5 GY
RAD ONC ARIA REFERENCE POINT SESSION DOSAGE GIVEN: 2.58 GY

## 2024-02-20 ENCOUNTER — HOSPITAL ENCOUNTER (OUTPATIENT)
Facility: HOSPITAL | Age: 74
Discharge: HOME | End: 2024-02-23
Attending: STUDENT IN AN ORGANIZED HEALTH CARE EDUCATION/TRAINING PROGRAM

## 2024-02-20 LAB
RAD ONC ARIA COURSE FIRST TREATMENT DATE: NORMAL
RAD ONC ARIA COURSE ID: NORMAL
RAD ONC ARIA COURSE INTENT: NORMAL
RAD ONC ARIA COURSE LAST TREATMENT DATE: NORMAL
RAD ONC ARIA COURSE SESSION NUMBER: 21
RAD ONC ARIA COURSE TREATMENT ELAPSED DAYS: 29
RAD ONC ARIA PLAN FRACTIONS TREATED TO DATE: 21
RAD ONC ARIA PLAN ID: NORMAL
RAD ONC ARIA PLAN PRESCRIBED DOSE PER FRACTION: 2.5 GY
RAD ONC ARIA PLAN PRIMARY REFERENCE POINT: NORMAL
RAD ONC ARIA PLAN TOTAL FRACTIONS PRESCRIBED: 25
RAD ONC ARIA PLAN TOTAL PRESCRIBED DOSE: 6250 CGY
RAD ONC ARIA REFERENCE POINT DOSAGE GIVEN TO DATE: 37.8 GY
RAD ONC ARIA REFERENCE POINT DOSAGE GIVEN TO DATE: 52.5 GY
RAD ONC ARIA REFERENCE POINT DOSAGE GIVEN TO DATE: 54.26 GY
RAD ONC ARIA REFERENCE POINT ID: NORMAL
RAD ONC ARIA REFERENCE POINT SESSION DOSAGE GIVEN: 1.8 GY
RAD ONC ARIA REFERENCE POINT SESSION DOSAGE GIVEN: 2.5 GY
RAD ONC ARIA REFERENCE POINT SESSION DOSAGE GIVEN: 2.58 GY

## 2024-02-21 ENCOUNTER — HOSPITAL ENCOUNTER (OUTPATIENT)
Facility: HOSPITAL | Age: 74
Discharge: HOME | End: 2024-02-24
Attending: STUDENT IN AN ORGANIZED HEALTH CARE EDUCATION/TRAINING PROGRAM

## 2024-02-21 LAB
RAD ONC ARIA COURSE FIRST TREATMENT DATE: NORMAL
RAD ONC ARIA COURSE ID: NORMAL
RAD ONC ARIA COURSE INTENT: NORMAL
RAD ONC ARIA COURSE LAST TREATMENT DATE: NORMAL
RAD ONC ARIA COURSE SESSION NUMBER: 22
RAD ONC ARIA COURSE TREATMENT ELAPSED DAYS: 30
RAD ONC ARIA PLAN FRACTIONS TREATED TO DATE: 22
RAD ONC ARIA PLAN ID: NORMAL
RAD ONC ARIA PLAN PRESCRIBED DOSE PER FRACTION: 2.5 GY
RAD ONC ARIA PLAN PRIMARY REFERENCE POINT: NORMAL
RAD ONC ARIA PLAN TOTAL FRACTIONS PRESCRIBED: 25
RAD ONC ARIA PLAN TOTAL PRESCRIBED DOSE: 6250 CGY
RAD ONC ARIA REFERENCE POINT DOSAGE GIVEN TO DATE: 39.6 GY
RAD ONC ARIA REFERENCE POINT DOSAGE GIVEN TO DATE: 55 GY
RAD ONC ARIA REFERENCE POINT DOSAGE GIVEN TO DATE: 56.85 GY
RAD ONC ARIA REFERENCE POINT ID: NORMAL
RAD ONC ARIA REFERENCE POINT SESSION DOSAGE GIVEN: 1.8 GY
RAD ONC ARIA REFERENCE POINT SESSION DOSAGE GIVEN: 2.5 GY
RAD ONC ARIA REFERENCE POINT SESSION DOSAGE GIVEN: 2.58 GY

## 2024-02-22 ENCOUNTER — HOSPITAL ENCOUNTER (OUTPATIENT)
Facility: HOSPITAL | Age: 74
Discharge: HOME | End: 2024-02-25
Attending: STUDENT IN AN ORGANIZED HEALTH CARE EDUCATION/TRAINING PROGRAM

## 2024-02-22 LAB
RAD ONC ARIA COURSE FIRST TREATMENT DATE: NORMAL
RAD ONC ARIA COURSE ID: NORMAL
RAD ONC ARIA COURSE INTENT: NORMAL
RAD ONC ARIA COURSE LAST TREATMENT DATE: NORMAL
RAD ONC ARIA COURSE SESSION NUMBER: 23
RAD ONC ARIA COURSE TREATMENT ELAPSED DAYS: 31
RAD ONC ARIA PLAN FRACTIONS TREATED TO DATE: 23
RAD ONC ARIA PLAN ID: NORMAL
RAD ONC ARIA PLAN PRESCRIBED DOSE PER FRACTION: 2.5 GY
RAD ONC ARIA PLAN PRIMARY REFERENCE POINT: NORMAL
RAD ONC ARIA PLAN TOTAL FRACTIONS PRESCRIBED: 25
RAD ONC ARIA PLAN TOTAL PRESCRIBED DOSE: 6250 CGY
RAD ONC ARIA REFERENCE POINT DOSAGE GIVEN TO DATE: 41.4 GY
RAD ONC ARIA REFERENCE POINT DOSAGE GIVEN TO DATE: 57.5 GY
RAD ONC ARIA REFERENCE POINT DOSAGE GIVEN TO DATE: 59.43 GY
RAD ONC ARIA REFERENCE POINT ID: NORMAL
RAD ONC ARIA REFERENCE POINT SESSION DOSAGE GIVEN: 1.8 GY
RAD ONC ARIA REFERENCE POINT SESSION DOSAGE GIVEN: 2.5 GY
RAD ONC ARIA REFERENCE POINT SESSION DOSAGE GIVEN: 2.58 GY

## 2024-02-23 ENCOUNTER — HOSPITAL ENCOUNTER (OUTPATIENT)
Facility: HOSPITAL | Age: 74
Discharge: HOME | End: 2024-02-26
Attending: STUDENT IN AN ORGANIZED HEALTH CARE EDUCATION/TRAINING PROGRAM

## 2024-02-23 DIAGNOSIS — C61 PROSTATE CANCER (HCC): Primary | ICD-10-CM

## 2024-02-23 LAB
RAD ONC ARIA COURSE FIRST TREATMENT DATE: NORMAL
RAD ONC ARIA COURSE ID: NORMAL
RAD ONC ARIA COURSE INTENT: NORMAL
RAD ONC ARIA COURSE LAST TREATMENT DATE: NORMAL
RAD ONC ARIA COURSE SESSION NUMBER: 24
RAD ONC ARIA COURSE TREATMENT ELAPSED DAYS: 32
RAD ONC ARIA PLAN FRACTIONS TREATED TO DATE: 24
RAD ONC ARIA PLAN ID: NORMAL
RAD ONC ARIA PLAN PRESCRIBED DOSE PER FRACTION: 2.5 GY
RAD ONC ARIA PLAN PRIMARY REFERENCE POINT: NORMAL
RAD ONC ARIA PLAN TOTAL FRACTIONS PRESCRIBED: 25
RAD ONC ARIA PLAN TOTAL PRESCRIBED DOSE: 6250 CGY
RAD ONC ARIA REFERENCE POINT DOSAGE GIVEN TO DATE: 43.2 GY
RAD ONC ARIA REFERENCE POINT DOSAGE GIVEN TO DATE: 60 GY
RAD ONC ARIA REFERENCE POINT DOSAGE GIVEN TO DATE: 62.01 GY
RAD ONC ARIA REFERENCE POINT ID: NORMAL
RAD ONC ARIA REFERENCE POINT SESSION DOSAGE GIVEN: 1.8 GY
RAD ONC ARIA REFERENCE POINT SESSION DOSAGE GIVEN: 2.5 GY
RAD ONC ARIA REFERENCE POINT SESSION DOSAGE GIVEN: 2.58 GY

## 2024-02-23 ASSESSMENT — PAIN SCALES - GENERAL: PAINLEVEL_OUTOF10: 0

## 2024-02-23 NOTE — PROGRESS NOTES
[facility] [place]  [facility]    Radiation Oncology Weekly Progress Note  Encounter Date: 02/23/24     Willam Dutton  [id number]  1950     Diagnosis   C61: initally cT1cN0, iPSA 5.88, GG4 (+5/12) prostate cancer s/p RP (4/2/19) with PSMA negative BCR, rPSA 0.424    AJCC Staging has been reviewed.  Oncologic History   12/17/2018: Diagnosed with a cT1cN0, iPSA 5.88, GG4 (+5/12) prostate cancer  04/02/2019: He underwent radical prostatectomy with PLND with Dr. Natan Morrissey with pathology showing a 44g gland with GS 4=3=7 disease involving 20% of the prostate. +EPE, SVI, PNI, no LVSI. Negative margins. 0/3 involved left pelvic nodes,  0/5 involved right pelvic nodes, staged pT3bN0 cM0  05/14/2019: PSA <0.1 (first post-op)  03/10/2020: PSA 0.035 (first detectable)  06/16/2020: PSA 0.140  09/22/2020: PSA 0.194  05/25/2021: PSA 0.188  09/28/2021: PSA 0.183  12/14/2021: PSA 0.241  06/30/2022: PSA 0.211  02/14/2023: PSA 0.290  06/15/2023: PSA 0.260  10/17/2023: PSA 0.424  10/20/2023: PSMA PET was negative for focal PyL binding in the prostatectomy bed, pelvic nodes, bones, or viscera  12/18/2023: Decipher 0.71 (high risk)    He was recommended a course of radiation directed at the prostate bed and pelvic LN with 6mo ADT (began 01/18/24)    Interval History   Mr. Dutton is a 73 y.o. male seen today for his weekly on-treatment evaluation    01/26/2024: Doing well thus far.  Has noticed a slight increase in urinary frequency.  No dysuria.  No changes to bowel habits or any other GI complaints.  Reviewed possible side effects.  02/05/2024: doing well without any new GI, , or other complaints.  02/09/2024: at fraction 14, doing well today overall, no issues with urination. He does have looser stools but no diarrhea.   02/16/2024: having loose stools 1-2x every afternoon, will start imodium. Urination increased during day but stable at night with nocturia of x2   02/23/2024: at fraction 24,

## 2024-02-26 ENCOUNTER — HOSPITAL ENCOUNTER (OUTPATIENT)
Facility: HOSPITAL | Age: 74
Discharge: HOME | End: 2024-02-29
Attending: STUDENT IN AN ORGANIZED HEALTH CARE EDUCATION/TRAINING PROGRAM

## 2024-02-26 ENCOUNTER — CLINICAL DOCUMENTATION (OUTPATIENT)
Facility: HOSPITAL | Age: 74
End: 2024-02-26

## 2024-02-26 LAB
RAD ONC ARIA COURSE FIRST TREATMENT DATE: NORMAL
RAD ONC ARIA COURSE ID: NORMAL
RAD ONC ARIA COURSE INTENT: NORMAL
RAD ONC ARIA COURSE LAST TREATMENT DATE: NORMAL
RAD ONC ARIA COURSE SESSION NUMBER: 25
RAD ONC ARIA COURSE TREATMENT ELAPSED DAYS: 35
RAD ONC ARIA PLAN FRACTIONS TREATED TO DATE: 25
RAD ONC ARIA PLAN ID: NORMAL
RAD ONC ARIA PLAN PRESCRIBED DOSE PER FRACTION: 2.5 GY
RAD ONC ARIA PLAN PRIMARY REFERENCE POINT: NORMAL
RAD ONC ARIA PLAN TOTAL FRACTIONS PRESCRIBED: 25
RAD ONC ARIA PLAN TOTAL PRESCRIBED DOSE: 6250 CGY
RAD ONC ARIA REFERENCE POINT DOSAGE GIVEN TO DATE: 45 GY
RAD ONC ARIA REFERENCE POINT DOSAGE GIVEN TO DATE: 62.5 GY
RAD ONC ARIA REFERENCE POINT DOSAGE GIVEN TO DATE: 64.6 GY
RAD ONC ARIA REFERENCE POINT ID: NORMAL
RAD ONC ARIA REFERENCE POINT SESSION DOSAGE GIVEN: 1.8 GY
RAD ONC ARIA REFERENCE POINT SESSION DOSAGE GIVEN: 2.5 GY
RAD ONC ARIA REFERENCE POINT SESSION DOSAGE GIVEN: 2.58 GY

## 2024-03-13 NOTE — PROGRESS NOTES
[facility] [place] at [facility]  [facility]    Radiation Oncology End of Treatment Summary    Willam Dutton  [id number]  1950     Diagnosis   C61: initally cT1cN0, iPSA 5.88, GG4 (+5/12) prostate cancer s/p RP (4/2/19) with PSMA negative BCR, rPSA 0.424     AJCC Staging has been reviewed.  Oncologic History   12/17/2018: Diagnosed with a cT1cN0, iPSA 5.88, GG4 (+5/12) prostate cancer  04/02/2019: He underwent radical prostatectomy with PLND with Dr. Natan Morrissey with pathology showing a 44g gland with GS 4=3=7 disease involving 20% of the prostate. +EPE, SVI, PNI, no LVSI. Negative margins. 0/3 involved left pelvic nodes,  0/5 involved right pelvic nodes, staged pT3bN0 cM0  05/14/2019: PSA <0.1 (first post-op)  03/10/2020: PSA 0.035 (first detectable)  06/16/2020: PSA 0.140  09/22/2020: PSA 0.194  05/25/2021: PSA 0.188  09/28/2021: PSA 0.183  12/14/2021: PSA 0.241  06/30/2022: PSA 0.211  02/14/2023: PSA 0.290  06/15/2023: PSA 0.260  10/17/2023: PSA 0.424  10/20/2023: PSMA PET was negative for focal PyL binding in the prostatectomy bed, pelvic nodes, bones, or viscera  12/18/2023: Decipher 0.71 (high risk)     He was recommended a course of radiation directed at the prostate bed and pelvic LN with 6mo ADT (began 01/18/24)     Treatment Details:   Treatment Site: Prostate Bed, Pelvic Nodes  Treatment Technique: IMRT/VMAT  Treatment Site Dose/Fx (cGy) #Fx Total Planned Dose (cGy) Start Date End Date Elapsed Days   Prostate bed Pelvic LNs 250  [phone]  4500 01/22/24 02/26/24 36      Concurrent Therapy: Concurrent ADT    Under-treatment Course Summary   He tolerated treatment well with development of expected GI/ side effects that were medically managed.    Follow Up Plan   - Follow up in 3 months with PSA/T    Thank you for the opportunity to participate in Mr. Dutton's care.    Juan Galindo MD  [facility]  [facility]  [phone] .

## 2024-05-01 ENCOUNTER — OFFICE VISIT (OUTPATIENT)
Age: 74
End: 2024-05-01
Payer: MEDICARE

## 2024-05-01 VITALS
BODY MASS INDEX: 30.03 KG/M2 | SYSTOLIC BLOOD PRESSURE: 112 MMHG | OXYGEN SATURATION: 97 % | HEIGHT: 70 IN | HEART RATE: 70 BPM | DIASTOLIC BLOOD PRESSURE: 60 MMHG | WEIGHT: 209.8 LBS

## 2024-05-01 DIAGNOSIS — I87.2 VENOUS INSUFFICIENCY: ICD-10-CM

## 2024-05-01 DIAGNOSIS — E78.2 MIXED HYPERLIPIDEMIA: ICD-10-CM

## 2024-05-01 DIAGNOSIS — Z87.891 HISTORY OF TOBACCO USE: ICD-10-CM

## 2024-05-01 DIAGNOSIS — I25.810 CORONARY ARTERY DISEASE INVOLVING CORONARY BYPASS GRAFT OF NATIVE HEART WITHOUT ANGINA PECTORIS: Primary | ICD-10-CM

## 2024-05-01 PROCEDURE — 99214 OFFICE O/P EST MOD 30 MIN: CPT | Performed by: INTERNAL MEDICINE

## 2024-05-01 ASSESSMENT — PATIENT HEALTH QUESTIONNAIRE - PHQ9
SUM OF ALL RESPONSES TO PHQ QUESTIONS 1-9: 0
SUM OF ALL RESPONSES TO PHQ9 QUESTIONS 1 & 2: 0
SUM OF ALL RESPONSES TO PHQ QUESTIONS 1-9: 0
1. LITTLE INTEREST OR PLEASURE IN DOING THINGS: NOT AT ALL
2. FEELING DOWN, DEPRESSED OR HOPELESS: NOT AT ALL
SUM OF ALL RESPONSES TO PHQ QUESTIONS 1-9: 0
SUM OF ALL RESPONSES TO PHQ QUESTIONS 1-9: 0

## 2024-05-01 NOTE — PROGRESS NOTES
2019.  8. Mixed hyperlipidemia. Tolerating statin.        He  has a past medical history of Arthritis, BPH (benign prostatic hyperplasia), CAD (coronary artery disease), Cancer (HCC), Cancer (HCC), Chronic pain, Chronic pelvic pain in male, Collagenous colitis, Diabetes mellitus type 2, controlled, with complications (HCC), Diabetic neuropathy (HCC), Diverticulosis, ED (erectile dysfunction), GERD (gastroesophageal reflux disease), Gout, Hyperlipidemia, Hypertension, Ill-defined condition, Low serum testosterone level, NSTEMI (non-ST elevated myocardial infarction) ([facility]), Osteopenia, Psoriasis, Sleep apnea, and Sun-damaged skin.    All other systems negative except as above.     PE  Vitals:    05/01/24 1059   BP: 112/60   Site: Left Upper Arm   Position: Sitting   Cuff Size: Large Adult   Pulse: 70   SpO2: 97%   Weight: 95.2 kg (209 lb 12.8 oz)   Height: 1.778 m (5' 10\")    Body mass index is 30.1 kg/m².  General appearance - alert, well appearing, and in no distress  Mental status - affect appropriate to mood  Eyes - sclera anicteric, moist mucous membranes  Neck - supple, no JVD  Chest - clear to auscultation, no wheezes, rales or rhonchi  Heart - normal rate, regular rhythm, normal S1, S2, no murmurs, rubs, clicks or gallops  Abdomen - soft, nontender, nondistended, no masses or organomegaly  Extremities - peripheral pulses normal, no pedal edema      Recent Labs:  Lab Results   Component Value Date/Time    CHOL 121 03/31/2023 02:45 AM    HDL 34 03/31/2023 02:45 AM    LDL 13.2 03/31/2023 02:45 AM     No results found for: \"MACIEL\", \"CREAPOC\"  Lab Results   Component Value Date/Time    BUN 12 10/06/2023 09:26 AM     Lab Results   Component Value Date/Time    K 3.7 10/06/2023 09:26 AM     No results found for: \"HBA1C\"    Lab Results   Component Value Date/Time    HGB 13.8 10/05/2023 12:45 AM     Lab Results   Component Value Date/Time     10/05/2023 12:45 AM       Reviewed:  Past Medical History:   Diagnosis

## 2024-05-09 ENCOUNTER — HOSPITAL ENCOUNTER (OUTPATIENT)
Facility: HOSPITAL | Age: 74
Discharge: HOME | End: 2024-05-09
Payer: MEDICARE

## 2024-05-09 ENCOUNTER — TRANSCRIBE ORDERS (OUTPATIENT)
Facility: HOSPITAL | Age: 74
End: 2024-05-09

## 2024-05-09 DIAGNOSIS — M79.643 PAIN OF HAND AND FINGERS: Primary | ICD-10-CM

## 2024-05-09 DIAGNOSIS — M79.643 PAIN OF HAND AND FINGERS: ICD-10-CM

## 2024-05-09 DIAGNOSIS — M79.646 PAIN OF HAND AND FINGERS: ICD-10-CM

## 2024-05-09 DIAGNOSIS — M79.646 PAIN OF HAND AND FINGERS: Primary | ICD-10-CM

## 2024-05-09 PROCEDURE — 73130 X-RAY EXAM OF HAND: CPT

## 2024-07-23 ENCOUNTER — TELEMEDICINE (OUTPATIENT)
Age: 74
End: 2024-07-23
Payer: MEDICARE

## 2024-07-23 DIAGNOSIS — U07.1 COVID-19: Primary | ICD-10-CM

## 2024-07-23 PROCEDURE — 3017F COLORECTAL CA SCREEN DOC REV: CPT | Performed by: INTERNAL MEDICINE

## 2024-07-23 PROCEDURE — 1123F ACP DISCUSS/DSCN MKR DOCD: CPT | Performed by: INTERNAL MEDICINE

## 2024-07-23 PROCEDURE — G8427 DOCREV CUR MEDS BY ELIG CLIN: HCPCS | Performed by: INTERNAL MEDICINE

## 2024-07-23 PROCEDURE — 99213 OFFICE O/P EST LOW 20 MIN: CPT | Performed by: INTERNAL MEDICINE

## 2024-07-23 RX ORDER — APREMILAST 30 MG/1
TABLET, FILM COATED ORAL
COMMUNITY
Start: 2024-07-17

## 2024-07-23 RX ORDER — METOPROLOL SUCCINATE 25 MG/1
25 TABLET, EXTENDED RELEASE ORAL 2 TIMES DAILY
COMMUNITY
Start: 2024-05-01

## 2024-07-23 ASSESSMENT — ENCOUNTER SYMPTOMS
COUGH: 1
ABDOMINAL PAIN: 0
SHORTNESS OF BREATH: 0
WHEEZING: 0

## 2024-07-23 NOTE — PROGRESS NOTES
7/23/2024    Willam Dutton 1950 is a 73 y.o. year old male established patient,   here for video visit to evaluate the following chief complaint(s):  Chief Complaint   Patient presents with    Positive For Covid-19           ASSESSMENT/PLAN:  Below is the assessment and plan developed based on review of pertinent history, physical exam, labs, studies, and medications.    1. COVID-19  -     molnupiravir 200 MG capsule; Take 4 capsules by mouth in the morning and 4 capsules in the evening. Do all this for 5 days., Disp-40 capsule, R-0Normal     Patient with positive home COVID test and mild-moderate URI symptoms  Discussed pros and cons of specific therapy, patient elects to start  Will choose molnupiravir based on medication interactions  New med dosing and potential side effects discussed   Isolation guidelines per CDC were discussed  We discussed symptomatic home care regimen including nasal saline lavage, nasal steroid spray, Mucinex products  Advised on monitoring vitals at home and discussed warning signs for worsening symptoms that should prompt urgent medical attention  Patient encouraged to contact me with any further concerns, questions, or failure to improve      Return if symptoms worsen or fail to improve.       SUBJECTIVE/OBJECTIVE:  Recently returned from travel  Positive home COVID test yesterday, symptoms began yesterday as well. He is having some congestion, cough, feeling run down. No measured fevers, feels mildly warm. Breathing OK.  Taking tylenol and Delsym for symptoms      Review of Systems   Constitutional:  Positive for fatigue. Negative for chills and fever.   Respiratory:  Positive for cough. Negative for shortness of breath and wheezing.    Cardiovascular:  Negative for chest pain and palpitations.   Gastrointestinal:  Negative for abdominal pain.   Musculoskeletal:  Negative for myalgias.   Skin:  Negative for rash.            Constitutional: [x] Appears well-developed and

## 2024-07-31 DIAGNOSIS — C61 PROSTATE CANCER (HCC): Primary | ICD-10-CM

## 2024-08-02 ENCOUNTER — HOSPITAL ENCOUNTER (OUTPATIENT)
Facility: HOSPITAL | Age: 74
Discharge: HOME | End: 2024-08-05
Attending: STUDENT IN AN ORGANIZED HEALTH CARE EDUCATION/TRAINING PROGRAM

## 2024-08-02 VITALS
SYSTOLIC BLOOD PRESSURE: 124 MMHG | WEIGHT: 195 LBS | BODY MASS INDEX: 27.92 KG/M2 | HEIGHT: 70 IN | DIASTOLIC BLOOD PRESSURE: 73 MMHG

## 2024-08-02 DIAGNOSIS — C61 PROSTATE CANCER (HCC): Primary | ICD-10-CM

## 2024-08-02 LAB
PSA SERPL DL<=0.01 NG/ML-MCNC: <0.006 NG/ML (ref 0–4)
TESTOST SERPL-MCNC: 8 NG/DL (ref 264–916)

## 2024-08-02 RX ORDER — TADALAFIL 20 MG/1
20 TABLET ORAL PRN
Qty: 30 TABLET | Refills: 3 | Status: SHIPPED | OUTPATIENT
Start: 2024-08-02

## 2024-08-02 ASSESSMENT — PAIN SCALES - GENERAL: PAINLEVEL_OUTOF10: 0

## 2024-08-02 NOTE — PROGRESS NOTES
Cancer [place] at [facility]  [facility]    Radiation Oncology Follow Up  Encounter Date: 08/02/24    Willam Dutton  [id number]  1950     Diagnosis   C61: initally cT1cN0, iPSA 5.88, GG4 (+5/12) prostate cancer s/p RP (4/2/19) with PSMA negative BCR, rPSA 0.424    AJCC Staging has been reviewed.  Oncologic History   12/17/2018: Diagnosed with a cT1cN0, iPSA 5.88, GG4 (+5/12) prostate cancer  04/02/2019: He underwent radical prostatectomy with PLND with Dr. Natan Morrissey with pathology showing a 44g gland with GS 4=3=7 disease involving 20% of the prostate. +EPE, SVI, PNI, no LVSI. Negative margins. 0/3 involved left pelvic nodes,  0/5 involved right pelvic nodes, staged pT3bN0 cM0  05/14/2019: PSA <0.1 (first post-op)  03/10/2020: PSA 0.035 (first detectable)  06/16/2020: PSA 0.140  09/22/2020: PSA 0.194  05/25/2021: PSA 0.188  09/28/2021: PSA 0.183  12/14/2021: PSA 0.241  06/30/2022: PSA 0.211  02/14/2023: PSA 0.290  06/15/2023: PSA 0.260  10/17/2023: PSA 0.424  10/20/2023: PSMA PET was negative for focal PyL binding in the prostatectomy bed, pelvic nodes, bones, or viscera  12/18/2023: Decipher 0.71 (high risk)  02/26/2024: He completed a course of radiation directed at the prostate bed (62.5Gy) and pelvic LN (45Gy) in 25fx with 6mo ADT (began 01/18/24)    Interval History   Mr. Dutton arrives today for routine follow up about 5 months from end of treatment. Symptomatically he was recently treated for covid, no issues from a urination/GI standpoint. He was supposed to see Dr. Morrissey last month but had to cancel, will be scheduling to see him in 3 months. Denies hot flashes, mainly having fatigue and sexual dysfunction.    IPSS:  Symptom Score   0 = Not at all   Incomplete Emptying 1   1 = Less than 1 in 5   Frequency 1   2 = Less than half the time    Intermittency 0   3 = About half the time   Urgency 5   4 =  More than half the time   Weak Stream 1   5 =  Almost always

## 2024-08-06 ENCOUNTER — HOSPITAL ENCOUNTER (OUTPATIENT)
Facility: HOSPITAL | Age: 74
Discharge: HOME | End: 2024-08-09
Payer: MEDICARE

## 2024-08-06 DIAGNOSIS — M47.817 LUMBOSACRAL SPONDYLOSIS WITHOUT MYELOPATHY: ICD-10-CM

## 2024-08-06 PROCEDURE — 72148 MRI LUMBAR SPINE W/O DYE: CPT

## 2024-08-06 RX ORDER — CLOPIDOGREL BISULFATE 75 MG/1
75 TABLET ORAL DAILY
Qty: 90 TABLET | Refills: 3 | OUTPATIENT
Start: 2024-08-06

## 2024-08-06 NOTE — TELEPHONE ENCOUNTER
Telephone call made to patient. Two patient identifiers verified.   Let the patient know that he can stop plavix per Dr. Arciniega. Also made sure he knows to continue a baby aspirin. Verified understanding.

## 2024-09-11 ENCOUNTER — OFFICE VISIT (OUTPATIENT)
Age: 74
End: 2024-09-11
Payer: MEDICARE

## 2024-09-11 VITALS
HEIGHT: 70 IN | HEART RATE: 74 BPM | OXYGEN SATURATION: 97 % | WEIGHT: 204.8 LBS | TEMPERATURE: 97.4 F | SYSTOLIC BLOOD PRESSURE: 120 MMHG | RESPIRATION RATE: 16 BRPM | DIASTOLIC BLOOD PRESSURE: 74 MMHG | BODY MASS INDEX: 29.32 KG/M2

## 2024-09-11 DIAGNOSIS — G62.9 PERIPHERAL POLYNEUROPATHY: ICD-10-CM

## 2024-09-11 DIAGNOSIS — E11.40 CONTROLLED TYPE 2 DIABETES MELLITUS WITH DIABETIC NEUROPATHY, WITH LONG-TERM CURRENT USE OF INSULIN (HCC): ICD-10-CM

## 2024-09-11 DIAGNOSIS — C61 PROSTATE CANCER (HCC): ICD-10-CM

## 2024-09-11 DIAGNOSIS — I25.10 CORONARY ARTERY DISEASE INVOLVING NATIVE CORONARY ARTERY OF NATIVE HEART WITHOUT ANGINA PECTORIS: ICD-10-CM

## 2024-09-11 DIAGNOSIS — Z00.00 ENCOUNTER FOR SUBSEQUENT ANNUAL WELLNESS VISIT (AWV) IN MEDICARE PATIENT: Primary | ICD-10-CM

## 2024-09-11 DIAGNOSIS — I10 BENIGN ESSENTIAL HYPERTENSION: ICD-10-CM

## 2024-09-11 DIAGNOSIS — E78.5 HYPERLIPIDEMIA WITH TARGET LDL LESS THAN 100: ICD-10-CM

## 2024-09-11 DIAGNOSIS — Z79.4 CONTROLLED TYPE 2 DIABETES MELLITUS WITH DIABETIC NEUROPATHY, WITH LONG-TERM CURRENT USE OF INSULIN (HCC): ICD-10-CM

## 2024-09-11 DIAGNOSIS — Z23 INFLUENZA VACCINE NEEDED: ICD-10-CM

## 2024-09-11 PROCEDURE — 3078F DIAST BP <80 MM HG: CPT | Performed by: INTERNAL MEDICINE

## 2024-09-11 PROCEDURE — G0008 ADMIN INFLUENZA VIRUS VAC: HCPCS | Performed by: INTERNAL MEDICINE

## 2024-09-11 PROCEDURE — 3046F HEMOGLOBIN A1C LEVEL >9.0%: CPT | Performed by: INTERNAL MEDICINE

## 2024-09-11 PROCEDURE — 90653 IIV ADJUVANT VACCINE IM: CPT | Performed by: INTERNAL MEDICINE

## 2024-09-11 PROCEDURE — PBSHW INFLUENZA, FLUAD TRIVALENT, (AGE 65 Y+), IM, PRESERVATIVE FREE, 0.5ML: Performed by: INTERNAL MEDICINE

## 2024-09-11 PROCEDURE — 1123F ACP DISCUSS/DSCN MKR DOCD: CPT | Performed by: INTERNAL MEDICINE

## 2024-09-11 PROCEDURE — 3017F COLORECTAL CA SCREEN DOC REV: CPT | Performed by: INTERNAL MEDICINE

## 2024-09-11 PROCEDURE — 3074F SYST BP LT 130 MM HG: CPT | Performed by: INTERNAL MEDICINE

## 2024-09-11 PROCEDURE — G0439 PPPS, SUBSEQ VISIT: HCPCS | Performed by: INTERNAL MEDICINE

## 2024-09-11 RX ORDER — METOPROLOL TARTRATE 25 MG/1
25 TABLET, FILM COATED ORAL 2 TIMES DAILY
COMMUNITY
Start: 2024-09-10

## 2024-09-11 SDOH — ECONOMIC STABILITY: FOOD INSECURITY: WITHIN THE PAST 12 MONTHS, THE FOOD YOU BOUGHT JUST DIDN'T LAST AND YOU DIDN'T HAVE MONEY TO GET MORE.: NEVER TRUE

## 2024-09-11 SDOH — ECONOMIC STABILITY: INCOME INSECURITY: HOW HARD IS IT FOR YOU TO PAY FOR THE VERY BASICS LIKE FOOD, HOUSING, MEDICAL CARE, AND HEATING?: NOT HARD AT ALL

## 2024-09-11 SDOH — ECONOMIC STABILITY: FOOD INSECURITY: WITHIN THE PAST 12 MONTHS, YOU WORRIED THAT YOUR FOOD WOULD RUN OUT BEFORE YOU GOT MONEY TO BUY MORE.: NEVER TRUE

## 2024-09-11 ASSESSMENT — PATIENT HEALTH QUESTIONNAIRE - PHQ9
SUM OF ALL RESPONSES TO PHQ QUESTIONS 1-9: 0
1. LITTLE INTEREST OR PLEASURE IN DOING THINGS: NOT AT ALL
2. FEELING DOWN, DEPRESSED OR HOPELESS: NOT AT ALL
SUM OF ALL RESPONSES TO PHQ9 QUESTIONS 1 & 2: 0

## 2024-09-11 ASSESSMENT — LIFESTYLE VARIABLES
HOW MANY STANDARD DRINKS CONTAINING ALCOHOL DO YOU HAVE ON A TYPICAL DAY: 1 OR 2
HOW OFTEN DO YOU HAVE A DRINK CONTAINING ALCOHOL: MONTHLY OR LESS

## 2024-11-07 ENCOUNTER — OFFICE VISIT (OUTPATIENT)
Age: 74
End: 2024-11-07
Payer: MEDICARE

## 2024-11-07 VITALS
HEIGHT: 69 IN | DIASTOLIC BLOOD PRESSURE: 62 MMHG | HEART RATE: 80 BPM | SYSTOLIC BLOOD PRESSURE: 114 MMHG | BODY MASS INDEX: 29.77 KG/M2 | WEIGHT: 201 LBS | OXYGEN SATURATION: 97 %

## 2024-11-07 DIAGNOSIS — I10 BENIGN ESSENTIAL HTN: ICD-10-CM

## 2024-11-07 DIAGNOSIS — I87.2 VENOUS INSUFFICIENCY: ICD-10-CM

## 2024-11-07 DIAGNOSIS — I25.810 CORONARY ARTERY DISEASE INVOLVING CORONARY BYPASS GRAFT OF NATIVE HEART WITHOUT ANGINA PECTORIS: Primary | ICD-10-CM

## 2024-11-07 DIAGNOSIS — E78.2 MIXED HYPERLIPIDEMIA: ICD-10-CM

## 2024-11-07 PROCEDURE — 93005 ELECTROCARDIOGRAM TRACING: CPT | Performed by: INTERNAL MEDICINE

## 2024-11-07 PROCEDURE — 99214 OFFICE O/P EST MOD 30 MIN: CPT | Performed by: INTERNAL MEDICINE

## 2024-11-07 RX ORDER — TIRZEPATIDE 7.5 MG/.5ML
INJECTION, SOLUTION SUBCUTANEOUS
COMMUNITY
Start: 2024-10-30

## 2024-11-07 ASSESSMENT — PATIENT HEALTH QUESTIONNAIRE - PHQ9
1. LITTLE INTEREST OR PLEASURE IN DOING THINGS: NOT AT ALL
SUM OF ALL RESPONSES TO PHQ QUESTIONS 1-9: 0
SUM OF ALL RESPONSES TO PHQ QUESTIONS 1-9: 0
SUM OF ALL RESPONSES TO PHQ9 QUESTIONS 1 & 2: 0
SUM OF ALL RESPONSES TO PHQ QUESTIONS 1-9: 0
SUM OF ALL RESPONSES TO PHQ QUESTIONS 1-9: 0
2. FEELING DOWN, DEPRESSED OR HOPELESS: NOT AT ALL

## 2024-11-07 NOTE — PROGRESS NOTES
[facility] [place]: Arciniega  [phone]    History of Present Illness:   Mr. Dutton is a 74 yo M with CAD, s/p CABG 3/2023 with LIMA to LAD, SVG to OM2 and SVG to PDA.  His echocardiogram on 04/01/2023 demonstrated preserved LV function.  He did have some hypokinesis of his apex, but his ejection fraction was 60%. Fall/fracture of left shoulder 2023, Dr. Mahnaz Bower.     Since last visit overall he has been doing okay.  Cardiac-wise no exertional chest pain.  Breathing has been stable.  No significant palpitations.  He does have a gym membership, but does admit he needs to go actually to the gym more and encouraged this.  His weight is down from 209 to 201 pounds and he has been taking Mounjaro. He is followed by ortho for various knee and shoulder issues.  He is accompanied by Monika.  He is compensated on exam with clear lungs and no lower extremity edema.  Soc hx. Quit tobacco 1980. 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 Monika Dutton  Fam hx. Dad with CAD.  Assessment/Plan:  1. Coronary artery disease, status post bypass with LIMA to LAD, SVG to OM2, SVG to PDA in March 2023.  Contniues stable and compensated and no changes made.  Will have him follow back in six months.  He did not have significant symptoms leading up to his bypass, so it is reasonable to do a stress test for surveillance in the future, possibly in the next year or two.  2. Trauma/fracture of his left shoulder, osteoarthritis in the knee, followed by ortho.  3. Venous insufficiency.  Preserved LV function and the edema has resolved for the most part.  4. Tobacco use. Quit many years ago.  5. Back surgery in 2018 with discectomy.  6. Prostate cancer, status post surgery in 2019.  7. Mixed hyperlipidemia.  Tolerating statin.      He  has a past medical history of Arthritis, BPH (benign prostatic hyperplasia), CAD (coronary artery disease), Cancer (HCC), Cancer (HCC), Chronic pain, Chronic pelvic pain in male, Collagenous colitis, Diabetes mellitus type 2,

## 2025-02-03 DIAGNOSIS — C61 PROSTATE CANCER (HCC): ICD-10-CM

## 2025-02-04 ENCOUNTER — HOSPITAL ENCOUNTER (OUTPATIENT)
Facility: HOSPITAL | Age: 75
Discharge: HOME | End: 2025-02-07
Attending: STUDENT IN AN ORGANIZED HEALTH CARE EDUCATION/TRAINING PROGRAM

## 2025-02-04 VITALS
WEIGHT: 192 LBS | DIASTOLIC BLOOD PRESSURE: 70 MMHG | SYSTOLIC BLOOD PRESSURE: 139 MMHG | HEIGHT: 70 IN | HEART RATE: 71 BPM | BODY MASS INDEX: 27.49 KG/M2

## 2025-02-04 DIAGNOSIS — R97.21 RISING PSA FOLLOWING TREATMENT FOR MALIGNANT NEOPLASM OF PROSTATE: ICD-10-CM

## 2025-02-04 DIAGNOSIS — C61 PROSTATE CANCER (HCC): Primary | ICD-10-CM

## 2025-02-04 RX ORDER — SILDENAFIL CITRATE 20 MG/1
TABLET ORAL
Qty: 30 TABLET | Refills: 3 | Status: SHIPPED | OUTPATIENT
Start: 2025-02-04

## 2025-02-04 ASSESSMENT — PAIN SCALES - GENERAL: PAINLEVEL_OUTOF10: 0

## 2025-02-04 NOTE — PROGRESS NOTES
Cancer [place] at [facility]  [facility]    Radiation Oncology Follow Up  Encounter Date: 02/04/25    Willam Dutton  [id number]  1950     Diagnosis   C61: initally cT1cN0, iPSA 5.88, GG4 (+5/12) prostate cancer s/p RP (4/2/19) with PSMA negative BCR, rPSA 0.424    AJCC Staging has been reviewed.  Oncologic History   12/17/2018: Diagnosed with a cT1cN0, iPSA 5.88, GG4 (+5/12) prostate cancer  04/02/2019: He underwent radical prostatectomy with PLND with Dr. Natan Morrissey with pathology showing a 44g gland with GS 4=3=7 disease involving 20% of the prostate. +EPE, SVI, PNI, no LVSI. Negative margins. 0/3 involved left pelvic nodes,  0/5 involved right pelvic nodes, staged pT3bN0 cM0  05/14/2019: PSA <0.1 (first post-op)  03/10/2020: PSA 0.035 (first detectable)  06/16/2020: PSA 0.140  09/22/2020: PSA 0.194  05/25/2021: PSA 0.188  09/28/2021: PSA 0.183  12/14/2021: PSA 0.241  06/30/2022: PSA 0.211  02/14/2023: PSA 0.290  06/15/2023: PSA 0.260  10/17/2023: PSA 0.424  10/20/2023: PSMA PET was negative for focal PyL binding in the prostatectomy bed, pelvic nodes, bones, or viscera  12/18/2023: Decipher 0.71 (high risk)  02/26/2024: He completed a course of radiation directed at the prostate bed (62.5Gy) and pelvic LN (45Gy) in 25fx with 6mo ADT (began 01/18/24)    Interval History   Mr. Dutton arrives today for follow up about 1 year from end of treatment. Symptomatically doing fair, not having GI issues but does note increased incontinence and weak stream since radiation completion. Also having ongoing ED which is worse than prior to radiation. He had his PSA and testosterone drawn a few days back but results have not returned yet. He will be seeing Dr. Morrissey again in August 2025.    IPSS:  Symptom Score   0 = Not at all   Incomplete Emptying 0   1 = Less than 1 in 5   Frequency 0   2 = Less than half the time    Intermittency 0   3 = About half the time   Urgency 5   4 =  More

## 2025-02-05 LAB — TESTOST FREE SERPL-MCNC: 3.2 PG/ML (ref 6.6–18.1)

## 2025-02-10 LAB
PSA SERPL DL<=0.01 NG/ML-MCNC: <0.006 NG/ML (ref 0–4)
TESTOST FREE SERPL-MCNC: 3.2 PG/ML (ref 6.6–18.1)

## 2025-05-05 ENCOUNTER — OFFICE VISIT (OUTPATIENT)
Age: 75
End: 2025-05-05
Payer: MEDICARE

## 2025-05-05 VITALS
TEMPERATURE: 97.2 F | WEIGHT: 193.4 LBS | SYSTOLIC BLOOD PRESSURE: 94 MMHG | BODY MASS INDEX: 28.64 KG/M2 | HEART RATE: 73 BPM | RESPIRATION RATE: 16 BRPM | OXYGEN SATURATION: 97 % | DIASTOLIC BLOOD PRESSURE: 61 MMHG | HEIGHT: 69 IN

## 2025-05-05 DIAGNOSIS — H61.23 CERUMEN DEBRIS ON TYMPANIC MEMBRANE OF BOTH EARS: Primary | ICD-10-CM

## 2025-05-05 DIAGNOSIS — H69.91 DYSFUNCTION OF RIGHT EUSTACHIAN TUBE: ICD-10-CM

## 2025-05-05 PROCEDURE — 1159F MED LIST DOCD IN RCRD: CPT

## 2025-05-05 PROCEDURE — 3074F SYST BP LT 130 MM HG: CPT

## 2025-05-05 PROCEDURE — 1036F TOBACCO NON-USER: CPT

## 2025-05-05 PROCEDURE — 1123F ACP DISCUSS/DSCN MKR DOCD: CPT

## 2025-05-05 PROCEDURE — 3078F DIAST BP <80 MM HG: CPT

## 2025-05-05 PROCEDURE — G8427 DOCREV CUR MEDS BY ELIG CLIN: HCPCS

## 2025-05-05 PROCEDURE — G8417 CALC BMI ABV UP PARAM F/U: HCPCS

## 2025-05-05 PROCEDURE — 1126F AMNT PAIN NOTED NONE PRSNT: CPT

## 2025-05-05 PROCEDURE — 99213 OFFICE O/P EST LOW 20 MIN: CPT

## 2025-05-05 RX ORDER — METHYLPREDNISOLONE 4 MG/1
TABLET ORAL
Qty: 21 TABLET | Refills: 0 | Status: SHIPPED | OUTPATIENT
Start: 2025-05-05 | End: 2025-05-11

## 2025-05-05 SDOH — ECONOMIC STABILITY: FOOD INSECURITY: WITHIN THE PAST 12 MONTHS, YOU WORRIED THAT YOUR FOOD WOULD RUN OUT BEFORE YOU GOT MONEY TO BUY MORE.: NEVER TRUE

## 2025-05-05 SDOH — ECONOMIC STABILITY: FOOD INSECURITY: WITHIN THE PAST 12 MONTHS, THE FOOD YOU BOUGHT JUST DIDN'T LAST AND YOU DIDN'T HAVE MONEY TO GET MORE.: NEVER TRUE

## 2025-05-05 ASSESSMENT — PATIENT HEALTH QUESTIONNAIRE - PHQ9
SUM OF ALL RESPONSES TO PHQ QUESTIONS 1-9: 0
SUM OF ALL RESPONSES TO PHQ QUESTIONS 1-9: 0
1. LITTLE INTEREST OR PLEASURE IN DOING THINGS: NOT AT ALL
2. FEELING DOWN, DEPRESSED OR HOPELESS: NOT AT ALL
SUM OF ALL RESPONSES TO PHQ QUESTIONS 1-9: 0
SUM OF ALL RESPONSES TO PHQ QUESTIONS 1-9: 0

## 2025-05-05 ASSESSMENT — ENCOUNTER SYMPTOMS
SINUS PRESSURE: 0
COUGH: 0

## 2025-05-05 NOTE — PROGRESS NOTES
Respiratory:  Negative for cough.         Physical Exam  Constitutional:       General: He is not in acute distress.     Appearance: Normal appearance.   HENT:      Head: Normocephalic and atraumatic.      Right Ear: Ear canal and external ear normal.      Left Ear: Tympanic membrane, ear canal and external ear normal.      Ears:      Comments: BL TM obscured by soft-appearing cerumen blocking the bilateral canals, following clearance of cerumen - mild effusion of R TM  Eyes:      General: No scleral icterus.  Cardiovascular:      Rate and Rhythm: Normal rate.   Pulmonary:      Effort: Pulmonary effort is normal.   Neurological:      Mental Status: He is alert.        Vitals:    05/05/25 1439 05/05/25 1442 05/05/25 1443   BP: 91/64 92/61 94/61   Pulse: 73     Resp: 16     Temp: 97.2 °F (36.2 °C)     TempSrc: Temporal     SpO2: 97%     Weight: 87.7 kg (193 lb 6.4 oz)     Height: 1.753 m (5' 9\")          Advised him to call back or return to office if symptoms worsen/change/persist.  Discussed expected course/resolution/complications of diagnosis in detail with patient.  Medication risks/benefits/costs/interactions/alternatives discussed with patient.    Michael Garay MD

## 2025-06-20 ENCOUNTER — TRANSCRIBE ORDERS (OUTPATIENT)
Facility: HOSPITAL | Age: 75
End: 2025-06-20

## 2025-06-20 DIAGNOSIS — M81.0 SENILE OSTEOPOROSIS: Primary | ICD-10-CM

## 2025-06-23 ENCOUNTER — OFFICE VISIT (OUTPATIENT)
Age: 75
End: 2025-06-23
Payer: MEDICARE

## 2025-06-23 VITALS
WEIGHT: 187 LBS | SYSTOLIC BLOOD PRESSURE: 128 MMHG | OXYGEN SATURATION: 95 % | DIASTOLIC BLOOD PRESSURE: 80 MMHG | RESPIRATION RATE: 15 BRPM | BODY MASS INDEX: 27.7 KG/M2 | HEART RATE: 77 BPM | TEMPERATURE: 97.9 F | HEIGHT: 69 IN

## 2025-06-23 DIAGNOSIS — J40 BRONCHITIS: ICD-10-CM

## 2025-06-23 DIAGNOSIS — H66.90 ACUTE OTITIS MEDIA, UNSPECIFIED OTITIS MEDIA TYPE: Primary | ICD-10-CM

## 2025-06-23 PROCEDURE — 99213 OFFICE O/P EST LOW 20 MIN: CPT

## 2025-06-23 PROCEDURE — 1036F TOBACCO NON-USER: CPT

## 2025-06-23 PROCEDURE — 1123F ACP DISCUSS/DSCN MKR DOCD: CPT

## 2025-06-23 PROCEDURE — G8427 DOCREV CUR MEDS BY ELIG CLIN: HCPCS

## 2025-06-23 PROCEDURE — 3079F DIAST BP 80-89 MM HG: CPT

## 2025-06-23 PROCEDURE — 3074F SYST BP LT 130 MM HG: CPT

## 2025-06-23 PROCEDURE — G8417 CALC BMI ABV UP PARAM F/U: HCPCS

## 2025-06-23 PROCEDURE — 1126F AMNT PAIN NOTED NONE PRSNT: CPT

## 2025-06-23 PROCEDURE — 1159F MED LIST DOCD IN RCRD: CPT

## 2025-06-23 RX ORDER — DOXYCYCLINE HYCLATE 100 MG
100 TABLET ORAL 2 TIMES DAILY
Qty: 14 TABLET | Refills: 0 | Status: SHIPPED | OUTPATIENT
Start: 2025-06-23 | End: 2025-06-30

## 2025-06-23 ASSESSMENT — ENCOUNTER SYMPTOMS
CHEST TIGHTNESS: 0
SINUS PRESSURE: 0
SORE THROAT: 0
WHEEZING: 0
SHORTNESS OF BREATH: 0
COUGH: 1

## 2025-06-23 NOTE — PROGRESS NOTES
Willam Dutton is a 74 y.o. male who was seen in clinic today (6/23/2025) for an acute visit.      Assessment & Plan:   Below is the assessment and plan developed based on review of pertinent history, physical exam, labs, studies, and medications.    Assessment & Plan  Acute otitis media, unspecified otitis media type   Acute condition, new, R TM with erythema and effusion, will treat with Doxy BID x7 days in setting of reported PCN allergy and ongoing cough following initial URI c/f superimposed bronchitis.    Orders:    doxycycline hyclate (VIBRA-TABS) 100 MG tablet; Take 1 tablet by mouth 2 times daily for 7 days    Bronchitis   Acute condition, new, Lingering cough following initial cold 2 weeks ago. No dyspnea, fevers, nor chills, and lungs clear on exam. Can obtain follow-up CXR if not responding to abx.    Orders:    doxycycline hyclate (VIBRA-TABS) 100 MG tablet; Take 1 tablet by mouth 2 times daily for 7 days      Subjective/Objective:   Willam was seen today for Cold Symptoms    Presents with lingering cough and right ear fullness 2 weeks after initial cold symptoms. Denies fevers, chills, nor dyspnea. Cough is intermittently productive of sputum, denies sinus pressure though does note some postnasal drip. Right ear feels \"full\", denies pain nor drainage. Seen last month for isolated Eustachian tube dysfunction without URI symptoms that resolved with a course of Methylpred.    Prior to Visit Medications    Medication Sig Taking? Authorizing Provider   doxycycline hyclate (VIBRA-TABS) 100 MG tablet Take 1 tablet by mouth 2 times daily for 7 days Yes Michael Garay MD   sildenafil (REVATIO) 20 MG tablet Take one tab PO 30 min before activity.  If no response can take a second tab.  Can take up to five tabs PO, titrating up by one tab each time. Yes Juan Galindo MD   MOUNJARO 7.5 MG/0.5ML SOAJ ADMINISTER 7.5 MG UNDER THE SKIN WEEKLY AS DIRECTED Yes Provider, MD [place]   metoprolol tartrate

## 2025-06-24 ENCOUNTER — OFFICE VISIT (OUTPATIENT)
Age: 75
End: 2025-06-24
Payer: MEDICARE

## 2025-06-24 VITALS
SYSTOLIC BLOOD PRESSURE: 112 MMHG | DIASTOLIC BLOOD PRESSURE: 72 MMHG | HEART RATE: 66 BPM | WEIGHT: 186.6 LBS | HEIGHT: 69 IN | OXYGEN SATURATION: 97 % | BODY MASS INDEX: 27.64 KG/M2

## 2025-06-24 DIAGNOSIS — I10 BENIGN ESSENTIAL HTN: ICD-10-CM

## 2025-06-24 DIAGNOSIS — I25.810 CORONARY ARTERY DISEASE INVOLVING CORONARY BYPASS GRAFT OF NATIVE HEART WITHOUT ANGINA PECTORIS: Primary | ICD-10-CM

## 2025-06-24 DIAGNOSIS — E78.2 MIXED HYPERLIPIDEMIA: ICD-10-CM

## 2025-06-24 DIAGNOSIS — Z87.891 HISTORY OF TOBACCO USE: ICD-10-CM

## 2025-06-24 PROCEDURE — 3078F DIAST BP <80 MM HG: CPT | Performed by: INTERNAL MEDICINE

## 2025-06-24 PROCEDURE — 99214 OFFICE O/P EST MOD 30 MIN: CPT | Performed by: INTERNAL MEDICINE

## 2025-06-24 PROCEDURE — 1159F MED LIST DOCD IN RCRD: CPT | Performed by: INTERNAL MEDICINE

## 2025-06-24 PROCEDURE — 3074F SYST BP LT 130 MM HG: CPT | Performed by: INTERNAL MEDICINE

## 2025-06-24 PROCEDURE — 1036F TOBACCO NON-USER: CPT | Performed by: INTERNAL MEDICINE

## 2025-06-24 PROCEDURE — G8417 CALC BMI ABV UP PARAM F/U: HCPCS | Performed by: INTERNAL MEDICINE

## 2025-06-24 PROCEDURE — G8427 DOCREV CUR MEDS BY ELIG CLIN: HCPCS | Performed by: INTERNAL MEDICINE

## 2025-06-24 PROCEDURE — G2211 COMPLEX E/M VISIT ADD ON: HCPCS | Performed by: INTERNAL MEDICINE

## 2025-06-24 PROCEDURE — 1123F ACP DISCUSS/DSCN MKR DOCD: CPT | Performed by: INTERNAL MEDICINE

## 2025-06-24 PROCEDURE — 1126F AMNT PAIN NOTED NONE PRSNT: CPT | Performed by: INTERNAL MEDICINE

## 2025-06-24 RX ORDER — SODIUM FLUORIDE 6 MG/ML
PASTE, DENTIFRICE DENTAL
COMMUNITY
Start: 2025-05-27

## 2025-06-24 RX ORDER — IBUPROFEN 600 MG/1
600 TABLET, FILM COATED ORAL EVERY 6 HOURS PRN
COMMUNITY
Start: 2025-06-19

## 2025-06-24 RX ORDER — HYDROCODONE BITARTRATE AND ACETAMINOPHEN 5; 325 MG/1; MG/1
1 TABLET ORAL EVERY 4 HOURS PRN
COMMUNITY
Start: 2025-05-28

## 2025-06-24 RX ORDER — PEN NEEDLE, DIABETIC 32GX 5/32"
NEEDLE, DISPOSABLE MISCELLANEOUS
COMMUNITY
Start: 2025-05-28

## 2025-06-24 RX ORDER — CLINDAMYCIN HYDROCHLORIDE 300 MG/1
CAPSULE ORAL
COMMUNITY
Start: 2025-06-19

## 2025-06-24 ASSESSMENT — PATIENT HEALTH QUESTIONNAIRE - PHQ9
SUM OF ALL RESPONSES TO PHQ QUESTIONS 1-9: 0
1. LITTLE INTEREST OR PLEASURE IN DOING THINGS: NOT AT ALL
2. FEELING DOWN, DEPRESSED OR HOPELESS: NOT AT ALL
SUM OF ALL RESPONSES TO PHQ QUESTIONS 1-9: 0

## 2025-06-24 NOTE — PROGRESS NOTES
1. Have you been to the ER, urgent care clinic since your last visit?  Hospitalized since your last visit?No    2. Have you seen or consulted any other health care providers outside of the [facility] since your last visit?  Include any pap smears or colon screening.  Yes [facility] 06/20/2025 Dr Kylie Howard  
Cataract, Chronic pain, Chronic pelvic pain in male, Collagenous colitis, Diabetes mellitus type 2, controlled, with complications (HCC), Diabetic neuropathy (HCC), Diverticulosis, ED (erectile dysfunction), GERD (gastroesophageal reflux disease), Gout, Hyperlipidemia, Hypertension, Ill-defined condition, Low serum testosterone level, NSTEMI (non-ST elevated myocardial infarction) ([facility]), Osteopenia, Prostate cancer ([facility]), Psoriasis, Sleep apnea, and Sun-damaged skin.    All other systems negative except as above.     PE  Vitals:    06/24/25 1445   BP: 112/72   BP Site: Right Upper Arm   Patient Position: Sitting   BP Cuff Size: Medium Adult   Pulse: 66   SpO2: 97%   Weight: 84.6 kg (186 lb 9.6 oz)   Height: 1.753 m (5' 9\")        Body mass index is 27.56 kg/m².  General appearance - alert, well appearing, and in no distress  Mental status - affect appropriate to mood  Eyes - sclera anicteric, moist mucous membranes  Neck - supple, no JVD  Chest - clear to auscultation, no wheezes, rales or rhonchi  Heart - normal rate, regular rhythm, normal S1, S2, no murmurs, rubs, clicks or gallops  Abdomen - soft, nontender, nondistended, no masses or organomegaly  Extremities - peripheral pulses normal, no pedal edema      Recent Labs:  Lab Results   Component Value Date/Time    CHOL 121 03/31/2023 02:45 AM    HDL 34 03/31/2023 02:45 AM    LDL 13.2 03/31/2023 02:45 AM     No results found for: \"MACIEL\", \"CREAPOC\"  Lab Results   Component Value Date/Time    BUN 12 10/06/2023 09:26 AM     Lab Results   Component Value Date/Time    K 3.7 10/06/2023 09:26 AM     No results found for: \"HBA1C\"    Lab Results   Component Value Date/Time    HGB 13.8 10/05/2023 12:45 AM     Lab Results   Component Value Date/Time     10/05/2023 12:45 AM       Reviewed:  Past Medical History:   Diagnosis Date    Arthritis     BACK    BPH (benign prostatic hyperplasia) 2013    CAD (coronary artery disease)     Cancer ([facility]) 01/2019    PROSTATE

## 2025-07-02 ENCOUNTER — OFFICE VISIT (OUTPATIENT)
Age: 75
End: 2025-07-02
Payer: MEDICARE

## 2025-07-02 VITALS
HEART RATE: 73 BPM | RESPIRATION RATE: 16 BRPM | BODY MASS INDEX: 27.96 KG/M2 | TEMPERATURE: 97.3 F | SYSTOLIC BLOOD PRESSURE: 108 MMHG | DIASTOLIC BLOOD PRESSURE: 69 MMHG | WEIGHT: 188.8 LBS | OXYGEN SATURATION: 94 % | HEIGHT: 69 IN

## 2025-07-02 DIAGNOSIS — H69.91 EUSTACHIAN TUBE DYSFUNCTION, RIGHT: Primary | ICD-10-CM

## 2025-07-02 PROCEDURE — 3078F DIAST BP <80 MM HG: CPT | Performed by: INTERNAL MEDICINE

## 2025-07-02 PROCEDURE — 99213 OFFICE O/P EST LOW 20 MIN: CPT | Performed by: INTERNAL MEDICINE

## 2025-07-02 PROCEDURE — 1123F ACP DISCUSS/DSCN MKR DOCD: CPT | Performed by: INTERNAL MEDICINE

## 2025-07-02 PROCEDURE — 1036F TOBACCO NON-USER: CPT | Performed by: INTERNAL MEDICINE

## 2025-07-02 PROCEDURE — G8427 DOCREV CUR MEDS BY ELIG CLIN: HCPCS | Performed by: INTERNAL MEDICINE

## 2025-07-02 PROCEDURE — 1159F MED LIST DOCD IN RCRD: CPT | Performed by: INTERNAL MEDICINE

## 2025-07-02 PROCEDURE — 1126F AMNT PAIN NOTED NONE PRSNT: CPT | Performed by: INTERNAL MEDICINE

## 2025-07-02 PROCEDURE — 3074F SYST BP LT 130 MM HG: CPT | Performed by: INTERNAL MEDICINE

## 2025-07-02 PROCEDURE — 1160F RVW MEDS BY RX/DR IN RCRD: CPT | Performed by: INTERNAL MEDICINE

## 2025-07-02 PROCEDURE — G8417 CALC BMI ABV UP PARAM F/U: HCPCS | Performed by: INTERNAL MEDICINE

## 2025-07-02 RX ORDER — PREDNISONE 20 MG/1
TABLET ORAL
Qty: 12 TABLET | Refills: 0 | Status: SHIPPED | OUTPATIENT
Start: 2025-07-02 | End: 2025-07-11

## 2025-07-02 ASSESSMENT — ENCOUNTER SYMPTOMS
ABDOMINAL PAIN: 0
SHORTNESS OF BREATH: 0
COUGH: 0

## 2025-07-02 NOTE — PROGRESS NOTES
7/2/2025    Willam Dutton 1950 is a 74 y.o. year old male established patient,   here for evaluation of the following chief complaint(s):  Chief Complaint   Patient presents with    Other     Pt here today to discussed difficulty hearing from left ear           ASSESSMENT/PLAN:  Below is the assessment and plan developed based on review of pertinent history, physical exam, labs, studies, and medications.    1. Eustachian tube dysfunction, right  -     predniSONE (DELTASONE) 20 MG tablet; Take 2 tablets by mouth daily for 4 days, THEN 1 tablet daily for 3 days, THEN 0.5 tablets daily for 2 days., Disp-12 tablet, R-0Normal       Assessment & Plan  Right ear hearing loss  - Persistent hearing loss in the right ear despite completing a course of doxycycline.  - Physical examination reveals no infection, but possible fluid retention in the inner ear.  - Prednisone prescribed on taper, New med dosing and potential side effects discussed  - Referral to an ENT specialist recommended if symptoms persist.    Dental issues.  - Recent tooth extraction, previously prescribed hydrocodone and clindamycin.  - These medications are no longer required and will be removed from the medication list.  - No current dental pain or infection noted.    Blood sugar management.  - Currently using Mounjaro for blood sugar control, with positive results reported.  - Advised to monitor blood sugar levels closely while on prednisone due to potential elevation.  - Dietary adjustments recommended during prednisone treatment to manage blood sugar levels.      No follow-ups on file.       SUBJECTIVE/OBJECTIVE:  History of Present Illness  The patient presents for evaluation of right ear trouble.    He had seen Dr. Guardado last month for ear trouble and cough. Doxycycline was prescribed, which he completed a week ago. Despite this, he continues to experience issues with his right ear, including significant hearing loss. He reports that he can

## 2025-07-11 ENCOUNTER — HOSPITAL ENCOUNTER (OUTPATIENT)
Facility: HOSPITAL | Age: 75
Discharge: HOME | End: 2025-07-14
Attending: INTERNAL MEDICINE
Payer: MEDICARE

## 2025-07-11 DIAGNOSIS — M81.0 SENILE OSTEOPOROSIS: ICD-10-CM

## 2025-07-11 PROCEDURE — 77080 DXA BONE DENSITY AXIAL: CPT

## 2025-08-06 ENCOUNTER — OFFICE VISIT (OUTPATIENT)
Age: 75
End: 2025-08-06
Payer: MEDICARE

## 2025-08-06 VITALS
TEMPERATURE: 97.1 F | WEIGHT: 195.6 LBS | OXYGEN SATURATION: 96 % | RESPIRATION RATE: 16 BRPM | DIASTOLIC BLOOD PRESSURE: 60 MMHG | HEART RATE: 98 BPM | HEIGHT: 69 IN | SYSTOLIC BLOOD PRESSURE: 120 MMHG | BODY MASS INDEX: 28.97 KG/M2

## 2025-08-06 DIAGNOSIS — B35.6 TINEA CRURIS: Primary | ICD-10-CM

## 2025-08-06 PROCEDURE — 1036F TOBACCO NON-USER: CPT | Performed by: INTERNAL MEDICINE

## 2025-08-06 PROCEDURE — 1123F ACP DISCUSS/DSCN MKR DOCD: CPT | Performed by: INTERNAL MEDICINE

## 2025-08-06 PROCEDURE — 1159F MED LIST DOCD IN RCRD: CPT | Performed by: INTERNAL MEDICINE

## 2025-08-06 PROCEDURE — 99213 OFFICE O/P EST LOW 20 MIN: CPT | Performed by: INTERNAL MEDICINE

## 2025-08-06 PROCEDURE — G8417 CALC BMI ABV UP PARAM F/U: HCPCS | Performed by: INTERNAL MEDICINE

## 2025-08-06 PROCEDURE — 3074F SYST BP LT 130 MM HG: CPT | Performed by: INTERNAL MEDICINE

## 2025-08-06 PROCEDURE — 3078F DIAST BP <80 MM HG: CPT | Performed by: INTERNAL MEDICINE

## 2025-08-06 PROCEDURE — G8427 DOCREV CUR MEDS BY ELIG CLIN: HCPCS | Performed by: INTERNAL MEDICINE

## 2025-08-06 RX ORDER — KETOCONAZOLE 20 MG/G
CREAM TOPICAL
Qty: 30 G | Refills: 0 | Status: SHIPPED | OUTPATIENT
Start: 2025-08-06

## (undated) LAB
ANION GAP SERPL CALC-SCNC: 5 MMOL/L (ref 5–15)
APTT PPP: 28.6 SEC (ref 22.1–31)
ATRIAL RATE: 90 BPM
BUN SERPL-MCNC: 18 MG/DL (ref 6–20)
BUN/CREAT SERPL: 20 (ref 12–20)
CALCIUM SERPL-MCNC: 8.6 MG/DL (ref 8.5–10.1)
CALCULATED P AXIS, ECG09: 38 DEGREES
CALCULATED R AXIS, ECG10: -6 DEGREES
CALCULATED T AXIS, ECG11: 43 DEGREES
CHLORIDE SERPL-SCNC: 104 MMOL/L (ref 97–108)
CO2 SERPL-SCNC: 25 MMOL/L (ref 21–32)
CREAT SERPL-MCNC: 0.92 MG/DL (ref 0.7–1.3)
DIAGNOSIS, 93000: (no result)
ERYTHROCYTE [DISTWIDTH] IN BLOOD BY AUTOMATED COUNT: 13.9 % (ref 11.5–14.5)
GLUCOSE BLD STRIP.AUTO-MCNC: 134 MG/DL (ref 65–117)
GLUCOSE BLD STRIP.AUTO-MCNC: 172 MG/DL (ref 65–117)
GLUCOSE BLD STRIP.AUTO-MCNC: 174 MG/DL (ref 65–117)
GLUCOSE BLD STRIP.AUTO-MCNC: 244 MG/DL (ref 65–117)
GLUCOSE SERPL-MCNC: 170 MG/DL (ref 65–100)
HCT VFR BLD AUTO: 35.7 % (ref 36.6–50.3)
HGB BLD-MCNC: 12 G/DL (ref 12.1–17)
INR PPP: 1.1 (ref 0.9–1.1)
MAGNESIUM SERPL-MCNC: 2.1 MG/DL (ref 1.6–2.4)
MCH RBC QN AUTO: 31.2 PG (ref 26–34)
MCHC RBC AUTO-ENTMCNC: 33.6 G/DL (ref 30–36.5)
MCV RBC AUTO: 92.7 FL (ref 80–99)
NRBC # BLD: 0 K/UL (ref 0–0.01)
NRBC BLD-RTO: 0 PER 100 WBC
P-R INTERVAL, ECG05: 156 MS
PLATELET # BLD AUTO: 211 K/UL (ref 150–400)
PMV BLD AUTO: 9.9 FL (ref 8.9–12.9)
POTASSIUM SERPL-SCNC: 4.2 MMOL/L (ref 3.5–5.1)
PROTHROMBIN TIME: 11.1 SEC (ref 9–11.1)
Q-T INTERVAL, ECG07: 372 MS
QRS DURATION, ECG06: 98 MS
QTC CALCULATION (BEZET), ECG08: 455 MS
RBC # BLD AUTO: 3.85 M/UL (ref 4.1–5.7)
SERVICE CMNT-IMP: (no result)
SERVICE CMNT-IMP: (no result)
SERVICE CMNT-IMP: (no result)
SERVICE CMNT-IMP: (no result)
SODIUM SERPL-SCNC: 134 MMOL/L (ref 136–145)
THERAPEUTIC RANGE,PTTT: NORMAL SECS (ref 58–77)
VENTRICULAR RATE, ECG03: 90 BPM
WBC # BLD AUTO: 10.9 K/UL (ref 4.1–11.1)

## (undated) DEVICE — BLADE ASSEMB CLP HAIR FINE --

## (undated) DEVICE — KIT HND CTRL 3 W STPCOCK ROT END 54IN PREM HI PRSS TBNG AT

## (undated) DEVICE — PAD 05IN BASE 3IN PEAK M DENS CONVOLUTED FOAM

## (undated) DEVICE — SUTURE PROL SZ 6-0 L24IN NONABSORBABLE BLU L9.3MM BV-1 3/8 8805H

## (undated) DEVICE — SUTURE VCRL SZ 1 L27IN ABSRB VLT L36MM CT-1 1/2 CIR J341H

## (undated) DEVICE — DRAIN KT WND 10FR RND 400ML --

## (undated) DEVICE — SYSTEM ENDOSCP VES HARV W/ TOOL CANN SEAL SHT PRT BLNT TIP

## (undated) DEVICE — SYR 50ML LR LCK 1ML GRAD NSAF --

## (undated) DEVICE — APPLICATOR BNDG 1MM ADH PREMIERPRO EXOFIN

## (undated) DEVICE — TRAY PREP DRY W/ PREM GLV 2 APPL 6 SPNG 2 UNDPD 1 OVERWRAP

## (undated) DEVICE — SYSTEM SKIN CLOSURE 42CM DERMABOND PRINEO

## (undated) DEVICE — SYR LR LCK 1ML GRAD NSAF 30ML --

## (undated) DEVICE — SUTURE PROL SZ 7-0 L24IN NONABSORBABLE BLU L8MM BV175-6 3/8 8735H

## (undated) DEVICE — GAUZE SPONGES,12 PLY: Brand: CURITY

## (undated) DEVICE — SUTURE PROL SZ 3-0 L30IN NONABSORBABLE BLU SH-1 L22MM 1/2 8762H

## (undated) DEVICE — PLEDGET SUT SFT OVL 3 8X5 16IN

## (undated) DEVICE — Device: Brand: CLEANCUT ROTATING AORTIC PUNCH

## (undated) DEVICE — STERILE POLYISOPRENE POWDER-FREE SURGICAL GLOVES WITH EMOLLIENT COATING: Brand: PROTEXIS

## (undated) DEVICE — GOWN,SIRUS,NONRNF,SETINSLV,XL,20/CS: Brand: MEDLINE

## (undated) DEVICE — Device

## (undated) DEVICE — SUTURE PDS II SZ 1 L27IN ABSRB VLT CT-1 L36MM 1/2 CIR Z341H

## (undated) DEVICE — TUBING HYDR IRR --

## (undated) DEVICE — LAMINECTOMY RICHMOND-LF: Brand: MEDLINE INDUSTRIES, INC.

## (undated) DEVICE — HI-TORQUE VERSACORE MODIFIED J GUIDE WIRE SYSTEM 260 CM: Brand: HI-TORQUE VERSACORE

## (undated) DEVICE — APPLIER CLP L9.375IN APER 2.1MM CLS L3.8MM 20 SM TI CLP

## (undated) DEVICE — SUTURE S STL SZ 6 L18IN NONABSORBABLE SIL L48MM V-40 1/2 M649G

## (undated) DEVICE — VISUALIZATION SYSTEM: Brand: CLEARIFY

## (undated) DEVICE — SPECIAL PROCEDURE DRAPE 32" X 34": Brand: SPECIAL PROCEDURE DRAPE

## (undated) DEVICE — SUTURE VCRL 2-0 L27IN ABSRB UD CP-2 L26MM 1/2 CIR REV CUT J869H

## (undated) DEVICE — SUTURE NONABSORBABLE MONOFILAMENT 7-0 BV-1 1X24 IN PROLENE 8702H

## (undated) DEVICE — ELECTRO LUBE IS A SINGLE PATIENT USE DEVICE THAT IS INTENDED TO BE USED ON ELECTROSURGICAL ELECTRODES TO REDUCE STICKING.: Brand: KEY SURGICAL ELECTRO LUBE

## (undated) DEVICE — TOOL 14MH30 LEGEND 14CM 3MM: Brand: MIDAS REX ™

## (undated) DEVICE — GLOVE SURG SZ 8 CRM LTX FREE POLYISOPRENE POLYMER BEAD ANTI

## (undated) DEVICE — BANDAGE COMPR ELASTIC 5 YDX6 IN

## (undated) DEVICE — SUTURE VCRL SZ 3-0 L27IN ABSRB UD FS-2 L19MM 1/2 CIR J423H

## (undated) DEVICE — KIT,ANTI FOG,W/SPONGE & FLUID,SOFT PACK: Brand: MEDLINE

## (undated) DEVICE — 6 FOOT DISPOSABLE EXTENSION CABLE WITH SAFE CONNECT / SCREW-DOWN

## (undated) DEVICE — OBTRTR BLDELSS 8MM DISP -- DA VINCI - SNGL USE

## (undated) DEVICE — CONNECTOR DRNGE 3/8 1/2X3/16X3/16IN BASE L5MM ARM L10-13MM

## (undated) DEVICE — KIT BLWR MISTER 5P 15L W/ TBNG SET IRRIG MIST TO IMPROVE

## (undated) DEVICE — BAG SPEC REM 224ML W4XL6IN DIA10MM 1 HND GYN DISP ENDOPCH

## (undated) DEVICE — 4-PORT MANIFOLD: Brand: NEPTUNE 2

## (undated) DEVICE — DRAPE MICSCP W46XL120IN POLY DRAWSTRAP W STEREO OBS TB AND

## (undated) DEVICE — SUTURE PROL SZ 4-0 L30IN NONABSORBABLE BLU SH-1 L22MM 1/2 8526H

## (undated) DEVICE — LUER-LOK 360°: Brand: CONNECTA, LUER-LOK

## (undated) DEVICE — SOL INJ SOD CL 0.9% 500ML BG --

## (undated) DEVICE — NDL SPNE QNCKE 18GX3.5IN LF --

## (undated) DEVICE — INFECTION CONTROL KIT SYS

## (undated) DEVICE — SUTURE MCRYL SZ 3-0 L27IN ABSRB UD L19MM PS-2 3/8 CIR PRIM Y427H

## (undated) DEVICE — SNARE ENDO 2.4MMX230CM -- COLD EXACTO

## (undated) DEVICE — Device: Brand: JELCO

## (undated) DEVICE — JELLY,LUBE,STERILE,FLIP TOP,TUBE,4-OZ: Brand: MEDLINE

## (undated) DEVICE — SOLUTION IV 1000ML 140MEQ/L SOD 5MEQ/L K 3MEQ/L MG 27MEQ/L

## (undated) DEVICE — INTENDED TO STANDARDIZE OR CAMERAS TO ALLOW FOR THE USE OF THE OR CAMERA COVER: Brand: ASPEN® O.R. CAMERA COVER

## (undated) DEVICE — 40418 TRENDELENBURG ONE-STEP ARM PROTECTORS LARGE (1 PAIR): Brand: 40418 TRENDELENBURG ONE-STEP ARM PROTECTORS LARGE (1 PAIR)

## (undated) DEVICE — BIPOLAR FORCEPS CORD: Brand: VALLEYLAB

## (undated) DEVICE — CATHETER DIAG 5FR L110CM PIG CRV SZ 6 SIDE H DBL BRAID WIRE

## (undated) DEVICE — SUTURE V-LOC 180 SZ 3-0 L6IN ABSRB VLT CV-23 L17MM 1/2 CIR VLOCM0804

## (undated) DEVICE — SURGICAL PROCEDURE PACK PROSTCTMY DAVINCI BSR RICHMOND

## (undated) DEVICE — 1000ML,PRESSURE INFUSER W/STOPCOCK: Brand: MEDLINE

## (undated) DEVICE — DEVICE SECUREMENT AD W/ TRICOT ANCHR PD FOR F LTX SIL CATH

## (undated) DEVICE — KENDALL SCD EXPRESS SLEEVES, KNEE LENGTH, MEDIUM: Brand: KENDALL SCD

## (undated) DEVICE — NEEDLE INSUF L120MM DIA2MM DISP FOR PNEUMOPERI ENDOPATH

## (undated) DEVICE — AGENT HEMSTAT W4XL4IN OXIDIZED REGENERATED CELOS ABSRB SFT

## (undated) DEVICE — FLOSEAL MATRIX IS INDICATED IN SURGICAL PROCEDURES (OTHER THAN IN OPHTHALMIC) AS AN ADJUNCT TO HEMOSTASIS WHEN CONTROL OF BLEEDING BY LIGATURE OR CONVENTIONALPROCEDURES IS INEFFECTIVE OR IMPRACTICAL.: Brand: FLOSEAL HEMOSTATIC MATRIX

## (undated) DEVICE — FOGARTY - HYDRAGRIP SURGICAL - CLAMP INSERTS: Brand: FOGARTY SOFTJAW

## (undated) DEVICE — (D)PREP SKN CHLRAPRP APPL 26ML -- CONVERT TO ITEM 371833

## (undated) DEVICE — TR BAND RADIAL ARTERY COMPRESSION DEVICE: Brand: TR BAND

## (undated) DEVICE — KIT MED IMAG CNTRST AGNT W/ IOPAMIDOL REUSE

## (undated) DEVICE — KIT DISPOSABLE ACC 4ARM ENDOWRIST DAVINCI

## (undated) DEVICE — GARMENT,MEDLINE,DVT,INT,CALF,MED, GEN2: Brand: MEDLINE

## (undated) DEVICE — REM POLYHESIVE ADULT PATIENT RETURN ELECTRODE: Brand: VALLEYLAB

## (undated) DEVICE — SOLUTION IV 1000ML 0.9% SOD CHL

## (undated) DEVICE — DRAIN,WOUND,ROUND,24FR,5/16",FULL-FLUTED: Brand: MEDLINE

## (undated) DEVICE — WASTE KIT - ST MARY: Brand: MEDLINE INDUSTRIES, INC.

## (undated) DEVICE — SYR 3ML LL TIP 1/10ML GRAD --

## (undated) DEVICE — TIP COVER ACCESSORY

## (undated) DEVICE — 3000CC GUARDIAN II: Brand: GUARDIAN

## (undated) DEVICE — PROVE COVER: Brand: UNBRANDED

## (undated) DEVICE — MONOPOLAR CURVED SCISSORS: Brand: ENDOWRIST

## (undated) DEVICE — FORCEPS BX L240CM JAW DIA2.8MM L CAP W/ NDL MIC MESH TOOTH

## (undated) DEVICE — LARGE NEEDLE DRIVER: Brand: ENDOWRIST;DAVINCI SI

## (undated) DEVICE — DRSG BORDR MPLX HEEL 8.7X9.1IN --

## (undated) DEVICE — INTENDED FOR TISSUE SEPARATION, AND OTHER PROCEDURES THAT REQUIRE A SHARP SURGICAL BLADE TO PUNCTURE OR CUT.: Brand: BARD-PARKER ® CARBON RIB-BACK BLADES

## (undated) DEVICE — OPEN HEART A- RICHMOND: Brand: MEDLINE INDUSTRIES, INC.

## (undated) DEVICE — AIRSEAL 12 MM ACCESS PORT AND PALM GRIP OBTURATOR WITH BLADELESS OPTICAL TIP, 120 MM LENGTH: Brand: AIRSEAL

## (undated) DEVICE — AVID DUAL STAGE VENOUS DRAINAGE CANNULA: Brand: AVID DUAL STAGE VENOUS DRAINAGE CANNULA

## (undated) DEVICE — SUTURE VCRL SZ 0 L36IN ABSRB VLT L36MM CT-1 1/2 CIR J346H

## (undated) DEVICE — SUTURE MCRYL SZ 3-0 L27IN ABSRB UD L24MM PS-1 3/8 CIR PRIM Y936H

## (undated) DEVICE — LONG TIP FORCEPS: Brand: ENDOWRIST;DAVINCI SI

## (undated) DEVICE — TBG INSUFFLATION LUER LOCK: Brand: MEDLINE INDUSTRIES, INC.

## (undated) DEVICE — CLIP INT L12MM POLYMER DISP LAPRO-CLP

## (undated) DEVICE — CATHETER IV 14GA L2IN POLYUR STR ORNG HUB SFTY RADPQ DISP

## (undated) DEVICE — ADHESIVE SKIN CLSR 0.7ML TOP DERMBND ADV

## (undated) DEVICE — DRAPE XR C ARM 41X74IN LF --

## (undated) DEVICE — PACK,BASIC,SIRUS,V: Brand: MEDLINE

## (undated) DEVICE — BLADED TROCAR WITH FIXATION CANNULA: Brand: VERSAONE

## (undated) DEVICE — CANN VES FRE FLO BLNT TIP 3 --

## (undated) DEVICE — KIT MFLD ISOLATN NACL CNTRST PRT TBNG SPIK W/ PRSS TRNSDUC

## (undated) DEVICE — PACK PROCEDURE SURG HRT CATH

## (undated) DEVICE — CATH 5F 100CM JL35 -- DXTERITY

## (undated) DEVICE — PREP SKN PREVAIL 40ML APPL --

## (undated) DEVICE — SOLUTION IV 1000ML PH 7.4 INJ NRMSOL R

## (undated) DEVICE — BANDAGE COMPR M W6INXL10YD WHT BGE VELC E MTRX HK AND LOOP

## (undated) DEVICE — PREP KIT PEEL PTCH POVIDONE IOD

## (undated) DEVICE — 3M™ TEGADERM™ TRANSPARENT FILM DRESSING FRAME STYLE, 1626W, 4 IN X 4-3/4 IN (10 CM X 12 CM), 50/CT 4CT/CASE: Brand: 3M™ TEGADERM™

## (undated) DEVICE — TRI-LUMEN FILTERED TUBE SET WITH ACTIVATED CHARCOAL FILTER: Brand: AIRSEAL

## (undated) DEVICE — ANGIOGRAPHY KIT

## (undated) DEVICE — COVER,MAYO STAND,STERILE: Brand: MEDLINE

## (undated) DEVICE — GLIDESHEATH SLENDER ACCESS KIT: Brand: GLIDESHEATH SLENDER

## (undated) DEVICE — PRESSURE MONITORING SET: Brand: TRUWAVE

## (undated) DEVICE — OPEN HEART B-RICHMOND: Brand: MEDLINE INDUSTRIES, INC.

## (undated) DEVICE — TRAP SURG QUAD PARABOLA SLOT DSGN SFTY SCRN TRAPEASE

## (undated) DEVICE — TRANSFER PK BLD PROD 300ML --

## (undated) DEVICE — TIDISHIELD TRANSPORT, CONTAINMENT COVER FOR BACK TABLE 4'6" (1.37M) TO 8' (2.43M) IN LENGTH: Brand: TIDISHIELD

## (undated) DEVICE — SYR 10ML LUER LOK 1/5ML GRAD --

## (undated) DEVICE — SOLUTION IRRIGATION H2O 0797305] ICU MEDICAL INC]

## (undated) DEVICE — WRAP SURG W1.31XL1.34M CARD FOR PT 165-172CM THERMOWRP

## (undated) DEVICE — BONE WAX WHITE: Brand: BONE WAX WHITE

## (undated) DEVICE — PILLOW POS AD L7IN R FOAM HD REST INTUB SLOT DISP

## (undated) DEVICE — CATH 5F 100CM JR40 -- DXTERITY

## (undated) DEVICE — 3M™ DURAPORE™ SURGICAL TAPE 1538-3, 3 INCH X 10 YARD (7,5CM X 9,1M), 4 ROLLS/BOX: Brand: 3M™ DURAPORE™

## (undated) DEVICE — DEVON™ KNEE AND BODY STRAP 60" X 3" (1.5 M X 7.6 CM): Brand: DEVON

## (undated) DEVICE — BLADE OPHTH W1.5MM 15DEG ORNG HNDL SHRP SHRP DEL FOR CATRCT

## (undated) DEVICE — MARYLAND BIPOLAR FORCEPS: Brand: ENDOWRIST;DAVINCI SI

## (undated) DEVICE — BAG RED 3PLY 2MIL 30X40 IN